# Patient Record
Sex: FEMALE | Race: WHITE | Employment: UNEMPLOYED | URBAN - METROPOLITAN AREA
[De-identification: names, ages, dates, MRNs, and addresses within clinical notes are randomized per-mention and may not be internally consistent; named-entity substitution may affect disease eponyms.]

---

## 2017-01-05 DIAGNOSIS — R07.0 BURNING SENSATION OF THROAT: ICD-10-CM

## 2017-01-05 RX ORDER — PHENOL/SODIUM PHENOLATE
20 AEROSOL, SPRAY (ML) MUCOUS MEMBRANE DAILY
Qty: 30 TAB | Refills: 1 | Status: SHIPPED | OUTPATIENT
Start: 2017-01-05 | End: 2019-08-13 | Stop reason: ALTCHOICE

## 2017-01-05 NOTE — TELEPHONE ENCOUNTER
Requested Prescriptions     Pending Prescriptions Disp Refills    Omeprazole delayed release (PRILOSEC D/R) 20 mg tablet 30 Tab 1     Sig: Take 1 Tab by mouth daily.

## 2017-01-10 ENCOUNTER — TELEPHONE (OUTPATIENT)
Dept: INTERNAL MEDICINE CLINIC | Age: 57
End: 2017-01-10

## 2017-01-13 NOTE — TELEPHONE ENCOUNTER
Denial received from insurance for Omeprazole. Patient contacted and pharmacy faxed insurance decision. Dr Tobias Martínez recommends using OTC omeprazole. Patient expressed understanding.

## 2017-05-09 DIAGNOSIS — E11.9 CONTROLLED TYPE 2 DIABETES MELLITUS WITHOUT COMPLICATION, WITHOUT LONG-TERM CURRENT USE OF INSULIN (HCC): ICD-10-CM

## 2017-05-09 RX ORDER — GLYBURIDE-METFORMIN HYDROCHLORIDE 2.5; 5 MG/1; MG/1
1 TABLET ORAL 2 TIMES DAILY WITH MEALS
Qty: 60 TAB | Refills: 5 | Status: SHIPPED | OUTPATIENT
Start: 2017-05-09 | End: 2017-11-20 | Stop reason: CLARIF

## 2017-05-09 NOTE — TELEPHONE ENCOUNTER
Requested Prescriptions     Pending Prescriptions Disp Refills    glyBURIDE-metFORMIN (GLUCOVANCE) 2.5-500 mg per tablet 60 Tab 5     Sig: Take 1 Tab by mouth two (2) times daily (with meals).

## 2017-05-24 ENCOUNTER — OFFICE VISIT (OUTPATIENT)
Dept: INTERNAL MEDICINE CLINIC | Age: 57
End: 2017-05-24

## 2017-05-24 VITALS
RESPIRATION RATE: 16 BRPM | OXYGEN SATURATION: 99 % | BODY MASS INDEX: 32.14 KG/M2 | WEIGHT: 200 LBS | TEMPERATURE: 97.3 F | DIASTOLIC BLOOD PRESSURE: 76 MMHG | HEART RATE: 93 BPM | SYSTOLIC BLOOD PRESSURE: 128 MMHG | HEIGHT: 66 IN

## 2017-05-24 DIAGNOSIS — I10 ESSENTIAL HYPERTENSION, BENIGN: Primary | ICD-10-CM

## 2017-05-24 DIAGNOSIS — J06.9 UPPER RESPIRATORY TRACT INFECTION, UNSPECIFIED TYPE: ICD-10-CM

## 2017-05-24 RX ORDER — AZITHROMYCIN 250 MG/1
TABLET, FILM COATED ORAL
Qty: 6 TAB | Refills: 0 | Status: SHIPPED | OUTPATIENT
Start: 2017-05-24 | End: 2017-12-04

## 2017-05-24 RX ORDER — CODEINE PHOSPHATE AND GUAIFENESIN 10; 100 MG/5ML; MG/5ML
5 SOLUTION ORAL
Qty: 120 ML | Refills: 0 | Status: SHIPPED | OUTPATIENT
Start: 2017-05-24 | End: 2018-01-16 | Stop reason: ALTCHOICE

## 2017-05-24 RX ORDER — BENAZEPRIL HYDROCHLORIDE AND HYDROCHLOROTHIAZIDE 20; 12.5 MG/1; MG/1
1 TABLET ORAL DAILY
Qty: 30 TAB | Refills: 5 | Status: SHIPPED | OUTPATIENT
Start: 2017-05-24 | End: 2017-12-11 | Stop reason: SDUPTHER

## 2017-05-24 NOTE — PROGRESS NOTES
Derek Montana is a 60 y.o. female presenting today for Nasal Congestion (x1 week)  . HPI:  Derek Montana presents to the office today for fever, rhinorrhea, cough, and sore throat x 1 week. Review of Systems   Constitutional: Positive for fever. HENT: Positive for congestion and sore throat. Respiratory: Positive for cough. Negative for sputum production and shortness of breath. Cardiovascular: Negative for chest pain and palpitations. Gastrointestinal: Negative for nausea and vomiting. Allergies   Allergen Reactions    Clindamycin Hives    Sulfa (Sulfonamide Antibiotics) Unable to Obtain       Current Outpatient Prescriptions   Medication Sig Dispense Refill    benazepril-hydroCHLOROthiazide (LOTENSIN HCT) 20-12.5 mg per tablet Take 1 Tab by mouth daily. 30 Tab 5    azithromycin (ZITHROMAX) 250 mg tablet Take 2 tablets today, then take 1 tablet daily 6 Tab 0    guaiFENesin-codeine (ROBITUSSIN AC) 100-10 mg/5 mL solution Take 5 mL by mouth three (3) times daily as needed for Cough. Max Daily Amount: 15 mL. Do not drive if taking this medication 120 mL 0    glyBURIDE-metFORMIN (GLUCOVANCE) 2.5-500 mg per tablet Take 1 Tab by mouth two (2) times daily (with meals). 60 Tab 5    Omeprazole delayed release (PRILOSEC D/R) 20 mg tablet Take 1 Tab by mouth daily. 30 Tab 1    atorvastatin (LIPITOR) 40 mg tablet 1 tablet daily 30 Tab 5    traMADol (ULTRAM) 50 mg tablet 1 tablet three times per day as needed for ain 90 Tab 3    clotrimazole-betamethasone (LOTRISONE) topical cream Apply to the affected area twice a day for 2 weeks 15 g 0    conjugated estrogens (PREMARIN) 0.625 mg/gram vaginal cream Apply 2 grams at bedtime 45 g 6    nystatin (MYCOSTATIN) topical cream Apply  to affected area two (2) times a day.  45 g 3       Past Medical History:   Diagnosis Date    Essential hypertension, benign     Mixed hyperlipidemia     Type II or unspecified type diabetes mellitus without mention of complication, not stated as uncontrolled        Past Surgical History:   Procedure Laterality Date    HX HYSTERECTOMY      X2       Social History     Social History    Marital status: SINGLE     Spouse name: N/A    Number of children: N/A    Years of education: N/A     Occupational History    Not on file. Social History Main Topics    Smoking status: Never Smoker    Smokeless tobacco: Never Used    Alcohol use 0.0 oz/week     0 Standard drinks or equivalent per week    Drug use: No    Sexual activity: Not Currently     Partners: Male     Other Topics Concern    Not on file     Social History Narrative       Patient does not have an advanced directive on file    Vitals:    05/24/17 0813   BP: 128/76   Pulse: 93   Resp: 16   Temp: 97.3 °F (36.3 °C)   TempSrc: Tympanic   SpO2: 99%   Weight: 200 lb (90.7 kg)   Height: 5' 6\" (1.676 m)   PainSc:   0 - No pain       Physical Exam   Constitutional: No distress. HENT:   Mouth/Throat: No oropharyngeal exudate. Cardiovascular: Normal rate, regular rhythm and normal heart sounds. Pulmonary/Chest: Effort normal and breath sounds normal.   Lymphadenopathy:     She has no cervical adenopathy. Nursing note and vitals reviewed. No visits with results within 3 Month(s) from this visit.   Latest known visit with results is:    Telephone on 08/28/2015   Component Date Value Ref Range Status    Glucose 08/28/2015 323* 65 - 99 mg/dL Final    BUN 08/28/2015 16  6 - 24 mg/dL Final    Creatinine 08/28/2015 0.81  0.57 - 1.00 mg/dL Final    GFR est non-AA 08/28/2015 81  >59 mL/min/1.73 Final    GFR est AA 08/28/2015 94  >59 mL/min/1.73 Final    BUN/Creatinine ratio 08/28/2015 20  9 - 23 Final    Sodium 08/28/2015 137  134 - 144 mmol/L Final    Potassium 08/28/2015 4.5  3.5 - 5.2 mmol/L Final    Chloride 08/28/2015 95* 97 - 108 mmol/L Final    CO2 08/28/2015 25  18 - 29 mmol/L Final    Calcium 08/28/2015 9.0  8.7 - 10.2 mg/dL Final    Protein, total 08/28/2015 7.0  6.0 - 8.5 g/dL Final    Albumin 08/28/2015 4.5  3.5 - 5.5 g/dL Final    GLOBULIN, TOTAL 08/28/2015 2.5  1.5 - 4.5 g/dL Final    A-G Ratio 08/28/2015 1.8  1.1 - 2.5 Final    Bilirubin, total 08/28/2015 0.6  0.0 - 1.2 mg/dL Final    Alk. phosphatase 08/28/2015 135* 39 - 117 IU/L Final    AST (SGOT) 08/28/2015 17  0 - 40 IU/L Final    ALT (SGPT) 08/28/2015 25  0 - 32 IU/L Final    WBC 08/28/2015 9.5  3.4 - 10.8 x10E3/uL Final    RBC 08/28/2015 4.57  3.77 - 5.28 x10E6/uL Final    HGB 08/28/2015 12.8  11.1 - 15.9 g/dL Final    HCT 08/28/2015 39.0  34.0 - 46.6 % Final    MCV 08/28/2015 85  79 - 97 fL Final    MCH 08/28/2015 28.0  26.6 - 33.0 pg Final    MCHC 08/28/2015 32.8  31.5 - 35.7 g/dL Final    RDW 08/28/2015 15.1  12.3 - 15.4 % Final    PLATELET 72/23/6497 617  150 - 379 x10E3/uL Final       .No results found for any visits on 05/24/17. Assessment / Plan:      ICD-10-CM ICD-9-CM    1. Essential hypertension, benign I10 401.1 benazepril-hydroCHLOROthiazide (LOTENSIN HCT) 20-12.5 mg per tablet   2. Upper respiratory tract infection, unspecified type J06.9 465.9 azithromycin (ZITHROMAX) 250 mg tablet      guaiFENesin-codeine (ROBITUSSIN AC) 100-10 mg/5 mL solution     Claritin OTC  Zpak rx  Cheratussin rx   HTN- controlled    Follow-up Disposition:  Return if symptoms worsen or fail to improve. I asked the patient if she  had any questions and answered her  questions.   The patient stated that she understands the treatment plan and agrees with the treatment plan

## 2017-06-05 NOTE — TELEPHONE ENCOUNTER
Requested Prescriptions     Pending Prescriptions Disp Refills    atorvastatin (LIPITOR) 40 mg tablet 30 Tab 5     Si tablet daily

## 2017-06-06 RX ORDER — ATORVASTATIN CALCIUM 40 MG/1
TABLET, FILM COATED ORAL
Qty: 30 TAB | Refills: 5 | Status: SHIPPED | OUTPATIENT
Start: 2017-06-06 | End: 2018-01-16 | Stop reason: SDUPTHER

## 2017-08-16 ENCOUNTER — CLINICAL SUPPORT (OUTPATIENT)
Dept: INTERNAL MEDICINE CLINIC | Age: 57
End: 2017-08-16

## 2017-08-16 DIAGNOSIS — Z23 ENCOUNTER FOR IMMUNIZATION: Primary | ICD-10-CM

## 2017-08-16 NOTE — PROGRESS NOTES
Ariadna Berrios denies any symptoms , reactions or allergies that would exclude them from being immunized today. Influenza vaccine administered per order. Risks and adverse reactions were discussed and the VIS was given to them. All questions were addressed. She was observed for 15 min post injection. There were no reactions observed. Patient left office ambulatory.

## 2017-08-16 NOTE — PATIENT INSTRUCTIONS
Vaccine Information Statement    Influenza (Flu) Vaccine (Inactivated or Recombinant): What you need to know    Many Vaccine Information Statements are available in [place]ic and other languages. See www.immunize.org/vis  Hojas de Información Sobre Vacunas están disponibles en Español y en muchos otros idiomas. Visite www.immunize.org/vis    1. Why get vaccinated? Influenza (flu) is a contagious disease that spreads around the [place] every year, usually between October and May. Flu is caused by influenza viruses, and is spread mainly by coughing, sneezing, and close contact. Anyone can get flu. Flu strikes suddenly and can last several days. Symptoms vary by age, but can include:   fever/chills   sore throat   muscle aches   fatigue   cough   headache    runny or stuffy nose    Flu can also lead to pneumonia and blood infections, and cause diarrhea and seizures in children. If you have a medical condition, such as heart or lung disease, flu can make it worse. Flu is more dangerous for some people. Infants and young children, people 72years of age and older, pregnant women, and people with certain health conditions or a weakened immune system are at greatest risk. Each year thousands of people in the [facility] die from flu, and many more are hospitalized. Flu vaccine can:   keep you from getting flu,   make flu less severe if you do get it, and   keep you from spreading flu to your family and other people. 2. Inactivated and recombinant flu vaccines    A dose of flu vaccine is recommended every flu season. Children 6 months through 6years of age may need two doses during the same flu season. Everyone else needs only one dose each flu season.        Some inactivated flu vaccines contain a very small amount of a mercury-based preservative called thimerosal. Studies have not shown thimerosal in vaccines to be harmful, but flu vaccines that do not contain thimerosal are available. There is no live flu virus in flu shots. They cannot cause the flu. There are many flu viruses, and they are always changing. Each year a new flu vaccine is made to protect against three or four viruses that are likely to cause disease in the upcoming flu season. But even when the vaccine doesnt exactly match these viruses, it may still provide some protection    Flu vaccine cannot prevent:   flu that is caused by a virus not covered by the vaccine, or   illnesses that look like flu but are not. It takes about 2 weeks for protection to develop after vaccination, and protection lasts through the flu season. 3. Some people should not get this vaccine    Tell the person who is giving you the vaccine:     If you have any severe, life-threatening allergies. If you ever had a life-threatening allergic reaction after a dose of flu vaccine, or have a severe allergy to any part of this vaccine, you may be advised not to get vaccinated. Most, but not all, types of flu vaccine contain a small amount of egg protein.  If you ever had Guillain-Barré Syndrome (also called GBS). Some people with a history of GBS should not get this vaccine. This should be discussed with your doctor.  If you are not feeling well. It is usually okay to get flu vaccine when you have a mild illness, but you might be asked to come back when you feel better. 4. Risks of a vaccine reaction    With any medicine, including vaccines, there is a chance of reactions. These are usually mild and go away on their own, but serious reactions are also possible. Most people who get a flu shot do not have any problems with it.      Minor problems following a flu shot include:    soreness, redness, or swelling where the shot was given     hoarseness   sore, red or itchy eyes   cough   fever   aches   headache   itching   fatigue  If these problems occur, they usually begin soon after the shot and last 1 or 2 days. More serious problems following a flu shot can include the following:     There may be a small increased risk of Guillain-Barré Syndrome (GBS) after inactivated flu vaccine. This risk has been estimated at 1 or 2 additional cases per million people vaccinated. This is much lower than the risk of severe complications from flu, which can be prevented by flu vaccine.  Young children who get the flu shot along with pneumococcal vaccine (PCV13) and/or DTaP vaccine at the same time might be slightly more likely to have a seizure caused by fever. Ask your doctor for more information. Tell your doctor if a child who is getting flu vaccine has ever had a seizure. Problems that could happen after any injected vaccine:      People sometimes faint after a medical procedure, including vaccination. Sitting or lying down for about 15 minutes can help prevent fainting, and injuries caused by a fall. Tell your doctor if you feel dizzy, or have vision changes or ringing in the ears.  Some people get severe pain in the shoulder and have difficulty moving the arm where a shot was given. This happens very rarely.  Any medication can cause a severe allergic reaction. Such reactions from a vaccine are very rare, estimated at about 1 in a million doses, and would happen within a few minutes to a few hours after the vaccination. As with any medicine, there is a very remote chance of a vaccine causing a serious injury or death. The safety of vaccines is always being monitored. For more information, visit: www.cdc.gov/vaccinesafety/    5. What if there is a serious reaction? What should I look for?  Look for anything that concerns you, such as signs of a severe allergic reaction, very high fever, or unusual behavior.     Signs of a severe allergic reaction can include hives, swelling of the face and throat, difficulty breathing, a fast heartbeat, dizziness, and weakness - usually within a few minutes to a few hours after the vaccination. What should I do?  If you think it is a severe allergic reaction or other emergency that cant wait, call 9-1-1 and get the person to the nearest hospital. Otherwise, call your doctor.  Reactions should be reported to the Vaccine Adverse Event Reporting System (VAERS). Your doctor should file this report, or you can do it yourself through  the VAERS web site at www.vaers. [facility].gov, or by calling 4-290.378.7450. VAERS does not give medical advice. 6. The National Vaccine Injury Compensation Program    The [facility] Vaccine Injury Compensation Program (VICP) is a federal program that was created to compensate people who may have been injured by certain vaccines. Persons who believe they may have been injured by a vaccine can learn about the program and about filing a claim by calling 1-288.969.3038 or visiting the [employer] website at www.[facility].gov/vaccinecompensation. There is a time limit to file a claim for compensation. 7. How can I learn more?  Ask your healthcare provider. He or she can give you the vaccine package insert or suggest other sources of information.  Call your local or state health department.  Contact the Centers for Disease Control and Prevention (CDC):  - Call 0-974.440.3728 (1-800-CDC-INFO) or  - Visit CDCs website at www.cdc.gov/flu    Vaccine Information Statement   Inactivated Influenza Vaccine   8/7/2015  42 UTino Watson Avers [id number]    Department of Health and Human Services  Centers for Disease Control and Prevention    Office Use Only

## 2017-11-20 ENCOUNTER — OFFICE VISIT (OUTPATIENT)
Dept: INTERNAL MEDICINE CLINIC | Age: 57
End: 2017-11-20

## 2017-11-20 VITALS
RESPIRATION RATE: 18 BRPM | BODY MASS INDEX: 30.86 KG/M2 | DIASTOLIC BLOOD PRESSURE: 84 MMHG | WEIGHT: 192 LBS | OXYGEN SATURATION: 99 % | HEART RATE: 93 BPM | SYSTOLIC BLOOD PRESSURE: 120 MMHG | TEMPERATURE: 98.3 F | HEIGHT: 66 IN

## 2017-11-20 DIAGNOSIS — Z11.59 ENCOUNTER FOR HEPATITIS C SCREENING TEST FOR LOW RISK PATIENT: ICD-10-CM

## 2017-11-20 DIAGNOSIS — I10 ESSENTIAL HYPERTENSION, BENIGN: ICD-10-CM

## 2017-11-20 DIAGNOSIS — N32.81 OAB (OVERACTIVE BLADDER): ICD-10-CM

## 2017-11-20 DIAGNOSIS — E78.2 MIXED HYPERLIPIDEMIA: ICD-10-CM

## 2017-11-20 DIAGNOSIS — Z12.11 COLON CANCER SCREENING: ICD-10-CM

## 2017-11-20 DIAGNOSIS — B37.31 VULVAL CANDIDIASIS: ICD-10-CM

## 2017-11-20 DIAGNOSIS — L30.4 INTERTRIGO: Primary | ICD-10-CM

## 2017-11-20 DIAGNOSIS — E11.9 TYPE 2 DIABETES MELLITUS WITHOUT COMPLICATION, WITHOUT LONG-TERM CURRENT USE OF INSULIN (HCC): ICD-10-CM

## 2017-11-20 DIAGNOSIS — Z12.39 BREAST CANCER SCREENING: ICD-10-CM

## 2017-11-20 LAB — HBA1C MFR BLD HPLC: 13.1 %

## 2017-11-20 RX ORDER — METFORMIN HYDROCHLORIDE 1000 MG/1
1000 TABLET ORAL 2 TIMES DAILY
Qty: 60 TAB | Refills: 2 | Status: SHIPPED | OUTPATIENT
Start: 2017-11-20 | End: 2018-02-20 | Stop reason: ALTCHOICE

## 2017-11-20 RX ORDER — NYSTATIN 100000 U/G
CREAM TOPICAL 2 TIMES DAILY
Qty: 15 G | Refills: 0 | Status: SHIPPED | OUTPATIENT
Start: 2017-11-20 | End: 2018-01-02 | Stop reason: SDUPTHER

## 2017-11-20 RX ORDER — PIOGLITAZONEHYDROCHLORIDE 15 MG/1
15 TABLET ORAL DAILY
Qty: 30 TAB | Refills: 1 | Status: SHIPPED | OUTPATIENT
Start: 2017-11-20 | End: 2018-01-16 | Stop reason: ALTCHOICE

## 2017-11-20 RX ORDER — GLIMEPIRIDE 2 MG/1
2 TABLET ORAL 2 TIMES DAILY
Qty: 60 TAB | Refills: 1 | Status: SHIPPED | OUTPATIENT
Start: 2017-11-20 | End: 2018-01-16 | Stop reason: SDUPTHER

## 2017-11-20 RX ORDER — OXYBUTYNIN CHLORIDE 5 MG/1
5 TABLET ORAL 3 TIMES DAILY
Qty: 90 TAB | Refills: 2 | Status: SHIPPED | OUTPATIENT
Start: 2017-11-20 | End: 2018-01-16 | Stop reason: ALTCHOICE

## 2017-11-20 RX ORDER — DOXYLAMINE SUCCINATE 25 MG
TABLET ORAL 2 TIMES DAILY
Qty: 15 G | Refills: 0 | Status: SHIPPED | OUTPATIENT
Start: 2017-11-20 | End: 2018-01-02

## 2017-11-20 NOTE — PROGRESS NOTES
Patient presents for   Chief Complaint   Patient presents with    Rash     groin and breast     Fall risk assessment was not indicated. Depression screening was not indicated Follow up questions were not indicated. 1. Have you been to the ER, urgent care clinic since your last visit? Hospitalized since your last visit? No    2. Have you seen or consulted any other health care providers outside of the [address] [place] [address] since your last visit? Include any pap smears or colon screening. No     Poc Hemoglobin A1C performed per provider order, provider made aware of results.

## 2017-11-20 NOTE — PROGRESS NOTES
Harshal Tavarez is a 60 y.o. female presenting today for Rash (groin and breast)  . HPI:  Harshal Tavarez presents to the office today for rash in the groin and breast area x 2 weeks. Patient noted she has \"urinary drainage\" a lot. She notes she wears a pad and has to change it several times a day. She is also complaining of a rash between the folds of her breast.     Review of Systems   Respiratory: Negative for cough. Cardiovascular: Negative for chest pain and palpitations. Skin: Positive for rash. Allergies   Allergen Reactions    Clindamycin Hives    Sulfa (Sulfonamide Antibiotics) Unable to Obtain       Current Outpatient Prescriptions   Medication Sig Dispense Refill    nystatin (MYCOSTATIN) topical cream Apply  to affected area two (2) times a day. Underneath Breast 15 g 0    oxybutynin (DITROPAN) 5 mg tablet Take 1 Tab by mouth three (3) times daily. 90 Tab 2    miconazole (MICOTIN) 2 % topical cream Apply  to affected area two (2) times a day. Vaginal area 15 g 0    metFORMIN (GLUCOPHAGE) 1,000 mg tablet Take 1 Tab by mouth two (2) times a day. 60 Tab 2    glimepiride (AMARYL) 2 mg tablet Take 1 Tab by mouth two (2) times a day. 60 Tab 1    pioglitazone (ACTOS) 15 mg tablet Take 1 Tab by mouth daily. 30 Tab 1    atorvastatin (LIPITOR) 40 mg tablet 1 tablet daily 30 Tab 5    benazepril-hydroCHLOROthiazide (LOTENSIN HCT) 20-12.5 mg per tablet Take 1 Tab by mouth daily. 30 Tab 5    Omeprazole delayed release (PRILOSEC D/R) 20 mg tablet Take 1 Tab by mouth daily. 30 Tab 1    traMADol (ULTRAM) 50 mg tablet 1 tablet three times per day as needed for ain 90 Tab 3    conjugated estrogens (PREMARIN) 0.625 mg/gram vaginal cream Apply 2 grams at bedtime 45 g 6    azithromycin (ZITHROMAX) 250 mg tablet Take 2 tablets today, then take 1 tablet daily 6 Tab 0    guaiFENesin-codeine (ROBITUSSIN AC) 100-10 mg/5 mL solution Take 5 mL by mouth three (3) times daily as needed for Cough.  Max Daily Amount: 15 mL. Do not drive if taking this medication 120 mL 0    nystatin (MYCOSTATIN) topical cream Apply  to affected area two (2) times a day. 45 g 3       Past Medical History:   Diagnosis Date    Essential hypertension, benign     Mixed hyperlipidemia     Type II or unspecified type diabetes mellitus without mention of complication, not stated as uncontrolled        Past Surgical History:   Procedure Laterality Date    HX HYSTERECTOMY      X2       Social History     Social History    Marital status: SINGLE     Spouse name: N/A    Number of children: N/A    Years of education: N/A     Occupational History    Not on file. Social History Main Topics    Smoking status: Never Smoker    Smokeless tobacco: Never Used    Alcohol use 0.0 oz/week     0 Standard drinks or equivalent per week    Drug use: No    Sexual activity: Not Currently     Partners: Male     Other Topics Concern    Not on file     Social History Narrative       Patient does not have an advanced directive on file    Vitals:    11/20/17 0939 11/20/17 1023   BP: 150/76 120/84   Pulse: 93    Resp: 18    Temp: 98.3 °F (36.8 °C)    TempSrc: Tympanic    SpO2: 99%    Weight: 192 lb (87.1 kg)    Height: 5' 6\" (1.676 m)    PainSc:   0 - No pain        Physical Exam   Cardiovascular: Normal rate and regular rhythm. Pulmonary/Chest: Effort normal and breath sounds normal.       Abdominal: Soft. Genitourinary: Vulva exhibits rash (erythematous). Nursing note and vitals reviewed. Office Visit on 11/20/2017   Component Date Value Ref Range Status    Hemoglobin A1c (POC) 11/20/2017 13.1  % Final       .  Results for orders placed or performed in visit on 11/20/17   AMB POC HEMOGLOBIN A1C   Result Value Ref Range    Hemoglobin A1c (POC) 13.1 %       Assessment / Plan:      ICD-10-CM ICD-9-CM    1. Intertrigo L30.4 695.89 nystatin (MYCOSTATIN) topical cream      AMB POC HEMOGLOBIN A1C   2.  Essential hypertension, benign I10 443.7 METABOLIC PANEL, COMPREHENSIVE      CBC WITH AUTOMATED DIFF   3. OAB (overactive bladder) N32.81 596.51 oxybutynin (DITROPAN) 5 mg tablet   4. Vulval candidiasis B37.3 112.1 miconazole (MICOTIN) 2 % topical cream      AMB POC HEMOGLOBIN A1C      REFERRAL TO OBSTETRICS AND GYNECOLOGY   5. Breast cancer screening Z12.31 V76.10 BINTA MAMMO BI SCREENING INCL CAD   6. Colon cancer screening Z12.11 V76.51 REFERRAL TO GASTROENTEROLOGY   7. Mixed hyperlipidemia E78.2 272.2 LIPID PANEL   8. Type 2 diabetes mellitus without complication, without long-term current use of insulin (HCC) E11.9 250.00 MICROALBUMIN, UR, RAND W/ MICROALBUMIN/CREA RATIO      metFORMIN (GLUCOPHAGE) 1,000 mg tablet      glimepiride (AMARYL) 2 mg tablet      pioglitazone (ACTOS) 15 mg tablet   9. Encounter for hepatitis C screening test for low risk patient Z11.59 V73.89 HEPATITIS C AB     Mycostatin for the breast  Miconazole for the vaginal region  Ditropan rx given  Hgb A1C done  Referral to GYN  Fasting labs ordered  Mammo ordered  Colonoscopy ordered  Patient needs fasting labs next week  Hgb A1c- 13.1     Medication changes      Repeat in 3 months    Follow-up Disposition:  Return in about 1 week (around 11/27/2017), or if symptoms worsen or fail to improve. I asked the patient if she  had any questions and answered her  questions. The patient stated that she understands the treatment plan and agrees with the treatment plan    Discussed the patient's BMI with her. The BMI follow up plan is as follows:     dietary management education, guidance, and counseling  encourage exercise  monitor weight    An After Visit Summary was printed and given to the patient.

## 2017-11-20 NOTE — MR AVS SNAPSHOT
Visit Information Date & Time Provider Department Dept. Phone Encounter #  
 11/20/2017  9:45 AM Elle Kelly NP [facility] INTERNAL MEDICINE [facility] [phone] [id number] Follow-up Instructions Return in about 1 week (around 11/27/2017), or if symptoms worsen or fail to improve. Follow-up and Disposition History Upcoming Health Maintenance Date Due Hepatitis C Screening 1960 HEMOGLOBIN A1C Q6M 1958 LIPID PANEL Q1 1958 FOOT EXAM Q1 12/16/1968 MICROALBUMIN Q1 12/16/1968 EYE EXAM RETINAL OR DILATED Q1 12/16/1968 Pneumococcal 19-64 Medium Risk (1 of 1 - PPSV23) 12/16/1977 DTaP/Tdap/Td series (1 - Tdap) 12/16/1979 PAP AKA CERVICAL CYTOLOGY 12/16/1979 BREAST CANCER SCRN MAMMOGRAM 12/16/2008 FOBT Q 1 YEAR AGE 50-75 12/16/2008 Allergies as of 11/20/2017  Review Complete On: 11/20/2017 By: Elle Kelly NP Severity Noted Reaction Type Reactions Clindamycin High 05/24/2016    Hives Sulfa (Sulfonamide Antibiotics) Medium   Unable to Obtain Current Immunizations  Reviewed on 8/16/2017 Name Date Influenza Vaccine (Quad) PF 8/16/2017 Not reviewed this visit You Were Diagnosed With   
  
 Codes Comments Intertrigo    -  Primary ICD-10-CM: L30.4 ICD-9-CM: 695.89 Essential hypertension, benign     ICD-10-CM: I10 
ICD-9-CM: 401.1   
 OAB (overactive bladder)     ICD-10-CM: N32.81 ICD-9-CM: 596.51 Vulval candidiasis     ICD-10-CM: B37.3 ICD-9-CM: 112.1 Breast cancer screening     ICD-10-CM: Z12.31 
ICD-9-CM: V76.10 Colon cancer screening     ICD-10-CM: Z12.11 ICD-9-CM: V76.51 Mixed hyperlipidemia     ICD-10-CM: E78.2 ICD-9-CM: 272.2 Type 2 diabetes mellitus without complication, without long-term current use of insulin (HCC)     ICD-10-CM: E11.9 ICD-9-CM: 250.00  Encounter for hepatitis C screening test for low risk patient     ICD-10-CM: Z11.59 
 ICD-9-CM: V73.89 Vitals BP Pulse Temp Resp Height(growth percentile) Weight(growth percentile) 120/84 93 98.3 °F (36.8 °C) (Tympanic) 18 5' 6\" (1.676 m) 192 lb (87.1 kg) SpO2 BMI OB Status Smoking Status 99% 30.99 kg/m2 Hysterectomy Never Smoker Vitals History BMI and BSA Data Body Mass Index Body Surface Area 30.99 kg/m 2 2.01 m 2 Preferred Pharmacy Pharmacy Name [facility] [address] [place] [address] [phone] Your Updated Medication List  
  
   
This list is accurate as of: 11/20/17 11:32 AM.  Always use your most recent med list.  
  
  
  
  
 atorvastatin 40 mg tablet Commonly known as:  LIPITOR  
1 tablet daily  
  
 azithromycin 250 mg tablet Commonly known as:  Marline Albert Take 2 tablets today, then take 1 tablet daily  
  
 benazepril-hydroCHLOROthiazide 20-12.5 mg per tablet Commonly known as:  LOTENSIN HCT Take 1 Tab by mouth daily. conjugated estrogens 0.625 mg/gram vaginal cream  
Commonly known as:  PREMARIN Apply 2 grams at bedtime  
  
 glimepiride 2 mg tablet Commonly known as:  AMARYL Take 1 Tab by mouth two (2) times a day. guaiFENesin-codeine 100-10 mg/5 mL solution Commonly known as:  ROBITUSSIN AC Take 5 mL by mouth three (3) times daily as needed for Cough. Max Daily Amount: 15 mL. Do not drive if taking this medication  
  
 metFORMIN 1,000 mg tablet Commonly known as:  GLUCOPHAGE Take 1 Tab by mouth two (2) times a day. miconazole 2 % topical cream  
Commonly known as:  [place]  Apply  to affected area two (2) times a day. Vaginal area * nystatin topical cream  
Commonly known as:  MYCOSTATIN Apply  to affected area two (2) times a day. * nystatin topical cream  
Commonly known as:  MYCOSTATIN Apply  to affected area two (2) times a day. Underneath Breast  
  
 Omeprazole delayed release 20 mg tablet Commonly known as:  PRILOSEC D/R  
 Take 1 Tab by mouth daily. oxybutynin 5 mg tablet Commonly known as:  GJVDKABR Take 1 Tab by mouth three (3) times daily. pioglitazone 15 mg tablet Commonly known as:  ACTOS Take 1 Tab by mouth daily. traMADol 50 mg tablet Commonly known as:  ULTRAM  
1 tablet three times per day as needed for ain * Notice: This list has 2 medication(s) that are the same as other medications prescribed for you. Read the directions carefully, and ask your doctor or other care provider to review them with you. Prescriptions Sent to Pharmacy Refills  
 nystatin (MYCOSTATIN) topical cream 0 Sig: Apply  to affected area two (2) times a day. Underneath Breast  
 Class: Normal  
 Pharmacy: [facility] [place] [address] Ph #: [phone] Route: Topical  
 oxybutynin (DITROPAN) 5 mg tablet 2 Sig: Take 1 Tab by mouth three (3) times daily. Class: Normal  
 Pharmacy: [facility] [place] [address] Ph #: [phone] Route: Oral  
 miconazole (MICOTIN) 2 % topical cream 0 Sig: Apply  to affected area two (2) times a day. Vaginal area Class: Normal  
 Pharmacy: [facility] [place] [address] Ph #: [phone] Route: Topical  
 metFORMIN (GLUCOPHAGE) 1,000 mg tablet 2 Sig: Take 1 Tab by mouth two (2) times a day. Class: Normal  
 Pharmacy: [facility] [place] [address] Ph #: [phone] Route: Oral  
 glimepiride (AMARYL) 2 mg tablet 1 Sig: Take 1 Tab by mouth two (2) times a day. Class: Normal  
 Pharmacy: [facility] [place] [address] Ph #: [phone] Route: Oral  
 pioglitazone (ACTOS) 15 mg tablet 1 Sig: Take 1 Tab by mouth daily. Class: Normal  
 Pharmacy: [facility] [place] [address] Ph #: [phone] Route: Oral  
  
We Performed the Following AMB POC HEMOGLOBIN A1C [71446 CPT(R)] REFERRAL TO GASTROENTEROLOGY [KPK41 Custom] Comments:  
 Colon cancer screening REFERRAL TO OBSTETRICS AND GYNECOLOGY [REF51 Custom] Comments:  
 Vulva rash and routine exam  
  
Follow-up Instructions Return in about 1 week (around 11/27/2017), or if symptoms worsen or fail to improve. To-Do List   
 11/20/2017 Lab:  CBC WITH AUTOMATED DIFF   
  
 11/20/2017 Lab:  HEPATITIS C AB   
  
 11/20/2017 Lab:  LIPID PANEL   
  
 11/20/2017 Imaging:  BINTA MAMMO BI SCREENING INCL CAD   
  
 11/20/2017 Lab:  METABOLIC PANEL, COMPREHENSIVE   
  
 11/20/2017 Lab:  MICROALBUMIN, UR, RAND W/ MICROALBUMIN/CREA RATIO Referral Information Referral ID Referred By Referred To  
  
 [id number] Bharat Soni MD   
   [address] [place] [address] [address] [place] [address], [address] [place] [address]. Phone: [phone] Fax: [phone] Visits Status Start Date End Date 1 New Request 11/20/17 11/20/18 If your referral has a status of pending review or denied, additional information will be sent to support the outcome of this decision. Referral ID Referred By Referred To  
 [id number] Hayden [address] [place], [place] [address]MD [address] [facility][1], [address] Phone: [phone] Fax: [phone] Visits Status Start Date End Date 1 New Request 11/20/17 11/20/18 If your referral has a status of pending review or denied, additional information will be sent to support the outcome of this decision. Patient Instructions Body Mass Index: Care Instructions Your Care Instructions Body mass index (BMI) can help you see if your weight is raising your risk for health problems. It uses a formula to compare how much you weigh with how tall you are. · A BMI lower than 18.5 is considered underweight. · A BMI between 18.5 and 24.9 is considered healthy. · A BMI between 25 and 29.9 is considered overweight. A BMI of 30 or higher is considered obese. If your BMI is in the normal range, it means that you have a lower risk for weight-related health problems. If your BMI is in the overweight or obese range, you may be at increased risk for weight-related health problems, such as high blood pressure, heart disease, stroke, arthritis or joint pain, and diabetes. If your BMI is in the underweight range, you may be at increased risk for health problems such as fatigue, lower protection (immunity) against illness, muscle loss, bone loss, hair loss, and hormone problems. BMI is just one measure of your risk for weight-related health problems. You may be at higher risk for health problems if you are not active, you eat an unhealthy diet, or you drink too much alcohol or use tobacco products. Follow-up care is a key part of your treatment and safety. Be sure to make and go to all appointments, and call your doctor if you are having problems. It's also a good idea to know your test results and keep a list of the medicines you take. How can you care for yourself at home? · Practice healthy eating habits. This includes eating plenty of fruits, vegetables, whole grains, lean protein, and low-fat dairy. · If your doctor recommends it, get more exercise. Walking is a good choice. Bit by bit, increase the amount you walk every day. Try for at least 30 minutes on most days of the week. · Do not smoke. Smoking can increase your risk for health problems. If you need help quitting, talk to your doctor about stop-smoking programs and medicines. These can increase your chances of quitting for good. · Limit alcohol to 2 drinks a day for men and 1 drink a day for women. Too much alcohol can cause health problems. If you have a BMI higher than 25 · Your doctor may do other tests to check your risk for weight-related health problems.  This may include measuring the distance around your waist. A waist measurement of more than 40 inches in men or 35 inches in women can increase the risk of weight-related health problems. · Talk with your doctor about steps you can take to stay healthy or improve your health. You may need to make lifestyle changes to lose weight and stay healthy, such as changing your diet and getting regular exercise. If you have a BMI lower than 18.5 · Your doctor may do other tests to check your risk for health problems. · Talk with your doctor about steps you can take to stay healthy or improve your health. You may need to make lifestyle changes to gain or maintain weight and stay healthy, such as getting more healthy foods in your diet and doing exercises to build muscle. Where can you learn more? Go to http://lyn-misty.info/. Enter S176 in the search box to learn more about \"Body Mass Index: Care Instructions. \" Current as of: October 13, 2016 Content Version: 11.4 © 6530-3905 [employer]. Care instructions adapted under license by [employer] (which disclaims liability or warranty for this information). If you have questions about a medical condition or this instruction, always ask your healthcare professional. [place] [address] any warranty or liability for your use of this information. Introducing [facility]! Miroslava Stahl introduces [facility] patient portal. Now you can access parts of your medical record, email your doctor's office, and request medication refills online. 1. In your internet browser, go to [email or web address] [employer]. [employer] / [employer] 2. Click on the First Time User? Click Here link in the Sign In box. You will see the New Member Sign Up page. 3. Enter your [facility] Access Code exactly as it appears below. You will not need to use this code after youve completed the sign-up process. If you do not sign up before the expiration date, you must request a new code. · [employer] Access Code: [email or web address] Expires: 2/18/2018 11:32 AM 
 
4. Enter the last four digits of your Social Security Number (xxxx) and Date of Birth (mm/dd/yyyy) as indicated and click Submit. You will be taken to the next sign-up page. 5. Create a [employer] ID. This will be your [employer] login ID and cannot be changed, so think of one that is secure and easy to remember. 6. Create a [employer] password. You can change your password at any time. 7. Enter your Password Reset Question and Answer. This can be used at a later time if you forget your password. 8. Enter your e-mail address. You will receive e-mail notification when new information is available in [address] 9. Click Sign Up. You can now view and download portions of your medical record. 10. Click the Download Summary menu link to download a portable copy of your medical information. If you have questions, please visit the Frequently Asked Questions section of the [employer] website. Remember, [employer] is NOT to be used for urgent needs. For medical emergencies, dial 911. Now available from your iPhone and Android! Please provide this summary of care documentation to your next provider. Your primary care clinician is listed as Marie Drake. If you have any questions after today's visit, please call [phone].

## 2017-11-20 NOTE — PATIENT INSTRUCTIONS
Body Mass Index: Care Instructions  Your Care Instructions    Body mass index (BMI) can help you see if your weight is raising your risk for health problems. It uses a formula to compare how much you weigh with how tall you are. · A BMI lower than 18.5 is considered underweight. · A BMI between 18.5 and 24.9 is considered healthy. · A BMI between 25 and 29.9 is considered overweight. A BMI of 30 or higher is considered obese. If your BMI is in the normal range, it means that you have a lower risk for weight-related health problems. If your BMI is in the overweight or obese range, you may be at increased risk for weight-related health problems, such as high blood pressure, heart disease, stroke, arthritis or joint pain, and diabetes. If your BMI is in the underweight range, you may be at increased risk for health problems such as fatigue, lower protection (immunity) against illness, muscle loss, bone loss, hair loss, and hormone problems. BMI is just one measure of your risk for weight-related health problems. You may be at higher risk for health problems if you are not active, you eat an unhealthy diet, or you drink too much alcohol or use tobacco products. Follow-up care is a key part of your treatment and safety. Be sure to make and go to all appointments, and call your doctor if you are having problems. It's also a good idea to know your test results and keep a list of the medicines you take. How can you care for yourself at home? · Practice healthy eating habits. This includes eating plenty of fruits, vegetables, whole grains, lean protein, and low-fat dairy. · If your doctor recommends it, get more exercise. Walking is a good choice. Bit by bit, increase the amount you walk every day. Try for at least 30 minutes on most days of the week. · Do not smoke. Smoking can increase your risk for health problems. If you need help quitting, talk to your doctor about stop-smoking programs and medicines. These can increase your chances of quitting for good. · Limit alcohol to 2 drinks a day for men and 1 drink a day for women. Too much alcohol can cause health problems. If you have a BMI higher than 25  · Your doctor may do other tests to check your risk for weight-related health problems. This may include measuring the distance around your waist. A waist measurement of more than 40 inches in men or 35 inches in women can increase the risk of weight-related health problems. · Talk with your doctor about steps you can take to stay healthy or improve your health. You may need to make lifestyle changes to lose weight and stay healthy, such as changing your diet and getting regular exercise. If you have a BMI lower than 18.5  · Your doctor may do other tests to check your risk for health problems. · Talk with your doctor about steps you can take to stay healthy or improve your health. You may need to make lifestyle changes to gain or maintain weight and stay healthy, such as getting more healthy foods in your diet and doing exercises to build muscle. Where can you learn more? Go to http://lyn-misty.info/. Enter S176 in the search box to learn more about \"Body Mass Index: Care Instructions. \"  Current as of: October 13, 2016  Content Version: 11.4  © 5569-1267 [employer]. Care instructions adapted under license by [employer] (which disclaims liability or warranty for this information). If you have questions about a medical condition or this instruction, always ask your healthcare professional. [address] any warranty or liability for your use of this information.

## 2017-11-27 ENCOUNTER — LAB ONLY (OUTPATIENT)
Dept: INTERNAL MEDICINE CLINIC | Age: 57
End: 2017-11-27

## 2017-11-27 DIAGNOSIS — I10 ESSENTIAL HYPERTENSION, BENIGN: Primary | ICD-10-CM

## 2017-11-27 DIAGNOSIS — E78.2 MIXED HYPERLIPIDEMIA: ICD-10-CM

## 2017-11-27 DIAGNOSIS — Z11.59 ENCOUNTER FOR HEPATITIS C SCREENING TEST FOR LOW RISK PATIENT: ICD-10-CM

## 2017-11-27 DIAGNOSIS — E11.9 TYPE 2 DIABETES MELLITUS WITHOUT COMPLICATION, WITHOUT LONG-TERM CURRENT USE OF INSULIN (HCC): ICD-10-CM

## 2017-11-28 LAB
ALBUMIN SERPL-MCNC: 4.4 G/DL (ref 3.5–5.5)
ALBUMIN/CREAT UR: 14.4 MG/G CREAT (ref 0–30)
ALBUMIN/GLOB SERPL: 1.8 {RATIO} (ref 1.2–2.2)
ALP SERPL-CCNC: 159 IU/L (ref 39–117)
ALT SERPL-CCNC: 19 IU/L (ref 0–32)
AST SERPL-CCNC: 9 IU/L (ref 0–40)
BASOPHILS # BLD AUTO: 0 X10E3/UL (ref 0–0.2)
BASOPHILS NFR BLD AUTO: 0 %
BILIRUB SERPL-MCNC: 0.6 MG/DL (ref 0–1.2)
BUN SERPL-MCNC: 17 MG/DL (ref 6–24)
BUN/CREAT SERPL: 24 (ref 9–23)
CALCIUM SERPL-MCNC: 9 MG/DL (ref 8.7–10.2)
CHLORIDE SERPL-SCNC: 93 MMOL/L (ref 96–106)
CHOLEST SERPL-MCNC: 136 MG/DL (ref 100–199)
CO2 SERPL-SCNC: 27 MMOL/L (ref 18–29)
CREAT SERPL-MCNC: 0.72 MG/DL (ref 0.57–1)
CREAT UR-MCNC: 45.8 MG/DL
EOSINOPHIL # BLD AUTO: 0.1 X10E3/UL (ref 0–0.4)
EOSINOPHIL NFR BLD AUTO: 1 %
ERYTHROCYTE [DISTWIDTH] IN BLOOD BY AUTOMATED COUNT: 14.2 % (ref 12.3–15.4)
GFR SERPLBLD CREATININE-BSD FMLA CKD-EPI: 107 ML/MIN/1.73
GFR SERPLBLD CREATININE-BSD FMLA CKD-EPI: 93 ML/MIN/1.73
GLOBULIN SER CALC-MCNC: 2.5 G/DL (ref 1.5–4.5)
GLUCOSE SERPL-MCNC: 297 MG/DL (ref 65–99)
HCT VFR BLD AUTO: 38.8 % (ref 34–46.6)
HCV AB S/CO SERPL IA: 0.1 S/CO RATIO (ref 0–0.9)
HDLC SERPL-MCNC: 39 MG/DL
HGB BLD-MCNC: 12.5 G/DL (ref 11.1–15.9)
IMM GRANULOCYTES # BLD: 0 X10E3/UL (ref 0–0.1)
IMM GRANULOCYTES NFR BLD: 0 %
LDLC SERPL CALC-MCNC: 56 MG/DL (ref 0–99)
LYMPHOCYTES # BLD AUTO: 2.3 X10E3/UL (ref 0.7–3.1)
LYMPHOCYTES NFR BLD AUTO: 23 %
MCH RBC QN AUTO: 28 PG (ref 26.6–33)
MCHC RBC AUTO-ENTMCNC: 32.2 G/DL (ref 31.5–35.7)
MCV RBC AUTO: 87 FL (ref 79–97)
MICROALBUMIN UR-MCNC: 6.6 UG/ML
MONOCYTES # BLD AUTO: 0.5 X10E3/UL (ref 0.1–0.9)
MONOCYTES NFR BLD AUTO: 5 %
NEUTROPHILS # BLD AUTO: 7 X10E3/UL (ref 1.4–7)
NEUTROPHILS NFR BLD AUTO: 71 %
PLATELET # BLD AUTO: 240 X10E3/UL (ref 150–379)
POTASSIUM SERPL-SCNC: 4.3 MMOL/L (ref 3.5–5.2)
PROT SERPL-MCNC: 6.9 G/DL (ref 6–8.5)
RBC # BLD AUTO: 4.47 X10E6/UL (ref 3.77–5.28)
SODIUM SERPL-SCNC: 140 MMOL/L (ref 134–144)
TRIGL SERPL-MCNC: 203 MG/DL (ref 0–149)
VLDLC SERPL CALC-MCNC: 41 MG/DL (ref 5–40)
WBC # BLD AUTO: 10 X10E3/UL (ref 3.4–10.8)

## 2017-11-30 NOTE — PROGRESS NOTES
Please let the patient know her labs are ok. Please confirm she is taking her cholesterol med. She should limit fried, fatty food and increase activity.

## 2017-12-04 ENCOUNTER — OFFICE VISIT (OUTPATIENT)
Dept: INTERNAL MEDICINE CLINIC | Age: 57
End: 2017-12-04

## 2017-12-04 VITALS
RESPIRATION RATE: 18 BRPM | OXYGEN SATURATION: 97 % | WEIGHT: 196 LBS | HEIGHT: 66 IN | DIASTOLIC BLOOD PRESSURE: 70 MMHG | TEMPERATURE: 97.8 F | BODY MASS INDEX: 31.5 KG/M2 | SYSTOLIC BLOOD PRESSURE: 118 MMHG | HEART RATE: 92 BPM

## 2017-12-04 DIAGNOSIS — I10 ESSENTIAL HYPERTENSION, BENIGN: ICD-10-CM

## 2017-12-04 DIAGNOSIS — J06.9 ACUTE URI: Primary | ICD-10-CM

## 2017-12-04 RX ORDER — AZITHROMYCIN 250 MG/1
TABLET, FILM COATED ORAL
Qty: 6 TAB | Refills: 0 | Status: SHIPPED | OUTPATIENT
Start: 2017-12-04 | End: 2017-12-29

## 2017-12-04 RX ORDER — CODEINE PHOSPHATE AND GUAIFENESIN 10; 100 MG/5ML; MG/5ML
5 SOLUTION ORAL
Qty: 120 ML | Refills: 0 | Status: SHIPPED | OUTPATIENT
Start: 2017-12-04 | End: 2018-01-02 | Stop reason: SDUPTHER

## 2017-12-04 NOTE — PROGRESS NOTES
Mary Alfonso is a 60 y.o. female presenting today for Nasal Congestion  . HPI:  Mary Alfonso presents to the office today for sore throat, nasal congestion, non-productive cough x 1 week. Review of Systems   HENT: Positive for congestion and sore throat. Respiratory: Positive for cough. Negative for sputum production and shortness of breath. Cardiovascular: Negative for chest pain and palpitations. Allergies   Allergen Reactions    Clindamycin Hives    Sulfa (Sulfonamide Antibiotics) Unable to Obtain       Current Outpatient Prescriptions   Medication Sig Dispense Refill    azithromycin (ZITHROMAX) 250 mg tablet Take 2 tablets today, then take 1 tablet daily 6 Tab 0    guaiFENesin-codeine (ROBITUSSIN AC) 100-10 mg/5 mL solution Take 5 mL by mouth three (3) times daily as needed for Cough. Max Daily Amount: 15 mL. Do not drive if taking this medication 120 mL 0    nystatin (MYCOSTATIN) topical cream Apply  to affected area two (2) times a day. Underneath Breast 15 g 0    oxybutynin (DITROPAN) 5 mg tablet Take 1 Tab by mouth three (3) times daily. 90 Tab 2    miconazole (MICOTIN) 2 % topical cream Apply  to affected area two (2) times a day. Vaginal area 15 g 0    metFORMIN (GLUCOPHAGE) 1,000 mg tablet Take 1 Tab by mouth two (2) times a day. 60 Tab 2    glimepiride (AMARYL) 2 mg tablet Take 1 Tab by mouth two (2) times a day. 60 Tab 1    pioglitazone (ACTOS) 15 mg tablet Take 1 Tab by mouth daily. 30 Tab 1    atorvastatin (LIPITOR) 40 mg tablet 1 tablet daily 30 Tab 5    benazepril-hydroCHLOROthiazide (LOTENSIN HCT) 20-12.5 mg per tablet Take 1 Tab by mouth daily. 30 Tab 5    Omeprazole delayed release (PRILOSEC D/R) 20 mg tablet Take 1 Tab by mouth daily.  30 Tab 1    traMADol (ULTRAM) 50 mg tablet 1 tablet three times per day as needed for ain 90 Tab 3    conjugated estrogens (PREMARIN) 0.625 mg/gram vaginal cream Apply 2 grams at bedtime 45 g 6    guaiFENesin-codeine (ROBITUSSIN AC) 100-10 mg/5 mL solution Take 5 mL by mouth three (3) times daily as needed for Cough. Max Daily Amount: 15 mL. Do not drive if taking this medication 120 mL 0    nystatin (MYCOSTATIN) topical cream Apply  to affected area two (2) times a day. 45 g 3       Past Medical History:   Diagnosis Date    Essential hypertension, benign     Mixed hyperlipidemia     Type II or unspecified type diabetes mellitus without mention of complication, not stated as uncontrolled        Past Surgical History:   Procedure Laterality Date    HX HYSTERECTOMY      X2       Social History     Social History    Marital status: SINGLE     Spouse name: N/A    Number of children: N/A    Years of education: N/A     Occupational History    Not on file. Social History Main Topics    Smoking status: Never Smoker    Smokeless tobacco: Never Used    Alcohol use 0.0 oz/week     0 Standard drinks or equivalent per week    Drug use: No    Sexual activity: Not Currently     Partners: Male     Other Topics Concern    Not on file     Social History Narrative       Patient does not have an advanced directive on file    Vitals:    12/04/17 1226   BP: 118/70   Pulse: 92   Resp: 18   Temp: 97.8 °F (36.6 °C)   TempSrc: Tympanic   SpO2: 97%   Weight: 196 lb (88.9 kg)   Height: 5' 6\" (1.676 m)   PainSc:   8   PainLoc: Throat       Physical Exam   HENT:   Right Ear: External ear normal.   Left Ear: External ear normal.   Mouth/Throat: No oropharyngeal exudate. Cardiovascular: Normal rate, regular rhythm and normal heart sounds. Pulmonary/Chest: Effort normal and breath sounds normal.   Lymphadenopathy:     She has no cervical adenopathy. Nursing note and vitals reviewed.       Office Visit on 11/20/2017   Component Date Value Ref Range Status    Hemoglobin A1c (POC) 11/20/2017 13.1  % Final    WBC 11/27/2017 10.0  3.4 - 10.8 x10E3/uL Final    RBC 11/27/2017 4.47  3.77 - 5.28 x10E6/uL Final    HGB 11/27/2017 12.5  11.1 - 15.9 g/dL Final    Comment: **Effective December 4, 2017 the reference interval**    for Hemoglobin MALES only will be changing to: Males 13-15 years: 12.6 - 17.7                          Males   >15 years: 13.0 - 17.7      HCT 11/27/2017 38.8  34.0 - 46.6 % Final    MCV 11/27/2017 87  79 - 97 fL Final    MCH 11/27/2017 28.0  26.6 - 33.0 pg Final    MCHC 11/27/2017 32.2  31.5 - 35.7 g/dL Final    RDW 11/27/2017 14.2  12.3 - 15.4 % Final    PLATELET 40/23/7248 149  150 - 379 x10E3/uL Final    NEUTROPHILS 11/27/2017 71  Not Estab. % Final    Lymphocytes 11/27/2017 23  Not Estab. % Final    MONOCYTES 11/27/2017 5  Not Estab. % Final    EOSINOPHILS 11/27/2017 1  Not Estab. % Final    BASOPHILS 11/27/2017 0  Not Estab. % Final    ABS. NEUTROPHILS 11/27/2017 7.0  1.4 - 7.0 x10E3/uL Final    Abs Lymphocytes 11/27/2017 2.3  0.7 - 3.1 x10E3/uL Final    ABS. MONOCYTES 11/27/2017 0.5  0.1 - 0.9 x10E3/uL Final    ABS. EOSINOPHILS 11/27/2017 0.1  0.0 - 0.4 x10E3/uL Final    ABS. BASOPHILS 11/27/2017 0.0  0.0 - 0.2 x10E3/uL Final    IMMATURE GRANULOCYTES 11/27/2017 0  Not Estab. % Final    ABS. IMM.  GRANS. 11/27/2017 0.0  0.0 - 0.1 x10E3/uL Final    Glucose 11/27/2017 297* 65 - 99 mg/dL Final    BUN 11/27/2017 17  6 - 24 mg/dL Final    Creatinine 11/27/2017 0.72  0.57 - 1.00 mg/dL Final    GFR est non-AA 11/27/2017 93  >59 mL/min/1.73 Final    GFR est AA 11/27/2017 107  >59 mL/min/1.73 Final    BUN/Creatinine ratio 11/27/2017 24* 9 - 23 Final    Sodium 11/27/2017 140  134 - 144 mmol/L Final    Potassium 11/27/2017 4.3  3.5 - 5.2 mmol/L Final    Chloride 11/27/2017 93* 96 - 106 mmol/L Final    CO2 11/27/2017 27  18 - 29 mmol/L Final    Calcium 11/27/2017 9.0  8.7 - 10.2 mg/dL Final    Protein, total 11/27/2017 6.9  6.0 - 8.5 g/dL Final    Albumin 11/27/2017 4.4  3.5 - 5.5 g/dL Final    GLOBULIN, TOTAL 11/27/2017 2.5  1.5 - 4.5 g/dL Final    A-G Ratio 11/27/2017 1.8  1.2 - 2.2 Final    Bilirubin, total 11/27/2017 0.6  0.0 - 1.2 mg/dL Final    Alk. phosphatase 11/27/2017 159* 39 - 117 IU/L Final    AST (SGOT) 11/27/2017 9  0 - 40 IU/L Final    ALT (SGPT) 11/27/2017 19  0 - 32 IU/L Final    Cholesterol, total 11/27/2017 136  100 - 199 mg/dL Final    Triglyceride 11/27/2017 203* 0 - 149 mg/dL Final    HDL Cholesterol 11/27/2017 39* >39 mg/dL Final    VLDL, calculated 11/27/2017 41* 5 - 40 mg/dL Final    LDL, calculated 11/27/2017 56  0 - 99 mg/dL Final    Creatinine, urine 11/27/2017 45.8  Not Estab. mg/dL Final    Microalbumin, urine 11/27/2017 6.6  Not Estab. ug/mL Final    Microalb/Creat ratio (ug/mg creat.) 11/27/2017 14.4  0.0 - 30.0 mg/g creat Final    Hep C Virus Ab 11/27/2017 0.1  0.0 - 0.9 s/co ratio Final    Comment:                                   Negative:     < 0.8                               Indeterminate: 0.8 - 0.9                                    Positive:     > 0.9   The CDC recommends that a positive HCV antibody result   be followed up with a HCV Nucleic Acid Amplification   test (214001). .No results found for any visits on 12/04/17. Assessment / Plan:      ICD-10-CM ICD-9-CM    1. Acute URI J06.9 465.9 azithromycin (ZITHROMAX) 250 mg tablet      guaiFENesin-codeine (ROBITUSSIN AC) 100-10 mg/5 mL solution   2. Essential hypertension, benign I10 401.1      zpak rx  Cheratussin rx  HTN- controlled  Lab work reviewed  BMI discussed    Follow-up Disposition:  Return if symptoms worsen or fail to improve. I asked the patient if she  had any questions and answered her  questions. The patient stated that she understands the treatment plan and agrees with the treatment plan    Discussed the patient's BMI with her. The BMI follow up plan is as follows:     dietary management education, guidance, and counseling  encourage exercise  monitor weight    An After Visit Summary was printed and given to the patient.

## 2017-12-04 NOTE — PROGRESS NOTES
Patient presents for   Chief Complaint   Patient presents with    Nasal Congestion     Fall risk assessment was not indicated. Depression screening was not indicated Follow up questions were not indicated. 1. Have you been to the ER, urgent care clinic since your last visit? Hospitalized since your last visit? No    2. Have you seen or consulted any other health care providers outside of the [address] [place] [address] since your last visit? Include any pap smears or colon screening.  No

## 2017-12-04 NOTE — PATIENT INSTRUCTIONS
Body Mass Index: Care Instructions  Your Care Instructions    Body mass index (BMI) can help you see if your weight is raising your risk for health problems. It uses a formula to compare how much you weigh with how tall you are. · A BMI lower than 18.5 is considered underweight. · A BMI between 18.5 and 24.9 is considered healthy. · A BMI between 25 and 29.9 is considered overweight. A BMI of 30 or higher is considered obese. If your BMI is in the normal range, it means that you have a lower risk for weight-related health problems. If your BMI is in the overweight or obese range, you may be at increased risk for weight-related health problems, such as high blood pressure, heart disease, stroke, arthritis or joint pain, and diabetes. If your BMI is in the underweight range, you may be at increased risk for health problems such as fatigue, lower protection (immunity) against illness, muscle loss, bone loss, hair loss, and hormone problems. BMI is just one measure of your risk for weight-related health problems. You may be at higher risk for health problems if you are not active, you eat an unhealthy diet, or you drink too much alcohol or use tobacco products. Follow-up care is a key part of your treatment and safety. Be sure to make and go to all appointments, and call your doctor if you are having problems. It's also a good idea to know your test results and keep a list of the medicines you take. How can you care for yourself at home? · Practice healthy eating habits. This includes eating plenty of fruits, vegetables, whole grains, lean protein, and low-fat dairy. · If your doctor recommends it, get more exercise. Walking is a good choice. Bit by bit, increase the amount you walk every day. Try for at least 30 minutes on most days of the week. · Do not smoke. Smoking can increase your risk for health problems. If you need help quitting, talk to your doctor about stop-smoking programs and medicines. These can increase your chances of quitting for good. · Limit alcohol to 2 drinks a day for men and 1 drink a day for women. Too much alcohol can cause health problems. If you have a BMI higher than 25  · Your doctor may do other tests to check your risk for weight-related health problems. This may include measuring the distance around your waist. A waist measurement of more than 40 inches in men or 35 inches in women can increase the risk of weight-related health problems. · Talk with your doctor about steps you can take to stay healthy or improve your health. You may need to make lifestyle changes to lose weight and stay healthy, such as changing your diet and getting regular exercise. If you have a BMI lower than 18.5  · Your doctor may do other tests to check your risk for health problems. · Talk with your doctor about steps you can take to stay healthy or improve your health. You may need to make lifestyle changes to gain or maintain weight and stay healthy, such as getting more healthy foods in your diet and doing exercises to build muscle. Where can you learn more? Go to http://lyn-misty.info/. Enter S176 in the search box to learn more about \"Body Mass Index: Care Instructions. \"  Current as of: October 13, 2016  Content Version: 11.4  © 4641-8104 [employer]. Care instructions adapted under license by [employer] (which disclaims liability or warranty for this information). If you have questions about a medical condition or this instruction, always ask your healthcare professional. [address] any warranty or liability for your use of this information.

## 2017-12-11 DIAGNOSIS — I10 ESSENTIAL HYPERTENSION, BENIGN: ICD-10-CM

## 2017-12-11 RX ORDER — BENAZEPRIL HYDROCHLORIDE AND HYDROCHLOROTHIAZIDE 20; 12.5 MG/1; MG/1
1 TABLET ORAL DAILY
Qty: 30 TAB | Refills: 5 | Status: SHIPPED | OUTPATIENT
Start: 2017-12-11 | End: 2017-12-18 | Stop reason: SDUPTHER

## 2017-12-11 NOTE — TELEPHONE ENCOUNTER
Requested Prescriptions     Pending Prescriptions Disp Refills    benazepril-hydroCHLOROthiazide (LOTENSIN HCT) 20-12.5 mg per tablet 30 Tab 5     Sig: Take 1 Tab by mouth daily.

## 2017-12-14 ENCOUNTER — TELEPHONE (OUTPATIENT)
Dept: INTERNAL MEDICINE CLINIC | Age: 57
End: 2017-12-14

## 2017-12-14 NOTE — TELEPHONE ENCOUNTER
Contacted Toni Damon and informed Her of lab results for labs drawn on 11/27/2017 per Alanna Willis NP's request. Toni Damon expressed understanding.

## 2017-12-14 NOTE — TELEPHONE ENCOUNTER
----- Message from Sylwia Shanks NP sent at 11/30/2017  2:30 PM EST -----  Please let the patient know her labs are ok. Please confirm she is taking her cholesterol med. She should limit fried, fatty food and increase activity.

## 2017-12-18 DIAGNOSIS — I10 ESSENTIAL HYPERTENSION, BENIGN: ICD-10-CM

## 2017-12-19 RX ORDER — BENAZEPRIL HYDROCHLORIDE AND HYDROCHLOROTHIAZIDE 20; 12.5 MG/1; MG/1
1 TABLET ORAL DAILY
Qty: 30 TAB | Refills: 5 | Status: SHIPPED | OUTPATIENT
Start: 2017-12-19 | End: 2018-01-16 | Stop reason: ALTCHOICE

## 2017-12-29 ENCOUNTER — OFFICE VISIT (OUTPATIENT)
Dept: INTERNAL MEDICINE CLINIC | Age: 57
End: 2017-12-29

## 2017-12-29 ENCOUNTER — HOSPITAL ENCOUNTER (OUTPATIENT)
Dept: GENERAL RADIOLOGY | Age: 57
Discharge: HOME | End: 2017-12-29
Payer: MEDICAID

## 2017-12-29 ENCOUNTER — TELEPHONE (OUTPATIENT)
Dept: INTERNAL MEDICINE CLINIC | Age: 57
End: 2017-12-29

## 2017-12-29 VITALS
HEART RATE: 95 BPM | WEIGHT: 189 LBS | TEMPERATURE: 97.6 F | OXYGEN SATURATION: 99 % | DIASTOLIC BLOOD PRESSURE: 80 MMHG | SYSTOLIC BLOOD PRESSURE: 138 MMHG | RESPIRATION RATE: 16 BRPM | BODY MASS INDEX: 30.37 KG/M2 | HEIGHT: 66 IN

## 2017-12-29 DIAGNOSIS — E88.81 METABOLIC SYNDROME: ICD-10-CM

## 2017-12-29 DIAGNOSIS — R05.9 COUGH: Primary | ICD-10-CM

## 2017-12-29 DIAGNOSIS — R05.9 COUGH: ICD-10-CM

## 2017-12-29 PROCEDURE — 71020 XR CHEST PA LAT: CPT

## 2017-12-29 RX ORDER — AZITHROMYCIN 250 MG/1
TABLET, FILM COATED ORAL
Qty: 6 TAB | Refills: 0 | Status: SHIPPED | OUTPATIENT
Start: 2017-12-29 | End: 2018-01-02

## 2017-12-29 RX ORDER — LOSARTAN POTASSIUM AND HYDROCHLOROTHIAZIDE 12.5; 1 MG/1; MG/1
1 TABLET ORAL DAILY
Qty: 30 TAB | Refills: 1 | Status: SHIPPED | OUTPATIENT
Start: 2017-12-29 | End: 2018-02-20 | Stop reason: SDUPTHER

## 2017-12-29 NOTE — PROGRESS NOTES
HPI:   Recurrent NP cough x 7 days with sinusitis; no fever, SOB  Given zpak earlier this mo and requests it again    ROS is otherwise negative. Past Medical History:   Diagnosis Date    Essential hypertension, benign     Mixed hyperlipidemia     Type II or unspecified type diabetes mellitus without mention of complication, not stated as uncontrolled        Past Surgical History:   Procedure Laterality Date    HX HYSTERECTOMY      X2       Social History     Social History    Marital status: SINGLE     Spouse name: N/A    Number of children: N/A    Years of education: N/A     Occupational History    Not on file. Social History Main Topics    Smoking status: Never Smoker    Smokeless tobacco: Never Used    Alcohol use 0.0 oz/week     0 Standard drinks or equivalent per week    Drug use: No    Sexual activity: Not Currently     Partners: Male     Other Topics Concern    Not on file     Social History Narrative       Allergies   Allergen Reactions    Clindamycin Hives    Sulfa (Sulfonamide Antibiotics) Unable to Obtain       Family History   Problem Relation Age of Onset    Diabetes Mother     Stroke Maternal Grandmother     Stroke Maternal Grandfather        Current Outpatient Prescriptions   Medication Sig Dispense Refill    benazepril-hydroCHLOROthiazide (LOTENSIN HCT) 20-12.5 mg per tablet Take 1 Tab by mouth daily. 30 Tab 5    guaiFENesin-codeine (ROBITUSSIN AC) 100-10 mg/5 mL solution Take 5 mL by mouth three (3) times daily as needed for Cough. Max Daily Amount: 15 mL. Do not drive if taking this medication 120 mL 0    nystatin (MYCOSTATIN) topical cream Apply  to affected area two (2) times a day. Underneath Breast 15 g 0    oxybutynin (DITROPAN) 5 mg tablet Take 1 Tab by mouth three (3) times daily. 90 Tab 2    miconazole (MICOTIN) 2 % topical cream Apply  to affected area two (2) times a day.  Vaginal area 15 g 0    metFORMIN (GLUCOPHAGE) 1,000 mg tablet Take 1 Tab by mouth two (2) times a day. 60 Tab 2    glimepiride (AMARYL) 2 mg tablet Take 1 Tab by mouth two (2) times a day. 60 Tab 1    pioglitazone (ACTOS) 15 mg tablet Take 1 Tab by mouth daily. 30 Tab 1    atorvastatin (LIPITOR) 40 mg tablet 1 tablet daily 30 Tab 5    guaiFENesin-codeine (ROBITUSSIN AC) 100-10 mg/5 mL solution Take 5 mL by mouth three (3) times daily as needed for Cough. Max Daily Amount: 15 mL. Do not drive if taking this medication 120 mL 0    Omeprazole delayed release (PRILOSEC D/R) 20 mg tablet Take 1 Tab by mouth daily. 30 Tab 1    traMADol (ULTRAM) 50 mg tablet 1 tablet three times per day as needed for ain 90 Tab 3    conjugated estrogens (PREMARIN) 0.625 mg/gram vaginal cream Apply 2 grams at bedtime 45 g 6    nystatin (MYCOSTATIN) topical cream Apply  to affected area two (2) times a day. 45 g 3           Visit Vitals    /80 (BP 1 Location: Left arm, BP Patient Position: Sitting)    Pulse 95    Temp 97.6 °F (36.4 °C) (Tympanic)    Resp 16    Ht 5' 6\" (1.676 m)    Wt 189 lb (85.7 kg)    SpO2 99%    BMI 30.51 kg/m2       PE  Well nourished in NAD  HEENT: OP: clear. Neck: supple w/o mass   Chest: clear. CV: RRR w/o m,r,g  Abd: soft, NT, w/o HSM or mass. Ext: w/o edema. Neuro: NF. Assessment and Plan    Encounter Diagnoses   Name Primary?  Cough Yes    Metabolic syndrome      Cough - possible recurrent sinustis; possibly d/t ACE I  Zpak; [facility]; change lotensin to losartan hct 100/12.5 every day  F/U worsening or unimproved 7 days  Metabolic syndrome - keep routine f/u in next mo for assessment  I have explained plan to patient and the patient verbalizes understanding      Discussed the patient's BMI with her. The BMI follow up plan is as follows:     dietary management education, guidance, and counseling  encourage exercise  monitor weight  prescribed dietary intake    An After Visit Summary was printed and given to the patient.

## 2017-12-29 NOTE — PATIENT INSTRUCTIONS
Diet and Exercise for Metabolic Syndrome: Care Instructions  Your Care Instructions    Metabolic syndrome is the name for a group of health problems. It includes having too much fat around your waist and high blood pressure. It also includes high triglycerides, high blood sugar, and low levels of healthy (HDL) cholesterol. These problems make it more likely you will have a heart attack or stroke or get diabetes. Your family history (your genes) can cause metabolic syndrome. So can unhealthy eating habits and not getting enough exercise. You can help lower your risk of heart attack, stroke, and diabetes if you eat healthy foods and get more exercise. It may be hard to make these lifestyle changes. But even small changes can help. Follow-up care is a key part of your treatment and safety. Be sure to make and go to all appointments, and call your doctor if you are having problems. It's also a good idea to know your test results and keep a list of the medicines you take. How can you care for yourself at home? Eat more fruits and vegetables  · Fruits and vegetables have nutrients to help protect you from heart disease and high blood pressure. They are low in fat and high in fiber. Dark green, orange, and yellow ones are the healthiest.  · Keep lots of vegetables ready for snacks. · Buy fruit that is in season. Then put it where you can see it so you will want to eat it. · Cook dishes that have a lot of vegetables. Soups and stir-fries are good choices. Limit saturated and trans fats  · Read food labels, and try to avoid saturated and trans fats. They increase your risk of heart disease. · Use olive or canola oil when you cook. · Bake, broil, grill, or steam foods. Avoid fried foods. · Limit how much high-fat meat you eat. This includes hot dogs and sausages. When you prepare meat, cut off all the fat. · You can replace high-fat meat with fish and skinless poultry.  You can also try products made from soybeans, like tofu. Soybeans may be very good for your heart. · Eat at least 2 servings of fish a week. Fish with omega-3 fatty acids may help reduce your risk of heart attack. [place] and sardines are good examples. · Choose low-fat or fat-free milk and dairy products. Eat foods high in fiber  · Foods high in fiber may reduce your cholesterol. And they may give you important vitamins and minerals. Good examples are oatmeal, cooked dried beans, brown rice, citrus fruits, and apples. · Eat whole-grain breads and cereals. They have more fiber than white bread or pastries. Limit high-sugar foods  · Limit foods and drinks that are high in sugar. Some examples are soda pop, sugar-sweetened fruit drinks, candy, and many desserts. · Limit the amount of sugar, honey, and other sweeteners that you add to food and drinks. · Choose water instead of soda pop or other sugar-sweetened drinks. · Limit fruit juice. Limit salt and sodium  · You can help lower your blood pressure if you limit salt and sodium. · If you take the salt shaker off the table, it may be easier to use less salt. You can also try using half the salt in a recipe. And you can avoid adding salt to cooking water for pasta, rice, and potatoes. · Try to eat fewer snacks, fast foods, and other high-salt, processed foods. Check labels so you know how much sodium a food has. · Choose canned goods (soups, vegetables, and beans) that are low in sodium. Get regular exercise  · Get more exercise. Make sure your doctor knows when you start a new exercise program. Even small amounts of exercise will help you get stronger and have more energy. It can also help you manage your weight and your stress. · Walking is a good choice. Little by little, increase the amount you walk every day. Try for at least 30 minutes on most days of the week. Where can you learn more? Go to http://andrew.info/.   Enter 046 0633 in the search box to learn more about \"Diet and Exercise for Metabolic Syndrome: Care Instructions. \"  Current as of: May 12, 2017  Content Version: 11.4  © 6184-1539 [employer]. Care instructions adapted under license by [employer] (which disclaims liability or warranty for this information). If you have questions about a medical condition or this instruction, always ask your healthcare professional. [address] any warranty or liability for your use of this information. Body Mass Index: Care Instructions  Your Care Instructions    Body mass index (BMI) can help you see if your weight is raising your risk for health problems. It uses a formula to compare how much you weigh with how tall you are. · A BMI lower than 18.5 is considered underweight. · A BMI between 18.5 and 24.9 is considered healthy. · A BMI between 25 and 29.9 is considered overweight. A BMI of 30 or higher is considered obese. If your BMI is in the normal range, it means that you have a lower risk for weight-related health problems. If your BMI is in the overweight or obese range, you may be at increased risk for weight-related health problems, such as high blood pressure, heart disease, stroke, arthritis or joint pain, and diabetes. If your BMI is in the underweight range, you may be at increased risk for health problems such as fatigue, lower protection (immunity) against illness, muscle loss, bone loss, hair loss, and hormone problems. BMI is just one measure of your risk for weight-related health problems. You may be at higher risk for health problems if you are not active, you eat an unhealthy diet, or you drink too much alcohol or use tobacco products. Follow-up care is a key part of your treatment and safety. Be sure to make and go to all appointments, and call your doctor if you are having problems. It's also a good idea to know your test results and keep a list of the medicines you take.   How can you care for yourself at home? · Practice healthy eating habits. This includes eating plenty of fruits, vegetables, whole grains, lean protein, and low-fat dairy. · If your doctor recommends it, get more exercise. Walking is a good choice. Bit by bit, increase the amount you walk every day. Try for at least 30 minutes on most days of the week. · Do not smoke. Smoking can increase your risk for health problems. If you need help quitting, talk to your doctor about stop-smoking programs and medicines. These can increase your chances of quitting for good. · Limit alcohol to 2 drinks a day for men and 1 drink a day for women. Too much alcohol can cause health problems. If you have a BMI higher than 25  · Your doctor may do other tests to check your risk for weight-related health problems. This may include measuring the distance around your waist. A waist measurement of more than 40 inches in men or 35 inches in women can increase the risk of weight-related health problems. · Talk with your doctor about steps you can take to stay healthy or improve your health. You may need to make lifestyle changes to lose weight and stay healthy, such as changing your diet and getting regular exercise. If you have a BMI lower than 18.5  · Your doctor may do other tests to check your risk for health problems. · Talk with your doctor about steps you can take to stay healthy or improve your health. You may need to make lifestyle changes to gain or maintain weight and stay healthy, such as getting more healthy foods in your diet and doing exercises to build muscle. Where can you learn more? Go to http://lyn-misty.info/. Enter S176 in the search box to learn more about \"Body Mass Index: Care Instructions. \"  Current as of: October 13, 2016  Content Version: 11.4  © 2758-8935 [employer]wise[employer].  Care instructions adapted under license by [employer] (which disclaims liability or warranty for this information). If you have questions about a medical condition or this instruction, always ask your healthcare professional. [place] [address] any warranty or liability for your use of this information.

## 2017-12-29 NOTE — PROGRESS NOTES
1. Have you been to the ER, urgent care clinic since your last visit? Hospitalized since your last visit? No    2. Have you seen or consulted any other health care providers outside of the [address] [place] [address] since your last visit? Include any pap smears or colon screening.  No

## 2018-01-02 ENCOUNTER — OFFICE VISIT (OUTPATIENT)
Dept: OBGYN CLINIC | Age: 58
End: 2018-01-02

## 2018-01-02 VITALS
DIASTOLIC BLOOD PRESSURE: 69 MMHG | RESPIRATION RATE: 18 BRPM | WEIGHT: 185 LBS | BODY MASS INDEX: 29.73 KG/M2 | HEIGHT: 66 IN | HEART RATE: 89 BPM | SYSTOLIC BLOOD PRESSURE: 126 MMHG

## 2018-01-02 DIAGNOSIS — R21 VULVAR RASH: ICD-10-CM

## 2018-01-02 DIAGNOSIS — R21 VULVAR RASH: Primary | ICD-10-CM

## 2018-01-02 RX ORDER — NYSTATIN AND TRIAMCINOLONE ACETONIDE 100000; 1 [USP'U]/G; MG/G
OINTMENT TOPICAL 2 TIMES DAILY
Qty: 30 G | Refills: 0 | Status: SHIPPED | OUTPATIENT
Start: 2018-01-02 | End: 2018-03-10 | Stop reason: ALTCHOICE

## 2018-01-02 RX ORDER — FLUCONAZOLE 150 MG/1
150 TABLET ORAL
Qty: 3 TAB | Refills: 4 | Status: SHIPPED | OUTPATIENT
Start: 2018-01-02 | End: 2018-01-09

## 2018-01-02 NOTE — PROGRESS NOTES
Name: Vincent Blanca MRN: [id number]    YOB: 1960  Age: 59 y.o. Sex: female        Chief Complaint   Patient presents with    Rash     pt c/o having a red rash in vaginal area for 2 months started after starting Metformin       HPI 59 P2 with HTN, DM2, and HLD, s/p abdominal hysterectomy in , referred from PCP for persistent vulvar rash. Pt states she has had red,itchy,burning vulva rash x2 mos. She was treated for a yeast infection with topical nystatin x2 by her pcp with no relief of symptoms. She denies vaginal discharge or bleeding. She wears pads for urinary incontinence. She states her DM2 is well controlled. Last HgAlc 13.1  OB History      Para Term  AB Living    4 2 2  2 2    SAB TAB Ectopic Molar Multiple Live Births    2             Obstetric Comments    No LMP recorded. Patient has had a hysterectomy. History of sexually transmitted infections none             PGyn  History   Sexual Activity    Sexual activity: Not Currently    Partners: Male         Past Medical History:   Diagnosis Date    Essential hypertension, benign     Mixed hyperlipidemia     Type II or unspecified type diabetes mellitus without mention of complication, not stated as uncontrolled        Past Surgical History:   Procedure Laterality Date    HX HYSTERECTOMY      X2       Allergies   Allergen Reactions    Clindamycin Hives    Sulfa (Sulfonamide Antibiotics) Unable to Obtain    Lisinopril Cough       Current Outpatient Prescriptions on File Prior to Visit   Medication Sig Dispense Refill    losartan-hydroCHLOROthiazide (HYZAAR) 100-12.5 mg per tablet Take 1 Tab by mouth daily. 30 Tab 1    miconazole (MICOTIN) 2 % topical cream Apply  to affected area two (2) times a day. Vaginal area 15 g 0    metFORMIN (GLUCOPHAGE) 1,000 mg tablet Take 1 Tab by mouth two (2) times a day. 60 Tab 2    glimepiride (AMARYL) 2 mg tablet Take 1 Tab by mouth two (2) times a day.  60 Tab 1    atorvastatin (LIPITOR) 40 mg tablet 1 tablet daily 30 Tab 5    guaiFENesin-codeine (ROBITUSSIN AC) 100-10 mg/5 mL solution Take 5 mL by mouth three (3) times daily as needed for Cough. Max Daily Amount: 15 mL. Do not drive if taking this medication 120 mL 0    Omeprazole delayed release (PRILOSEC D/R) 20 mg tablet Take 1 Tab by mouth daily. 30 Tab 1    nystatin (MYCOSTATIN) topical cream Apply  to affected area two (2) times a day. 45 g 3    azithromycin (ZITHROMAX) 250 mg tablet Take 2 tablets today, then take 1 tablet daily 6 Tab 0    benazepril-hydroCHLOROthiazide (LOTENSIN HCT) 20-12.5 mg per tablet Take 1 Tab by mouth daily. 30 Tab 5    oxybutynin (DITROPAN) 5 mg tablet Take 1 Tab by mouth three (3) times daily. 90 Tab 2    pioglitazone (ACTOS) 15 mg tablet Take 1 Tab by mouth daily. 30 Tab 1    traMADol (ULTRAM) 50 mg tablet 1 tablet three times per day as needed for ain 90 Tab 3    conjugated estrogens (PREMARIN) 0.625 mg/gram vaginal cream Apply 2 grams at bedtime 45 g 6     No current facility-administered medications on file prior to visit. Social History     Social History    Marital status: SINGLE     Spouse name: N/A    Number of children: N/A    Years of education: N/A     Occupational History    Not on file.      Social History Main Topics    Smoking status: Never Smoker    Smokeless tobacco: Never Used    Alcohol use No    Drug use: No    Sexual activity: Not Currently     Partners: Male     Other Topics Concern    Not on file     Social History Narrative       Family History   Problem Relation Age of Onset    Diabetes Mother     Stroke Maternal Grandmother     Stroke Maternal Grandfather     Hypertension Sister     Diabetes Sister        ROS        Visit Vitals    /69 (BP 1 Location: Right arm, BP Patient Position: Sitting)    Pulse 89    Resp 18    Ht 5' 6\" (1.676 m)    Wt 185 lb (83.9 kg)    BMI 29.86 kg/m2       GENERAL:  Well developed, well nourished, in no distress  Abdomen: obese, soft, NTND, well healed midline vertical scar seen, superior to umbilicus down to the pubis   PELVIC EXAM:  LABIA MAJORA: no masses, tenderness or lesions  LABIA MINORA: no masses, tenderness or lesions  CLITORIS: no masses, tenderness or lesions  SPeculum: no abnormal discharge, or bleeding, no lesions, pink vaginal mucosa, no atrophy  SVE: no adnexal tendernes or massess, no lesions palpated on vagina  Erythema present along the labia major and minor extended the inner crease of the thigh bilateral and down to the bilateral buttocks. Also with vulvar edema. No results found for this or any previous visit (from the past 24 hour(s)).     1. Vulvar Rash: likely persistent vulvovaginal candidiasis  Will treat with fluconazole 150mg q 72hours x 3 doses  And mycolog topical ointment  Once infection is treated, recommend applying vaseline, to the area prior to putting on pad.     2 F/U weeks

## 2018-01-03 NOTE — PATIENT INSTRUCTIONS
Vaginal Yeast Infection: Care Instructions  Your Care Instructions    A vaginal yeast infection is caused by too many yeast cells in the vagina. This is common in women of all ages. Itching, vaginal discharge and irritation, and other symptoms can bother you. But yeast infections don't often cause other health problems. Some medicines can increase your risk of getting a yeast infection. These include antibiotics, birth control pills, hormones, and steroids. You may also be more likely to get a yeast infection if you are pregnant, have diabetes, douche, or wear tight clothes. With treatment, most yeast infections get better in 2 to 3 days. Follow-up care is a key part of your treatment and safety. Be sure to make and go to all appointments, and call your doctor if you are having problems. It's also a good idea to know your test results and keep a list of the medicines you take. How can you care for yourself at home? · Take your medicines exactly as prescribed. Call your doctor if you think you are having a problem with your medicine. · Ask your doctor about over-the-counter (OTC) medicines for yeast infections. They may cost less than prescription medicines. If you use an OTC treatment, read and follow all instructions on the label. · Do not use tampons while using a vaginal cream or suppository. The tampons can absorb the medicine. Use pads instead. · Wear loose cotton clothing. Do not wear nylon or other fabric that holds body heat and moisture close to the skin. · Try sleeping without underwear. · Do not scratch. Relieve itching with a cold pack or a cool bath. · Do not wash your vaginal area more than once a day. Use plain water or a mild, unscented soap. Air-dry the vaginal area. · Change out of wet swimsuits after swimming. · Do not have sex until you have finished your treatment. · Do not douche. When should you call for help?   Call your doctor now or seek immediate medical care if:  ? · You have unexpected vaginal bleeding. ? · You have new or increased pain in your vagina or pelvis. ? Watch closely for changes in your health, and be sure to contact your doctor if:  ? · You have a fever. ? · You are not getting better after 2 days. ? · Your symptoms come back after you finish your medicines. Where can you learn more? Go to http://lyn-misty.info/. Enter S749 in the search box to learn more about \"Vaginal Yeast Infection: Care Instructions. \"  Current as of: October 13, 2016  Content Version: 11.4  © 1256-7193 [employer]. Care instructions adapted under license by [employer] (which disclaims liability or warranty for this information). If you have questions about a medical condition or this instruction, always ask your healthcare professional. [address] any warranty or liability for your use of this information.

## 2018-01-04 LAB
A VAGINAE DNA VAG QL NAA+PROBE: ABNORMAL SCORE
BVAB2 DNA VAG QL NAA+PROBE: ABNORMAL SCORE
C ALBICANS DNA VAG QL NAA+PROBE: POSITIVE
C GLABRATA DNA VAG QL NAA+PROBE: POSITIVE
C TRACH RRNA SPEC QL NAA+PROBE: NEGATIVE
MEGA1 DNA VAG QL NAA+PROBE: ABNORMAL SCORE
N GONORRHOEA RRNA SPEC QL NAA+PROBE: NEGATIVE
T VAGINALIS RRNA SPEC QL NAA+PROBE: NEGATIVE

## 2018-01-16 ENCOUNTER — OFFICE VISIT (OUTPATIENT)
Dept: INTERNAL MEDICINE CLINIC | Age: 58
End: 2018-01-16

## 2018-01-16 VITALS
DIASTOLIC BLOOD PRESSURE: 76 MMHG | BODY MASS INDEX: 29.73 KG/M2 | HEIGHT: 66 IN | OXYGEN SATURATION: 97 % | RESPIRATION RATE: 18 BRPM | SYSTOLIC BLOOD PRESSURE: 120 MMHG | WEIGHT: 185 LBS | HEART RATE: 95 BPM | TEMPERATURE: 98.5 F

## 2018-01-16 DIAGNOSIS — E11.9 TYPE 2 DIABETES MELLITUS WITHOUT COMPLICATION, WITHOUT LONG-TERM CURRENT USE OF INSULIN (HCC): ICD-10-CM

## 2018-01-16 DIAGNOSIS — L30.9 DERMATITIS: Primary | ICD-10-CM

## 2018-01-16 RX ORDER — ATORVASTATIN CALCIUM 40 MG/1
TABLET, FILM COATED ORAL
Qty: 30 TAB | Refills: 3 | Status: SHIPPED | OUTPATIENT
Start: 2018-01-16 | End: 2018-02-20 | Stop reason: SDUPTHER

## 2018-01-16 RX ORDER — GLIMEPIRIDE 2 MG/1
2 TABLET ORAL 2 TIMES DAILY
Qty: 60 TAB | Refills: 1 | Status: SHIPPED | OUTPATIENT
Start: 2018-01-16 | End: 2018-02-20 | Stop reason: SDUPTHER

## 2018-01-16 RX ORDER — FLUCONAZOLE 150 MG/1
150 TABLET ORAL
COMMUNITY
Start: 2018-01-16 | End: 2018-01-25

## 2018-01-16 NOTE — MR AVS SNAPSHOT
[facility] 
 
 
 [address], [address] [facility] [address] 
[phone] Patient: Yanira Sabillon MRN: [id number] SPH:[id number] Visit Information Date & Time Provider Department Dept. Phone Encounter #  
 1/16/2018  2:30 PM Josse Hammond MD [facility] OF [place] [phone] [id number] Your Appointments 2/2/2018 10:30 AM  
Office Visit with Madonna Rajan MD  
[facility] ([facility] Appt Note: 1 month f/u  
 [address], [place] [address] [facility] [address]  
[phone]  
  
   
 [address], [address] [facility] 2/19/2018 10:00 AM  
Office Visit with Anders Herrera NP  
[facility]ICINE OF [place] ([facility]) Appt Note: 3 mo f/u  
 [address], [address]  
  
   
 [address], [address] [facility] [address] Upcoming Health Maintenance Date Due  
 FOOT EXAM Q1 12/16/1968 EYE EXAM RETINAL OR DILATED Q1 12/16/1968 Pneumococcal 19-64 Medium Risk (1 of 1 - PPSV23) 12/16/1977 DTaP/Tdap/Td series (1 - Tdap) 12/16/1979 PAP AKA CERVICAL CYTOLOGY 12/16/1979 BREAST CANCER SCRN MAMMOGRAM 12/16/2008 FOBT Q 1 YEAR AGE 50-75 12/16/2008 HEMOGLOBIN A1C Q6M 5/20/2018 MICROALBUMIN Q1 11/27/2018 LIPID PANEL Q1 11/27/2018 Allergies as of 1/16/2018  Review Complete On: 1/16/2018 By: Semaj Horse, LPN Severity Noted Reaction Type Reactions Clindamycin High 05/24/2016    Hives Sulfa (Sulfonamide Antibiotics) Medium   Unable to Obtain Lisinopril  01/02/2018    Cough Current Immunizations  Reviewed on 8/16/2017 Name Date Influenza Vaccine (Quad) PF 8/16/2017 Not reviewed this visit You Were Diagnosed With   
  
 Codes Comments Type 2 diabetes mellitus without complication, without long-term current use of insulin (HCC)     ICD-10-CM: E11.9 ICD-9-CM: 250.00 Vitals BP Pulse Temp Resp Height(growth percentile) 120/76 (BP 1 Location: Left arm, BP Patient Position: Sitting) 95 98.5 °F (36.9 °C) (Tympanic) 18 5' 6\" (1.676 m) Weight(growth percentile) SpO2 BMI OB Status Smoking Status 185 lb (83.9 kg) 97% 29.86 kg/m2 Hysterectomy Never Smoker BMI and BSA Data Body Mass Index Body Surface Area  
 29.86 kg/m 2 1.98 m 2 Preferred Pharmacy Pharmacy Name [facility]  [place] [address], [facility] [address] [place] [address] [phone] Your Updated Medication List  
  
   
This list is accurate as of: 1/16/18  3:41 PM.  Always use your most recent med list.  
  
  
  
  
 atorvastatin 40 mg tablet Commonly known as:  LIPITOR  
1 tablet daily  
  
 benazepril-hydroCHLOROthiazide 20-12.5 mg per tablet Commonly known as:  LOTENSIN HCT Take 1 Tab by mouth daily. conjugated estrogens 0.625 mg/gram vaginal cream  
Commonly known as:  PREMARIN Apply 2 grams at bedtime  
  
 fluconazole 150 mg tablet Commonly known as:  DIFLUCAN Take 150 mg by mouth every three (3) days. FDA advises cautious prescribing of oral fluconazole in pregnancy. glimepiride 2 mg tablet Commonly known as:  AMARYL Take 1 Tab by mouth two (2) times a day. guaiFENesin-codeine 100-10 mg/5 mL solution Commonly known as:  ROBITUSSIN AC Take 5 mL by mouth three (3) times daily as needed for Cough. Max Daily Amount: 15 mL. Do not drive if taking this medication  
  
 losartan-hydroCHLOROthiazide 100-12.5 mg per tablet Commonly known as:  HYZAAR Take 1 Tab by mouth daily. metFORMIN 1,000 mg tablet Commonly known as:  GLUCOPHAGE Take 1 Tab by mouth two (2) times a day. nystatin-triamcinolone 100,000-0.1 unit/gram-% ointment Commonly known as:  Sherine Sabina Apply  to affected area two (2) times a day. Omeprazole delayed release 20 mg tablet Commonly known as:  PRILOSEC D/R  
 Take 1 Tab by mouth daily. oxybutynin 5 mg tablet Commonly known as:  XTYQYGJK Take 1 Tab by mouth three (3) times daily. pioglitazone 15 mg tablet Commonly known as:  ACTOS Take 1 Tab by mouth daily. traMADol 50 mg tablet Commonly known as:  ULTRAM  
1 tablet three times per day as needed for ain Prescriptions Sent to Pharmacy Refills  
 glimepiride (AMARYL) 2 mg tablet 1 Sig: Take 1 Tab by mouth two (2) times a day. Class: Normal  
 Pharmacy: [facility] [address] Ph #: [phone] Route: Oral  
 atorvastatin (LIPITOR) 40 mg tablet 3 Si tablet daily Class: Normal  
 Pharmacy: [facility] [address] Ph #: [phone] Please provide this summary of care documentation to your next provider. Your primary care clinician is listed as Kailey Matthews. If you have any questions after today's visit, please call [phone].

## 2018-01-17 NOTE — PROGRESS NOTES
The patient presents to the office today with the chief complaint of rash    HPI    The has been placed on Metformin for diabetes mellitus. Since then the patient states that she has rash - on her face, arms and back. The rash is non pruritic. ROS    Allergies   Allergen Reactions    Clindamycin Hives    Sulfa (Sulfonamide Antibiotics) Unable to Obtain    Lisinopril Cough       Current Outpatient Prescriptions   Medication Sig Dispense Refill    fluconazole (DIFLUCAN) 150 mg tablet Take 150 mg by mouth every three (3) days. FDA advises cautious prescribing of oral fluconazole in pregnancy.  glimepiride (AMARYL) 2 mg tablet Take 1 Tab by mouth two (2) times a day. 60 Tab 1    atorvastatin (LIPITOR) 40 mg tablet 1 tablet daily 30 Tab 3    nystatin-triamcinolone (MYCOLOG) 100,000-0.1 unit/gram-% ointment Apply  to affected area two (2) times a day. 30 g 0    losartan-hydroCHLOROthiazide (HYZAAR) 100-12.5 mg per tablet Take 1 Tab by mouth daily. 30 Tab 1    metFORMIN (GLUCOPHAGE) 1,000 mg tablet Take 1 Tab by mouth two (2) times a day. 60 Tab 2    Omeprazole delayed release (PRILOSEC D/R) 20 mg tablet Take 1 Tab by mouth daily. 30 Tab 1    benazepril-hydroCHLOROthiazide (LOTENSIN HCT) 20-12.5 mg per tablet Take 1 Tab by mouth daily. 30 Tab 5    oxybutynin (DITROPAN) 5 mg tablet Take 1 Tab by mouth three (3) times daily. 90 Tab 2    pioglitazone (ACTOS) 15 mg tablet Take 1 Tab by mouth daily. 30 Tab 1    guaiFENesin-codeine (ROBITUSSIN AC) 100-10 mg/5 mL solution Take 5 mL by mouth three (3) times daily as needed for Cough. Max Daily Amount: 15 mL.  Do not drive if taking this medication 120 mL 0    traMADol (ULTRAM) 50 mg tablet 1 tablet three times per day as needed for ain 90 Tab 3    conjugated estrogens (PREMARIN) 0.625 mg/gram vaginal cream Apply 2 grams at bedtime 45 g 6       Past Medical History:   Diagnosis Date    Essential hypertension, benign     Mixed hyperlipidemia     Type II or unspecified type diabetes mellitus without mention of complication, not stated as uncontrolled        Past Surgical History:   Procedure Laterality Date    HX HYSTERECTOMY  80s    X1       Social History     Social History    Marital status: SINGLE     Spouse name: N/A    Number of children: N/A    Years of education: N/A     Occupational History    Not on file. Social History Main Topics    Smoking status: Never Smoker    Smokeless tobacco: Never Used    Alcohol use No    Drug use: No    Sexual activity: Not Currently     Partners: Male     Other Topics Concern    Not on file     Social History Narrative       Patient does not have an advanced directive on file    Visit Vitals    /76 (BP 1 Location: Left arm, BP Patient Position: Sitting)    Pulse 95    Temp 98.5 °F (36.9 °C) (Tympanic)    Resp 18    Ht 5' 6\" (1.676 m)    Wt 185 lb (83.9 kg)    SpO2 97%    BMI 29.86 kg/m2       Physical Exam   Skin:  Rash on face, upper chest and upper back - pimple type apperence  No Cervical Lymphadenopathy  No Supraclavicular Lymphadenopathy  Thyroid is Normal  Lungs are normal to percussion. Clear to auscultation   Heart:  S1 S2 are normal, No gallops, No mummers  No Carotid Bruits  Abdomen:  Normal Bowel Sounds. No tenderness. No masses. No Hepatomegaly or Splenomegly  LE:  Strong Pedal Pulses. No Edema      BMI:  The patient was advised to limit calories to 1800 cecelia and carbohydrates to 100 grams daily      Office Visit on 01/02/2018   Component Date Value Ref Range Status    Atopobium vaginae 01/02/2018 Low - 0  Score Final    BVAB 2 01/02/2018 Low - 0  Score Final    Megasphaera 1 01/02/2018 Low - 0  Score Final    Comment: Calculate total score by adding the 3 individual bacterial  vaginosis (BV) marker scores together. Total score is  interpreted as follows: Total score 0-1: Indicates the absence of BV. Total score   2: Indeterminate for BV.  Additional clinical data should be evaluated to establish a                   diagnosis. Total score 3-6: Indicates the presence of BV. This test was developed and its performance characteristics  determined by [facility].  It has not been cleared or approved  by the Food and Drug Administration. The FDA has determined  that such clearance or approval is not necessary.  C. albicans, NEGAR 01/02/2018 Positive* Negative Final    C. glabrata, NEGAR 01/02/2018 Positive* Negative Final    Comment: This test was developed and its performance characteristics determined  by [facility].  It has not been cleared or approved by the Food and Drug  Administration. The FDA has determined that such clearance or  approval is not necessary. Published data demonstrate that up to 65% of Candida glabrata  identified in cases of vaginal candidiasis have decreased  susceptibility to fluconazole.  T. vaginalis, NEGAR 01/02/2018 Negative  Negative Final    C. trachomatis, NEGAR 01/02/2018 Negative  Negative Final    N. gonorrhoeae, NEGAR 01/02/2018 Negative  Negative Final   Office Visit on 11/20/2017   Component Date Value Ref Range Status    Hemoglobin A1c (POC) 11/20/2017 13.1  % Final    WBC 11/27/2017 10.0  3.4 - 10.8 x10E3/uL Final    RBC 11/27/2017 4.47  3.77 - 5.28 x10E6/uL Final    HGB 11/27/2017 12.5  11.1 - 15.9 g/dL Final    Comment: **Effective December 4, 2017 the reference interval**    for Hemoglobin MALES only will be changing to:                           Males 13-15 years: 12.6 - 17.7                          Males   >15 years: 13.0 - 17.7      HCT 11/27/2017 38.8  34.0 - 46.6 % Final    MCV 11/27/2017 87  79 - 97 fL Final    MCH 11/27/2017 28.0  26.6 - 33.0 pg Final    MCHC 11/27/2017 32.2  31.5 - 35.7 g/dL Final    RDW 11/27/2017 14.2  12.3 - 15.4 % Final    PLATELET 27/85/5569 783  150 - 379 x10E3/uL Final    NEUTROPHILS 11/27/2017 71  Not Estab. % Final    Lymphocytes 11/27/2017 23  Not Estab. % Final    MONOCYTES 11/27/2017 5  Not Estab. % Final    EOSINOPHILS 11/27/2017 1  Not Estab. % Final    BASOPHILS 11/27/2017 0  Not Estab. % Final    ABS. NEUTROPHILS 11/27/2017 7.0  1.4 - 7.0 x10E3/uL Final    Abs Lymphocytes 11/27/2017 2.3  0.7 - 3.1 x10E3/uL Final    ABS. MONOCYTES 11/27/2017 0.5  0.1 - 0.9 x10E3/uL Final    ABS. EOSINOPHILS 11/27/2017 0.1  0.0 - 0.4 x10E3/uL Final    ABS. BASOPHILS 11/27/2017 0.0  0.0 - 0.2 x10E3/uL Final    IMMATURE GRANULOCYTES 11/27/2017 0  Not Estab. % Final    ABS. IMM. GRANS. 11/27/2017 0.0  0.0 - 0.1 x10E3/uL Final    Glucose 11/27/2017 297* 65 - 99 mg/dL Final    BUN 11/27/2017 17  6 - 24 mg/dL Final    Creatinine 11/27/2017 0.72  0.57 - 1.00 mg/dL Final    GFR est non-AA 11/27/2017 93  >59 mL/min/1.73 Final    GFR est AA 11/27/2017 107  >59 mL/min/1.73 Final    BUN/Creatinine ratio 11/27/2017 24* 9 - 23 Final    Sodium 11/27/2017 140  134 - 144 mmol/L Final    Potassium 11/27/2017 4.3  3.5 - 5.2 mmol/L Final    Chloride 11/27/2017 93* 96 - 106 mmol/L Final    CO2 11/27/2017 27  18 - 29 mmol/L Final    Calcium 11/27/2017 9.0  8.7 - 10.2 mg/dL Final    Protein, total 11/27/2017 6.9  6.0 - 8.5 g/dL Final    Albumin 11/27/2017 4.4  3.5 - 5.5 g/dL Final    GLOBULIN, TOTAL 11/27/2017 2.5  1.5 - 4.5 g/dL Final    A-G Ratio 11/27/2017 1.8  1.2 - 2.2 Final    Bilirubin, total 11/27/2017 0.6  0.0 - 1.2 mg/dL Final    Alk.  phosphatase 11/27/2017 159* 39 - 117 IU/L Final    AST (SGOT) 11/27/2017 9  0 - 40 IU/L Final    ALT (SGPT) 11/27/2017 19  0 - 32 IU/L Final    Cholesterol, total 11/27/2017 136  100 - 199 mg/dL Final    Triglyceride 11/27/2017 203* 0 - 149 mg/dL Final    HDL Cholesterol 11/27/2017 39* >39 mg/dL Final    VLDL, calculated 11/27/2017 41* 5 - 40 mg/dL Final    LDL, calculated 11/27/2017 56  0 - 99 mg/dL Final    Creatinine, urine 11/27/2017 45.8  Not Estab. mg/dL Final    Microalbumin, urine 11/27/2017 6.6  Not Estab. ug/mL Final    Microalb/Creat ratio (ug/mg creat.) 11/27/2017 14.4  0.0 - 30.0 mg/g creat Final    Hep C Virus Ab 11/27/2017 0.1  0.0 - 0.9 s/co ratio Final    Comment:                                   Negative:     < 0.8                               Indeterminate: 0.8 - 0.9                                    Positive:     > 0.9   The CDC recommends that a positive HCV antibody result   be followed up with a HCV Nucleic Acid Amplification   test ([id number]). .No results found for any visits on 01/16/18. Assessment / Plan      ICD-10-CM ICD-9-CM    1. Dermatitis L30.9 692.9    2. Type 2 diabetes mellitus without complication, without long-term current use of insulin (HCC) E11.9 250.00 glimepiride (AMARYL) 2 mg tablet       Question of Metformin is the cause of the rash but the timing suggests that it is. Will hold Met formin and follow the symptoms and the sugars. she was advised to continue her other maintenance medications      Follow-up Disposition:  Return in about 5 months (around 6/16/2018). I asked Levell Necessary if she has any questions and I answered the questions.   Levell Necessary states that she understands the treatment plan and agrees with the treatment plan

## 2018-01-29 ENCOUNTER — TELEPHONE (OUTPATIENT)
Dept: INTERNAL MEDICINE CLINIC | Age: 58
End: 2018-01-29

## 2018-01-29 NOTE — TELEPHONE ENCOUNTER
Patient saw Dr Dorinda Stratton 2 weeks ago and he took her off of her diabetes medication. She has since gotten 2 boils. One on her backside and the other on her stomach. They are painful. Please call her to advise at [phone].

## 2018-02-20 ENCOUNTER — OFFICE VISIT (OUTPATIENT)
Dept: INTERNAL MEDICINE CLINIC | Age: 58
End: 2018-02-20

## 2018-02-20 VITALS
TEMPERATURE: 98.6 F | HEIGHT: 66 IN | RESPIRATION RATE: 16 BRPM | BODY MASS INDEX: 30.37 KG/M2 | DIASTOLIC BLOOD PRESSURE: 88 MMHG | OXYGEN SATURATION: 97 % | HEART RATE: 80 BPM | SYSTOLIC BLOOD PRESSURE: 140 MMHG | WEIGHT: 189 LBS

## 2018-02-20 DIAGNOSIS — E78.2 MIXED HYPERLIPIDEMIA: Primary | ICD-10-CM

## 2018-02-20 DIAGNOSIS — E78.2 MIXED HYPERLIPIDEMIA: ICD-10-CM

## 2018-02-20 DIAGNOSIS — I10 ESSENTIAL HYPERTENSION, BENIGN: ICD-10-CM

## 2018-02-20 DIAGNOSIS — E11.9 TYPE 2 DIABETES MELLITUS WITHOUT COMPLICATION, WITHOUT LONG-TERM CURRENT USE OF INSULIN (HCC): ICD-10-CM

## 2018-02-20 DIAGNOSIS — L02.211 ABSCESS OF SKIN OF ABDOMEN: ICD-10-CM

## 2018-02-20 RX ORDER — LOSARTAN POTASSIUM AND HYDROCHLOROTHIAZIDE 12.5; 1 MG/1; MG/1
TABLET ORAL
Qty: 30 TAB | Refills: 3 | Status: SHIPPED | OUTPATIENT
Start: 2018-02-20 | End: 2018-08-02 | Stop reason: SDUPTHER

## 2018-02-20 RX ORDER — ATORVASTATIN CALCIUM 40 MG/1
TABLET, FILM COATED ORAL
Qty: 30 TAB | Refills: 3 | Status: SHIPPED | OUTPATIENT
Start: 2018-02-20 | End: 2018-07-05 | Stop reason: SDUPTHER

## 2018-02-20 RX ORDER — METFORMIN HYDROCHLORIDE 500 MG/1
TABLET, EXTENDED RELEASE ORAL
Qty: 30 TAB | Refills: 3 | Status: SHIPPED | OUTPATIENT
Start: 2018-02-20 | End: 2018-03-06 | Stop reason: SDUPTHER

## 2018-02-20 RX ORDER — GLIMEPIRIDE 2 MG/1
2 TABLET ORAL 2 TIMES DAILY
Qty: 60 TAB | Refills: 3 | Status: SHIPPED | OUTPATIENT
Start: 2018-02-20 | End: 2018-07-05 | Stop reason: SDUPTHER

## 2018-02-20 NOTE — PATIENT INSTRUCTIONS
Health Maintenance Due   Topic    FOOT EXAM Q1     EYE EXAM RETINAL OR DILATED Q1     Pneumococcal 19-64 Medium Risk (1 of 1 - PPSV23)    DTaP/Tdap/Td series (1 - Tdap)    PAP AKA CERVICAL CYTOLOGY     BREAST CANCER SCRN MAMMOGRAM     FOBT Q 1 YEAR AGE 50-75

## 2018-02-20 NOTE — MR AVS SNAPSHOT
[facility] 
 
 
 [address], [address] [place] [address] 
[phone] Patient: Justin Stokes MRN: [id number] Visit Information Date & Time Provider Department Dept. Phone Encounter #  
 2/20/2018 12:30 PM Balaji Allen MD [facility] [phone] [id number] Your Appointments 6/18/2018 10:30 AM  
Follow Up with Balaji Allen MD  
[facility]: 4 mo f/u  
 [address], [address] [place] [address]  
  
   
 [address], [address] [place] [address] Upcoming Health Maintenance Date Due  
 FOOT EXAM Q1 12/16/1968 EYE EXAM RETINAL OR DILATED Q1 12/16/1968 Pneumococcal 19-64 Medium Risk (1 of 1 - PPSV23) 12/16/1977 DTaP/Tdap/Td series (1 - Tdap) 12/16/1979 PAP AKA CERVICAL CYTOLOGY 12/16/1979 BREAST CANCER SCRN MAMMOGRAM 12/16/2008 FOBT Q 1 YEAR AGE 50-75 12/16/2008 HEMOGLOBIN A1C Q6M 5/20/2018 MICROALBUMIN Q1 11/27/2018 LIPID PANEL Q1 11/27/2018 Allergies as of 2/20/2018  Review Complete On: 2/20/2018 By: Jack Cerna LPN Severity Noted Reaction Type Reactions Clindamycin High 05/24/2016    Hives Sulfa (Sulfonamide Antibiotics) Medium   Unable to Obtain Lisinopril  01/02/2018    Cough Current Immunizations  Reviewed on 2/9/2018 Name Date Influenza Vaccine (Quad) PF 8/16/2017 Zoster Vaccine, Live 9/20/2015 Not reviewed this visit You Were Diagnosed With   
  
 Codes Comments Mixed hyperlipidemia    -  Primary ICD-10-CM: P12.3 ICD-9-CM: 272.2 Type 2 diabetes mellitus without complication, without long-term current use of insulin (HCC)     ICD-10-CM: E11.9 ICD-9-CM: 250.00 Essential hypertension, benign     ICD-10-CM: I10 
ICD-9-CM: 401.1 Vitals BP Pulse Temp Resp Height(growth percentile) Weight(growth percentile) 140/88 (BP 1 Location: Left arm, BP Patient Position: Sitting) 80 98.6 °F (37 °C) (Tympanic) 16 5' 6\" (1.676 m) 189 lb (85.7 kg) SpO2 BMI OB Status Smoking Status 97% 30.51 kg/m2 Hysterectomy Never Smoker Vitals History BMI and BSA Data Body Mass Index Body Surface Area 30.51 kg/m 2 2 m 2 Preferred Pharmacy Pharmacy Name Phone [facility] [place] [address], [address] [place] [address] [phone] Your Updated Medication List  
  
   
This list is accurate as of: 18  1:29 PM.  Always use your most recent med list.  
  
  
  
  
 atorvastatin 40 mg tablet Commonly known as:  LIPITOR  
1 tablet daily  
  
 glimepiride 2 mg tablet Commonly known as:  AMARYL Take 1 Tab by mouth two (2) times a day. losartan-hydroCHLOROthiazide 100-12.5 mg per tablet Commonly known as:  HYZAAR  
1 tablet daily  
  
 metFORMIN  mg tablet Commonly known as:  GLUCOPHAGE XR  
1 tablet daily at supper  
  
 nystatin-triamcinolone 100,000-0.1 unit/gram-% ointment Commonly known as:  Teola Flavio Apply  to affected area two (2) times a day. Omeprazole delayed release 20 mg tablet Commonly known as:  PRILOSEC D/R Take 1 Tab by mouth daily. Prescriptions Sent to Pharmacy Refills  
 metFORMIN ER (GLUCOPHAGE XR) 500 mg tablet 3 Si tablet daily at supper Class: Normal  
 Pharmacy: [facility] [address] [place] [address], [address] [place] [address] Ph #: [phone]  
 glimepiride (AMARYL) 2 mg tablet 3 Sig: Take 1 Tab by mouth two (2) times a day. Class: Normal  
 Pharmacy: [facility] Ph #: [phone] Route: Oral  
 atorvastatin (LIPITOR) 40 mg tablet 3 Si tablet daily Class: Normal  
 Pharmacy: [facility] [address] [place] [address], [address] [place] [address],[address] [place] [address] Ph #: [phone]  
 losartan-hydroCHLOROthiazide (HYZAAR) 100-12.5 mg per tablet 3 Si tablet daily Class: Normal  
 Pharmacy: [facility], [address] [place] [address] #: [phone] To-Do List   
 02/20/2018 Lab:  CHOLESTEROL, TOTAL   
  
 02/20/2018 Lab:  HEMOGLOBIN A1C WITH EAG   
  
 02/20/2018 Lab:  METABOLIC PANEL, COMPREHENSIVE   
  
 06/22/2018 Lab:  CBC WITH AUTOMATED DIFF Patient Instructions Health Maintenance Due Topic  FOOT EXAM Q1   
 EYE EXAM RETINAL OR DILATED Q1   
 Pneumococcal 19-64 Medium Risk (1 of 1 - PPSV23)  DTaP/Tdap/Td series (1 - Tdap)  PAP AKA CERVICAL CYTOLOGY  BREAST CANCER SCRN MAMMOGRAM   
 FOBT Q 1 YEAR AGE 50-75 Introducing [facility]! Nicholas Palmer introduces [employer] patient portal. Now you can access parts of your medical record, email your doctor's office, and request medication refills online. 1. In your internet browser, go to [email or web address] 2. Click on the First Time User? Click Here link in the Sign In box. You will see the New Member Sign Up page. 3. Enter your [employer] Access Code exactly as it appears below. You will not need to use this code after youve completed the sign-up process. If you do not sign up before the expiration date, you must request a new code. · [employer] Access Code: [email or web address] Expires: 4/16/2018 10:27 PM 
 
4. Enter the last four digits of your Social Security Number (xxxx) and Date of Birth (mm/dd/yyyy) as indicated and click Submit. You will be taken to the next sign-up page. 5. Create a Stor[employer] ID. This will be your [employer] login ID and cannot be changed, so think of one that is secure and easy to remember. 6. Create a [employer] password. You can change your password at any time. 7. Enter your Password Reset Question and Answer. This can be used at a later time if you forget your password. 8. Enter your e-mail address. You will receive e-mail notification when new information is available in [address] 9. Click Sign Up. You can now view and download portions of your medical record. 10. Click the Download Summary menu link to download a portable copy of your medical information. If you have questions, please visit the Frequently Asked Questions section of the [employer] website. Remember, [employer] is NOT to be used for urgent needs. For medical emergencies, dial 911. Now available from your iPhone and Android! Please provide this summary of care documentation to your next provider. Your primary care clinician is listed as Juliann Aguiar. If you have any questions after today's visit, please call [phone].

## 2018-02-20 NOTE — PROGRESS NOTES
1. Have you been to the ER, urgent care clinic since your last visit? Hospitalized since your last visit? No    2. Have you seen or consulted any other health care providers outside of the [address] [place] [address] since your last visit? Include any pap smears or colon screening.  No

## 2018-02-21 ENCOUNTER — TELEPHONE (OUTPATIENT)
Dept: INTERNAL MEDICINE CLINIC | Age: 58
End: 2018-02-21

## 2018-02-21 LAB
ALBUMIN SERPL-MCNC: 4.6 G/DL (ref 3.5–5.5)
ALBUMIN/GLOB SERPL: 1.8 {RATIO} (ref 1.2–2.2)
ALP SERPL-CCNC: 218 IU/L (ref 39–117)
ALT SERPL-CCNC: 32 IU/L (ref 0–32)
AST SERPL-CCNC: 20 IU/L (ref 0–40)
BASOPHILS # BLD AUTO: 0 X10E3/UL (ref 0–0.2)
BASOPHILS NFR BLD AUTO: 0 %
BILIRUB SERPL-MCNC: 0.5 MG/DL (ref 0–1.2)
BUN SERPL-MCNC: 18 MG/DL (ref 6–24)
BUN/CREAT SERPL: 24 (ref 9–23)
CALCIUM SERPL-MCNC: 9.1 MG/DL (ref 8.7–10.2)
CHLORIDE SERPL-SCNC: 90 MMOL/L (ref 96–106)
CHOLEST SERPL-MCNC: 175 MG/DL (ref 100–199)
CO2 SERPL-SCNC: 25 MMOL/L (ref 18–29)
CREAT SERPL-MCNC: 0.76 MG/DL (ref 0.57–1)
EOSINOPHIL # BLD AUTO: 0.1 X10E3/UL (ref 0–0.4)
EOSINOPHIL NFR BLD AUTO: 1 %
ERYTHROCYTE [DISTWIDTH] IN BLOOD BY AUTOMATED COUNT: 14.2 % (ref 12.3–15.4)
EST. AVERAGE GLUCOSE BLD GHB EST-MCNC: 344 MG/DL
GFR SERPLBLD CREATININE-BSD FMLA CKD-EPI: 86 ML/MIN/{1.73_M2}
GFR SERPLBLD CREATININE-BSD FMLA CKD-EPI: 99 ML/MIN/{1.73_M2}
GLOBULIN SER CALC-MCNC: 2.5 G/L (ref 1.5–4.5)
GLUCOSE SERPL-MCNC: 540 MG/DL (ref 65–99)
HBA1C MFR BLD: 13.6 % (ref 4.8–5.6)
HCT VFR BLD AUTO: 41.5 % (ref 34–46.6)
HGB BLD-MCNC: 13.2 G/DL (ref 11.1–15.9)
IMM GRANULOCYTES # BLD: 0 X10E3/UL (ref 0–0.1)
IMM GRANULOCYTES NFR BLD: 0 %
LYMPHOCYTES # BLD AUTO: 2 X10E3/UL (ref 0.7–3.1)
LYMPHOCYTES NFR BLD AUTO: 25 %
MCH RBC QN AUTO: 28 PG (ref 26.6–33)
MCHC RBC AUTO-ENTMCNC: 31.8 G/DL (ref 31.5–35.7)
MCV RBC AUTO: 88 FL (ref 79–97)
MONOCYTES # BLD AUTO: 0.4 X10E3/UL (ref 0.1–0.9)
MONOCYTES NFR BLD AUTO: 5 %
NEUTROPHILS # BLD AUTO: 5.6 X10E3/UL (ref 1.4–7)
NEUTROPHILS NFR BLD AUTO: 69 %
PLATELET # BLD AUTO: 217 X10E3/UL (ref 150–379)
POTASSIUM SERPL-SCNC: 3.8 MMOL/L (ref 3.5–5.2)
PROT SERPL-MCNC: 7.1 G/DL (ref 6–8.5)
RBC # BLD AUTO: 4.72 X10E6/UL (ref 3.77–5.28)
SODIUM SERPL-SCNC: 134 MMOL/L (ref 134–144)
WBC # BLD AUTO: 8.1 X10E3/UL (ref 3.4–10.8)

## 2018-02-22 ENCOUNTER — OFFICE VISIT (OUTPATIENT)
Dept: INTERNAL MEDICINE CLINIC | Age: 58
End: 2018-02-22

## 2018-02-22 VITALS
HEART RATE: 80 BPM | OXYGEN SATURATION: 97 % | HEIGHT: 66 IN | SYSTOLIC BLOOD PRESSURE: 144 MMHG | DIASTOLIC BLOOD PRESSURE: 100 MMHG | BODY MASS INDEX: 30.37 KG/M2 | WEIGHT: 189 LBS | RESPIRATION RATE: 16 BRPM | TEMPERATURE: 98.3 F

## 2018-02-22 RX ORDER — PEN NEEDLE, DIABETIC 30 GX3/16"
NEEDLE, DISPOSABLE MISCELLANEOUS
Qty: 1 PACKAGE | Refills: 11 | Status: SHIPPED | OUTPATIENT
Start: 2018-02-22 | End: 2018-03-08 | Stop reason: ALTCHOICE

## 2018-02-22 RX ORDER — INSULIN GLARGINE 100 [IU]/ML
INJECTION, SOLUTION SUBCUTANEOUS
Qty: 3 PEN | Refills: 1 | Status: SHIPPED | OUTPATIENT
Start: 2018-02-22 | End: 2018-03-08 | Stop reason: SDUPTHER

## 2018-02-22 NOTE — MR AVS SNAPSHOT
[address] 
 
 
 [address], [address] [place] [address] 
[phone] Patient: Sonja Webb MRN: [id number] Visit Information Date & Time Provider Department Dept. Phone Encounter #  
 2/22/2018 12:15 PM Kate Uriostegui MD [facility] OF [place] [phone] [id number] Follow-up Instructions Return in about 1 week (around 3/1/2018). Your Appointments 6/18/2018 10:30 AM  
Follow Up with Kate Uriostegui MD  
[address] [facility]) Appt Note: 4 mo f/u  
 [address], [address] [place] [address]  
  
   
 [address], [address] [place] [address] Upcoming Health Maintenance Date Due  
 FOOT EXAM Q1 12/16/1968 EYE EXAM RETINAL OR DILATED Q1 12/16/1968 Pneumococcal 19-64 Medium Risk (1 of 1 - PPSV23) 12/16/1977 DTaP/Tdap/Td series (1 - Tdap) 12/16/1979 PAP AKA CERVICAL CYTOLOGY 12/16/1979 BREAST CANCER SCRN MAMMOGRAM 12/16/2008 FOBT Q 1 YEAR AGE 50-75 12/16/2008 HEMOGLOBIN A1C Q6M 8/20/2018 MICROALBUMIN Q1 11/27/2018 LIPID PANEL Q1 2/20/2019 Allergies as of 2/22/2018  Review Complete On: 2/22/2018 By: Erna Gonzáles LPN Severity Noted Reaction Type Reactions Clindamycin High 05/24/2016    Hives Sulfa (Sulfonamide Antibiotics) Medium   Unable to Obtain Lisinopril  01/02/2018    Cough Current Immunizations  Reviewed on 2/9/2018 Name Date Influenza Vaccine (Quad) PF 8/16/2017 Zoster Vaccine, Live 9/20/2015 Not reviewed this visit Vitals BP Pulse Temp Resp Height(growth percentile) (!) 144/100 (BP 1 Location: Left arm, BP Patient Position: Sitting) 80 98.3 °F (36.8 °C) (Tympanic) 16 5' 6\" (1.676 m) Weight(growth percentile) SpO2 BMI OB Status Smoking Status 189 lb (85.7 kg) 97% 30.51 kg/m2 Hysterectomy Never Smoker BMI and BSA Data Body Mass Index Body Surface Area 30.51 kg/m 2 2 m 2 Preferred Pharmacy Pharmacy Name Phone [facility] [place], [address] [place] [address],[address] [place] [address] [phone] Your Updated Medication List  
  
   
This list is accurate as of 18  1:36 PM.  Always use your most recent med list.  
  
  
  
  
 atorvastatin 40 mg tablet Commonly known as:  LIPITOR  
1 tablet daily  
  
 glimepiride 2 mg tablet Commonly known as:  AMARYL Take 1 Tab by mouth two (2) times a day. insulin glargine 100 unit/mL (3 mL) Inpn Commonly known as:  LANTUS,BASAGLAR  
20 units daily Insulin Needles (Disposable) 31 gauge x 516\" Ndle Use insulin pen  
  
 losartan-hydroCHLOROthiazide 100-12.5 mg per tablet Commonly known as:  HYZAAR  
1 tablet daily  
  
 metFORMIN  mg tablet Commonly known as:  GLUCOPHAGE XR  
1 tablet daily at supper  
  
 nystatin-triamcinolone 100,000-0.1 unit/gram-% ointment Commonly known as:  Leni  Apply  to affected area two (2) times a day. Omeprazole delayed release 20 mg tablet Commonly known as:  PRILOSEC D/R Take 1 Tab by mouth daily. Prescriptions Sent to Pharmacy Refills  
 insulin glargine (LANTUS,BASAGLAR) 100 unit/mL (3 mL) inpn 1 Si units daily Class: Normal  
 Pharmacy: [facility] [address] Ph #: [phone] Insulin Needles, Disposable, 31 gauge x 516\" ndle 11 Sig: Use insulin pen Class: Normal  
 Pharmacy: [facility] Ph #: [phone] Follow-up Instructions Return in about 1 week (around 3/1/2018). Patient Instructions Health Maintenance Due Topic Date Due  
 FOOT EXAM Q1  1968  
 EYE EXAM RETINAL OR DILATED Q1  1968  Pneumococcal 19-64 Medium Risk (1 of 1 - PPSV23) 1977  
 DTaP/Tdap/Td series (1 - Tdap) 1979  PAP AKA CERVICAL CYTOLOGY  12/16/1979  
 BREAST CANCER SCRN MAMMOGRAM  12/16/2008  FOBT Q 1 YEAR AGE 50-75  12/16/2008 Introducing [facility]! Danny Lugo introduces [employer] patient portal. Now you can access parts of your medical record, email your doctor's office, and request medication refills online. 1. In your internet browser, go to [email or web address] 2. Click on the First Time User? Click Here link in the Sign In box. You will see the New Member Sign Up page. 3. Enter your [employer] Access Code exactly as it appears below. You will not need to use this code after youve completed the sign-up process. If you do not sign up before the expiration date, you must request a new code. · [employer] Access Code: [email or web address] Expires: 4/16/2018 10:27 PM 
 
4. Enter the last four digits of your Social Security Number (xxxx) and Date of Birth (mm/dd/yyyy) as indicated and click Submit. You will be taken to the next sign-up page. 5. Create a [employer] ID. This will be your [employer] login ID and cannot be changed, so think of one that is secure and easy to remember. 6. Create a [employer] password. You can change your password at any time. 7. Enter your Password Reset Question and Answer. This can be used at a later time if you forget your password. 8. Enter your e-mail address. You will receive e-mail notification when new information is available in [address] 9. Click Sign Up. You can now view and download portions of your medical record. 10. Click the Download Summary menu link to download a portable copy of your medical information. If you have questions, please visit the Frequently Asked Questions section of the [employer] website. Remember, [employer] is NOT to be used for urgent needs. For medical emergencies, dial 911. Now available from your iPhone and Android! Please provide this summary of care documentation to your next provider. Your primary care clinician is listed as Lauren Hinton. If you have any questions after today's visit, please call [phone].

## 2018-02-22 NOTE — PROGRESS NOTES
1. Have you been to the ER, urgent care clinic since your last visit? Hospitalized since your last visit? No    2. Have you seen or consulted any other health care providers outside of the [address] [place] [address] since your last visit? Include any pap smears or colon screening.  No

## 2018-02-22 NOTE — PROGRESS NOTES
CC:  Diabetes management/Education    S/O: Meghana Calderon is a 59 y.o. female referred by Dr. Ruby Dover MD for diabetes management/education. Patient had a critical BG value on recent labs - 540 mg/dL and was asked to come to the office to discuss results and consider change in therapy. HPI: Patient has long-standing uncontrolled type 2 DM. Current diabetes regimen consists of the following: metformin  mg once daily, glimepiride 2 mg BID.      ROS:   Patient denies chest pain, shortness of breath, neuropathy, vision changes, and any foot changes. Patient reports the following signs/symptoms of diabetes: polyuria, polydipsia, polyphagia, blurred vision, numbness/tingling in feet, dizziness and fatigue. Patient denies recent hypoglycemic episodes. Self-monitoring Blood Glucose: Patient does not check blood sugars at home. Most recent A1C was 13.6 % this week. Diet was reviewed extensively and the following were noted:  A typical days food intake is as follows:  - breakfast: breakfast bowl with eggs, steak, potatoes, cheese or scrapple/sausage with sweet tea  - lunch: \"can't recall\"  - dinner: fried chicken with skin removed, large baked potato with cheese  - beverages: sweet tea, Pepsi, water  - snacks: cheese nips, applesauce    Exercise consists of \"staying active around the house\". Patient reports adherence with her medications. Patient denies adverse effects from her medications at the current doses (patient believes she got a rash at higher doses of metformin IR - 1000 mg BID, no rash on the XR product).     Past Medical History:   Diagnosis Date    Essential hypertension, benign     Mixed hyperlipidemia     Type II or unspecified type diabetes mellitus without mention of complication, not stated as uncontrolled      Past Surgical History:   Procedure Laterality Date    [facility]X [facility]ST[facility]CTOMY  [facility]    X2     Family History   Problem Relation Age of Onset    Diabetes Mother  Stroke Maternal Grandmother     Stroke Maternal Grandfather     Hypertension Sister     Diabetes Sister      Social History     Social History    Marital status: SINGLE     Spouse name: N/A    Number of children: N/A    Years of education: N/A     Social History Main Topics    Smoking status: Never Smoker    Smokeless tobacco: Never Used    Alcohol use No    Drug use: No    Sexual activity: Not Currently     Partners: Male     Other Topics Concern    None     Social History Narrative     Allergies   Allergen Reactions    Clindamycin Hives    Sulfa (Sulfonamide Antibiotics) Unable to Obtain    Lisinopril Cough     Current Outpatient Prescriptions   Medication Sig    metFORMIN ER (GLUCOPHAGE XR) 500 mg tablet 1 tablet daily at supper    glimepiride (AMARYL) 2 mg tablet Take 1 Tab by mouth two (2) times a day.  atorvastatin (LIPITOR) 40 mg tablet 1 tablet daily    losartan-hydroCHLOROthiazide (HYZAAR) 100-12.5 mg per tablet 1 tablet daily    nystatin-triamcinolone (MYCOLOG) 100,000-0.1 unit/gram-% ointment Apply  to affected area two (2) times a day.  Omeprazole delayed release (PRILOSEC D/R) 20 mg tablet Take 1 Tab by mouth daily. No current facility-administered medications for this visit.       Visit Vitals    BP (!) 144/100 (BP 1 Location: Left arm, BP Patient Position: Sitting)    Pulse 80    Temp 98.3 °F (36.8 °C) (Tympanic)    Resp 16    Ht 5' 6\" (1.676 m)    Wt 189 lb (85.7 kg)    SpO2 97%    BMI 30.51 kg/m2     *No BP meds yet today*    Data reviewed:  Lab Results   Component Value Date/Time    Hemoglobin A1c 13.6 (H) 02/20/2018 01:30 AM    Hemoglobin A1c (POC) 13.1 11/20/2017 10:52 AM     Lab Results   Component Value Date/Time    Cholesterol, total 175 02/20/2018 01:30 AM    HDL Cholesterol 39 (L) 11/27/2017 09:15 AM    LDL, calculated 56 11/27/2017 09:15 AM    VLDL, calculated 41 (H) 11/27/2017 09:15 AM    Triglyceride 203 (H) 11/27/2017 09:15 AM     Lab Results Component Value Date/Time    ALT (SGPT) 32 02/20/2018 01:30 AM    AST (SGOT) 20 02/20/2018 01:30 AM    Alk. phosphatase 218 (H) 02/20/2018 01:30 AM    Bilirubin, total 0.5 02/20/2018 01:30 AM     Lab Results   Component Value Date/Time    Creatinine 0.76 02/20/2018 01:30 AM     Lab Results   Component Value Date/Time    Microalb/Creat ratio (ug/mg creat.) 14.4 11/27/2017 09:15 AM       Diabetes Health Maintenance/Screenings:  Last eye exam: unsure - will address next visit  Last foot exam: unsure - will address next visit  Last microalbumin: 11/27/2017 14.4 mcg/mg Cr  Last influenza vaccine: 8/16/2017  Last Pneumovax 23 vaccine: none  Hepatitis B Series: none  ASA Therapy: not taking  ACE/ARB Therapy: losartan/HCTZ (not yet taken today)  Statin Therapy: atorvastatin 40 mg (high intensity)    Assessment/Plan:     1. Diabetes:  Uncontrolled (goal A1C <7%) and no home blood glucose readings. A. Medications:  Stressed importance of medication adherence on overall diabetes control as well as with preventing complications of diabetes. MD would like to start insulin glargine - demonstrated injection technique, administration sites, rotation of sites, use of an insulin pen. Reviewed symptoms of hypoglycemia and how to treat. Patient's daughter is on Lantus Solostar and agreed to help patient with insulin administration per patient. Discussed need to have own insulin pen and needles, testing supplies (if patient starts checking glucose at home) and importance of not sharing any devices/equipment that may be contaminated with blood. Patient verbalized understanding. At follow-up visits, plan is to maximize insulin/metformin and consider stopping glimepiride as likely no added benefit once on adequate insulin dose.     B. Diet/lifestyle:  Reinforced importance of regular activity/exercise to help improve insulin resistance and reviewed general food label/carbohydrate concepts and plate method, difference between starchy and nonstarchy vegetables. Patient education materials were provided (Type 2 DM and insulin, Healthy Portions, Plate Method, Hypo/Hyperglycemia, Planning Healthy Meals) and reviewed with the patient. Discussed decreasing Pepsi/sweet tea and moderating starch intake, increasing non-starchy vegetable intake and limiting fried foods. May need to consider referral to the dietician in the future. C. Screenings/Prevention:  Vaccinations: Patient is eligible for the following vaccinations: Pneumococcal (PPSV23), Hepatitis B, Tdap    Dilated eye exam (recommended at least every 2 years or annually if retinopathy present): next due - need to schedule    Albumin-to creatinine ratio (recommended annually): next due 11/2018    Foot Exam (recommended annually): next due - need to perform at next PCP visit     2. Hypertension:  Blood pressureis not at goal of < 140/90 mm Hg per the ADA guidelines but patient did not take her BP meds prior to visit today. Emphasized the importance of regular physical activity, restricting salt in diet and weight maintenance/loss on overall blood pressure control. Will reassess BP at visit next week. 3.  Hyperlipidemia:  ASCVD 10 year risk is 8.9%. Current lipid treatment guidelines recommend at least moderate-intensity statin doses to decrease ASCVD risk. Patient is on high intensity atorvastatin and LDL was 56 in November 2017. 4.  Medication reconciliation was completed during the visit. There are no discontinued medications. Patient verbalized understanding of the information presented and all of the patients questions were answered. Patient advised to call the officeor Dr. Kevyn Desai MD with any additional questions or concerns. Follow-up: 1 week with MD/pharmacist Monday/Thursday. Notification of recommendations will be sent to Dr. Kevyn Desai MD for review.     Thank you for the consult,  Rocky Patricia, PharmD, BCACP

## 2018-02-22 NOTE — PATIENT INSTRUCTIONS
Health Maintenance Due   Topic Date Due    FOOT EXAM Q1  12/16/1968    EYE EXAM RETINAL OR DILATED Q1  12/16/1968    Pneumococcal 19-64 Medium Risk (1 of 1 - PPSV23) 12/16/1977    DTaP/Tdap/Td series (1 - Tdap) 12/16/1979    PAP AKA CERVICAL CYTOLOGY  12/16/1979    BREAST CANCER SCRN MAMMOGRAM  12/16/2008    FOBT Q 1 YEAR AGE 50-75  12/16/2008

## 2018-02-22 NOTE — TELEPHONE ENCOUNTER
A message was left on patient's voice mail to call the office to set appointment to see Dr. Mary Kate Mathew today. My direct line was left on message.

## 2018-02-24 NOTE — PROGRESS NOTES
Patient on oral medications for type II diabetes mellitus. Recent random blood sugar 540 with HbA1c 13.6. I discussed the high sugar with the patient and the long term implications of a high sugar. I recommended using Metformin 500 mg BID and Lantus 20 units daily. Recheck the sugar in 1 week    A total of 10 minutes was spent with the patient.  Greater than 50% of this time was spent in discussion of the problem, counseling the patient, and coordinating the care regarding the problem of type II diabetes mellitus with high blood sugars    Dr. Angelina Burdick also met with the patient and educated the patient on aspects of Type II diabetes mellitus - proper diet - proper use of an insulin pen

## 2018-02-28 PROBLEM — L02.211 ABSCESS OF SKIN OF ABDOMEN: Status: ACTIVE | Noted: 2018-02-28

## 2018-02-28 NOTE — PROGRESS NOTES
The patient presents to the office today with the chief complaint of Diabetes Mellitus    HPI    The patient remains on oral for type II diabetes mellitus. she does not check blood sugars at home. The patient has not had any symptoms of  low sugars. The patient notes polyuria and recurrent skin abscesses. The patient has one on her abdomen currently The patient remains on medications for hypertension and hyperlipidemia. she is tolerating the medications well. Review of Systems   Respiratory: Negative for shortness of breath. Cardiovascular: Negative for chest pain and leg swelling. Skin:        Skin infection        Allergies   Allergen Reactions    Clindamycin Hives    Sulfa (Sulfonamide Antibiotics) Unable to Obtain    Lisinopril Cough       Current Outpatient Prescriptions   Medication Sig Dispense Refill    metFORMIN ER (GLUCOPHAGE XR) 500 mg tablet 1 tablet daily at supper 30 Tab 3    glimepiride (AMARYL) 2 mg tablet Take 1 Tab by mouth two (2) times a day. 60 Tab 3    atorvastatin (LIPITOR) 40 mg tablet 1 tablet daily 30 Tab 3    losartan-hydroCHLOROthiazide (HYZAAR) 100-12.5 mg per tablet 1 tablet daily 30 Tab 3    nystatin-triamcinolone (MYCOLOG) 100,000-0.1 unit/gram-% ointment Apply  to affected area two (2) times a day. 30 g 0    Omeprazole delayed release (PRILOSEC D/R) 20 mg tablet Take 1 Tab by mouth daily.  30 Tab 1    insulin glargine (LANTUS,BASAGLAR) 100 unit/mL (3 mL) inpn 20 units daily 3 Pen 1    Insulin Needles, Disposable, 31 gauge x 5/16\" ndle Use insulin pen 1 Package 11       Past Medical History:   Diagnosis Date    Essential hypertension, benign     Mixed hyperlipidemia     Type II or unspecified type diabetes mellitus without mention of complication, not stated as uncontrolled        Past Surgical History:   Procedure Laterality Date    HX HYSTERECTOMY  80s    X1       Social History     Social History    Marital status: SINGLE     Spouse name: N/A    Number of children: N/A    Years of education: N/A     Occupational History    Not on file. Social History Main Topics    Smoking status: Never Smoker    Smokeless tobacco: Never Used    Alcohol use No    Drug use: No    Sexual activity: Not Currently     Partners: Male     Other Topics Concern    Not on file     Social History Narrative       Patient does not have an advanced directive on file    Visit Vitals    /88 (BP 1 Location: Left arm, BP Patient Position: Sitting)    Pulse 80    Temp 98.6 °F (37 °C) (Tympanic)    Resp 16    Ht 5' 6\" (1.676 m)    Wt 189 lb (85.7 kg)    SpO2 97%    BMI 30.51 kg/m2       Physical Exam   Neck: Carotid bruit is not present. Cardiovascular: Normal rate and regular rhythm. Exam reveals no gallop. No murmur heard. Pulmonary/Chest: She has no wheezes. She has no rales. Abdominal: Soft. Bowel sounds are normal. She exhibits no distension. There is no tenderness. Musculoskeletal: She exhibits no edema.    Skin:   1 1/2 cm superficial abscess upper abdomen       DM Foot:  Diabetic foot exam:     Left: Reflexes 2+     Vibratory sensation normal    Proprioception normal   Sharp/dull discrimination normal    Filament test 4/6   Pulse DP: 2+ (normal)   Pulse PT: 2+ (normal)   Deformities: None   Right: Reflexes 2+   Vibratory sensation normal   Proprioception normal   Sharp/dull discrimination normal   Filament test 4/6   Pulse DP: 2+ (normal)   Pulse PT: 2+ (normal)   Deformities: None      BMI:  The patient was advised to limit calories to 1800 cecelia and carbohydrates to 100 grams daily      Office Visit on 02/20/2018   Component Date Value Ref Range Status    WBC 02/20/2018 8.1  3.4 - 10.8 x10E3/uL Final    RBC 02/20/2018 4.72  3.77 - 5.28 x10E6/uL Final    HGB 02/20/2018 13.2  11.1 - 15.9 g/dL Final    HCT 02/20/2018 41.5  34.0 - 46.6 % Final    MCV 02/20/2018 88  79 - 97 fL Final    MCH 02/20/2018 28.0  26.6 - 33.0 pg Final    MCHC 02/20/2018 31.8  31.5 - 35.7 g/dL Final    RDW 02/20/2018 14.2  12.3 - 15.4 % Final    PLATELET 91/64/3150 724  150 - 379 x10E3/uL Final    NEUTROPHILS 02/20/2018 69  Not Estab. % Final    Lymphocytes 02/20/2018 25  Not Estab. % Final    MONOCYTES 02/20/2018 5  Not Estab. % Final    EOSINOPHILS 02/20/2018 1  Not Estab. % Final    BASOPHILS 02/20/2018 0  Not Estab. % Final    ABS. NEUTROPHILS 02/20/2018 5.6  1.4 - 7.0 x10E3/uL Final    Abs Lymphocytes 02/20/2018 2.0  0.7 - 3.1 x10E3/uL Final    ABS. MONOCYTES 02/20/2018 0.4  0.1 - 0.9 x10E3/uL Final    ABS. EOSINOPHILS 02/20/2018 0.1  0.0 - 0.4 x10E3/uL Final    ABS. BASOPHILS 02/20/2018 0.0  0.0 - 0.2 x10E3/uL Final    IMMATURE GRANULOCYTES 02/20/2018 0  Not Estab. % Final    ABS. IMM. GRANS. 02/20/2018 0.0  0.0 - 0.1 x10E3/uL Final    Glucose 02/20/2018 540* 65 - 99 mg/dL Final    **Verified by repeat analysis**    BUN 02/20/2018 18  6 - 24 mg/dL Final    Creatinine 02/20/2018 0.76  0.57 - 1.00 mg/dL Final    GFR est non-AA 02/20/2018 86  >59 Final    GFR est AA 02/20/2018 99  >59 Final    BUN/Creatinine ratio 02/20/2018 24* 9 - 23 Final    Sodium 02/20/2018 134  134 - 144 mmol/L Final    Potassium 02/20/2018 3.8  3.5 - 5.2 mmol/L Final    Chloride 02/20/2018 90* 96 - 106 mmol/L Final    CO2 02/20/2018 25  18 - 29 mmol/L Final    Calcium 02/20/2018 9.1  8.7 - 10.2 mg/dL Final    Protein, total 02/20/2018 7.1  6.0 - 8.5 g/dL Final    Albumin 02/20/2018 4.6  3.5 - 5.5 g/dL Final    GLOBULIN, TOTAL 02/20/2018 2.5  1.5 - 4.5 Final    A-G Ratio 02/20/2018 1.8  1.2 - 2.2 Final    Bilirubin, total 02/20/2018 0.5  0.0 - 1.2 mg/dL Final    Alk.  phosphatase 02/20/2018 218* 39 - 117 IU/L Final    AST (SGOT) 02/20/2018 20  0 - 40 IU/L Final    ALT (SGPT) 02/20/2018 32  0 - 32 IU/L Final    Hemoglobin A1c 02/20/2018 13.6* 4.8 - 5.6 % Final    Comment:          Pre-diabetes: 5.7 - 6.4           Diabetes: >6.4           Glycemic control for adults with diabetes: <7.0      Estimated average glucose 02/20/2018 344   Final    Cholesterol, total 02/20/2018 175  100 - 199 mg/dL Final   Office Visit on 01/02/2018   Component Date Value Ref Range Status    Atopobium vaginae 01/02/2018 Low - 0  Score Final    BVAB 2 01/02/2018 Low - 0  Score Final    Megasphaera 1 01/02/2018 Low - 0  Score Final    Comment: Calculate total score by adding the 3 individual bacterial  vaginosis (BV) marker scores together. Total score is  interpreted as follows: Total score 0-1: Indicates the absence of BV. Total score   2: Indeterminate for BV. Additional clinical                   data should be evaluated to establish a                   diagnosis. Total score 3-6: Indicates the presence of BV. This test was developed and its performance characteristics  determined by [facility].  It has not been cleared or approved  by the Food and Drug Administration. The FDA has determined  that such clearance or approval is not necessary.  C. albicans, NEGAR 01/02/2018 Positive* Negative Final    C. glabrata, NEGAR 01/02/2018 Positive* Negative Final    Comment: This test was developed and its performance characteristics determined  by [facility].  It has not been cleared or approved by the Food and Drug  Administration. The FDA has determined that such clearance or  approval is not necessary. Published data demonstrate that up to 65% of Candida glabrata  identified in cases of vaginal candidiasis have decreased  susceptibility to fluconazole.       T. vaginalis, NEGAR 01/02/2018 Negative  Negative Final    C. trachomatis, NEGAR 01/02/2018 Negative  Negative Final    N. gonorrhoeae, NEGAR 01/02/2018 Negative  Negative Final       .  Results for orders placed or performed in visit on 02/20/18   CBC WITH AUTOMATED DIFF   Result Value Ref Range    WBC 8.1 3.4 - 10.8 x10E3/uL    RBC 4.72 3.77 - 5.28 x10E6/uL    HGB 13.2 11.1 - 15.9 g/dL    HCT 41.5 34.0 - 46.6 %    MCV 88 79 - 97 fL    MCH 28.0 26.6 - 33.0 pg    MCHC 31.8 31.5 - 35.7 g/dL    RDW 14.2 12.3 - 15.4 %    PLATELET 855 752 - 571 x10E3/uL    NEUTROPHILS 69 Not Estab. %    Lymphocytes 25 Not Estab. %    MONOCYTES 5 Not Estab. %    EOSINOPHILS 1 Not Estab. %    BASOPHILS 0 Not Estab. %    ABS. NEUTROPHILS 5.6 1.4 - 7.0 x10E3/uL    Abs Lymphocytes 2.0 0.7 - 3.1 x10E3/uL    ABS. MONOCYTES 0.4 0.1 - 0.9 x10E3/uL    ABS. EOSINOPHILS 0.1 0.0 - 0.4 x10E3/uL    ABS. BASOPHILS 0.0 0.0 - 0.2 x10E3/uL    IMMATURE GRANULOCYTES 0 Not Estab. %    ABS. IMM. GRANS. 0.0 0.0 - 0.1 x10E3/uL    Narrative    Specimen Comment: Test(s) Glucose, Serum called to [facility] [address] on 02/21/2018 at   11:04 EST  Performed at:  [address]  [id number]  : Maine Rivas MD, Phone:  [phone]   METABOLIC PANEL, COMPREHENSIVE   Result Value Ref Range    Glucose 540 (>) 65 - 99 mg/dL    BUN 18 6 - 24 mg/dL    Creatinine 0.76 0.57 - 1.00 mg/dL    GFR est non-AA 86 >59    GFR est AA 99 >59    BUN/Creatinine ratio 24 (H) 9 - 23    Sodium 134 134 - 144 mmol/L    Potassium 3.8 3.5 - 5.2 mmol/L    Chloride 90 (L) 96 - 106 mmol/L    CO2 25 18 - 29 mmol/L    Calcium 9.1 8.7 - 10.2 mg/dL    Protein, total 7.1 6.0 - 8.5 g/dL    Albumin 4.6 3.5 - 5.5 g/dL    GLOBULIN, TOTAL 2.5 1.5 - 4.5    A-G Ratio 1.8 1.2 - 2.2    Bilirubin, total 0.5 0.0 - 1.2 mg/dL    Alk.  phosphatase 218 (H) 39 - 117 IU/L    AST (SGOT) 20 0 - 40 IU/L    ALT (SGPT) 32 0 - 32 IU/L    Narrative    Specimen Comment: Test(s) Glucose, Serum called to [facility] [address] on 02/21/2018 at   11:04 EST  Performed at:  [address]  [id number]  : Maine Rivas MD, Phone:  [phone]   HEMOGLOBIN A1C WITH EAG   Result Value Ref Range    Hemoglobin A1c 13.6 (H) 4.8 - 5.6 %    Estimated average glucose 344     Narrative    Specimen Comment: Test(s) Glucose, Serum called to [facility] [address] on 02/21/2018 at   11:04 EST  Performed at:  [facility]  [address], [address] [place] [address]  [id number]  : Sunny Moon MD, Phone:  [phone]   CHOLESTEROL, TOTAL   Result Value Ref Range    Cholesterol, total 175 100 - 199 mg/dL    Narrative    Specimen Comment: Test(s) Glucose, Serum called to [address] [place] [address] [address] on 02/21/2018 at   11:04 EST  Performed at:  [address], [address] [place] [address]  [id number]  : Sunny Moon MD, Phone:  [phone]       Assessment / Plan      ICD-10-CM ICD-9-CM    1. Mixed hyperlipidemia E78.2 272.2 metFORMIN ER (GLUCOPHAGE XR) 500 mg tablet      glimepiride (AMARYL) 2 mg tablet      atorvastatin (LIPITOR) 40 mg tablet      losartan-hydroCHLOROthiazide (HYZAAR) 100-12.5 mg per tablet      COLLECTION VENOUS BLOOD,VENIPUNCTURE      CANCELED: CBC WITH AUTOMATED DIFF      CANCELED: METABOLIC PANEL, COMPREHENSIVE      CANCELED: HEMOGLOBIN A1C WITH EAG      CANCELED: CHOLESTEROL, TOTAL   2. Type 2 diabetes mellitus without complication, without long-term current use of insulin (HCC) E11.9 250.00 metFORMIN ER (GLUCOPHAGE XR) 500 mg tablet      glimepiride (AMARYL) 2 mg tablet      atorvastatin (LIPITOR) 40 mg tablet      losartan-hydroCHLOROthiazide (HYZAAR) 100-12.5 mg per tablet      COLLECTION VENOUS BLOOD,VENIPUNCTURE      CANCELED: CBC WITH AUTOMATED DIFF      CANCELED: METABOLIC PANEL, COMPREHENSIVE      CANCELED: HEMOGLOBIN A1C WITH EAG      CANCELED: CHOLESTEROL, TOTAL   3. Essential hypertension, benign I10 401.1 metFORMIN ER (GLUCOPHAGE XR) 500 mg tablet      glimepiride (AMARYL) 2 mg tablet      atorvastatin (LIPITOR) 40 mg tablet      losartan-hydroCHLOROthiazide (HYZAAR) 100-12.5 mg per tablet      COLLECTION VENOUS BLOOD,VENIPUNCTURE      CANCELED: CBC WITH AUTOMATED DIFF      CANCELED: METABOLIC PANEL, COMPREHENSIVE      CANCELED: HEMOGLOBIN A1C WITH EAG      CANCELED: CHOLESTEROL, TOTAL   4. Abscess of skin of abdomen L02.211 682. 2        she was advised to continue her maintenance medications for now  Labs  The patient does not seem to have a good understanding of diabetes. I suspect that her sugars will be high. I will have the patient return in 2 days to see Dr. Kenneth Holly or review of medications and education. We may need to start insulin then    Follow-up Disposition:  Return in about 2 days (around 2/22/2018). I asked Katherine Wilson if she has any questions and I answered the questions.   Katherine Wilson states that she understands the treatment plan and agrees with the treatment plan

## 2018-03-06 DIAGNOSIS — I10 ESSENTIAL HYPERTENSION, BENIGN: ICD-10-CM

## 2018-03-06 DIAGNOSIS — E11.9 TYPE 2 DIABETES MELLITUS WITHOUT COMPLICATION, WITHOUT LONG-TERM CURRENT USE OF INSULIN (HCC): ICD-10-CM

## 2018-03-06 DIAGNOSIS — E78.2 MIXED HYPERLIPIDEMIA: ICD-10-CM

## 2018-03-06 RX ORDER — METFORMIN HYDROCHLORIDE 500 MG/1
500 TABLET, EXTENDED RELEASE ORAL 2 TIMES DAILY WITH MEALS
Qty: 120 TAB | Refills: 3 | Status: SHIPPED | OUTPATIENT
Start: 2018-03-06 | End: 2018-10-02 | Stop reason: DRUGHIGH

## 2018-03-06 NOTE — TELEPHONE ENCOUNTER
Requested Prescriptions     Pending Prescriptions Disp Refills    metFORMIN ER (GLUCOPHAGE XR) 500 mg tablet 30 Tab 3     Si tablet daily at supper        DR Jacey Sequeira CHANGED IT TO TWO A DAY PATIENT NEEDS A NEW SCRIPT SENT TO THE PHARMACY.     Patient will be out tomorrow

## 2018-03-08 ENCOUNTER — OFFICE VISIT (OUTPATIENT)
Dept: INTERNAL MEDICINE CLINIC | Age: 58
End: 2018-03-08

## 2018-03-08 VITALS
SYSTOLIC BLOOD PRESSURE: 140 MMHG | OXYGEN SATURATION: 97 % | WEIGHT: 193 LBS | RESPIRATION RATE: 18 BRPM | DIASTOLIC BLOOD PRESSURE: 86 MMHG | BODY MASS INDEX: 31.02 KG/M2 | TEMPERATURE: 97.9 F | HEART RATE: 95 BPM | HEIGHT: 66 IN

## 2018-03-08 DIAGNOSIS — R73.9 ELEVATED BLOOD SUGAR: Primary | ICD-10-CM

## 2018-03-08 LAB — GLUCOSE POC: NORMAL MG/DL

## 2018-03-08 RX ORDER — PEN NEEDLE, DIABETIC 31 GX3/16"
NEEDLE, DISPOSABLE MISCELLANEOUS
Qty: 100 PEN NEEDLE | Refills: 1 | Status: SHIPPED | OUTPATIENT
Start: 2018-03-08 | End: 2021-03-24 | Stop reason: SDUPTHER

## 2018-03-08 RX ORDER — INSULIN GLARGINE 100 [IU]/ML
INJECTION, SOLUTION SUBCUTANEOUS
Qty: 3 PEN | Refills: 1
Start: 2018-03-08 | End: 2018-05-04 | Stop reason: SDUPTHER

## 2018-03-08 NOTE — MR AVS SNAPSHOT
[facility] 
 
 
 [address], [address] [place] [address] 
[phone] Patient: Mike Allison MRN: [id number] BJO: Visit Information Date & Time Provider Department Dept. Phone Encounter #  
 3/8/2018 11:30 AM Milady Ornelas MD [facility] INTERNAL MEDICINE OF [place] Riggs [phone] Your Appointments 3/23/2018 12:30 PM  
Follow Up with Milady Ornelas MD  
[facility]) Appt Note: 2wk  
 [address], [address]  
  
   
 [address], [address] [facility] [address] 2018 10:30 AM  
Follow Up with Milady Ornelas MD  
[facility]) Appt Note: 4 mo f/u  
 [address], [address] [facility] [address]  
[phone] Upcoming Health Maintenance Date Due  
 FOOT EXAM Q1 1968 EYE EXAM RETINAL OR DILATED Q1 1968 Pneumococcal 19-64 Medium Risk (1 of 1 - PPSV23) 1977 DTaP/Tdap/Td series (1 - Tdap) 1979 PAP AKA CERVICAL CYTOLOGY 1979 BREAST CANCER SCRN MAMMOGRAM 2008 FOBT Q 1 YEAR AGE 50-75 2008 HEMOGLOBIN A1C Q6M 2018 MICROALBUMIN Q1 2018 LIPID PANEL Q1 2019 Allergies as of 3/8/2018  Review Complete On: 3/8/2018 By: Abby Christianson, ROSALINAD  
  
 Severity Noted Reaction Type Reactions Clindamycin High 2016    Hives Sulfa (Sulfonamide Antibiotics) Medium   Unable to Obtain Lisinopril  2018    Cough Current Immunizations  Reviewed on 2018 Name Date Influenza Vaccine (Quad) PF 2017 Zoster Vaccine, Live 2015 Not reviewed this visit You Were Diagnosed With   
  
 Codes Comments Elevated blood sugar    -  Primary ICD-10-CM: R73.9 ICD-9-CM: 790.29  Uncontrolled type 2 diabetes mellitus without complication, with long-term current use of insulin (HCC)     ICD-10-CM: E11.65, Z79.4 ICD-9-CM: 250.02, V58.67 Vitals BP Pulse Temp Resp Height(growth percentile) 140/86 (BP 1 Location: Left arm, BP Patient Position: Sitting) 95 97.9 °F (36.6 °C) (Tympanic) 18 5' 6\" (1.676 m) Weight(growth percentile) SpO2 BMI OB Status Smoking Status 193 lb (87.5 kg) 97% 31.15 kg/m2 Hysterectomy Never Smoker BMI and BSA Data Body Mass Index Body Surface Area  
 31.15 kg/m 2 2.02 m 2 Preferred Pharmacy Pharmacy Name Phone [facility], [address] [place] [address],[address] [place] [address] [phone] Your Updated Medication List  
  
   
This list is accurate as of 3/8/18 12:51 PM.  Always use your most recent med list.  
  
  
  
  
 atorvastatin 40 mg tablet Commonly known as:  LIPITOR  
1 tablet daily  
  
 glimepiride 2 mg tablet Commonly known as:  AMARYL Take 1 Tab by mouth two (2) times a day. glucose blood VI test strips strip Commonly known as:  ASCENSIA AUTODISC VI, ONE TOUCH ULTRA TEST VI To check sugar daily  
  
 insulin glargine 100 unit/mL (3 mL) Inpn Commonly known as:  LANTUS,BASAGLAR  
20 units daily Insulin Needles (Disposable) 32 gauge x 5/32\" Ndle Commonly known as:  PEN NEEDLE For insulin shots  
  
 losartan-hydroCHLOROthiazide 100-12.5 mg per tablet Commonly known as:  HYZAAR  
1 tablet daily  
  
 metFORMIN  mg tablet Commonly known as:  GLUCOPHAGE XR Take 1 Tab by mouth two (2) times daily (with meals). nystatin-triamcinolone 100,000-0.1 unit/gram-% ointment Commonly known as:  An Home Apply  to affected area two (2) times a day. Omeprazole delayed release 20 mg tablet Commonly known as:  PRILOSEC D/R Take 1 Tab by mouth daily. Prescriptions Sent to Pharmacy Refills  
 glucose blood VI test strips (ASCENSIA AUTODISC VI, ONE TOUCH ULTRA TEST VI) strip 2 Sig: To check sugar daily  Class: Normal  
 Pharmacy: [facility] Daren Arellano, [address] [place] [address] Ph #: [phone] Insulin Needles, Disposable, (PEN NEEDLE) 32 gauge x 5/32\" ndle 1 Sig: For insulin shots Class: Normal  
 Pharmacy: [facility] Daren Arellano, [address] [place] [address] Ph #: [phone] We Performed the Following AMB POC GLUCOSE BLOOD, BY GLUCOSE MONITORING DEVICE [96477 CPT(R)] Patient Instructions Make sure to store your unopened Lantus Solostar pens in the fridge. Once you use a pen, you may store it at room temperature for 28 days after opening. Introducing [facility]! [facility] introduces [employer] patient portal. Now you can access parts of your medical record, email your doctor's office, and request medication refills online. 1. In your internet browser, go to [email or web address] 2. Click on the First Time User? Click Here link in the Sign In box. You will see the New Member Sign Up page. 3. Enter your [employer] Access Code exactly as it appears below. You will not need to use this code after youve completed the sign-up process. If you do not sign up before the expiration date, you must request a new code. · [employer] Access Code: [email or web address] Expires: 4/16/2018 10:27 PM 
 
4. Enter the last four digits of your Social Security Number (xxxx) and Date of Birth (mm/dd/yyyy) as indicated and click Submit. You will be taken to the next sign-up page. 5. Create a [employer] ID. This will be your [employer] login ID and cannot be changed, so think of one that is secure and easy to remember. 6. Create a [employer] password. You can change your password at any time. 7. Enter your Password Reset Question and Answer. This can be used at a later time if you forget your password. 8. Enter your e-mail address. You will receive e-mail notification when new information is available in [address] 9. Click Sign Up. You can now view and download portions of your medical record. 10. Click the Download Summary menu link to download a portable copy of your medical information. If you have questions, please visit the Frequently Asked Questions section of the [employer] website. Remember, [employer] is NOT to be used for urgent needs. For medical emergencies, dial 911. Now available from your iPhone and Android! Please provide this summary of care documentation to your next provider. Your primary care clinician is listed as Derrick Lawton. If you have any questions after today's visit, please call [phone].

## 2018-03-08 NOTE — PROGRESS NOTES
CC:  Diabetes management/Education    S/O: Maegan Crisostomo is a 59 y.o. female referred by Dr. Omar Everett MD for follow-up diabetes management/education. Patient had a critical BG value on recent labs - 540 mg/dL and was started on 20 units of insulin glargine and metformin XR was increased to 500 mg BID approximately 2 weeks ago. Patient is accompanied by her daughter, Renu Duran, who is also diabetic and has been administering patient's Lantus. Patient will also be seen by PCP today. HPI: Patient has long-standing uncontrolled type 2 DM. Current diabetes regimen consists of the following: metformin  mg BID, glimepiride 2 mg BID (plan is to discontinue soon) and Lantus Solostar 20 units sub-Q every morning. ROS:   Patient denies chest pain, shortness of breath, neuropathy, vision changes, and any foot changes. Patient reports the following signs/symptoms of diabetes: polyuria (but she has noticed some improvement since starting insulin), polydipsia, polyphagia, blurred vision (also notes some improvement after starting insulin), numbness/tingling in feet, dizziness and fatigue (dizziness and fatigue \"come and go\" but seem to be less frequent since starting insulin). Patient denies any  hypoglycemic episodes since starting insulin. Self-monitoring Blood Glucose: Patient does not check blood sugars at home but is contacted insurance to see which glucometer is covered. Most recent A1C was 13.6 %.       Diet was reviewed extensively again and the following were noted:  A typical days food intake is as follows:  - breakfast: breakfast bowl with eggs, steak, potatoes, cheese or scrapple/sausage with sweet tea (this morning she ate scrapple)  - lunch: \"can't recall\"  - dinner: fried chicken with skin removed, medium baked potato with cheese  - beverages: sweet tea, Pepsi (trying to cut down to 1 small soda/day, tried diet Coke yesterday), water  - snacks: cheese nips, applesauce    Exercise consists of \"staying active around the house\" but no activity for the sake of exercise. Daughter states she will encourage her mom to start walking with her daily. Patient reports adherence with her medications. Patient denies adverse effects from her medications at the current doses (no rash with increased metformin XR dose - patient suspected higher dose IR product caused rash). Past Medical History:   Diagnosis Date    Essential hypertension, benign     Mixed hyperlipidemia     Type II or unspecified type diabetes mellitus without mention of complication, not stated as uncontrolled      Past Surgical History:   Procedure Laterality Date    HX HYSTERECTOMY  36s    X2     Family History   Problem Relation Age of Onset    Diabetes Mother     Stroke Maternal Grandmother     Stroke Maternal Grandfather     Hypertension Sister     Diabetes Sister      Social History     Social History    Marital status: SINGLE     Spouse name: N/A    Number of children: N/A    Years of education: N/A     Social History Main Topics    Smoking status: Never Smoker    Smokeless tobacco: Never Used    Alcohol use No    Drug use: No    Sexual activity: Not Currently     Partners: Male     Other Topics Concern    None     Social History Narrative     Allergies   Allergen Reactions    Clindamycin Hives    Sulfa (Sulfonamide Antibiotics) Unable to Obtain    Lisinopril Cough     Current Outpatient Prescriptions   Medication Sig    metFORMIN ER (GLUCOPHAGE XR) 500 mg tablet Take 1 Tab by mouth two (2) times daily (with meals).  insulin glargine (LANTUS,BASAGLAR) 100 unit/mL (3 mL) inpn 20 units daily    Insulin Needles, Disposable, 31 gauge x 5/16\" ndle Use insulin pen    glimepiride (AMARYL) 2 mg tablet Take 1 Tab by mouth two (2) times a day.     atorvastatin (LIPITOR) 40 mg tablet 1 tablet daily    losartan-hydroCHLOROthiazide (HYZAAR) 100-12.5 mg per tablet 1 tablet daily    Omeprazole delayed release (PRILOSEC D/R) 20 mg tablet Take 1 Tab by mouth daily.  nystatin-triamcinolone (MYCOLOG) 100,000-0.1 unit/gram-% ointment Apply  to affected area two (2) times a day. No current facility-administered medications for this visit. Visit Vitals    /86 (BP 1 Location: Left arm, BP Patient Position: Sitting)    Pulse 95    Temp 97.9 °F (36.6 °C) (Tympanic)    Resp 18    Ht 5' 6\" (1.676 m)    Wt 193 lb (87.5 kg)    SpO2 97%    BMI 31.15 kg/m2     *Took BP meds today*    Data reviewed:  Lab Results   Component Value Date/Time    Hemoglobin A1c 13.6 (H) 02/20/2018 01:30 AM    Hemoglobin A1c (POC) 13.1 11/20/2017 10:52 AM     Lab Results   Component Value Date/Time    Cholesterol, total 175 02/20/2018 01:30 AM    HDL Cholesterol 39 (L) 11/27/2017 09:15 AM    LDL, calculated 56 11/27/2017 09:15 AM    VLDL, calculated 41 (H) 11/27/2017 09:15 AM    Triglyceride 203 (H) 11/27/2017 09:15 AM     Lab Results   Component Value Date/Time    ALT (SGPT) 32 02/20/2018 01:30 AM    AST (SGOT) 20 02/20/2018 01:30 AM    Alk. phosphatase 218 (H) 02/20/2018 01:30 AM    Bilirubin, total 0.5 02/20/2018 01:30 AM     Lab Results   Component Value Date/Time    Creatinine 0.76 02/20/2018 01:30 AM     Lab Results   Component Value Date/Time    Microalb/Creat ratio (ug/mg creat.) 14.4 11/27/2017 09:15 AM       Diabetes Health Maintenance/Screenings:  Last eye exam: unsure - will address next PCP visit  Last foot exam: unsure - will address next PCP visit  Last microalbumin: 11/27/2017 14.4 mcg/mg Cr  Last influenza vaccine: 8/16/2017  Last Pneumovax 23 vaccine: none  Hepatitis B Series: none  ASA Therapy: not taking  ACE/ARB Therapy: losartan/HCTZ  Statin Therapy: atorvastatin 40 mg (high intensity)    Assessment/Plan:     1.   Diabetes:  Uncontrolled (goal A1C <7%) and no home blood glucose readings although patient is looking into insurance coverage for specific glucometers (daughter is willing to assist with testing). A. Medications:  Patient tolerating Lantus insulin and random blood sugar today still elevated well above goal (296 mg/dL POC). No hypogycemic episodes. Per MD, will increase Lantus to 26 units sub-q daily. Prescriptions for testing supplies sent to pharmacy today. Reviewed injection technique, administration sites, rotation of sites, use of Solostar pen and proper storage of unopened pens in fridge vs. opened pens which can be stored at room temp for 28 days after opening (patient and daughter were keeping all pens at room temp). Reviewed symptoms of hypoglycemia and how to treat. Patient's daughter agreed to continue to help patient with both insulin administration and blood sugar testing. Reinforced need to have own insulin pen and needles, testing supplies and importance of not sharing any devices/equipment that may be contaminated with blood. Patient and daughter verbalized understanding. At follow-up visits, plan is to maximize insulin/metformin and consider stopping glimepiride as likely no added benefit once on adequate insulin dose. B. Diet/lifestyle:  Reinforced importance of regular activity/exercise (in particular, walking with daughter) to help improve insulin resistance. Reinforced general food label/carbohydrate concepts and plate method, difference between starchy and nonstarchy vegetables. Reinforced patient education materials provided at last visit (Type 2 DM and insulin, Healthy Portions, Plate Method, Hypo/Hyperglycemia, Planning Healthy Meals) . Discussed decreasing Pepsi/sweet tea and moderating starch intake, increasing non-starchy vegetable intake and limiting fried foods/salt. May need to consider referral to the dietician in the future.      C. Screenings/Prevention:  Vaccinations: Patient is eligible for the following vaccinations: Pneumococcal (PPSV23), Hepatitis B, Tdap    Dilated eye exam (recommended at least every 2 years or annually if retinopathy present): next due - need to schedule    Albumin-to creatinine ratio (recommended annually): next due 11/2018    Foot Exam (recommended annually): next due - need to perform at next PCP visit     2. Hypertension:  Blood pressure is right at goal of < 140/90 mm Hg per the ADA guidelines (patient took her BP meds prior to visit today). Emphasized the importance of regular physical activity, restricting salt in diet and weight maintenance/loss on overall blood pressure control. Will continue to follow. 3.  Hyperlipidemia:  ASCVD 10 year risk is 8.9%. Current lipid treatment guidelines recommend at least moderate-intensity statin doses to decrease ASCVD risk. Patient is on high intensity atorvastatin and LDL was 56 in November 2017. 4.  Medication reconciliation was completed during the visit. There are no discontinued medications. Patient/daughter verbalized understanding of the information presented and all of the patients/daughter's questions were answered. Patient advised to call the officeor Dr. Zuleyka Evans MD with any additional questions or concerns. Follow-up: 2 weeks with MD/pharmacist Monday/Thursday. Notification of recommendations will be sent to Dr. Zuleyka Evans MD for review.     Thank you for the consult,  Rocky Patricia, PharmD, BCACP

## 2018-03-08 NOTE — PATIENT INSTRUCTIONS
Make sure to store your unopened Lantus Solostar pens in the fridge. Once you use a pen, you may store it at room temperature for 28 days after opening.

## 2018-03-09 NOTE — TELEPHONE ENCOUNTER
Patient would like Dr Marichuy Menon to put a order in for a Glucouse  Meter it needs to be True Metrix for her insurance to cover. They are requesting #150 Strips

## 2018-03-10 NOTE — PROGRESS NOTES
The patient remains on Lantus Insulin at 20 units daily. The previously noted abdominal skin abscess's is resolving. The patient has polyuria but this is decreasing. The sugar is 294. I discussed the situation with the patient . I recommended that the patient increase the Insulin to 26 units daily. The patient also received in depth instructions on there medications and diet by Dr. Jackee Lesch. FANI   I recommended follow up here in 2 weeks. A total of 10 minutes was spent with the patient.  Greater than 50% of this time was spent in discussion of the problem, counseling the patient, and coordinating the care regarding the problem of  Type II diabetes mellitus and insulin use

## 2018-03-12 RX ORDER — INSULIN PUMP SYRINGE, 3 ML
EACH MISCELLANEOUS
Qty: 1 KIT | Refills: 0 | Status: SHIPPED | OUTPATIENT
Start: 2018-03-12

## 2018-03-23 ENCOUNTER — OFFICE VISIT (OUTPATIENT)
Dept: INTERNAL MEDICINE CLINIC | Age: 58
End: 2018-03-23

## 2018-03-23 VITALS
DIASTOLIC BLOOD PRESSURE: 90 MMHG | HEIGHT: 66 IN | HEART RATE: 107 BPM | SYSTOLIC BLOOD PRESSURE: 150 MMHG | RESPIRATION RATE: 18 BRPM | WEIGHT: 192 LBS | OXYGEN SATURATION: 98 % | TEMPERATURE: 98 F | BODY MASS INDEX: 30.86 KG/M2

## 2018-03-23 NOTE — MR AVS SNAPSHOT
[facility] 
 
 
 [address], [address] [place] [address] 
[phone] Patient: Marian Res MRN: [id number] QUD:[id number] Visit Information Date & Time Provider Department Dept. Phone Encounter #  
 3/23/2018 12:30 PM Junior Torres MD [facility] INTERNAL MEDICINE OF [place] [phone] [id number] Your Appointments 4/13/2018 10:45 AM  
Follow Up with Junior Torres MD  
[facility]) Appt Note: 3wk  
 [address], [address] [place] [address]  
  
   
 [address], [address] [facility] [address] 6/18/2018 10:30 AM  
Follow Up with Junior Torres MD  
[facility]) Appt Note: 4 mo f/u  
 [address], [address] [facility] [address]  
[phone] Upcoming Health Maintenance Date Due  
 FOOT EXAM Q1 12/16/1968 EYE EXAM RETINAL OR DILATED Q1 12/16/1968 Pneumococcal 19-64 Medium Risk (1 of 1 - PPSV23) 12/16/1977 DTaP/Tdap/Td series (1 - Tdap) 12/16/1979 PAP AKA CERVICAL CYTOLOGY 12/16/1979 BREAST CANCER SCRN MAMMOGRAM 12/16/2008 FOBT Q 1 YEAR AGE 50-75 12/16/2008 HEMOGLOBIN A1C Q6M 8/20/2018 MICROALBUMIN Q1 11/27/2018 LIPID PANEL Q1 2/20/2019 Allergies as of 3/23/2018  Review Complete On: 3/10/2018 By: Junior Torres MD  
  
 Severity Noted Reaction Type Reactions Clindamycin High 05/24/2016    Hives Sulfa (Sulfonamide Antibiotics) Medium   Unable to Obtain Lisinopril  01/02/2018    Cough Current Immunizations  Reviewed on 2/9/2018 Name Date Influenza Vaccine (Quad) PF 8/16/2017 Zoster Vaccine, Live 9/20/2015 Not reviewed this visit Vitals BP Pulse Temp Resp Height(growth percentile) Weight(growth percentile)  150/90 (BP 1 Location: Left arm, BP Patient Position: Sitting) (!) 107 98 °F (36.7 °C) (Tympanic) 18 5' 6\" (1.676 m) 192 lb (87.1 kg) SpO2 BMI OB Status Smoking Status 98% 30.99 kg/m2 Hysterectomy Never Smoker BMI and BSA Data Body Mass Index Body Surface Area 30.99 kg/m 2 2.01 m 2 Preferred Pharmacy Pharmacy Name Phone [facility] - [place], [facility] [address] [place] [address] [phone] Your Updated Medication List  
  
   
This list is accurate as of 3/23/18 12:39 PM.  Always use your most recent med list.  
  
  
  
  
 atorvastatin 40 mg tablet Commonly known as:  LIPITOR  
1 tablet daily Blood-Glucose Meter monitoring kit Commonly known as:  TRUE METRIX AIR GLUCOSE METER Test Blood Glucose once daily; ICD 10 E11.9  
  
 glimepiride 2 mg tablet Commonly known as:  AMARYL Take 1 Tab by mouth two (2) times a day. * glucose blood VI test strips strip Commonly known as:  ASCENSIA AUTODISC VI, ONE TOUCH ULTRA TEST VI To check sugar daily * glucose blood VI test strips strip Commonly known as:  TRUE METRIX GLUCOSE TEST STRIP Test Blood Glucose once daily; ICD 10 E11.9, Quantity 100  
  
 insulin glargine 100 unit/mL (3 mL) Inpn Commonly known as:  Divina Feng Inject 26 units subcutaneously (under skin) once daily. Insulin Needles (Disposable) 32 gauge x 5/32\" Ndle Commonly known as:  PEN NEEDLE For insulin shots  
  
 losartan-hydroCHLOROthiazide 100-12.5 mg per tablet Commonly known as:  HYZAAR  
1 tablet daily  
  
 metFORMIN  mg tablet Commonly known as:  GLUCOPHAGE XR Take 1 Tab by mouth two (2) times daily (with meals). Omeprazole delayed release 20 mg tablet Commonly known as:  PRILOSEC D/R Take 1 Tab by mouth daily. * Notice: This list has 2 medication(s) that are the same as other medications prescribed for you. Read the directions carefully, and ask your doctor or other care provider to review them with you. Introducing [facility]! Jaylen Chamberlain introduces [employer] patient portal. Now you can access parts of your medical record, email your doctor's office, and request medication refills online. 1. In your internet browser, go to [email or web address] 2. Click on the First Time User? Click Here link in the Sign In box. You will see the New Member Sign Up page. 3. Enter your [employer] Access Code exactly as it appears below. You will not need to use this code after youve completed the sign-up process. If you do not sign up before the expiration date, you must request a new code. · [employer] Access Code: [email or web address] Expires: 4/16/2018 11:27 PM 
 
4. Enter the last four digits of your Social Security Number (xxxx) and Date of Birth (mm/dd/yyyy) as indicated and click Submit. You will be taken to the next sign-up page. 5. Create a [employer] ID. This will be your [employer] login ID and cannot be changed, so think of one that is secure and easy to remember. 6. Create a [employer] password. You can change your password at any time. 7. Enter your Password Reset Question and Answer. This can be used at a later time if you forget your password. 8. Enter your e-mail address. You will receive e-mail notification when new information is available in [address] 9. Click Sign Up. You can now view and download portions of your medical record. 10. Click the Download Summary menu link to download a portable copy of your medical information. If you have questions, please visit the Frequently Asked Questions section of the [employer] website. Remember, [employer] is NOT to be used for urgent needs. For medical emergencies, dial 911. Now available from your iPhone and Android! Please provide this summary of care documentation to your next provider. Your primary care clinician is listed as [place]. If you have any questions after today's visit, please call [phone].

## 2018-03-25 NOTE — PROGRESS NOTES
The patient finds that her blood sugars are running in the 250 to 320 range at home. The patient notes low sugars. She is using the insulin without fail  The patient had a previous skin abscess has healed. I advised the patient to increase the insulin 34 units daily. The patient was advised to check her blood sugars several times during the day. Recheck the patient in 3 weeks. A total of 10 minutes was spent with the patient.  Greater than 50% of this time was spent in discussion of the problem, counseling the patient, and coordinating the care regarding the problem of diabetes mellitus and an elevated blood sugar

## 2018-05-02 ENCOUNTER — OFFICE VISIT (OUTPATIENT)
Dept: INTERNAL MEDICINE CLINIC | Age: 58
End: 2018-05-02

## 2018-05-02 VITALS
SYSTOLIC BLOOD PRESSURE: 140 MMHG | RESPIRATION RATE: 18 BRPM | TEMPERATURE: 97.5 F | HEART RATE: 73 BPM | BODY MASS INDEX: 30.53 KG/M2 | HEIGHT: 66 IN | WEIGHT: 190 LBS | DIASTOLIC BLOOD PRESSURE: 98 MMHG | OXYGEN SATURATION: 99 %

## 2018-05-02 DIAGNOSIS — Z12.11 COLON CANCER SCREENING: ICD-10-CM

## 2018-05-02 DIAGNOSIS — Z12.39 BREAST CANCER SCREENING: ICD-10-CM

## 2018-05-02 DIAGNOSIS — J01.90 ACUTE SINUSITIS, RECURRENCE NOT SPECIFIED, UNSPECIFIED LOCATION: Primary | ICD-10-CM

## 2018-05-02 DIAGNOSIS — I10 ESSENTIAL HYPERTENSION, BENIGN: ICD-10-CM

## 2018-05-02 RX ORDER — AMLODIPINE BESYLATE 5 MG/1
5 TABLET ORAL DAILY
Qty: 30 TAB | Refills: 1 | Status: SHIPPED | OUTPATIENT
Start: 2018-05-02 | End: 2018-10-03 | Stop reason: SDUPTHER

## 2018-05-02 RX ORDER — AMOXICILLIN AND CLAVULANATE POTASSIUM 875; 125 MG/1; MG/1
1 TABLET, FILM COATED ORAL 2 TIMES DAILY
Qty: 14 TAB | Refills: 0 | Status: SHIPPED | OUTPATIENT
Start: 2018-05-02 | End: 2018-05-09

## 2018-05-02 RX ORDER — CODEINE PHOSPHATE AND GUAIFENESIN 10; 100 MG/5ML; MG/5ML
5 SOLUTION ORAL
Qty: 118 ML | Refills: 0 | Status: SHIPPED | OUTPATIENT
Start: 2018-05-02 | End: 2018-10-17 | Stop reason: ALTCHOICE

## 2018-05-02 NOTE — PROGRESS NOTES
Alexy Brown is a 59 y.o. female presenting today for Dizziness and Excessive Sweating  . HPI:  Alexy Brown presents to the office today for headache, sinus pain and sore throat with dizziness x 2 weeks. Patient reports she has been taking allergy medication without relief. Associating symptoms are cough and rhinorrhea. Ms. Allyn Carver is also complaining of excessive sweating after 8 p.m. She has a Pmhx for diabetes and notes she eats dinner at 5 p.m and she is inconsistent in the times she takes her insulin. Patient last Hgb A1C was 13.6 on 2/20/18. She is scheduled for upcoming labs this month. Review of Systems   HENT: Positive for sore throat. Respiratory: Positive for cough and sputum production. Negative for shortness of breath. Cardiovascular: Negative for chest pain and palpitations. Neurological: Positive for dizziness and headaches. Allergies   Allergen Reactions    Clindamycin Hives    Sulfa (Sulfonamide Antibiotics) Unable to Obtain    Lisinopril Cough       Current Outpatient Prescriptions   Medication Sig Dispense Refill    amLODIPine (NORVASC) 5 mg tablet Take 1 Tab by mouth daily. 30 Tab 1    amoxicillin-clavulanate (AUGMENTIN) 875-125 mg per tablet Take 1 Tab by mouth two (2) times a day for 7 days. 14 Tab 0    guaiFENesin-codeine (ROBITUSSIN AC) 100-10 mg/5 mL solution Take 5 mL by mouth three (3) times daily as needed for Cough. Max Daily Amount: 15 mL. Do not drive if taking this medication 118 mL 0    Blood-Glucose Meter (TRUE METRIX AIR GLUCOSE METER) monitoring kit Test Blood Glucose once daily; ICD 10 E11.9 1 Kit 0    glucose blood VI test strips (TRUE METRIX GLUCOSE TEST STRIP) strip Test Blood Glucose once daily; ICD 10 E11.9, Quantity 100 100 Strip 3    Insulin Needles, Disposable, (PEN NEEDLE) 32 gauge x 5/32\" ndle For insulin shots 100 Pen Needle 1    metFORMIN ER (GLUCOPHAGE XR) 500 mg tablet Take 1 Tab by mouth two (2) times daily (with meals). 120 Tab 3    glimepiride (AMARYL) 2 mg tablet Take 1 Tab by mouth two (2) times a day. 60 Tab 3    atorvastatin (LIPITOR) 40 mg tablet 1 tablet daily 30 Tab 3    losartan-hydroCHLOROthiazide (HYZAAR) 100-12.5 mg per tablet 1 tablet daily 30 Tab 3    Omeprazole delayed release (PRILOSEC D/R) 20 mg tablet Take 1 Tab by mouth daily. 30 Tab 1    insulin glargine (LANTUS,BASAGLAR) 100 unit/mL (3 mL) inpn Inject 26 units subcutaneously (under skin) once daily. 3 Pen 1    glucose blood VI test strips (ASCENSIA AUTODISC VI, ONE TOUCH ULTRA TEST VI) strip To check sugar daily 50 Strip 2       Past Medical History:   Diagnosis Date    Essential hypertension, benign     Mixed hyperlipidemia     Type II or unspecified type diabetes mellitus without mention of complication, not stated as uncontrolled        Past Surgical History:   Procedure Laterality Date    HX HYSTERECTOMY  80s    X1       Social History     Social History    Marital status: SINGLE     Spouse name: N/A    Number of children: N/A    Years of education: N/A     Occupational History    Not on file. Social History Main Topics    Smoking status: Never Smoker    Smokeless tobacco: Never Used    Alcohol use No    Drug use: No    Sexual activity: Not Currently     Partners: Male     Other Topics Concern    Not on file     Social History Narrative       Patient does not have an advanced directive on file    Vitals:    05/02/18 0938   BP: (!) 140/98   Pulse: 73   Resp: 18   Temp: 97.5 °F (36.4 °C)   TempSrc: Tympanic   SpO2: 99%   Weight: 190 lb (86.2 kg)   Height: 5' 6\" (1.676 m)   PainSc:   0 - No pain       Physical Exam   Constitutional: No distress. HENT:   Right Ear: External ear normal.   Mouth/Throat: Oropharynx is clear and moist.   Cardiovascular: Normal rate, regular rhythm and normal heart sounds. Pulmonary/Chest: Effort normal and breath sounds normal.   Musculoskeletal: She exhibits no tenderness.    Lymphadenopathy:     She has no cervical adenopathy. Neurological: She is alert. Nursing note and vitals reviewed. Office Visit on 02/20/2018   Component Date Value Ref Range Status    WBC 02/20/2018 8.1  3.4 - 10.8 x10E3/uL Final    RBC 02/20/2018 4.72  3.77 - 5.28 x10E6/uL Final    HGB 02/20/2018 13.2  11.1 - 15.9 g/dL Final    HCT 02/20/2018 41.5  34.0 - 46.6 % Final    MCV 02/20/2018 88  79 - 97 fL Final    MCH 02/20/2018 28.0  26.6 - 33.0 pg Final    MCHC 02/20/2018 31.8  31.5 - 35.7 g/dL Final    RDW 02/20/2018 14.2  12.3 - 15.4 % Final    PLATELET 38/22/9182 817  150 - 379 x10E3/uL Final    NEUTROPHILS 02/20/2018 69  Not Estab. % Final    Lymphocytes 02/20/2018 25  Not Estab. % Final    MONOCYTES 02/20/2018 5  Not Estab. % Final    EOSINOPHILS 02/20/2018 1  Not Estab. % Final    BASOPHILS 02/20/2018 0  Not Estab. % Final    ABS. NEUTROPHILS 02/20/2018 5.6  1.4 - 7.0 x10E3/uL Final    Abs Lymphocytes 02/20/2018 2.0  0.7 - 3.1 x10E3/uL Final    ABS. MONOCYTES 02/20/2018 0.4  0.1 - 0.9 x10E3/uL Final    ABS. EOSINOPHILS 02/20/2018 0.1  0.0 - 0.4 x10E3/uL Final    ABS. BASOPHILS 02/20/2018 0.0  0.0 - 0.2 x10E3/uL Final    IMMATURE GRANULOCYTES 02/20/2018 0  Not Estab. % Final    ABS. IMM.  GRANS. 02/20/2018 0.0  0.0 - 0.1 x10E3/uL Final    Glucose 02/20/2018 540* 65 - 99 mg/dL Final    **Verified by repeat analysis**    BUN 02/20/2018 18  6 - 24 mg/dL Final    Creatinine 02/20/2018 0.76  0.57 - 1.00 mg/dL Final    GFR est non-AA 02/20/2018 86  >59 Final    GFR est AA 02/20/2018 99  >59 Final    BUN/Creatinine ratio 02/20/2018 24* 9 - 23 Final    Sodium 02/20/2018 134  134 - 144 mmol/L Final    Potassium 02/20/2018 3.8  3.5 - 5.2 mmol/L Final    Chloride 02/20/2018 90* 96 - 106 mmol/L Final    CO2 02/20/2018 25  18 - 29 mmol/L Final    Calcium 02/20/2018 9.1  8.7 - 10.2 mg/dL Final    Protein, total 02/20/2018 7.1  6.0 - 8.5 g/dL Final    Albumin 02/20/2018 4.6  3.5 - 5.5 g/dL Final    GLOBULIN, TOTAL 02/20/2018 2.5  1.5 - 4.5 Final    A-G Ratio 02/20/2018 1.8  1.2 - 2.2 Final    Bilirubin, total 02/20/2018 0.5  0.0 - 1.2 mg/dL Final    Alk. phosphatase 02/20/2018 218* 39 - 117 IU/L Final    AST (SGOT) 02/20/2018 20  0 - 40 IU/L Final    ALT (SGPT) 02/20/2018 32  0 - 32 IU/L Final    Hemoglobin A1c 02/20/2018 13.6* 4.8 - 5.6 % Final    Comment:          Pre-diabetes: 5.7 - 6.4           Diabetes: >6.4           Glycemic control for adults with diabetes: <7.0      Estimated average glucose 02/20/2018 344   Final    Cholesterol, total 02/20/2018 175  100 - 199 mg/dL Final       .No results found for any visits on 05/02/18. Assessment / Plan:      ICD-10-CM ICD-9-CM    1. Acute sinusitis, recurrence not specified, unspecified location J01.90 461.9 amoxicillin-clavulanate (AUGMENTIN) 875-125 mg per tablet      guaiFENesin-codeine (ROBITUSSIN AC) 100-10 mg/5 mL solution   2. Essential hypertension, benign I10 401.1 amLODIPine (NORVASC) 5 mg tablet   3. Breast cancer screening Z12.31 V76.10 [facility] BI SCREENING INCL CAD   4. Colon cancer screening Z12.11 V76.51 REFERRAL TO GASTROENTEROLOGY     Acute Sinusitis     Augmentin rx     Cheratussin rx  Refill Amlodipine  Mammo ordered  Referral to GI for Colonoscopy    Follow-up Disposition:  Return in about 3 weeks (around 5/23/2018), or if symptoms worsen or fail to improve, for Blood pressure. I asked the patient if she  had any questions and answered her  questions.   The patient stated that she understands the treatment plan and agrees with the treatment plan

## 2018-05-02 NOTE — MR AVS SNAPSHOT
Rohan Harris 
 
 
 [address] [facility] [place] 2[facility] [address] [facility] [address] 
[phone] Patient: Elbetr Hollins MRN: [id number] Visit Information Date & Time Provider Department Dept. Phone Encounter #  
 5/2/2018  9:15 AM Rashi Olivas NP [facility] [phone] [id number] Follow-up Instructions Return in about 3 weeks (around 5/23/2018), or if symptoms worsen or fail to improve, for Blood pressure. Your Appointments 6/18/2018 10:30 AM  
Follow Up with Hillary De Dios MD  
[address] [place] [address] Appt Note: 4 mo f/u  
 [address] [facility] [place]note [facility] [address] [facility] [address]  
  
   
 [address] [facility] [place]note [facility] [address] [facility] [address] Upcoming Health Maintenance Date Due  
 FOOT EXAM Q1 12/16/1968 EYE EXAM RETINAL OR DILATED Q1 12/16/1968 Pneumococcal 19-64 Medium Risk (1 of 1 - PPSV23) 12/16/1977 DTaP/Tdap/Td series (1 - Tdap) 12/16/1979 PAP AKA CERVICAL CYTOLOGY 12/16/1979 BREAST CANCER SCRN MAMMOGRAM 12/16/2008 FOBT Q 1 YEAR AGE 50-75 12/16/2008 Influenza Age 5 to Adult 8/1/2018 HEMOGLOBIN A1C Q6M 8/20/2018 MICROALBUMIN Q1 11/27/2018 LIPID PANEL Q1 2/20/2019 Allergies as of 5/2/2018  Review Complete On: 5/2/2018 By: Felice Ramires LPN Severity Noted Reaction Type Reactions Clindamycin High 05/24/2016    Hives Sulfa (Sulfonamide Antibiotics) Medium   Unable to Obtain Lisinopril  01/02/2018    Cough Current Immunizations  Reviewed on 2/9/2018 Name Date Influenza Vaccine (Quad) PF 8/16/2017 Zoster Vaccine, Live 9/20/2015 Not reviewed this visit You Were Diagnosed With   
  
 Codes Comments Acute sinusitis, recurrence not specified, unspecified location    -  Primary ICD-10-CM: J01.90 ICD-9-CM: 461.9 Essential hypertension, benign     ICD-10-CM: I10 
ICD-9-CM: 401.1 Breast cancer screening     ICD-10-CM: Z12.31 
ICD-9-CM: V76.10 Colon cancer screening     ICD-10-CM: Z12.11 ICD-9-CM: V76.51 Vitals BP Pulse Temp Resp Height(growth percentile) (!) 140/98 (BP 1 Location: Left arm, BP Patient Position: Sitting) 73 97.5 °F (36.4 °C) (Tympanic) 18 5' 6\" (1.676 m) Weight(growth percentile) SpO2 BMI OB Status Smoking Status 190 lb (86.2 kg) 99% 30.67 kg/m2 Hysterectomy Never Smoker BMI and BSA Data Body Mass Index Body Surface Area  
 30.67 kg/m 2 2 m 2 Preferred Pharmacy Pharmacy Name [facility]  Sharan Alfred, [address] [place] [address],[address] [place] [address] [phone] Your Updated Medication List  
  
   
This list is accurate as of 5/2/18 10:38 AM.  Always use your most recent med list. amLODIPine 5 mg tablet Commonly known as:  Candace Chimes Take 1 Tab by mouth daily. amoxicillin-clavulanate 875-125 mg per tablet Commonly known as:  AUGMENTIN Take 1 Tab by mouth two (2) times a day for 7 days. atorvastatin 40 mg tablet Commonly known as:  LIPITOR  
1 tablet daily Blood-Glucose Meter monitoring kit Commonly known as:  TRUE METRIX AIR GLUCOSE METER Test Blood Glucose once daily; ICD 10 E11.9  
  
 glimepiride 2 mg tablet Commonly known as:  AMARYL Take 1 Tab by mouth two (2) times a day. * glucose blood VI test strips strip Commonly known as:  ASCENSIA AUTODISC VI, ONE TOUCH ULTRA TEST VI To check sugar daily * glucose blood VI test strips strip Commonly known as:  TRUE METRIX GLUCOSE TEST STRIP Test Blood Glucose once daily; ICD 10 E11.9, Quantity 100  
  
 guaiFENesin-codeine 100-10 mg/5 mL solution Commonly known as:  ROBITUSSIN AC Take 5 mL by mouth three (3) times daily as needed for Cough. Max Daily Amount: 15 mL. Do not drive if taking this medication  
  
 insulin glargine 100 unit/mL (3 mL) Inpn Commonly known as:  Arnie Greer  
 Inject 26 units subcutaneously (under skin) once daily. Insulin Needles (Disposable) 32 gauge x 5/32\" Ndle Commonly known as:  PEN NEEDLE For insulin shots  
  
 losartan-hydroCHLOROthiazide 100-12.5 mg per tablet Commonly known as:  HYZAAR  
1 tablet daily  
  
 metFORMIN  mg tablet Commonly known as:  GLUCOPHAGE XR Take 1 Tab by mouth two (2) times daily (with meals). Omeprazole delayed release 20 mg tablet Commonly known as:  PRILOSEC D/R Take 1 Tab by mouth daily. * Notice: This list has 2 medication(s) that are the same as other medications prescribed for you. Read the directions carefully, and ask your doctor or other care provider to review them with you. Prescriptions Printed Refills  
 guaiFENesin-codeine (ROBITUSSIN AC) 100-10 mg/5 mL solution 0 Sig: Take 5 mL by mouth three (3) times daily as needed for Cough. Max Daily Amount: 15 mL. Do not drive if taking this medication Class: Print Route: Oral  
  
Prescriptions Sent to Pharmacy Refills  
 amLODIPine (NORVASC) 5 mg tablet 1 Sig: Take 1 Tab by mouth daily. Class: Normal  
 Pharmacy: [facility] [address] Ph #: [phone] Route: Oral  
 amoxicillin-clavulanate (AUGMENTIN) 875-125 mg per tablet 0 Sig: Take 1 Tab by mouth two (2) times a day for 7 days. Class: Normal  
 Pharmacy: [facility] [address] Ph #: [phone] Route: Oral  
  
We Performed the Following REFERRAL TO GASTROENTEROLOGY [TFP Custom] Comments:  
 Colon cancer screening Follow-up Instructions Return in about 3 weeks (around 5/23/2018), or if symptoms worsen or fail to improve, for Blood pressure. To-Do List   
 05/02/2018 Imaging:  BINTA MAMMO BI SCREENING INCL CAD Referral Information  Referral ID Referred By Referred To  
  
 [id number] Joe Lombardi MD   
 [address] [place] [address] [address] Yousif spann, [address] Phone: [phone] Fax: [phone] Visits Status Start Date End Date 1 New Request 5/2/18 5/2/19 If your referral has a status of pending review or denied, additional information will be sent to support the outcome of this decision. Introducing [facility]! [employer] introduces [employer] patient portal. Now you can access parts of your medical record, email your doctor's office, and request medication refills online. 1. In your internet browser, go to [email or web address] 2. Click on the First Time User? Click Here link in the Sign In box. You will see the New Member Sign Up page. 3. Enter your [employer] Access Code exactly as it appears below. You will not need to use this code after youve completed the sign-up process. If you do not sign up before the expiration date, you must request a new code. · [employer] Access Code: [email or web address] Expires: 7/31/2018 10:38 AM 
 
4. Enter the last four digits of your Social Security Number (xxxx) and Date of Birth (mm/dd/yyyy) as indicated and click Submit. You will be taken to the next sign-up page. 5. Create a [employer] ID. This will be your [employer] login ID and cannot be changed, so think of one that is secure and easy to remember. 6. Create a [employer] password. You can change your password at any time. 7. Enter your Password Reset Question and Answer. This can be used at a later time if you forget your password. 8. Enter your e-mail address. You will receive e-mail notification when new information is available in [address] 9. Click Sign Up. You can now view and download portions of your medical record. 10. Click the Download Summary menu link to download a portable copy of your medical information.  
 
If you have questions, please visit the Frequently Asked Questions section of the [employer]. Remember, [employer] is NOT to be used for urgent needs. For medical emergencies, dial 911. Now available from your iPhone and Android! Please provide this summary of care documentation to your next provider. Your primary care clinician is listed as Altagracia Ayers. If you have any questions after today's visit, please call [phone].

## 2018-05-04 DIAGNOSIS — E78.2 MIXED HYPERLIPIDEMIA: ICD-10-CM

## 2018-05-04 DIAGNOSIS — I10 ESSENTIAL HYPERTENSION, BENIGN: ICD-10-CM

## 2018-05-04 DIAGNOSIS — E11.9 TYPE 2 DIABETES MELLITUS WITHOUT COMPLICATION, WITHOUT LONG-TERM CURRENT USE OF INSULIN (HCC): ICD-10-CM

## 2018-05-04 RX ORDER — INSULIN GLARGINE 100 [IU]/ML
INJECTION, SOLUTION SUBCUTANEOUS
Qty: 3 PEN | Refills: 1 | Status: SHIPPED | OUTPATIENT
Start: 2018-05-04 | End: 2018-09-21 | Stop reason: SDUPTHER

## 2018-05-04 NOTE — TELEPHONE ENCOUNTER
Requested Prescriptions     Pending Prescriptions Disp Refills    insulin glargine (LANTUS,BASAGLAR) 100 unit/mL (3 mL) inpn 3 Pen 1     Sig: Inject 26 units subcutaneously (under skin) once daily.

## 2018-05-08 RX ORDER — INSULIN GLARGINE 100 [IU]/ML
INJECTION, SOLUTION SUBCUTANEOUS
Qty: 15 ADJUSTABLE DOSE PRE-FILLED PEN SYRINGE | Refills: 1 | Status: SHIPPED | OUTPATIENT
Start: 2018-05-08 | End: 2019-01-22 | Stop reason: SDUPTHER

## 2018-07-05 DIAGNOSIS — I10 ESSENTIAL HYPERTENSION, BENIGN: ICD-10-CM

## 2018-07-05 DIAGNOSIS — E78.2 MIXED HYPERLIPIDEMIA: ICD-10-CM

## 2018-07-05 DIAGNOSIS — E11.9 TYPE 2 DIABETES MELLITUS WITHOUT COMPLICATION, WITHOUT LONG-TERM CURRENT USE OF INSULIN (HCC): ICD-10-CM

## 2018-07-05 NOTE — TELEPHONE ENCOUNTER
Requested Prescriptions     Pending Prescriptions Disp Refills    atorvastatin (LIPITOR) 40 mg tablet 30 Tab 3     Si tablet daily    glimepiride (AMARYL) 2 mg tablet 60 Tab 3     Sig: Take 1 Tab by mouth two (2) times a day.

## 2018-07-06 RX ORDER — GLIMEPIRIDE 2 MG/1
2 TABLET ORAL 2 TIMES DAILY
Qty: 60 TAB | Refills: 3 | Status: SHIPPED | OUTPATIENT
Start: 2018-07-06 | End: 2018-07-16 | Stop reason: SDUPTHER

## 2018-07-06 RX ORDER — ATORVASTATIN CALCIUM 40 MG/1
TABLET, FILM COATED ORAL
Qty: 90 TAB | Refills: 3 | Status: SHIPPED | OUTPATIENT
Start: 2018-07-06 | End: 2018-07-16 | Stop reason: SDUPTHER

## 2018-07-16 DIAGNOSIS — E11.9 TYPE 2 DIABETES MELLITUS WITHOUT COMPLICATION, WITHOUT LONG-TERM CURRENT USE OF INSULIN (HCC): ICD-10-CM

## 2018-07-16 DIAGNOSIS — I10 ESSENTIAL HYPERTENSION, BENIGN: ICD-10-CM

## 2018-07-16 DIAGNOSIS — E78.2 MIXED HYPERLIPIDEMIA: ICD-10-CM

## 2018-07-16 RX ORDER — GLIMEPIRIDE 2 MG/1
2 TABLET ORAL 2 TIMES DAILY
Qty: 120 TAB | Refills: 3 | Status: SHIPPED | OUTPATIENT
Start: 2018-07-16 | End: 2018-11-26 | Stop reason: SDUPTHER

## 2018-07-16 RX ORDER — ATORVASTATIN CALCIUM 40 MG/1
TABLET, FILM COATED ORAL
Qty: 90 TAB | Refills: 3 | Status: SHIPPED | OUTPATIENT
Start: 2018-07-16 | End: 2018-08-10 | Stop reason: SDUPTHER

## 2018-08-02 DIAGNOSIS — I10 ESSENTIAL HYPERTENSION, BENIGN: ICD-10-CM

## 2018-08-02 DIAGNOSIS — E11.9 TYPE 2 DIABETES MELLITUS WITHOUT COMPLICATION, WITHOUT LONG-TERM CURRENT USE OF INSULIN (HCC): ICD-10-CM

## 2018-08-02 DIAGNOSIS — E78.2 MIXED HYPERLIPIDEMIA: ICD-10-CM

## 2018-08-02 RX ORDER — LOSARTAN POTASSIUM AND HYDROCHLOROTHIAZIDE 12.5; 1 MG/1; MG/1
TABLET ORAL
Qty: 30 TAB | Refills: 2 | Status: SHIPPED | OUTPATIENT
Start: 2018-08-02 | End: 2018-08-10 | Stop reason: SDUPTHER

## 2018-08-10 DIAGNOSIS — E78.2 MIXED HYPERLIPIDEMIA: ICD-10-CM

## 2018-08-10 DIAGNOSIS — I10 ESSENTIAL HYPERTENSION, BENIGN: ICD-10-CM

## 2018-08-10 DIAGNOSIS — E11.9 TYPE 2 DIABETES MELLITUS WITHOUT COMPLICATION, WITHOUT LONG-TERM CURRENT USE OF INSULIN (HCC): ICD-10-CM

## 2018-08-10 NOTE — TELEPHONE ENCOUNTER
Requested Prescriptions     Pending Prescriptions Disp Refills    losartan-hydroCHLOROthiazide (HYZAAR) 100-12.5 mg per tablet 30 Tab 2    atorvastatin (LIPITOR) 40 mg tablet 90 Tab 3     Si tablet daily

## 2018-08-12 RX ORDER — LOSARTAN POTASSIUM AND HYDROCHLOROTHIAZIDE 12.5; 1 MG/1; MG/1
TABLET ORAL
Qty: 90 TAB | Refills: 2 | Status: SHIPPED | OUTPATIENT
Start: 2018-08-12 | End: 2019-05-21 | Stop reason: SDUPTHER

## 2018-08-12 RX ORDER — ATORVASTATIN CALCIUM 40 MG/1
TABLET, FILM COATED ORAL
Qty: 90 TAB | Refills: 3 | Status: SHIPPED | OUTPATIENT
Start: 2018-08-12 | End: 2019-08-01 | Stop reason: SDUPTHER

## 2018-09-21 RX ORDER — INSULIN GLARGINE 100 [IU]/ML
INJECTION, SOLUTION SUBCUTANEOUS
Qty: 5 PEN | Refills: 1 | Status: SHIPPED | OUTPATIENT
Start: 2018-09-21 | End: 2018-10-02

## 2018-09-23 DIAGNOSIS — R73.9 ELEVATED BLOOD SUGAR: ICD-10-CM

## 2018-10-01 RX ORDER — CALCIUM CITRATE/VITAMIN D3 200MG-6.25
TABLET ORAL
Qty: 50 STRIP | Refills: 1 | Status: SHIPPED | OUTPATIENT
Start: 2018-10-01 | End: 2019-08-01 | Stop reason: SDUPTHER

## 2018-10-02 ENCOUNTER — OFFICE VISIT (OUTPATIENT)
Dept: INTERNAL MEDICINE CLINIC | Age: 58
End: 2018-10-02

## 2018-10-02 ENCOUNTER — HOSPITAL ENCOUNTER (OUTPATIENT)
Dept: LAB | Age: 58
Discharge: HOME | End: 2018-10-02
Payer: MEDICAID

## 2018-10-02 VITALS
HEART RATE: 95 BPM | SYSTOLIC BLOOD PRESSURE: 140 MMHG | DIASTOLIC BLOOD PRESSURE: 82 MMHG | TEMPERATURE: 98.1 F | RESPIRATION RATE: 18 BRPM | HEIGHT: 66 IN | BODY MASS INDEX: 31.66 KG/M2 | WEIGHT: 197 LBS | OXYGEN SATURATION: 96 %

## 2018-10-02 DIAGNOSIS — I83.11 VARICOSE VEINS OF BOTH LOWER EXTREMITIES WITH INFLAMMATION: ICD-10-CM

## 2018-10-02 DIAGNOSIS — S81.801A OPEN LEG WOUND, RIGHT, INITIAL ENCOUNTER: ICD-10-CM

## 2018-10-02 DIAGNOSIS — I10 ESSENTIAL HYPERTENSION, BENIGN: ICD-10-CM

## 2018-10-02 DIAGNOSIS — I83.12 VARICOSE VEINS OF BOTH LOWER EXTREMITIES WITH INFLAMMATION: ICD-10-CM

## 2018-10-02 DIAGNOSIS — E11.65 UNCONTROLLED TYPE 2 DIABETES MELLITUS WITH HYPERGLYCEMIA (HCC): ICD-10-CM

## 2018-10-02 DIAGNOSIS — S81.802A OPEN LEG WOUND, LEFT, INITIAL ENCOUNTER: Primary | ICD-10-CM

## 2018-10-02 LAB — HBA1C MFR BLD HPLC: 10.4 %

## 2018-10-02 PROCEDURE — 99001 SPECIMEN HANDLING PT-LAB: CPT | Performed by: NURSE PRACTITIONER

## 2018-10-02 RX ORDER — METFORMIN HYDROCHLORIDE 750 MG/1
TABLET, EXTENDED RELEASE ORAL
Qty: 60 TAB | Refills: 2 | Status: SHIPPED | OUTPATIENT
Start: 2018-10-02 | End: 2018-12-26 | Stop reason: SDUPTHER

## 2018-10-02 RX ORDER — CEPHALEXIN 500 MG/1
500 CAPSULE ORAL 4 TIMES DAILY
Qty: 40 CAP | Refills: 0 | Status: SHIPPED | OUTPATIENT
Start: 2018-10-02 | End: 2018-10-12

## 2018-10-02 NOTE — MR AVS SNAPSHOT
[address] [facility] 
 
 
 [address], [address] [facility] [address] 
[phone] Patient: Alec Herrera MRN: [id number] Visit Information Date & Time Provider Department Dept. Phone Encounter #  
 10/2/2018  8:45 AM Prabha Hernandez NP [facility] OF [place] [phone] [id number] Your Appointments 10/9/2018 10:30 AM  
Follow Up with Prabha Hernandez NP  
[facility] OF [place] ([address]) Appt Note: 1mo  
 [address], [address] [facility] [address]  
[phone]  
  
   
 [address] [facility] [facility] [address]  
  
    
 10/15/2018 10:30 AM  
Follow Up with Sammy Sandra MD  
[facility] [place] [address]) Appt Note: boils on legs - requested only Dr Maxwell Gabriel [address], [address] [facility] [address]  
  
   
 [address], [address] [facility] [address] Upcoming Health Maintenance Date Due  
 FOOT EXAM Q1 12/16/1968 EYE EXAM RETINAL OR DILATED Q1 12/16/1968 Pneumococcal 19-64 Medium Risk (1 of 1 - PPSV23) 12/16/1977 DTaP/Tdap/Td series (1 - Tdap) 12/16/1979 Shingrix Vaccine Age 50> (1 of 2) 12/16/2008 BREAST CANCER SCRN MAMMOGRAM 12/16/2008 FOBT Q 1 YEAR AGE 50-75 12/16/2008 Influenza Age 5 to Adult 8/1/2018 HEMOGLOBIN A1C Q6M 8/20/2018 MICROALBUMIN Q1 11/27/2018 LIPID PANEL Q1 2/20/2019 Allergies as of 10/2/2018  Review Complete On: 10/2/2018 By: Prabha Hernandez NP Severity Noted Reaction Type Reactions Clindamycin High 05/24/2016    Hives Sulfa (Sulfonamide Antibiotics) Medium   Unable to Obtain Lisinopril  01/02/2018    Cough Current Immunizations  Reviewed on 6/14/2018 Name Date Influenza Vaccine (Quad) PF 8/16/2017 Zoster Vaccine, Live 9/20/2015 Not reviewed this visit You Were Diagnosed With   
  
 Codes Comments Open leg wound, left, initial encounter    -  Primary ICD-10-CM: U01.832G ICD-9-CM: 891.0 Open leg wound, right, initial encounter     ICD-10-CM: D72.490P ICD-9-CM: 891.0 Uncontrolled type 2 diabetes mellitus with hyperglycemia (HCC)     ICD-10-CM: E11.65 ICD-9-CM: 250.02 Essential hypertension, benign     ICD-10-CM: I10 
ICD-9-CM: 401.1 Varicose veins of both lower extremities with inflammation     ICD-10-CM: I83.11, I83.12 
ICD-9-CM: 454.1 Vitals BP Pulse Temp Resp Height(growth percentile) 140/82 (BP 1 Location: Left arm, BP Patient Position: Sitting) 95 98.1 °F (36.7 °C) (Tympanic) 18 5' 6\" (1.676 m) Weight(growth percentile) SpO2 BMI OB Status Smoking Status 197 lb (89.4 kg) 96% 31.8 kg/m2 Hysterectomy Never Smoker BMI and BSA Data Body Mass Index Body Surface Area  
 31.8 kg/m 2 2.04 m 2 Preferred Pharmacy Pharmacy Name [facility] [address] [place] [address],[address] [place] [address] [phone] Your Updated Medication List  
  
   
This list is accurate as of 10/2/18 10:36 AM.  Always use your most recent med list. amLODIPine 5 mg tablet Commonly known as:  Erna Mate Take 1 Tab by mouth daily. atorvastatin 40 mg tablet Commonly known as:  LIPITOR  
1 tablet daily Blood-Glucose Meter monitoring kit Commonly known as:  TRUE METRIX AIR GLUCOSE METER Test Blood Glucose once daily; ICD 10 E11.9 cephALEXin 500 mg capsule Commonly known as:  Izella Locket Take 1 Cap by mouth four (4) times daily for 10 days. glimepiride 2 mg tablet Commonly known as:  AMARYL Take 1 Tab by mouth two (2) times a day. * glucose blood VI test strips strip Commonly known as:  TRUE METRIX GLUCOSE TEST STRIP Test Blood Glucose once daily; ICD 10 E11.9, Quantity 100 * TRUE METRIX GLUCOSE TEST STRIP strip Generic drug:  glucose blood VI test strips CHECK BLOOD SUGAR DAILY guaiFENesin-codeine 100-10 mg/5 mL solution Commonly known as:  ROBITUSSIN AC Take 5 mL by mouth three (3) times daily as needed for Cough. Max Daily Amount: 15 mL. Do not drive if taking this medication Insulin Needles (Disposable) 32 gauge x 5/32\" Ndle Commonly known as:  PEN NEEDLE For insulin shots LANTUS SOLOSTAR U-100 INSULIN 100 unit/mL (3 mL) Inpn Generic drug:  insulin glargine INJECT 20 UNITS UNDER THE SKIN ONCE DAILY losartan-hydroCHLOROthiazide 100-12.5 mg per tablet Commonly known as:  HYZAAR  
TAKE 1 TABLET BY MOUTH ONCE DAILY  
  
 metFORMIN  mg tablet Commonly known as:  GLUCOPHAGE XR Take 1 tablet by mouth twice daily Omeprazole delayed release 20 mg tablet Commonly known as:  PRILOSEC D/R Take 1 Tab by mouth daily. * Notice: This list has 2 medication(s) that are the same as other medications prescribed for you. Read the directions carefully, and ask your doctor or other care provider to review them with you. Prescriptions Sent to Pharmacy Refills  
 cephALEXin (KEFLEX) 500 mg capsule 0 Sig: Take 1 Cap by mouth four (4) times daily for 10 days. Class: Normal  
 Pharmacy: [facility] [address] [place] [address] Ph #: [phone] Route: Oral  
 metFORMIN ER (GLUCOPHAGE XR) 750 mg tablet 2 Sig: Take 1 tablet by mouth twice daily Class: Normal  
 Pharmacy: [facility] [address] Ph #: [phone] We Performed the Following REFERRAL TO NUTRITION [REF50 Custom] REFERRAL TO VASCULAR CENTER [PCE381 Custom] To-Do List   
 10/02/2018 Microbiology:  CULTURE, WOUND Cleta Brooking STAIN   
  
 10/02/2018 Microbiology:  CULTURE, WOUND W GRAM STAIN Referral Information Referral ID Referred By Referred To  
  
 [id number] Halina HUI Not Available Visits Status Start Date End Date 1 New Request 10/2/18 10/2/19 If your referral has a status of pending review or denied, additional information will be sent to support the outcome of this decision. Referral ID Referred By Referred To  
 [id number] NIRMALA Homero Manning [address] [facility] [address] Phone: [phone] Fax: [phone] Visits Status Start Date End Date 1 New Request 10/2/18 10/2/19 If your referral has a status of pending review or denied, additional information will be sent to support the outcome of this decision. Patient Instructions Body Mass Index: Care Instructions Your Care Instructions Body mass index (BMI) can help you see if your weight is raising your risk for health problems. It uses a formula to compare how much you weigh with how tall you are. · A BMI lower than 18.5 is considered underweight. · A BMI between 18.5 and 24.9 is considered healthy. · A BMI between 25 and 29.9 is considered overweight. A BMI of 30 or higher is considered obese. If your BMI is in the normal range, it means that you have a lower risk for weight-related health problems. If your BMI is in the overweight or obese range, you may be at increased risk for weight-related health problems, such as high blood pressure, heart disease, stroke, arthritis or joint pain, and diabetes. If your BMI is in the underweight range, you may be at increased risk for health problems such as fatigue, lower protection (immunity) against illness, muscle loss, bone loss, hair loss, and hormone problems. BMI is just one measure of your risk for weight-related health problems. You may be at higher risk for health problems if you are not active, you eat an unhealthy diet, or you drink too much alcohol or use tobacco products. Follow-up care is a key part of your treatment and safety.  Be sure to make and go to all appointments, and call your doctor if you are having problems. It's also a good idea to know your test results and keep a list of the medicines you take. How can you care for yourself at home? · Practice healthy eating habits. This includes eating plenty of fruits, vegetables, whole grains, lean protein, and low-fat dairy. · If your doctor recommends it, get more exercise. Walking is a good choice. Bit by bit, increase the amount you walk every day. Try for at least 30 minutes on most days of the week. · Do not smoke. Smoking can increase your risk for health problems. If you need help quitting, talk to your doctor about stop-smoking programs and medicines. These can increase your chances of quitting for good. · Limit alcohol to 2 drinks a day for men and 1 drink a day for women. Too much alcohol can cause health problems. If you have a BMI higher than 25 · Your doctor may do other tests to check your risk for weight-related health problems. This may include measuring the distance around your waist. A waist measurement of more than 40 inches in men or 35 inches in women can increase the risk of weight-related health problems. · Talk with your doctor about steps you can take to stay healthy or improve your health. You may need to make lifestyle changes to lose weight and stay healthy, such as changing your diet and getting regular exercise. If you have a BMI lower than 18.5 · Your doctor may do other tests to check your risk for health problems. · Talk with your doctor about steps you can take to stay healthy or improve your health. You may need to make lifestyle changes to gain or maintain weight and stay healthy, such as getting more healthy foods in your diet and doing exercises to build muscle. Where can you learn more? Go to http://lyn-misty.info/. Enter S176 in the search box to learn more about \"Body Mass Index: Care Instructions. \" Current as of: October 13, 2016 Content Version: 11.4 © 6049-3958 Healthwise, Incorporated. Care instructions adapted under license by [address] (which disclaims liability or warranty for this information). If you have questions about a medical condition or this instruction, always ask your healthcare professional. [address] any warranty or liability for your use of this information. Introducing [facility]! [employer] introduces [employer] patient portal. Now you can access parts of your medical record, email your doctor's office, and request medication refills online. 1. In your internet browser, go to [email or web address] 2. Click on the First Time User? Click Here link in the Sign In box. You will see the New Member Sign Up page. 3. Enter your [employer] Access Code exactly as it appears below. You will not need to use this code after youve completed the sign-up process. If you do not sign up before the expiration date, you must request a new code. · [employer] Access Code: [email or web address] Expires: 12/31/2018  9:49 AM 
 
4. Enter the last four digits of your Social Security Number (xxxx) and Date of Birth (mm/dd/yyyy) as indicated and click Submit. You will be taken to the next sign-up page. 5. Create a [employer] ID. This will be your [employer] login ID and cannot be changed, so think of one that is secure and easy to remember. 6. Create a [employer] password. You can change your password at any time. 7. Enter your Password Reset Question and Answer. This can be used at a later time if you forget your password. 8. Enter your e-mail address. You will receive e-mail notification when new information is available in [address] 9. Click Sign Up. You can now view and download portions of your medical record. 10. Click the Download Summary menu link to download a portable copy of your medical information.  
 
If you have questions, please visit the Frequently Asked Questions section of the [employer]. Remember, [employer] is NOT to be used for urgent needs. For medical emergencies, dial 911. Now available from your iPhone and Android! Please provide this summary of care documentation to your next provider. Your primary care clinician is listed as Shani Hagan. If you have any questions after today's visit, please call [phone].

## 2018-10-02 NOTE — PATIENT INSTRUCTIONS
Body Mass Index: Care Instructions  Your Care Instructions    Body mass index (BMI) can help you see if your weight is raising your risk for health problems. It uses a formula to compare how much you weigh with how tall you are. · A BMI lower than 18.5 is considered underweight. · A BMI between 18.5 and 24.9 is considered healthy. · A BMI between 25 and 29.9 is considered overweight. A BMI of 30 or higher is considered obese. If your BMI is in the normal range, it means that you have a lower risk for weight-related health problems. If your BMI is in the overweight or obese range, you may be at increased risk for weight-related health problems, such as high blood pressure, heart disease, stroke, arthritis or joint pain, and diabetes. If your BMI is in the underweight range, you may be at increased risk for health problems such as fatigue, lower protection (immunity) against illness, muscle loss, bone loss, hair loss, and hormone problems. BMI is just one measure of your risk for weight-related health problems. You may be at higher risk for health problems if you are not active, you eat an unhealthy diet, or you drink too much alcohol or use tobacco products. Follow-up care is a key part of your treatment and safety. Be sure to make and go to all appointments, and call your doctor if you are having problems. It's also a good idea to know your test results and keep a list of the medicines you take. How can you care for yourself at home? · Practice healthy eating habits. This includes eating plenty of fruits, vegetables, whole grains, lean protein, and low-fat dairy. · If your doctor recommends it, get more exercise. Walking is a good choice. Bit by bit, increase the amount you walk every day. Try for at least 30 minutes on most days of the week. · Do not smoke. Smoking can increase your risk for health problems. If you need help quitting, talk to your doctor about stop-smoking programs and medicines. These can increase your chances of quitting for good. · Limit alcohol to 2 drinks a day for men and 1 drink a day for women. Too much alcohol can cause health problems. If you have a BMI higher than 25  · Your doctor may do other tests to check your risk for weight-related health problems. This may include measuring the distance around your waist. A waist measurement of more than 40 inches in men or 35 inches in women can increase the risk of weight-related health problems. · Talk with your doctor about steps you can take to stay healthy or improve your health. You may need to make lifestyle changes to lose weight and stay healthy, such as changing your diet and getting regular exercise. If you have a BMI lower than 18.5  · Your doctor may do other tests to check your risk for health problems. · Talk with your doctor about steps you can take to stay healthy or improve your health. You may need to make lifestyle changes to gain or maintain weight and stay healthy, such as getting more healthy foods in your diet and doing exercises to build muscle. Where can you learn more? Go to http://lyn-misty.info/. Enter S176 in the search box to learn more about \"Body Mass Index: Care Instructions. \"  Current as of: October 13, 2016  Content Version: 11.4  © 3403-9272 Healthwise, Incorporated. Care instructions adapted under license by [employer] (which disclaims liability or warranty for this information). If you have questions about a medical condition or this instruction, always ask your healthcare professional. [address] any warranty or liability for your use of this information.

## 2018-10-02 NOTE — PROGRESS NOTES
Chief Complaint   Patient presents with    Skin Problem         Is someone accompanying this pt? yes    Is the patient using any DME equipment during OV? no    Depression Screening:  PHQ over the last two weeks 10/2/2018 3/23/2018 3/8/2018 11/20/2017 10/5/2016 9/4/2015   Little interest or pleasure in doing things Not at all Not at all Not at all Not at all Not at all Not at all   Feeling down, depressed, irritable, or hopeless Not at all Not at all Not at all Not at all Not at all Not at all   Total Score PHQ 2 0 0 0 0 0 0       Learning Assessment:  Learning Assessment 10/2/2018 5/24/2017 11/22/2016 9/4/2015   PRIMARY LEARNER Patient Patient Patient Patient   HIGHEST LEVEL OF EDUCATION - PRIMARY LEARNER  DID NOT GRADUATE HIGH SCHOOL DID NOT GRADUATE HIGH SCHOOL DID NOT GRADUATE HIGH SCHOOL DID NOT GRADUATE [address] LEARNER NONE NONE NONE NONE   CO-LEARNER CAREGIVER No No No -   PRIMARY LANGUAGE ENGLISH ENGLISH ENGLISH ENGLISH   LEARNER PREFERENCE PRIMARY DEMONSTRATION DEMONSTRATION DEMONSTRATION DEMONSTRATION   ANSWERED BY patient patient patient Patient   RELATIONSHIP SELF SELF SELF SELF       Abuse Screening:  Abuse Screening Questionnaire 10/2/2018 9/4/2015   Do you ever feel afraid of your partner? N N   Are you in a relationship with someone who physically or mentally threatens you? N N   Is it safe for you to go home? Y Y       Fall Risk  Fall Risk Assessment, last 12 mths 5/24/2016   Able to walk? Yes   Fall in past 12 months? Yes   Fall with injury? Yes   Number of falls in past 12 months 5   Fall Risk Score 6         Health Maintenance reviewed and discussed per provider.     Pt is due for   Health Maintenance Due   Topic    FOOT EXAM Q1     EYE EXAM RETINAL OR DILATED Q1     Pneumococcal 19-64 Medium Risk (1 of 1 - PPSV23)    DTaP/Tdap/Td series (1 - Tdap)    Shingrix Vaccine Age 50> (1 of 2)    BREAST CANCER SCRN MAMMOGRAM     FOBT Q 1 YEAR AGE 54-65     Influenza Age 5 to Adult     HEMOGLOBIN A1C Q6M     Please order/place referral if appropriate. Pt currently taking Antiplatelet therapy? no      Coordination of Care:  1. Have you been to the ER, urgent care clinic since your last visit? Hospitalized since your last visit? no    2. Have you seen or consulted any other health care providers outside of the [address] [place] [address] since your last visit? Include any pap smears or colon screening. no    Please see Red banners under Allergies, Med rec, Immunizations to remove outside inquires. All correct information has been verified with patient and added to chart.

## 2018-10-02 NOTE — PROGRESS NOTES
Emelina Chong is a 59 y.o. female presenting today for Skin Problem  . HPI:  Emelina Chong presents to the office today for bilateral medial calf wounds over 1 week ago. Patient reports she has been treating the wounds with peroxide and bandages. She denies any fever or any known injury. She is diabetic and notes she has been taking her medications as prescribed. She does not follow a diabetic diet and her last HBA1C is     Review of Systems   Respiratory: Negative for sputum production. Cardiovascular: Negative for chest pain and palpitations. Allergies   Allergen Reactions    Clindamycin Hives    Sulfa (Sulfonamide Antibiotics) Unable to Obtain    Lisinopril Cough       Current Outpatient Prescriptions   Medication Sig Dispense Refill    cephALEXin (KEFLEX) 500 mg capsule Take 1 Cap by mouth four (4) times daily for 10 days. 40 Cap 0    metFORMIN ER (GLUCOPHAGE XR) 750 mg tablet Take 1 tablet by mouth twice daily 60 Tab 2    TRUE METRIX GLUCOSE TEST STRIP strip CHECK BLOOD SUGAR DAILY 50 Strip 1    glucose blood VI test strips (TRUE METRIX GLUCOSE TEST STRIP) strip Test Blood Glucose once daily; ICD 10 E11.9, Quantity 100 100 Strip 3    losartan-hydroCHLOROthiazide (HYZAAR) 100-12.5 mg per tablet TAKE 1 TABLET BY MOUTH ONCE DAILY 90 Tab 2    atorvastatin (LIPITOR) 40 mg tablet 1 tablet daily 90 Tab 3    glimepiride (AMARYL) 2 mg tablet Take 1 Tab by mouth two (2) times a day. 120 Tab 3    LANTUS SOLOSTAR U-100 INSULIN 100 unit/mL (3 mL) inpn INJECT 20 UNITS UNDER THE SKIN ONCE DAILY (Patient taking differently: INJECT 34 UNITS UNDER THE SKIN ONCE DAILY) 15 Adjustable Dose Pre-filled Pen Syringe 1    amLODIPine (NORVASC) 5 mg tablet Take 1 Tab by mouth daily.  30 Tab 1    Blood-Glucose Meter (TRUE METRIX AIR GLUCOSE METER) monitoring kit Test Blood Glucose once daily; ICD 10 E11.9 1 Kit 0    Insulin Needles, Disposable, (PEN NEEDLE) 32 gauge x 5/32\" ndle For insulin shots 100 Pen Needle 1    Omeprazole delayed release (PRILOSEC D/R) 20 mg tablet Take 1 Tab by mouth daily. 30 Tab 1    guaiFENesin-codeine (ROBITUSSIN AC) 100-10 mg/5 mL solution Take 5 mL by mouth three (3) times daily as needed for Cough. Max Daily Amount: 15 mL. Do not drive if taking this medication 118 mL 0       Past Medical History:   Diagnosis Date    Essential hypertension, benign     Mixed hyperlipidemia     Type II or unspecified type diabetes mellitus without mention of complication, not stated as uncontrolled        Past Surgical History:   Procedure Laterality Date    HX HYSTERECTOMY  80s    X1       Social History     Social History    Marital status: SINGLE     Spouse name: N/A    Number of children: N/A    Years of education: N/A     Occupational History    Not on file. Social History Main Topics    Smoking status: Never Smoker    Smokeless tobacco: Never Used    Alcohol use No    Drug use: No    Sexual activity: Not Currently     Partners: Male     Other Topics Concern    Not on file     Social History Narrative       Patient does not have an advanced directive on file    Vitals:    10/02/18 0902   BP: 140/82   Pulse: 95   Resp: 18   Temp: 98.1 °F (36.7 °C)   TempSrc: Tympanic   SpO2: 96%   Weight: 197 lb (89.4 kg)   Height: 5' 6\" (1.676 m)   PainSc:   7   PainLoc: Leg       Physical Exam   Cardiovascular: Normal rate and regular rhythm. Pulmonary/Chest: Effort normal and breath sounds normal. No respiratory distress. Skin:        Nursing note and vitals reviewed. No visits with results within 3 Month(s) from this visit.   Latest known visit with results is:    Office Visit on 02/20/2018   Component Date Value Ref Range Status    WBC 02/20/2018 8.1  3.4 - 10.8 x10E3/uL Final    RBC 02/20/2018 4.72  3.77 - 5.28 x10E6/uL Final    HGB 02/20/2018 13.2  11.1 - 15.9 g/dL Final    HCT 02/20/2018 41.5  34.0 - 46.6 % Final    MCV 02/20/2018 88  79 - 97 fL Final    MCH 02/20/2018 28.0 26.6 - 33.0 pg Final    MCHC 02/20/2018 31.8  31.5 - 35.7 g/dL Final    RDW 02/20/2018 14.2  12.3 - 15.4 % Final    PLATELET 69/11/3705 482  150 - 379 x10E3/uL Final    NEUTROPHILS 02/20/2018 69  Not Estab. % Final    Lymphocytes 02/20/2018 25  Not Estab. % Final    MONOCYTES 02/20/2018 5  Not Estab. % Final    EOSINOPHILS 02/20/2018 1  Not Estab. % Final    BASOPHILS 02/20/2018 0  Not Estab. % Final    ABS. NEUTROPHILS 02/20/2018 5.6  1.4 - 7.0 x10E3/uL Final    Abs Lymphocytes 02/20/2018 2.0  0.7 - 3.1 x10E3/uL Final    ABS. MONOCYTES 02/20/2018 0.4  0.1 - 0.9 x10E3/uL Final    ABS. EOSINOPHILS 02/20/2018 0.1  0.0 - 0.4 x10E3/uL Final    ABS. BASOPHILS 02/20/2018 0.0  0.0 - 0.2 x10E3/uL Final    IMMATURE GRANULOCYTES 02/20/2018 0  Not Estab. % Final    ABS. IMM. GRANS. 02/20/2018 0.0  0.0 - 0.1 x10E3/uL Final    Glucose 02/20/2018 540* 65 - 99 mg/dL Final    **Verified by repeat analysis**    BUN 02/20/2018 18  6 - 24 mg/dL Final    Creatinine 02/20/2018 0.76  0.57 - 1.00 mg/dL Final    GFR est non-AA 02/20/2018 86  >59 Final    GFR est AA 02/20/2018 99  >59 Final    BUN/Creatinine ratio 02/20/2018 24* 9 - 23 Final    Sodium 02/20/2018 134  134 - 144 mmol/L Final    Potassium 02/20/2018 3.8  3.5 - 5.2 mmol/L Final    Chloride 02/20/2018 90* 96 - 106 mmol/L Final    CO2 02/20/2018 25  18 - 29 mmol/L Final    Calcium 02/20/2018 9.1  8.7 - 10.2 mg/dL Final    Protein, total 02/20/2018 7.1  6.0 - 8.5 g/dL Final    Albumin 02/20/2018 4.6  3.5 - 5.5 g/dL Final    GLOBULIN, TOTAL 02/20/2018 2.5  1.5 - 4.5 Final    A-G Ratio 02/20/2018 1.8  1.2 - 2.2 Final    Bilirubin, total 02/20/2018 0.5  0.0 - 1.2 mg/dL Final    Alk.  phosphatase 02/20/2018 218* 39 - 117 IU/L Final    AST (SGOT) 02/20/2018 20  0 - 40 IU/L Final    ALT (SGPT) 02/20/2018 32  0 - 32 IU/L Final    Hemoglobin A1c 02/20/2018 13.6* 4.8 - 5.6 % Final    Comment:          Pre-diabetes: 5.7 - 6.4           Diabetes: >6.4 Glycemic control for adults with diabetes: <7.0      Estimated average glucose 02/20/2018 344   Final    Cholesterol, total 02/20/2018 175  100 - 199 mg/dL Final       .No results found for any visits on 10/02/18. Assessment / Plan:      ICD-10-CM ICD-9-CM    1. Open leg wound, left, initial encounter S81.802A 891.0 CULTURE, WOUND W GRAM STAIN      CULTURE, WOUND W GRAM STAIN      cephALEXin (KEFLEX) 500 mg capsule      CULTURE, WOUND W GRAM STAIN      CULTURE, WOUND W GRAM STAIN      REFERRAL TO WOUND CARE   2. Open leg wound, right, initial encounter S81.801A 891.0 CULTURE, WOUND W GRAM STAIN      CULTURE, WOUND W GRAM STAIN      cephALEXin (KEFLEX) 500 mg capsule      CULTURE, WOUND W GRAM STAIN      CULTURE, WOUND W GRAM STAIN      REFERRAL TO WOUND CARE   3. Uncontrolled type 2 diabetes mellitus with hyperglycemia (HCC) E11.65 250.02 REFERRAL TO NUTRITION      AMB POC HEMOGLOBIN A1C   4. Essential hypertension, benign I10 401.1    5. Varicose veins of both lower extremities with inflammation I83.11 454.1 REFERRAL TO VASCULAR CENTER    I83.12       Increase Metformin to 750 mg BID  Increase Lantus to 40 units   Nutritionist  HTN- controlled  Engorged varicose veins- will refer to Vascular  Wounds bilateral medial calf region  Culture bilateral medial calf region wounds  Referral to wound care    Follow-up Disposition:  Return if symptoms worsen or fail to improve. I asked the patient if she  had any questions and answered her  questions. The patient stated that she understands the treatment plan and agrees with the treatment plan    Discussed the patient's BMI with her. The BMI follow up plan is as follows:     dietary management education, guidance, and counseling  encourage exercise  monitor weight  prescribed dietary intake    An After Visit Summary was printed and give to the patient.

## 2018-10-02 NOTE — ACP (ADVANCE CARE PLANNING)
Non-Provider Advance Care Planning (ACP) Note    Date of ACP Conversation: 10/2/2018  Persons included in Conversation: patient and family  Length of ACP Conversation in minutes: <16 minutes (Non-Billable)    Conversation requested by:   Provider      Authorized Decision Maker (if patient is incapable of making informed decisions): This person is:  Healthcare Agent/Medical Power of  under Advance Directive    General ACP for ALL Patients with Decision Making Capacity:    Advance Directive Conversation with Patients who have not yet planned:  n/a    Review of Existing Advance Directive: (Select questions covered)  Does this advance directive still reflect your preferences?   Yes    Interventions Provided:  Reviewed patient's existing Advance Directive and prior conversations/process related to its completion  Recommended communicating the plan and providing copies for the healthcare agent, personal physician, and others as appropriate

## 2018-10-03 DIAGNOSIS — I10 ESSENTIAL HYPERTENSION, BENIGN: ICD-10-CM

## 2018-10-04 RX ORDER — AMLODIPINE BESYLATE 5 MG/1
TABLET ORAL
Qty: 30 TAB | Refills: 1 | Status: SHIPPED | OUTPATIENT
Start: 2018-10-04 | End: 2018-10-15 | Stop reason: SDUPTHER

## 2018-10-07 LAB
BACTERIA SPEC AEROBE CULT: ABNORMAL
BACTERIA SPEC AEROBE CULT: ABNORMAL
GRAM STN SPEC: NORMAL

## 2018-10-09 ENCOUNTER — OFFICE VISIT (OUTPATIENT)
Dept: INTERNAL MEDICINE CLINIC | Age: 58
End: 2018-10-09

## 2018-10-09 VITALS
BODY MASS INDEX: 31.66 KG/M2 | TEMPERATURE: 98.2 F | HEART RATE: 85 BPM | DIASTOLIC BLOOD PRESSURE: 68 MMHG | RESPIRATION RATE: 16 BRPM | SYSTOLIC BLOOD PRESSURE: 102 MMHG | HEIGHT: 66 IN | OXYGEN SATURATION: 96 % | WEIGHT: 197 LBS

## 2018-10-09 DIAGNOSIS — E11.65 UNCONTROLLED TYPE 2 DIABETES MELLITUS WITH HYPERGLYCEMIA (HCC): ICD-10-CM

## 2018-10-09 DIAGNOSIS — S81.801A OPEN WOUND OF RIGHT LOWER EXTREMITY, INITIAL ENCOUNTER: ICD-10-CM

## 2018-10-09 DIAGNOSIS — S81.802A OPEN LEG WOUND, LEFT, INITIAL ENCOUNTER: Primary | ICD-10-CM

## 2018-10-09 DIAGNOSIS — I10 ESSENTIAL HYPERTENSION, BENIGN: ICD-10-CM

## 2018-10-09 NOTE — MR AVS SNAPSHOT
[address] [facility] [address] 
 
 
 [address] [facility] [facility] [address] 
[phone] Patient: Osvaldo Cui MRN: [id number] MTH:[id number] Visit Information Date & Time Provider Department Dept. Phone Encounter #  
 10/9/2018 10:30 AM Donzell Babinski, NP [facility] INTERNAL MEDICINE OF Destiny Javier [phone] [id number] Your Appointments 10/15/2018 10:30 AM  
Follow Up with Onelia Martinez MD  
[address] [place] [address] [address] [facility]) Appt Note: boils on legs - requested only Dr Dane Kevin [address] [facility], [facility] [address]  
  
   
 [address] [facility] [address]  
  
    
 10/23/2018  9:00 AM  
Follow Up with Onelia Martinez MD  
[facility] INTERNAL MEDICINE OF [place] [address] [facility]) Appt Note: 2wk  
 [address] [facility], [facility] [address] [facility] [address]  
[phone] Upcoming Health Maintenance Date Due  
 FOOT EXAM Q1 12/16/1968 EYE EXAM RETINAL OR DILATED Q1 12/16/1968 Pneumococcal 19-64 Medium Risk (1 of 1 - PPSV23) 12/16/1977 DTaP/Tdap/Td series (1 - Tdap) 12/16/1979 Shingrix Vaccine Age 50> (1 of 2) 12/16/2008 BREAST CANCER SCRN MAMMOGRAM 12/16/2008 FOBT Q 1 YEAR AGE 50-75 12/16/2008 Influenza Age 5 to Adult 8/1/2018 MICROALBUMIN Q1 11/27/2018 LIPID PANEL Q1 2/20/2019 HEMOGLOBIN A1C Q6M 4/2/2019 Allergies as of 10/9/2018  Review Complete On: 10/4/2018 By: Donzell Babinski, NP Severity Noted Reaction Type Reactions Clindamycin High 05/24/2016    Hives Sulfa (Sulfonamide Antibiotics) Medium   Unable to Obtain Lisinopril  01/02/2018    Cough Current Immunizations  Reviewed on 6/14/2018 Name Date Influenza Vaccine (Quad) PF 8/16/2017 Zoster Vaccine, Live 9/20/2015 Not reviewed this visit Vitals BP Pulse Temp Resp Height(growth percentile) 102/68 (BP 1 Location: Left arm, BP Patient Position: Sitting) 85 98.2 °F (36.8 °C) (Tympanic) 16 5' 6\" (1.676 m) Weight(growth percentile) SpO2 BMI OB Status Smoking Status 197 lb (89.4 kg) 96% 31.8 kg/m2 Hysterectomy Never Smoker BMI and BSA Data Body Mass Index Body Surface Area  
 31.8 kg/m 2 2.04 m 2 Preferred Pharmacy Pharmacy Name [facility] [address] [place] [address] [phone] Your Updated Medication List  
  
   
This list is accurate as of 10/9/18 11:07 AM.  Always use your most recent med list. amLODIPine 5 mg tablet Commonly known as:  Jilda Sierras TAKE 1 TABLET BY MOUTH ONCE DAILY  
  
 atorvastatin 40 mg tablet Commonly known as:  LIPITOR  
1 tablet daily Blood-Glucose Meter monitoring kit Commonly known as:  TRUE METRIX AIR GLUCOSE METER Test Blood Glucose once daily; ICD 10 E11.9 cephALEXin 500 mg capsule Commonly known as:  Joretta Arabella Take 1 Cap by mouth four (4) times daily for 10 days. glimepiride 2 mg tablet Commonly known as:  AMARYL Take 1 Tab by mouth two (2) times a day. * glucose blood VI test strips strip Commonly known as:  TRUE METRIX GLUCOSE TEST STRIP Test Blood Glucose once daily; ICD 10 E11.9, Quantity 100 * TRUE METRIX GLUCOSE TEST STRIP strip Generic drug:  glucose blood VI test strips CHECK BLOOD SUGAR DAILY  
  
 guaiFENesin-codeine 100-10 mg/5 mL solution Commonly known as:  ROBITUSSIN AC Take 5 mL by mouth three (3) times daily as needed for Cough. Max Daily Amount: 15 mL. Do not drive if taking this medication Insulin Needles (Disposable) 32 gauge x 5/32\" Ndle Commonly known as:  PEN NEEDLE For insulin shots LANTUS SOLOSTAR U-100 INSULIN 100 unit/mL (3 mL) Inpn Generic drug:  insulin glargine INJECT 20 UNITS UNDER THE SKIN ONCE DAILY losartan-hydroCHLOROthiazide 100-12.5 mg per tablet Commonly known as:  HYZAAR  
 TAKE 1 TABLET BY MOUTH ONCE DAILY  
  
 metFORMIN  mg tablet Commonly known as:  GLUCOPHAGE XR Take 1 tablet by mouth twice daily Omeprazole delayed release 20 mg tablet Commonly known as:  PRILOSEC D/R Take 1 Tab by mouth daily. * Notice: This list has 2 medication(s) that are the same as other medications prescribed for you. Read the directions carefully, and ask your doctor or other care provider to review them with you. Introducing hospitals & HEALTH SERVICES! [facility] introduces Embarke patient portal. Now you can access parts of your medical record, email your doctor's office, and request medication refills online. 1. In your internet browser, go to [email or web address] 2. Click on the First Time User? Click Here link in the Sign In box. You will see the New Member Sign Up page. 3. Enter your Embarke Access Code exactly as it appears below. You will not need to use this code after youve completed the sign-up process. If you do not sign up before the expiration date, you must request a new code. · Embarke Access Code: [email or web address] Expires: 12/31/2018  9:49 AM 
 
4. Enter the last four digits of your Social Security Number (xxxx) and Date of Birth (mm/dd/yyyy) as indicated and click Submit. You will be taken to the next sign-up page. 5. Create a Embarke ID. This will be your Embarke login ID and cannot be changed, so think of one that is secure and easy to remember. 6. Create a Embarke password. You can change your password at any time. 7. Enter your Password Reset Question and Answer. This can be used at a later time if you forget your password. 8. Enter your e-mail address. You will receive e-mail notification when new information is available in [address] 9. Click Sign Up. You can now view and download portions of your medical record. 10. Click the Download Summary menu link to download a portable copy of your medical information. If you have questions, please visit the Frequently Asked Questions section of the [employer] website. Remember, via680 is NOT to be used for urgent needs. For medical emergencies, dial 911. Now available from your iPhone and Android! Please provide this summary of care documentation to your next provider. Your primary care clinician is listed as Luana Mota. If you have any questions after today's visit, please call [phone].

## 2018-10-15 ENCOUNTER — HOSPITAL ENCOUNTER (OUTPATIENT)
Dept: LAB | Age: 58
Discharge: HOME | End: 2018-10-15
Payer: MEDICAID

## 2018-10-15 ENCOUNTER — OFFICE VISIT (OUTPATIENT)
Dept: INTERNAL MEDICINE CLINIC | Age: 58
End: 2018-10-15

## 2018-10-15 VITALS
DIASTOLIC BLOOD PRESSURE: 76 MMHG | OXYGEN SATURATION: 98 % | HEIGHT: 66 IN | WEIGHT: 197 LBS | HEART RATE: 95 BPM | SYSTOLIC BLOOD PRESSURE: 118 MMHG | TEMPERATURE: 98.4 F | BODY MASS INDEX: 31.66 KG/M2

## 2018-10-15 DIAGNOSIS — E11.65 UNCONTROLLED TYPE 2 DIABETES MELLITUS WITH HYPERGLYCEMIA (HCC): Primary | ICD-10-CM

## 2018-10-15 DIAGNOSIS — E78.2 MIXED HYPERLIPIDEMIA: ICD-10-CM

## 2018-10-15 DIAGNOSIS — I10 ESSENTIAL HYPERTENSION, BENIGN: ICD-10-CM

## 2018-10-15 DIAGNOSIS — Z23 ENCOUNTER FOR IMMUNIZATION: ICD-10-CM

## 2018-10-15 PROCEDURE — 99001 SPECIMEN HANDLING PT-LAB: CPT | Performed by: INTERNAL MEDICINE

## 2018-10-15 RX ORDER — METFORMIN HYDROCHLORIDE 500 MG/1
TABLET, EXTENDED RELEASE ORAL
COMMUNITY
Start: 2018-09-04 | End: 2018-10-15 | Stop reason: SDUPTHER

## 2018-10-15 RX ORDER — AMLODIPINE BESYLATE 5 MG/1
TABLET ORAL
Qty: 30 TAB | Refills: 1 | Status: SHIPPED | OUTPATIENT
Start: 2018-10-15 | End: 2019-01-02 | Stop reason: SDUPTHER

## 2018-10-15 NOTE — TELEPHONE ENCOUNTER
Requested Prescriptions     Pending Prescriptions Disp Refills    amLODIPine (NORVASC) 5 mg tablet 30 Tab 1

## 2018-10-15 NOTE — PROGRESS NOTES
Patient presented to office for Influenza 0.5 ml injection. Allergies noted. Patient well and consenting to injection. Injection given intramuscular in left deltoid. Patient tolerated injection well and left office ambulatory.

## 2018-10-15 NOTE — MR AVS SNAPSHOT
[address] 
 
 
 [address], [address] [place] [address] 
[phone] Patient: Rita Marrero MRN: [id number] [id number] Visit Information Date & Time Provider Department Dept. Phone Encounter #  
 10/15/2018 10:30 AM Bryanna Parker MD [facility] INTERNAL MEDICINE OF Ananth Choi [phone] [id number] Your Appointments 1/16/2019 10:30 AM  
Follow Up with Bryanna Parker MD  
[address]) Appt Note: 3mo  
 [address], [address] [place] [address]  
  
   
 [address], [address] [place] [address] Upcoming Health Maintenance Date Due  
 FOOT EXAM Q1 12/16/1968 EYE EXAM RETINAL OR DILATED Q1 12/16/1968 Pneumococcal 19-64 Medium Risk (1 of 1 - PPSV23) 12/16/1977 DTaP/Tdap/Td series (1 - Tdap) 12/16/1979 Shingrix Vaccine Age 50> (1 of 2) 12/16/2008 BREAST CANCER SCRN MAMMOGRAM 12/16/2008 FOBT Q 1 YEAR AGE 50-75 12/16/2008 Influenza Age 5 to Adult 8/1/2018 MICROALBUMIN Q1 11/27/2018 LIPID PANEL Q1 2/20/2019 HEMOGLOBIN A1C Q6M 4/2/2019 Allergies as of 10/15/2018  Review Complete On: 10/15/2018 By: Peggy Weaver LPN Severity Noted Reaction Type Reactions Clindamycin High 05/24/2016    Hives Sulfa (Sulfonamide Antibiotics) Medium   Unable to Obtain Lisinopril  01/02/2018    Cough Current Immunizations  Reviewed on 10/12/2018 Name Date Influenza Vaccine (Quad) PF 8/16/2017 Zoster Vaccine, Live 9/20/2015 Not reviewed this visit You Were Diagnosed With   
  
 Codes Comments Uncontrolled type 2 diabetes mellitus with hyperglycemia (HCC)    -  Primary ICD-10-CM: E11.65 ICD-9-CM: 250.02 Essential hypertension, benign     ICD-10-CM: I10 
ICD-9-CM: 401.1 Vitals BP Pulse Temp Height(growth percentile) Weight(growth percentile) 118/76 (BP 1 Location: Left arm, BP Patient Position: Sitting) 95 98.4 °F (36.9 °C) (Tympanic) 5' 6\" (1.676 m) 197 lb (89.4 kg) SpO2 BMI OB Status Smoking Status 98% 31.8 kg/m2 Hysterectomy Never Smoker Vitals History BMI and BSA Data Body Mass Index Body Surface Area  
 31.8 kg/m 2 2.04 m 2 Preferred Pharmacy Pharmacy Name [facility] [address] [place] [address],[address] [phone] Your Updated Medication List  
  
   
This list is accurate as of 10/15/18 12:35 PM.  Always use your most recent med list. amLODIPine 5 mg tablet Commonly known as:  Erna Mate TAKE 1 TABLET BY MOUTH ONCE DAILY  
  
 atorvastatin 40 mg tablet Commonly known as:  LIPITOR  
1 tablet daily Blood-Glucose Meter monitoring kit Commonly known as:  TRUE METRIX AIR GLUCOSE METER Test Blood Glucose once daily; ICD 10 E11.9  
  
 glimepiride 2 mg tablet Commonly known as:  AMARYL Take 1 Tab by mouth two (2) times a day. * glucose blood VI test strips strip Commonly known as:  TRUE METRIX GLUCOSE TEST STRIP Test Blood Glucose once daily; ICD 10 E11.9, Quantity 100 * TRUE METRIX GLUCOSE TEST STRIP strip Generic drug:  glucose blood VI test strips CHECK BLOOD SUGAR DAILY  
  
 guaiFENesin-codeine 100-10 mg/5 mL solution Commonly known as:  ROBITUSSIN AC Take 5 mL by mouth three (3) times daily as needed for Cough. Max Daily Amount: 15 mL. Do not drive if taking this medication Insulin Needles (Disposable) 32 gauge x 5/32\" Ndle Commonly known as:  PEN NEEDLE For insulin shots LANTUS SOLOSTAR U-100 INSULIN 100 unit/mL (3 mL) Inpn Generic drug:  insulin glargine INJECT 20 UNITS UNDER THE SKIN ONCE DAILY losartan-hydroCHLOROthiazide 100-12.5 mg per tablet Commonly known as:  HYZAAR  
TAKE 1 TABLET BY MOUTH ONCE DAILY  
  
 metFORMIN  mg tablet Commonly known as:  GLUCOPHAGE XR  
 Take 1 tablet by mouth twice daily Omeprazole delayed release 20 mg tablet Commonly known as:  PRILOSEC D/R Take 1 Tab by mouth daily. * Notice: This list has 2 medication(s) that are the same as other medications prescribed for you. Read the directions carefully, and ask your doctor or other care provider to review them with you. We Performed the Following CBC WITH AUTOMATED DIFF [63807 CPT(R)] HEMOGLOBIN A1C WITH EAG [12388 CPT(R)] METABOLIC PANEL, COMPREHENSIVE [18196 CPT(R)] To-Do List   
 10/15/2018 Lab:  HEMOGLOBIN A1C WITH EAG   
  
 10/15/2018 Lab:  METABOLIC PANEL, COMPREHENSIVE   
  
 02/14/2019 Lab:  CBC WITH AUTOMATED DIFF Introducing [facility]! [employer] introduces AbilTo patient portal. Now you can access parts of your medical record, email your doctor's office, and request medication refills online. 1. In your internet browser, go to [email or web address] 2. Click on the First Time User? Click Here link in the Sign In box. You will see the New Member Sign Up page. 3. Enter your AbilTo Access Code exactly as it appears below. You will not need to use this code after youve completed the sign-up process. If you do not sign up before the expiration date, you must request a new code. · AbilTo Access Code: [email or web address] Expires: 12/31/2018  9:49 AM 
 
4. Enter the last four digits of your Social Security Number (xxxx) and Date of Birth (mm/dd/yyyy) as indicated and click Submit. You will be taken to the next sign-up page. 5. Create a AbilTo ID. This will be your AbilTo login ID and cannot be changed, so think of one that is secure and easy to remember. 6. Create a AbilTo password. You can change your password at any time. 7. Enter your Password Reset Question and Answer. This can be used at a later time if you forget your password. 8. Enter your e-mail address. You will receive e-mail notification when new information is available in [address] 9. Click Sign Up. You can now view and download portions of your medical record. 10. Click the Download Summary menu link to download a portable copy of your medical information. If you have questions, please visit the Frequently Asked Questions section of the [employer] website. Remember, Nasty Gal is NOT to be used for urgent needs. For medical emergencies, dial 911. Now available from your iPhone and Android! Please provide this summary of care documentation to your next provider. Your primary care clinician is listed as Brandt Chandler. If you have any questions after today's visit, please call [phone].

## 2018-10-15 NOTE — PROGRESS NOTES
Chief Complaint   Patient presents with    Wound Check       Depression Screening:  PHQ over the last two weeks 10/2/2018   Little interest or pleasure in doing things Not at all   Feeling down, depressed, irritable, or hopeless Not at all   Total Score PHQ 2 0       Learning Assessment:  Learning Assessment 10/2/2018   PRIMARY LEARNER Patient   HIGHEST LEVEL OF EDUCATION - PRIMARY LEARNER  DID NOT GRADUATE [facility] PRIMARY LEARNER NONE   CO-LEARNER CAREGIVER No   PRIMARY LANGUAGE ENGLISH   LEARNER PREFERENCE PRIMARY DEMONSTRATION   ANSWERED BY patient   RELATIONSHIP SELF       Abuse Screening:  Abuse Screening Questionnaire 10/2/2018   Do you ever feel afraid of your partner? N   Are you in a relationship with someone who physically or mentally threatens you? N   Is it safe for you to go home? Y       Fall Risk  Fall Risk Assessment, last 12 mths 5/24/2016   Able to walk? Yes   Fall in past 12 months? Yes   Fall with injury? Yes   Number of falls in past 12 months 5   Fall Risk Score 6         Advance Directive:  1. Do you have an advance directive in place? Patient Reply:on file         1. Have you been to the ER, urgent care clinic since your last visit? Hospitalized since your last visit? No    2. Have you seen or consulted any other health care providers outside of the [address] [place] [address] since your last visit? Include any pap smears or colon screening.  No

## 2018-10-16 LAB
ALBUMIN SERPL-MCNC: 4.8 G/DL (ref 3.5–5.5)
ALBUMIN/GLOB SERPL: 1.8 {RATIO} (ref 1.2–2.2)
ALP SERPL-CCNC: 172 IU/L (ref 39–117)
ALT SERPL-CCNC: 21 IU/L (ref 0–32)
AST SERPL-CCNC: 13 IU/L (ref 0–40)
BASOPHILS # BLD AUTO: 0 X10E3/UL (ref 0–0.2)
BASOPHILS NFR BLD AUTO: 0 %
BILIRUB SERPL-MCNC: 0.6 MG/DL (ref 0–1.2)
BUN SERPL-MCNC: 22 MG/DL (ref 6–24)
BUN/CREAT SERPL: 24 (ref 9–23)
CALCIUM SERPL-MCNC: 9.2 MG/DL (ref 8.7–10.2)
CHLORIDE SERPL-SCNC: 100 MMOL/L (ref 96–106)
CO2 SERPL-SCNC: 22 MMOL/L (ref 20–29)
CREAT SERPL-MCNC: 0.91 MG/DL (ref 0.57–1)
EOSINOPHIL # BLD AUTO: 0.1 X10E3/UL (ref 0–0.4)
EOSINOPHIL NFR BLD AUTO: 1 %
ERYTHROCYTE [DISTWIDTH] IN BLOOD BY AUTOMATED COUNT: 14.5 % (ref 12.3–15.4)
EST. AVERAGE GLUCOSE BLD GHB EST-MCNC: 243 MG/DL
GLOBULIN SER CALC-MCNC: 2.7 G/DL (ref 1.5–4.5)
GLUCOSE SERPL-MCNC: 273 MG/DL (ref 65–99)
HBA1C MFR BLD: 10.1 % (ref 4.8–5.6)
HCT VFR BLD AUTO: 36.8 % (ref 34–46.6)
HGB BLD-MCNC: 12.6 G/DL (ref 11.1–15.9)
IMM GRANULOCYTES # BLD: 0 X10E3/UL (ref 0–0.1)
IMM GRANULOCYTES NFR BLD: 0 %
LYMPHOCYTES # BLD AUTO: 2.7 X10E3/UL (ref 0.7–3.1)
LYMPHOCYTES NFR BLD AUTO: 29 %
MCH RBC QN AUTO: 28.4 PG (ref 26.6–33)
MCHC RBC AUTO-ENTMCNC: 34.2 G/DL (ref 31.5–35.7)
MCV RBC AUTO: 83 FL (ref 79–97)
MONOCYTES # BLD AUTO: 0.5 X10E3/UL (ref 0.1–0.9)
MONOCYTES NFR BLD AUTO: 6 %
NEUTROPHILS # BLD AUTO: 5.9 X10E3/UL (ref 1.4–7)
NEUTROPHILS NFR BLD AUTO: 64 %
PLATELET # BLD AUTO: 278 X10E3/UL (ref 150–379)
POTASSIUM SERPL-SCNC: 4 MMOL/L (ref 3.5–5.2)
PROT SERPL-MCNC: 7.5 G/DL (ref 6–8.5)
RBC # BLD AUTO: 4.44 X10E6/UL (ref 3.77–5.28)
SODIUM SERPL-SCNC: 144 MMOL/L (ref 134–144)
WBC # BLD AUTO: 9.3 X10E3/UL (ref 3.4–10.8)

## 2018-10-18 NOTE — PROGRESS NOTES
The patient presents to the office today with the chief complaint of Diabetes Mellitus    HPI    The patient remains on Lantus for type II diabetes mellitus. she  checks blood sugars at home daily. The sugars have been high. The patient has not had any  low sugars. The patient remains on medications for hyperlipidemia. she is tolerating the medications well. ROS     Allergies   Allergen Reactions    Clindamycin Hives    Sulfa (Sulfonamide Antibiotics) Unable to Obtain    Lisinopril Cough       Current Outpatient Medications   Medication Sig Dispense Refill    amLODIPine (NORVASC) 5 mg tablet TAKE 1 TABLET BY MOUTH ONCE DAILY 30 Tab 1    metFORMIN ER (GLUCOPHAGE XR) 750 mg tablet Take 1 tablet by mouth twice daily 60 Tab 2    TRUE METRIX GLUCOSE TEST STRIP strip CHECK BLOOD SUGAR DAILY 50 Strip 1    glucose blood VI test strips (TRUE METRIX GLUCOSE TEST STRIP) strip Test Blood Glucose once daily; ICD 10 E11.9, Quantity 100 100 Strip 3    losartan-hydroCHLOROthiazide (HYZAAR) 100-12.5 mg per tablet TAKE 1 TABLET BY MOUTH ONCE DAILY 90 Tab 2    atorvastatin (LIPITOR) 40 mg tablet 1 tablet daily 90 Tab 3    glimepiride (AMARYL) 2 mg tablet Take 1 Tab by mouth two (2) times a day. 120 Tab 3    LANTUS SOLOSTAR U-100 INSULIN 100 unit/mL (3 mL) inpn INJECT 20 UNITS UNDER THE SKIN ONCE DAILY (Patient taking differently: INJECT 40 UNITS UNDER THE SKIN ONCE DAILY) 15 Adjustable Dose Pre-filled Pen Syringe 1    Blood-Glucose Meter (TRUE METRIX AIR GLUCOSE METER) monitoring kit Test Blood Glucose once daily; ICD 10 E11.9 1 Kit 0    Insulin Needles, Disposable, (PEN NEEDLE) 32 gauge x 5/32\" ndle For insulin shots 100 Pen Needle 1    Omeprazole delayed release (PRILOSEC D/R) 20 mg tablet Take 1 Tab by mouth daily.  27 Tab 1       Past Medical History:   Diagnosis Date    Essential hypertension, benign     Mixed hyperlipidemia     Type II or unspecified type diabetes mellitus without mention of complication, not stated as uncontrolled        Past Surgical History:   Procedure Laterality Date    HX HYSTERECTOMY  36s    X2       Social History     Socioeconomic History    Marital status: SINGLE     Spouse name: Not on file    Number of children: Not on file    Years of education: Not on file    Highest education level: Not on file   Social Needs    Financial resource strain: Not on file    Food insecurity - worry: Not on file    Food insecurity - inability: Not on file    Transportation needs - medical: Not on file   [employer] needs - non-medical: Not on file   Occupational History    Not on file   Tobacco Use    Smoking status: Never Smoker    Smokeless tobacco: Never Used   Substance and Sexual Activity    Alcohol use: No     Alcohol/week: 0.0 oz    Drug use: No    Sexual activity: Not Currently     Partners: Male   Other Topics Concern    Not on file   Social History Narrative    Not on file       Patient does have an advanced directive on file    Visit Vitals  /76 (BP 1 Location: Left arm, BP Patient Position: Sitting)   Pulse 95   Temp 98.4 °F (36.9 °C) (Tympanic)   Ht 5' 6\" (1.676 m)   Wt 197 lb (89.4 kg)   SpO2 98%   BMI 31.80 kg/m²       Physical Exam    DM Foot:    Diabetic foot exam:     Left Foot:   Visual Exam: normal    Pulse DP: 2+ (normal)   Filament test: normal sensation    Vibratory sensation: normal      Right Foot:   Visual Exam: normal    Pulse DP: 2+ (normal)   Filament test: normal sensation    Vibratory sensation: normal        BMI:  The patient was advised to limit calories to 1800 cecelia and carbohydrates to 100 grams daily      Office Visit on 10/15/2018   Component Date Value Ref Range Status    Glucose 10/15/2018 273* 65 - 99 mg/dL Final    BUN 10/15/2018 22  6 - 24 mg/dL Final    Creatinine 10/15/2018 0.91  0.57 - 1.00 mg/dL Final    GFR est non-AA 10/15/2018 69  >59 mL/min/1.73 Final    GFR est AA 10/15/2018 80  >59 mL/min/1.73 Final    BUN/Creatinine ratio 10/15/2018 24* 9 - 23 Final    Sodium 10/15/2018 144  134 - 144 mmol/L Final    Potassium 10/15/2018 4.0  3.5 - 5.2 mmol/L Final    Chloride 10/15/2018 100  96 - 106 mmol/L Final    CO2 10/15/2018 22  20 - 29 mmol/L Final    Calcium 10/15/2018 9.2  8.7 - 10.2 mg/dL Final    Protein, total 10/15/2018 7.5  6.0 - 8.5 g/dL Final    Albumin 10/15/2018 4.8  3.5 - 5.5 g/dL Final    GLOBULIN, TOTAL 10/15/2018 2.7  1.5 - 4.5 g/dL Final    A-G Ratio 10/15/2018 1.8  1.2 - 2.2 Final    Bilirubin, total 10/15/2018 0.6  0.0 - 1.2 mg/dL Final    Alk. phosphatase 10/15/2018 172* 39 - 117 IU/L Final    AST (SGOT) 10/15/2018 13  0 - 40 IU/L Final    ALT (SGPT) 10/15/2018 21  0 - 32 IU/L Final    WBC 10/15/2018 9.3  3.4 - 10.8 x10E3/uL Final    RBC 10/15/2018 4.44  3.77 - 5.28 x10E6/uL Final    HGB 10/15/2018 12.6  11.1 - 15.9 g/dL Final    HCT 10/15/2018 36.8  34.0 - 46.6 % Final    MCV 10/15/2018 83  79 - 97 fL Final    MCH 10/15/2018 28.4  26.6 - 33.0 pg Final    MCHC 10/15/2018 34.2  31.5 - 35.7 g/dL Final    RDW 10/15/2018 14.5  12.3 - 15.4 % Final    PLATELET 02/49/6121 870  150 - 379 x10E3/uL Final    NEUTROPHILS 10/15/2018 64  Not Estab. % Final    Lymphocytes 10/15/2018 29  Not Estab. % Final    MONOCYTES 10/15/2018 6  Not Estab. % Final    EOSINOPHILS 10/15/2018 1  Not Estab. % Final    BASOPHILS 10/15/2018 0  Not Estab. % Final    ABS. NEUTROPHILS 10/15/2018 5.9  1.4 - 7.0 x10E3/uL Final    Abs Lymphocytes 10/15/2018 2.7  0.7 - 3.1 x10E3/uL Final    ABS. MONOCYTES 10/15/2018 0.5  0.1 - 0.9 x10E3/uL Final    ABS. EOSINOPHILS 10/15/2018 0.1  0.0 - 0.4 x10E3/uL Final    ABS. BASOPHILS 10/15/2018 0.0  0.0 - 0.2 x10E3/uL Final    IMMATURE GRANULOCYTES 10/15/2018 0  Not Estab. % Final    ABS. IMM. GRANS.  10/15/2018 0.0  0.0 - 0.1 x10E3/uL Final    Hemoglobin A1c 10/15/2018 10.1* 4.8 - 5.6 % Final    Comment:          Prediabetes: 5.7 - 6.4           Diabetes: >6.4 Glycemic control for adults with diabetes: <7.0      Estimated average glucose 10/15/2018 243  mg/dL Final   Office Visit on 10/02/2018   Component Date Value Ref Range Status    Hemoglobin A1c (POC) 10/02/2018 10.4  % Final    Gram Stain 10/02/2018 No white blood cells seen. Final    Gram Stain 10/02/2018 Many gram negative diplococci. Final    Gram Stain 10/02/2018 Many gram positive cocci. Final    Gram Stain 10/02/2018 Moderate number of gram positive rods. Final    Aerobic culture 10/02/2018 *  Final                    Value:Staphylococcus aureus  Heavy growth      Comment: Methicillin resistant (MRSA)  Based on resistance to oxacillin this isolate would be resistant to  all currently available beta-lactam antimicrobial agents, with the  exception of the newer cephalosporins with anti-MRSA activity, such as  Ceftaroline      Aerobic culture 10/02/2018 *  Final                    Value:Beta hemolytic Streptococcus, group B  Moderate growth      Comment: Penicillin and ampicillin are drugs of choice for treatment of  beta-hemolytic streptococcal infections. Susceptibility testing of  penicillins and other beta-lactam agents approved by the FDA for  treatment of beta-hemolytic streptococcal infections need not be  performed routinely because nonsusceptible isolates are extremely  rare in any beta-hemolytic streptococcus and have not been reported  for Streptococcus pyogenes (group A). (CLSI 2011)         .   Results for orders placed or performed in visit on 37/02/10   METABOLIC PANEL, COMPREHENSIVE   Result Value Ref Range    Glucose 273 (H) 65 - 99 mg/dL    BUN 22 6 - 24 mg/dL    Creatinine 0.91 0.57 - 1.00 mg/dL    GFR est non-AA 69 >59 mL/min/1.73    GFR est AA 80 >59 mL/min/1.73    BUN/Creatinine ratio 24 (H) 9 - 23    Sodium 144 134 - 144 mmol/L    Potassium 4.0 3.5 - 5.2 mmol/L    Chloride 100 96 - 106 mmol/L    CO2 22 20 - 29 mmol/L    Calcium 9.2 8.7 - 10.2 mg/dL    Protein, total 7.5 6.0 - 8.5 g/dL    Albumin 4.8 3.5 - 5.5 g/dL    GLOBULIN, TOTAL 2.7 1.5 - 4.5 g/dL    A-G Ratio 1.8 1.2 - 2.2    Bilirubin, total 0.6 0.0 - 1.2 mg/dL    Alk. phosphatase 172 (H) 39 - 117 IU/L    AST (SGOT) 13 0 - 40 IU/L    ALT (SGPT) 21 0 - 32 IU/L    Narrative    Performed at:  [address]  [id number]  : Kellie Best MD, Phone:  [phone]   CBC WITH AUTOMATED DIFF   Result Value Ref Range    WBC 9.3 3.4 - 10.8 x10E3/uL    RBC 4.44 3.77 - 5.28 x10E6/uL    HGB 12.6 11.1 - 15.9 g/dL    HCT 36.8 34.0 - 46.6 %    MCV 83 79 - 97 fL    MCH 28.4 26.6 - 33.0 pg    MCHC 34.2 31.5 - 35.7 g/dL    RDW 14.5 12.3 - 15.4 %    PLATELET 569 339 - 578 x10E3/uL    NEUTROPHILS 64 Not Estab. %    Lymphocytes 29 Not Estab. %    MONOCYTES 6 Not Estab. %    EOSINOPHILS 1 Not Estab. %    BASOPHILS 0 Not Estab. %    ABS. NEUTROPHILS 5.9 1.4 - 7.0 x10E3/uL    Abs Lymphocytes 2.7 0.7 - 3.1 x10E3/uL    ABS. MONOCYTES 0.5 0.1 - 0.9 x10E3/uL    ABS. EOSINOPHILS 0.1 0.0 - 0.4 x10E3/uL    ABS. BASOPHILS 0.0 0.0 - 0.2 x10E3/uL    IMMATURE GRANULOCYTES 0 Not Estab. %    ABS. IMM. GRANS. 0.0 0.0 - 0.1 x10E3/uL    Narrative    Performed at:  [address]  [id number]  : Kellie Best MD, Phone:  [phone]   HEMOGLOBIN A1C WITH EAG   Result Value Ref Range    Hemoglobin A1c 10.1 (H) 4.8 - 5.6 %    Estimated average glucose 243 mg/dL    Narrative    Performed at:  [address]  [id number]  : Kellie Best MD, Phone:  [phone]       Assessment / Plan      ICD-10-CM ICD-9-CM    1. Uncontrolled type 2 diabetes mellitus with hyperglycemia (HCC) E11.65 250.02 HEMOGLOBIN A1C WITH EAG      HEMOGLOBIN A1C WITH EAG      HM DIABETES FOOT EXAM   2.  Essential hypertension, benign U12 424.7 METABOLIC PANEL, COMPREHENSIVE      CBC WITH AUTOMATED DIFF      METABOLIC PANEL, COMPREHENSIVE      CBC WITH AUTOMATED DIFF   3. Encounter for immunization Z23 V03.89 INFLUENZA VIRUS VAC QUAD,SPLIT,PRESV FREE SYRINGE IM   4. Mixed hyperlipidemia E78.2 272.2      Increase Lantus to 48 units daily  she was advised to continue her maintenance medications  Labs ordered  Flu shot given    Follow-up Disposition:  Return in about 3 months (around 1/15/2019). I asked Laura Barrera if she has any questions and I answered the questions.   Laura Barrera states that she understands the treatment plan and agrees with the treatment plan

## 2018-10-30 DIAGNOSIS — I10 ESSENTIAL HYPERTENSION, BENIGN: ICD-10-CM

## 2018-11-01 RX ORDER — AMLODIPINE BESYLATE 5 MG/1
TABLET ORAL
Qty: 30 TAB | Refills: 1 | Status: SHIPPED | OUTPATIENT
Start: 2018-11-01 | End: 2019-04-12 | Stop reason: SDUPTHER

## 2018-11-26 ENCOUNTER — OFFICE VISIT (OUTPATIENT)
Dept: INTERNAL MEDICINE CLINIC | Age: 58
End: 2018-11-26

## 2018-11-26 VITALS
SYSTOLIC BLOOD PRESSURE: 130 MMHG | HEIGHT: 66 IN | RESPIRATION RATE: 18 BRPM | DIASTOLIC BLOOD PRESSURE: 78 MMHG | TEMPERATURE: 98.5 F | WEIGHT: 199 LBS | BODY MASS INDEX: 31.98 KG/M2 | HEART RATE: 96 BPM | OXYGEN SATURATION: 96 %

## 2018-11-26 DIAGNOSIS — E11.9 TYPE 2 DIABETES MELLITUS WITHOUT COMPLICATION, WITHOUT LONG-TERM CURRENT USE OF INSULIN (HCC): ICD-10-CM

## 2018-11-26 DIAGNOSIS — I10 ESSENTIAL HYPERTENSION, BENIGN: ICD-10-CM

## 2018-11-26 DIAGNOSIS — J06.9 URI WITH COUGH AND CONGESTION: Primary | ICD-10-CM

## 2018-11-26 DIAGNOSIS — E78.2 MIXED HYPERLIPIDEMIA: ICD-10-CM

## 2018-11-26 RX ORDER — GLIMEPIRIDE 2 MG/1
2 TABLET ORAL 2 TIMES DAILY
Qty: 120 TAB | Refills: 3 | Status: SHIPPED | OUTPATIENT
Start: 2018-11-26 | End: 2018-12-26 | Stop reason: SDUPTHER

## 2018-11-26 RX ORDER — BENZONATATE 100 MG/1
100 CAPSULE ORAL
Qty: 21 CAP | Refills: 0 | Status: SHIPPED | OUTPATIENT
Start: 2018-11-26 | End: 2018-12-03

## 2018-11-26 RX ORDER — AMOXICILLIN AND CLAVULANATE POTASSIUM 875; 125 MG/1; MG/1
1 TABLET, FILM COATED ORAL EVERY 12 HOURS
Qty: 14 TAB | Refills: 0 | Status: SHIPPED | OUTPATIENT
Start: 2018-11-26 | End: 2018-12-03

## 2018-11-26 NOTE — TELEPHONE ENCOUNTER
Requested Prescriptions     Pending Prescriptions Disp Refills    glimepiride (AMARYL) 2 mg tablet 120 Tab 3     Sig: Take 1 Tab by mouth two (2) times a day.

## 2018-11-26 NOTE — PROGRESS NOTES
Isis Diego is a 59 y.o. female presenting today for Nasal Congestion (x1 week)  . HPI:  Isis Diego presents to the office today for sore throat, productive cough, and chest congestion x 7 days. Patient also reports rhinorrhea and chest tightness when breathing or swallowing. Patient reports recent sick contacts within the home. Denies shortness of breath, fever, chest pain. Blood pressure stable 130/78. Review of Systems   Constitutional: Negative for chills and fever. HENT: Positive for congestion. Eyes: Negative for blurred vision, discharge and redness. Respiratory: Positive for cough and sputum production. Negative for shortness of breath and wheezing. Cardiovascular: Negative for chest pain and palpitations. Allergies   Allergen Reactions    Clindamycin Hives    Sulfa (Sulfonamide Antibiotics) Unable to Obtain    Lisinopril Cough       Current Outpatient Medications   Medication Sig Dispense Refill    amoxicillin-clavulanate (AUGMENTIN) 875-125 mg per tablet Take 1 Tab by mouth every twelve (12) hours for 7 days. 14 Tab 0    benzonatate (TESSALON) 100 mg capsule Take 1 Cap by mouth three (3) times daily as needed for Cough for up to 7 days.  21 Cap 0    amLODIPine (NORVASC) 5 mg tablet TAKE 1 TABLET BY MOUTH ONCE DAILY 30 Tab 1    metFORMIN ER (GLUCOPHAGE XR) 750 mg tablet Take 1 tablet by mouth twice daily 60 Tab 2    TRUE METRIX GLUCOSE TEST STRIP strip CHECK BLOOD SUGAR DAILY 50 Strip 1    glucose blood VI test strips (TRUE METRIX GLUCOSE TEST STRIP) strip Test Blood Glucose once daily; ICD 10 E11.9, Quantity 100 100 Strip 3    losartan-hydroCHLOROthiazide (HYZAAR) 100-12.5 mg per tablet TAKE 1 TABLET BY MOUTH ONCE DAILY 90 Tab 2    atorvastatin (LIPITOR) 40 mg tablet 1 tablet daily 90 Tab 3    LANTUS SOLOSTAR U-100 INSULIN 100 unit/mL (3 mL) inpn INJECT 20 UNITS UNDER THE SKIN ONCE DAILY (Patient taking differently: INJECT 40 UNITS UNDER THE SKIN ONCE DAILY) 15 Adjustable Dose Pre-filled Pen Syringe 1    Blood-Glucose Meter (TRUE METRIX AIR GLUCOSE METER) monitoring kit Test Blood Glucose once daily; ICD 10 E11.9 1 Kit 0    Insulin Needles, Disposable, (PEN NEEDLE) 32 gauge x 5/32\" ndle For insulin shots 100 Pen Needle 1    glimepiride (AMARYL) 2 mg tablet Take 1 Tab by mouth two (2) times a day. 120 Tab 3    amLODIPine (NORVASC) 5 mg tablet TAKE 1 TABLET BY MOUTH ONCE DAILY 30 Tab 1    Omeprazole delayed release (PRILOSEC D/R) 20 mg tablet Take 1 Tab by mouth daily.  27 Tab 1       Past Medical History:   Diagnosis Date    Essential hypertension, benign     Mixed hyperlipidemia     Type II or unspecified type diabetes mellitus without mention of complication, not stated as uncontrolled        Past Surgical History:   Procedure Laterality Date    HX HYSTERECTOMY  36s    X2       Social History     Socioeconomic History    Marital status: SINGLE     Spouse name: Not on file    Number of children: Not on file    Years of education: Not on file    Highest education level: Not on file   Social Needs    Financial resource strain: Not on file    Food insecurity - worry: Not on file    Food insecurity - inability: Not on file    Transportation needs - medical: Not on file   [employer] needs - non-medical: Not on file   Occupational History    Not on file   Tobacco Use    Smoking status: Never Smoker    Smokeless tobacco: Never Used   Substance and Sexual Activity    Alcohol use: No     Alcohol/week: 0.0 oz    Drug use: No    Sexual activity: Not Currently     Partners: Male   Other Topics Concern    Not on file   Social History Narrative    Not on file       Patient does not have an advanced directive on file    Vitals:    11/26/18 1223   BP: 130/78   Pulse: 96   Resp: 18   Temp: 98.5 °F (36.9 °C)   TempSrc: Tympanic   SpO2: 96%   Weight: 199 lb (90.3 kg)   Height: 5' 6\" (1.676 m)   PainSc:   8   PainLoc: Chest       Physical Exam Constitutional: She is well-developed, well-nourished, and in no distress. HENT:   Right Ear: External ear normal.   Left Ear: External ear normal.   Mouth/Throat: No oropharyngeal exudate. Cardiovascular: Normal rate, regular rhythm and normal heart sounds. Pulmonary/Chest: Effort normal and breath sounds normal.   Abdominal: Soft. Bowel sounds are normal.       Office Visit on 10/15/2018   Component Date Value Ref Range Status    Glucose 10/15/2018 273* 65 - 99 mg/dL Final    BUN 10/15/2018 22  6 - 24 mg/dL Final    Creatinine 10/15/2018 0.91  0.57 - 1.00 mg/dL Final    GFR est non-AA 10/15/2018 69  >59 mL/min/1.73 Final    GFR est AA 10/15/2018 80  >59 mL/min/1.73 Final    BUN/Creatinine ratio 10/15/2018 24* 9 - 23 Final    Sodium 10/15/2018 144  134 - 144 mmol/L Final    Potassium 10/15/2018 4.0  3.5 - 5.2 mmol/L Final    Chloride 10/15/2018 100  96 - 106 mmol/L Final    CO2 10/15/2018 22  20 - 29 mmol/L Final    Calcium 10/15/2018 9.2  8.7 - 10.2 mg/dL Final    Protein, total 10/15/2018 7.5  6.0 - 8.5 g/dL Final    Albumin 10/15/2018 4.8  3.5 - 5.5 g/dL Final    GLOBULIN, TOTAL 10/15/2018 2.7  1.5 - 4.5 g/dL Final    A-G Ratio 10/15/2018 1.8  1.2 - 2.2 Final    Bilirubin, total 10/15/2018 0.6  0.0 - 1.2 mg/dL Final    Alk.  phosphatase 10/15/2018 172* 39 - 117 IU/L Final    AST (SGOT) 10/15/2018 13  0 - 40 IU/L Final    ALT (SGPT) 10/15/2018 21  0 - 32 IU/L Final    WBC 10/15/2018 9.3  3.4 - 10.8 x10E3/uL Final    RBC 10/15/2018 4.44  3.77 - 5.28 x10E6/uL Final    HGB 10/15/2018 12.6  11.1 - 15.9 g/dL Final    HCT 10/15/2018 36.8  34.0 - 46.6 % Final    MCV 10/15/2018 83  79 - 97 fL Final    MCH 10/15/2018 28.4  26.6 - 33.0 pg Final    MCHC 10/15/2018 34.2  31.5 - 35.7 g/dL Final    RDW 10/15/2018 14.5  12.3 - 15.4 % Final    PLATELET 97/69/7777 124  150 - 379 x10E3/uL Final    NEUTROPHILS 10/15/2018 64  Not Estab. % Final    Lymphocytes 10/15/2018 29  Not Estab. % Final    MONOCYTES 10/15/2018 6  Not Estab. % Final    EOSINOPHILS 10/15/2018 1  Not Estab. % Final    BASOPHILS 10/15/2018 0  Not Estab. % Final    ABS. NEUTROPHILS 10/15/2018 5.9  1.4 - 7.0 x10E3/uL Final    Abs Lymphocytes 10/15/2018 2.7  0.7 - 3.1 x10E3/uL Final    ABS. MONOCYTES 10/15/2018 0.5  0.1 - 0.9 x10E3/uL Final    ABS. EOSINOPHILS 10/15/2018 0.1  0.0 - 0.4 x10E3/uL Final    ABS. BASOPHILS 10/15/2018 0.0  0.0 - 0.2 x10E3/uL Final    IMMATURE GRANULOCYTES 10/15/2018 0  Not Estab. % Final    ABS. IMM. GRANS. 10/15/2018 0.0  0.0 - 0.1 x10E3/uL Final    Hemoglobin A1c 10/15/2018 10.1* 4.8 - 5.6 % Final    Comment:          Prediabetes: 5.7 - 6.4           Diabetes: >6.4           Glycemic control for adults with diabetes: <7.0      Estimated average glucose 10/15/2018 243  mg/dL Final   Office Visit on 10/02/2018   Component Date Value Ref Range Status    Hemoglobin A1c (POC) 10/02/2018 10.4  % Final    Gram Stain 10/02/2018 No white blood cells seen. Final    Gram Stain 10/02/2018 Many gram negative diplococci. Final    Gram Stain 10/02/2018 Many gram positive cocci. Final    Gram Stain 10/02/2018 Moderate number of gram positive rods. Final    Aerobic culture 10/02/2018 *  Final                    Value:Staphylococcus aureus  Heavy growth      Comment: Methicillin resistant (MRSA)  Based on resistance to oxacillin this isolate would be resistant to  all currently available beta-lactam antimicrobial agents, with the  exception of the newer cephalosporins with anti-MRSA activity, such as  Ceftaroline      Aerobic culture 10/02/2018 *  Final                    Value:Beta hemolytic Streptococcus, group B  Moderate growth      Comment: Penicillin and ampicillin are drugs of choice for treatment of  beta-hemolytic streptococcal infections.  Susceptibility testing of  penicillins and other beta-lactam agents approved by the FDA for  treatment of beta-hemolytic streptococcal infections need not be  performed routinely because nonsusceptible isolates are extremely  rare in any beta-hemolytic streptococcus and have not been reported  for Streptococcus pyogenes (group A). (CLSI 2011)         . No results found for any visits on 11/26/18. Assessment / Plan:      ICD-10-CM ICD-9-CM    1. URI with cough and congestion J06.9 465.9 amoxicillin-clavulanate (AUGMENTIN) 875-125 mg per tablet      benzonatate (TESSALON) 100 mg capsule         Tessalon Perles prescribed. Augmentin prescribed. Increase fluid intake encouraged. Follow-up if no improvement      Follow-up Disposition:  Return if symptoms worsen or fail to improve. I asked the patient if she  had any questions and answered her  questions.   The patient stated that she understands the treatment plan and agrees with the treatment plan

## 2018-12-03 ENCOUNTER — OFFICE VISIT (OUTPATIENT)
Dept: INTERNAL MEDICINE CLINIC | Age: 58
End: 2018-12-03

## 2018-12-03 VITALS
HEART RATE: 63 BPM | OXYGEN SATURATION: 96 % | DIASTOLIC BLOOD PRESSURE: 78 MMHG | WEIGHT: 198 LBS | TEMPERATURE: 98.4 F | SYSTOLIC BLOOD PRESSURE: 132 MMHG | BODY MASS INDEX: 31.82 KG/M2 | RESPIRATION RATE: 18 BRPM | HEIGHT: 66 IN

## 2018-12-03 DIAGNOSIS — J06.9 VIRAL URI WITH COUGH: Primary | ICD-10-CM

## 2018-12-03 RX ORDER — FLUTICASONE PROPIONATE 50 MCG
SPRAY, SUSPENSION (ML) NASAL
Qty: 1 BOTTLE | Refills: 1 | Status: SHIPPED | OUTPATIENT
Start: 2018-12-03 | End: 2019-01-30 | Stop reason: SDUPTHER

## 2018-12-03 RX ORDER — LORATADINE 10 MG/1
10 TABLET ORAL
Qty: 30 TAB | Refills: 0 | Status: SHIPPED | OUTPATIENT
Start: 2018-12-03 | End: 2019-08-13 | Stop reason: ALTCHOICE

## 2018-12-03 NOTE — PROGRESS NOTES
Isis Diego is a 59 y.o. female presenting today for Nasal Congestion  . HPI:  Isis Diego presents to the office today for follow-up for continued nasal congestion. Patient continues to complain of congestion rhinorrhea cough and hoarseness times 2 weeks. Patient reports some improvement in cough since last visit. Patient denies dyspnea, chest pain, palpitations, sinus pressure, sinus pain. Review of Systems   Constitutional: Negative for chills and fever. HENT: Positive for congestion. Negative for sinus pain and sore throat. Respiratory: Positive for cough. Negative for sputum production and shortness of breath. Cardiovascular: Negative for chest pain and palpitations. Allergies   Allergen Reactions    Clindamycin Hives    Sulfa (Sulfonamide Antibiotics) Unable to Obtain    Lisinopril Cough       Current Outpatient Medications   Medication Sig Dispense Refill    fluticasone (FLONASE) 50 mcg/actuation nasal spray 2 spray each nostril twice daily 1 Bottle 1    loratadine (CLARITIN) 10 mg tablet Take 1 Tab by mouth daily as needed for Allergies. 30 Tab 0    glimepiride (AMARYL) 2 mg tablet Take 1 Tab by mouth two (2) times a day.  120 Tab 3    amLODIPine (NORVASC) 5 mg tablet TAKE 1 TABLET BY MOUTH ONCE DAILY 30 Tab 1    metFORMIN ER (GLUCOPHAGE XR) 750 mg tablet Take 1 tablet by mouth twice daily 60 Tab 2    TRUE METRIX GLUCOSE TEST STRIP strip CHECK BLOOD SUGAR DAILY 50 Strip 1    glucose blood VI test strips (TRUE METRIX GLUCOSE TEST STRIP) strip Test Blood Glucose once daily; ICD 10 E11.9, Quantity 100 100 Strip 3    losartan-hydroCHLOROthiazide (HYZAAR) 100-12.5 mg per tablet TAKE 1 TABLET BY MOUTH ONCE DAILY 90 Tab 2    atorvastatin (LIPITOR) 40 mg tablet 1 tablet daily 90 Tab 3    LANTUS SOLOSTAR U-100 INSULIN 100 unit/mL (3 mL) inpn INJECT 20 UNITS UNDER THE SKIN ONCE DAILY (Patient taking differently: INJECT 40 UNITS UNDER THE SKIN ONCE DAILY) 15 Adjustable Dose Pre-filled Pen Syringe 1    Blood-Glucose Meter (TRUE METRIX AIR GLUCOSE METER) monitoring kit Test Blood Glucose once daily; ICD 10 E11.9 1 Kit 0    Insulin Needles, Disposable, (PEN NEEDLE) 32 gauge x 5/32\" ndle For insulin shots 100 Pen Needle 1    Omeprazole delayed release (PRILOSEC D/R) 20 mg tablet Take 1 Tab by mouth daily. 30 Tab 1    amoxicillin-clavulanate (AUGMENTIN) 875-125 mg per tablet Take 1 Tab by mouth every twelve (12) hours for 7 days. 14 Tab 0    benzonatate (TESSALON) 100 mg capsule Take 1 Cap by mouth three (3) times daily as needed for Cough for up to 7 days.  21 Cap 0    amLODIPine (NORVASC) 5 mg tablet TAKE 1 TABLET BY MOUTH ONCE DAILY 30 Tab 1       Past Medical History:   Diagnosis Date    Essential hypertension, benign     Mixed hyperlipidemia     Type II or unspecified type diabetes mellitus without mention of complication, not stated as uncontrolled        Past Surgical History:   Procedure Laterality Date    HX HYSTERECTOMY  36s    X2       Social History     Socioeconomic History    Marital status: SINGLE     Spouse name: Not on file    Number of children: Not on file    Years of education: Not on file    Highest education level: Not on file   Social Needs    Financial resource strain: Not on file    Food insecurity - worry: Not on file    Food insecurity - inability: Not on file   [employer] needs - medical: Not on file   [employer] needs - non-medical: Not on file   Occupational History    Not on file   Tobacco Use    Smoking status: Never Smoker    Smokeless tobacco: Never Used   Substance and Sexual Activity    Alcohol use: No     Alcohol/week: 0.0 oz    Drug use: No    Sexual activity: Not Currently     Partners: Male   Other Topics Concern    Not on file   Social History Narrative    Not on file       Patient does have an advanced directive on file    Vitals:    12/03/18 0914   BP: 132/78   Pulse: 63   Resp: 18   Temp: 98.4 °F (36.9 °C) TempSrc: Tympanic   SpO2: 96%   Weight: 198 lb (89.8 kg)   Height: 5' 6\" (1.676 m)   PainSc:   8   PainLoc: Chest       Physical Exam   Constitutional: She is well-developed, well-nourished, and in no distress. HENT:   Head: Normocephalic. Right Ear: External ear normal.   Left Ear: External ear normal.   Cardiovascular: Normal rate, regular rhythm and normal heart sounds. Pulmonary/Chest: Effort normal and breath sounds normal.       Office Visit on 10/15/2018   Component Date Value Ref Range Status    Glucose 10/15/2018 273* 65 - 99 mg/dL Final    BUN 10/15/2018 22  6 - 24 mg/dL Final    Creatinine 10/15/2018 0.91  0.57 - 1.00 mg/dL Final    GFR est non-AA 10/15/2018 69  >59 mL/min/1.73 Final    GFR est AA 10/15/2018 80  >59 mL/min/1.73 Final    BUN/Creatinine ratio 10/15/2018 24* 9 - 23 Final    Sodium 10/15/2018 144  134 - 144 mmol/L Final    Potassium 10/15/2018 4.0  3.5 - 5.2 mmol/L Final    Chloride 10/15/2018 100  96 - 106 mmol/L Final    CO2 10/15/2018 22  20 - 29 mmol/L Final    Calcium 10/15/2018 9.2  8.7 - 10.2 mg/dL Final    Protein, total 10/15/2018 7.5  6.0 - 8.5 g/dL Final    Albumin 10/15/2018 4.8  3.5 - 5.5 g/dL Final    GLOBULIN, TOTAL 10/15/2018 2.7  1.5 - 4.5 g/dL Final    A-G Ratio 10/15/2018 1.8  1.2 - 2.2 Final    Bilirubin, total 10/15/2018 0.6  0.0 - 1.2 mg/dL Final    Alk.  phosphatase 10/15/2018 172* 39 - 117 IU/L Final    AST (SGOT) 10/15/2018 13  0 - 40 IU/L Final    ALT (SGPT) 10/15/2018 21  0 - 32 IU/L Final    WBC 10/15/2018 9.3  3.4 - 10.8 x10E3/uL Final    RBC 10/15/2018 4.44  3.77 - 5.28 x10E6/uL Final    HGB 10/15/2018 12.6  11.1 - 15.9 g/dL Final    HCT 10/15/2018 36.8  34.0 - 46.6 % Final    MCV 10/15/2018 83  79 - 97 fL Final    MCH 10/15/2018 28.4  26.6 - 33.0 pg Final    MCHC 10/15/2018 34.2  31.5 - 35.7 g/dL Final    RDW 10/15/2018 14.5  12.3 - 15.4 % Final    PLATELET 56/62/4305 443  150 - 379 x10E3/uL Final    NEUTROPHILS 10/15/2018 64 Not Estab. % Final    Lymphocytes 10/15/2018 29  Not Estab. % Final    MONOCYTES 10/15/2018 6  Not Estab. % Final    EOSINOPHILS 10/15/2018 1  Not Estab. % Final    BASOPHILS 10/15/2018 0  Not Estab. % Final    ABS. NEUTROPHILS 10/15/2018 5.9  1.4 - 7.0 x10E3/uL Final    Abs Lymphocytes 10/15/2018 2.7  0.7 - 3.1 x10E3/uL Final    ABS. MONOCYTES 10/15/2018 0.5  0.1 - 0.9 x10E3/uL Final    ABS. EOSINOPHILS 10/15/2018 0.1  0.0 - 0.4 x10E3/uL Final    ABS. BASOPHILS 10/15/2018 0.0  0.0 - 0.2 x10E3/uL Final    IMMATURE GRANULOCYTES 10/15/2018 0  Not Estab. % Final    ABS. IMM. GRANS. 10/15/2018 0.0  0.0 - 0.1 x10E3/uL Final    Hemoglobin A1c 10/15/2018 10.1* 4.8 - 5.6 % Final    Comment:          Prediabetes: 5.7 - 6.4           Diabetes: >6.4           Glycemic control for adults with diabetes: <7.0      Estimated average glucose 10/15/2018 243  mg/dL Final   Office Visit on 10/02/2018   Component Date Value Ref Range Status    Hemoglobin A1c (POC) 10/02/2018 10.4  % Final    Gram Stain 10/02/2018 No white blood cells seen. Final    Gram Stain 10/02/2018 Many gram negative diplococci. Final    Gram Stain 10/02/2018 Many gram positive cocci. Final    Gram Stain 10/02/2018 Moderate number of gram positive rods. Final    Aerobic culture 10/02/2018 *  Final                    Value:Staphylococcus aureus  Heavy growth      Comment: Methicillin resistant (MRSA)  Based on resistance to oxacillin this isolate would be resistant to  all currently available beta-lactam antimicrobial agents, with the  exception of the newer cephalosporins with anti-MRSA activity, such as  Ceftaroline      Aerobic culture 10/02/2018 *  Final                    Value:Beta hemolytic Streptococcus, group B  Moderate growth      Comment: Penicillin and ampicillin are drugs of choice for treatment of  beta-hemolytic streptococcal infections.  Susceptibility testing of  penicillins and other beta-lactam agents approved by the FDA for  treatment of beta-hemolytic streptococcal infections need not be  performed routinely because nonsusceptible isolates are extremely  rare in any beta-hemolytic streptococcus and have not been reported  for Streptococcus pyogenes (group A). (CLSI 2011)         . No results found for any visits on 12/03/18. Assessment / Plan:      ICD-10-CM ICD-9-CM    1. Viral URI with cough J06.9 465.9 fluticasone (FLONASE) 50 mcg/actuation nasal spray    B97.89  loratadine (CLARITIN) 10 mg tablet         Patient prescribed Claritin. Patient prescribed Flonase. Patient instructed to follow-up within 1 week as needed. Follow-up Disposition:  Return if symptoms worsen or fail to improve. I asked the patient if she  had any questions and answered her  questions.   The patient stated that she understands the treatment plan and agrees with the treatment plan

## 2018-12-26 DIAGNOSIS — I10 ESSENTIAL HYPERTENSION, BENIGN: ICD-10-CM

## 2018-12-26 DIAGNOSIS — E78.2 MIXED HYPERLIPIDEMIA: ICD-10-CM

## 2018-12-26 DIAGNOSIS — E11.9 TYPE 2 DIABETES MELLITUS WITHOUT COMPLICATION, WITHOUT LONG-TERM CURRENT USE OF INSULIN (HCC): ICD-10-CM

## 2018-12-26 NOTE — TELEPHONE ENCOUNTER
Requested Prescriptions     Pending Prescriptions Disp Refills    glimepiride (AMARYL) 2 mg tablet 120 Tab 3     Sig: Take 1 Tab by mouth two (2) times a day.     metFORMIN ER (GLUCOPHAGE XR) 750 mg tablet 60 Tab 2     Sig: Take 1 tablet by mouth twice daily

## 2018-12-27 RX ORDER — GLIMEPIRIDE 2 MG/1
2 TABLET ORAL 2 TIMES DAILY
Qty: 120 TAB | Refills: 3 | Status: SHIPPED | OUTPATIENT
Start: 2018-12-27 | End: 2019-08-01 | Stop reason: SDUPTHER

## 2018-12-27 RX ORDER — METFORMIN HYDROCHLORIDE 750 MG/1
TABLET, EXTENDED RELEASE ORAL
Qty: 60 TAB | Refills: 2 | Status: SHIPPED | OUTPATIENT
Start: 2018-12-27 | End: 2019-03-25 | Stop reason: SDUPTHER

## 2019-01-02 DIAGNOSIS — I10 ESSENTIAL HYPERTENSION, BENIGN: ICD-10-CM

## 2019-01-02 RX ORDER — AMLODIPINE BESYLATE 5 MG/1
TABLET ORAL
Qty: 30 TAB | Refills: 1 | Status: SHIPPED | OUTPATIENT
Start: 2019-01-02 | End: 2019-03-01 | Stop reason: SDUPTHER

## 2019-01-02 NOTE — TELEPHONE ENCOUNTER
Requested Prescriptions     Pending Prescriptions Disp Refills    amLODIPine (NORVASC) 5 mg tablet 30 Tab 1     Sig: TAKE 1 TABLET BY MOUTH ONCE DAILY

## 2019-01-22 NOTE — TELEPHONE ENCOUNTER
Requested Prescriptions     Pending Prescriptions Disp Refills    insulin glargine (LANTUS SOLOSTAR U-100 INSULIN) 100 unit/mL (3 mL) inpn 15 Adjustable Dose Pre-filled Pen Syringe 1

## 2019-01-23 ENCOUNTER — TELEPHONE (OUTPATIENT)
Dept: INTERNAL MEDICINE CLINIC | Age: 59
End: 2019-01-23

## 2019-01-23 RX ORDER — INSULIN GLARGINE 100 [IU]/ML
INJECTION, SOLUTION SUBCUTANEOUS
Qty: 15 ADJUSTABLE DOSE PRE-FILLED PEN SYRINGE | Refills: 1 | Status: SHIPPED | OUTPATIENT
Start: 2019-01-23 | End: 2019-03-12 | Stop reason: SDUPTHER

## 2019-01-23 NOTE — TELEPHONE ENCOUNTER
I called the patient  Per Dr. Naeem Issa and left a message for her to return my call regarding information on the units of her insulin. Waiting for a return call.

## 2019-01-30 DIAGNOSIS — J06.9 VIRAL URI WITH COUGH: ICD-10-CM

## 2019-01-31 RX ORDER — FLUTICASONE PROPIONATE 50 MCG
SPRAY, SUSPENSION (ML) NASAL
Qty: 1 BOTTLE | Refills: 3 | Status: SHIPPED | OUTPATIENT
Start: 2019-01-31 | End: 2019-08-13 | Stop reason: ALTCHOICE

## 2019-03-01 DIAGNOSIS — I10 ESSENTIAL HYPERTENSION, BENIGN: ICD-10-CM

## 2019-03-01 RX ORDER — AMLODIPINE BESYLATE 5 MG/1
TABLET ORAL
Qty: 30 TAB | Refills: 0 | Status: SHIPPED | OUTPATIENT
Start: 2019-03-01 | End: 2019-08-01 | Stop reason: SDUPTHER

## 2019-03-12 RX ORDER — INSULIN GLARGINE 100 [IU]/ML
INJECTION, SOLUTION SUBCUTANEOUS
Qty: 15 ADJUSTABLE DOSE PRE-FILLED PEN SYRINGE | Refills: 1 | Status: SHIPPED | OUTPATIENT
Start: 2019-03-12 | End: 2019-04-12 | Stop reason: SDUPTHER

## 2019-03-12 NOTE — TELEPHONE ENCOUNTER
Requested Prescriptions     Pending Prescriptions Disp Refills    insulin glargine (LANTUS SOLOSTAR U-100 INSULIN) 100 unit/mL (3 mL) inpn 15 Adjustable Dose Pre-filled Pen Syringe 1     Sig: INJECT 40 UNITS UNDER THE SKIN ONCE DAILY

## 2019-03-25 NOTE — TELEPHONE ENCOUNTER
Requested Prescriptions     Pending Prescriptions Disp Refills    metFORMIN ER (GLUCOPHAGE XR) 750 mg tablet 60 Tab 2     Sig: Take 1 tablet by mouth twice daily

## 2019-03-26 RX ORDER — METFORMIN HYDROCHLORIDE 750 MG/1
TABLET, EXTENDED RELEASE ORAL
Qty: 60 TAB | Refills: 2 | Status: SHIPPED | OUTPATIENT
Start: 2019-03-26 | End: 2019-07-29 | Stop reason: SDUPTHER

## 2019-03-29 RX ORDER — METFORMIN HYDROCHLORIDE 750 MG/1
TABLET, EXTENDED RELEASE ORAL
Qty: 60 TAB | Refills: 1 | Status: SHIPPED | OUTPATIENT
Start: 2019-03-29 | End: 2019-07-29 | Stop reason: SDUPTHER

## 2019-04-12 DIAGNOSIS — I10 ESSENTIAL HYPERTENSION, BENIGN: ICD-10-CM

## 2019-04-12 RX ORDER — INSULIN GLARGINE 100 [IU]/ML
INJECTION, SOLUTION SUBCUTANEOUS
Qty: 15 ADJUSTABLE DOSE PRE-FILLED PEN SYRINGE | Refills: 1 | Status: SHIPPED | OUTPATIENT
Start: 2019-04-12 | End: 2019-08-12 | Stop reason: SDUPTHER

## 2019-04-12 RX ORDER — AMLODIPINE BESYLATE 5 MG/1
TABLET ORAL
Qty: 30 TAB | Refills: 1 | Status: SHIPPED | OUTPATIENT
Start: 2019-04-12 | End: 2019-08-01 | Stop reason: SDUPTHER

## 2019-05-21 DIAGNOSIS — E11.9 TYPE 2 DIABETES MELLITUS WITHOUT COMPLICATION, WITHOUT LONG-TERM CURRENT USE OF INSULIN (HCC): ICD-10-CM

## 2019-05-21 DIAGNOSIS — I10 ESSENTIAL HYPERTENSION, BENIGN: ICD-10-CM

## 2019-05-21 DIAGNOSIS — E78.2 MIXED HYPERLIPIDEMIA: ICD-10-CM

## 2019-05-21 RX ORDER — LOSARTAN POTASSIUM AND HYDROCHLOROTHIAZIDE 12.5; 1 MG/1; MG/1
TABLET ORAL
Qty: 90 TAB | Refills: 2 | Status: SHIPPED | OUTPATIENT
Start: 2019-05-21 | End: 2019-08-01 | Stop reason: SDUPTHER

## 2019-05-21 NOTE — TELEPHONE ENCOUNTER
Requested Prescriptions     Pending Prescriptions Disp Refills    losartan-hydroCHLOROthiazide (HYZAAR) 100-12.5 mg per tablet 90 Tab 2     Sig: TAKE 1 TABLET BY MOUTH ONCE DAILY

## 2019-06-17 DIAGNOSIS — E78.2 MIXED HYPERLIPIDEMIA: ICD-10-CM

## 2019-06-17 DIAGNOSIS — E11.9 TYPE 2 DIABETES MELLITUS WITHOUT COMPLICATION, WITHOUT LONG-TERM CURRENT USE OF INSULIN (HCC): ICD-10-CM

## 2019-06-17 DIAGNOSIS — I10 ESSENTIAL HYPERTENSION, BENIGN: ICD-10-CM

## 2019-06-17 RX ORDER — LOSARTAN POTASSIUM AND HYDROCHLOROTHIAZIDE 12.5; 1 MG/1; MG/1
TABLET ORAL
Qty: 30 TAB | Refills: 1 | Status: SHIPPED | OUTPATIENT
Start: 2019-06-17 | End: 2019-09-16 | Stop reason: SDUPTHER

## 2019-07-19 DIAGNOSIS — I10 ESSENTIAL HYPERTENSION, BENIGN: ICD-10-CM

## 2019-07-19 DIAGNOSIS — E11.9 TYPE 2 DIABETES MELLITUS WITHOUT COMPLICATION, WITHOUT LONG-TERM CURRENT USE OF INSULIN (HCC): ICD-10-CM

## 2019-07-19 DIAGNOSIS — E78.2 MIXED HYPERLIPIDEMIA: ICD-10-CM

## 2019-07-19 RX ORDER — AMLODIPINE BESYLATE 5 MG/1
TABLET ORAL
Qty: 30 TAB | Refills: 0 | Status: SHIPPED | OUTPATIENT
Start: 2019-07-19 | End: 2019-09-16 | Stop reason: SDUPTHER

## 2019-07-19 RX ORDER — GLIMEPIRIDE 2 MG/1
TABLET ORAL
Qty: 60 TAB | Refills: 2 | Status: SHIPPED | OUTPATIENT
Start: 2019-07-19 | End: 2020-02-19 | Stop reason: SDUPTHER

## 2019-07-29 RX ORDER — METFORMIN HYDROCHLORIDE 750 MG/1
TABLET, EXTENDED RELEASE ORAL
Qty: 60 TAB | Refills: 0 | Status: SHIPPED | OUTPATIENT
Start: 2019-07-29 | End: 2019-08-01 | Stop reason: SDUPTHER

## 2019-07-29 RX ORDER — METFORMIN HYDROCHLORIDE 750 MG/1
TABLET, EXTENDED RELEASE ORAL
Qty: 60 TAB | Refills: 1 | Status: SHIPPED | OUTPATIENT
Start: 2019-07-29 | End: 2019-09-16 | Stop reason: SDUPTHER

## 2019-08-01 ENCOUNTER — OFFICE VISIT (OUTPATIENT)
Dept: FAMILY MEDICINE CLINIC | Facility: CLINIC | Age: 59
End: 2019-08-01

## 2019-08-01 VITALS
BODY MASS INDEX: 31.66 KG/M2 | HEIGHT: 66 IN | HEART RATE: 100 BPM | TEMPERATURE: 98.3 F | OXYGEN SATURATION: 97 % | WEIGHT: 197 LBS | DIASTOLIC BLOOD PRESSURE: 90 MMHG | SYSTOLIC BLOOD PRESSURE: 128 MMHG | RESPIRATION RATE: 18 BRPM

## 2019-08-01 DIAGNOSIS — E78.2 MIXED HYPERLIPIDEMIA: ICD-10-CM

## 2019-08-01 DIAGNOSIS — I10 ESSENTIAL HYPERTENSION, BENIGN: ICD-10-CM

## 2019-08-01 DIAGNOSIS — E11.9 TYPE 2 DIABETES MELLITUS WITHOUT COMPLICATION, WITHOUT LONG-TERM CURRENT USE OF INSULIN (HCC): ICD-10-CM

## 2019-08-01 DIAGNOSIS — S90.411A INFECTED ABRASION OF GREAT TOE OF RIGHT FOOT, INITIAL ENCOUNTER: Primary | ICD-10-CM

## 2019-08-01 DIAGNOSIS — L08.9 INFECTED ABRASION OF GREAT TOE OF RIGHT FOOT, INITIAL ENCOUNTER: Primary | ICD-10-CM

## 2019-08-01 RX ORDER — CEPHALEXIN 500 MG/1
CAPSULE ORAL
Qty: 30 CAP | Refills: 0 | Status: SHIPPED | OUTPATIENT
Start: 2019-08-01 | End: 2019-08-12 | Stop reason: SDUPTHER

## 2019-08-01 RX ORDER — ATORVASTATIN CALCIUM 40 MG/1
TABLET, FILM COATED ORAL
Qty: 90 TAB | Refills: 3 | Status: SHIPPED | OUTPATIENT
Start: 2019-08-01 | End: 2020-09-09 | Stop reason: SDUPTHER

## 2019-08-01 NOTE — PROGRESS NOTES
1. Have you been to the ER, urgent care clinic since your last visit? Hospitalized since your last visit? No    2. Have you seen or consulted any other health care providers outside of the [address] [place] [address] since your last visit? Include any pap smears or colon screening.  No        Chief Complaint   Patient presents with    Toe Injury     Right big toe

## 2019-08-01 NOTE — PROGRESS NOTES
Medication reconciliation completed by pharmacist at MD request - multiple duplicate medications. Sonja Russell was notified of these changes. Allergies   Allergen Reactions    Clindamycin Hives    Sulfa (Sulfonamide Antibiotics) Unable to Obtain    Lisinopril Cough       Medication List Prior to Visit:   Outpatient Medications Prior to Visit   Medication Sig Dispense Refill    metFORMIN ER (GLUCOPHAGE XR) 750 mg tablet TAKE 1 TABLET BY MOUTH TWO TIMES A DAY 60 Tab 1    glimepiride (AMARYL) 2 mg tablet TAKE 1 TABLET BY MOUTH TWO TIMES A DAY 60 Tab 2    amLODIPine (NORVASC) 5 mg tablet TAKE 1 TABLET BY MOUTH ONCE DAILY 30 Tab 0    losartan-hydroCHLOROthiazide (HYZAAR) 100-12.5 mg per tablet TAKE 1 TABLET BY MOUTH ONCE DAILY 30 Tab 1    insulin glargine (LANTUS SOLOSTAR U-100 INSULIN) 100 unit/mL (3 mL) inpn INJECT 40 UNITS UNDER THE SKIN ONCE DAILY 15 Adjustable Dose Pre-filled Pen Syringe 1    fluticasone (FLONASE) 50 mcg/actuation nasal spray USE 2 SPRAYS EACH NOSTRIL TWO TIMES A DAY 1 Bottle 3    loratadine (CLARITIN) 10 mg tablet Take 1 Tab by mouth daily as needed for Allergies. 30 Tab 0    glucose blood VI test strips (TRUE METRIX GLUCOSE TEST STRIP) strip Test Blood Glucose once daily; ICD 10 E11.9, Quantity 100 100 Strip 3    Blood-Glucose Meter (TRUE METRIX AIR GLUCOSE METER) monitoring kit Test Blood Glucose once daily; ICD 10 E11.9 1 Kit 0    Insulin Needles, Disposable, (PEN NEEDLE) 32 gauge x 5/32\" ndle For insulin shots 100 Pen Needle 1    Omeprazole delayed release (PRILOSEC D/R) 20 mg tablet Take 1 Tab by mouth daily.  30 Tab 1    metFORMIN ER (GLUCOPHAGE XR) 750 mg tablet TAKE 1 TABLET BY MOUTH TWO TIMES A DAY 60 Tab 0    losartan-hydroCHLOROthiazide (HYZAAR) 100-12.5 mg per tablet TAKE 1 TABLET BY MOUTH ONCE DAILY 90 Tab 2    amLODIPine (NORVASC) 5 mg tablet TAKE 1 TABLET BY MOUTH ONCE DAILY 30 Tab 1    amLODIPine (NORVASC) 5 mg tablet TAKE 1 TABLET BY MOUTH ONCE DAILY 30 Tab 0    glimepiride (AMARYL) 2 mg tablet Take 1 Tab by mouth two (2) times a day. 120 Tab 3    TRUE METRIX GLUCOSE TEST STRIP strip CHECK BLOOD SUGAR DAILY 50 Strip 1    atorvastatin (LIPITOR) 40 mg tablet 1 tablet daily 90 Tab 3     No facility-administered medications prior to visit. Medications Discontinued / Updated During Visit:   Medications Discontinued During This Encounter   Medication Reason    atorvastatin (LIPITOR) 40 mg tablet Reorder    amLODIPine (NORVASC) 5 mg tablet Duplicate Order    amLODIPine (NORVASC) 5 mg tablet Duplicate Order    glimepiride (AMARYL) 2 mg tablet Duplicate Order    losartan-hydroCHLOROthiazide (HYZAAR) 100-12.5 mg per tablet Duplicate Order    metFORMIN ER (GLUCOPHAGE XR) 664 mg tablet Duplicate Order    TRUE METRIX GLUCOSE TEST STRIP strip Duplicate Order       Updated Medication List   Current Outpatient Medications   Medication Sig    atorvastatin (LIPITOR) 40 mg tablet 1 tablet daily    metFORMIN ER (GLUCOPHAGE XR) 750 mg tablet TAKE 1 TABLET BY MOUTH TWO TIMES A DAY    glimepiride (AMARYL) 2 mg tablet TAKE 1 TABLET BY MOUTH TWO TIMES A DAY    amLODIPine (NORVASC) 5 mg tablet TAKE 1 TABLET BY MOUTH ONCE DAILY    losartan-hydroCHLOROthiazide (HYZAAR) 100-12.5 mg per tablet TAKE 1 TABLET BY MOUTH ONCE DAILY    insulin glargine (LANTUS SOLOSTAR U-100 INSULIN) 100 unit/mL (3 mL) inpn INJECT 40 UNITS UNDER THE SKIN ONCE DAILY    fluticasone (FLONASE) 50 mcg/actuation nasal spray USE 2 SPRAYS EACH NOSTRIL TWO TIMES A DAY    loratadine (CLARITIN) 10 mg tablet Take 1 Tab by mouth daily as needed for Allergies.     glucose blood VI test strips (TRUE METRIX GLUCOSE TEST STRIP) strip Test Blood Glucose once daily; ICD 10 E11.9, Quantity 100    Blood-Glucose Meter (TRUE METRIX AIR GLUCOSE METER) monitoring kit Test Blood Glucose once daily; ICD 10 E11.9    Insulin Needles, Disposable, (PEN NEEDLE) 32 gauge x 5/32\" ndle For insulin shots    Omeprazole delayed release (PRILOSEC D/R) 20 mg tablet Take 1 Tab by mouth daily. No current facility-administered medications for this visit.       Dipti Wolfe, PharmD, BCACP

## 2019-08-02 LAB
ALBUMIN SERPL-MCNC: 4.7 G/DL (ref 3.6–4.8)
ALBUMIN/GLOB SERPL: 1.9 {RATIO} (ref 1.2–2.2)
ALP SERPL-CCNC: 172 IU/L (ref 39–117)
ALT SERPL-CCNC: 23 IU/L (ref 0–32)
AST SERPL-CCNC: 19 IU/L (ref 0–40)
BASOPHILS # BLD AUTO: 0.1 X10E3/UL (ref 0–0.2)
BASOPHILS NFR BLD AUTO: 1 %
BILIRUB SERPL-MCNC: 0.5 MG/DL (ref 0–1.2)
BUN SERPL-MCNC: 19 MG/DL (ref 8–27)
BUN/CREAT SERPL: 21 (ref 12–28)
CALCIUM SERPL-MCNC: 8.9 MG/DL (ref 8.7–10.3)
CHLORIDE SERPL-SCNC: 94 MMOL/L (ref 96–106)
CO2 SERPL-SCNC: 23 MMOL/L (ref 20–29)
CREAT SERPL-MCNC: 0.92 MG/DL (ref 0.57–1)
CRP SERPL-MCNC: 21 MG/L (ref 0–10)
EOSINOPHIL # BLD AUTO: 0.1 X10E3/UL (ref 0–0.4)
EOSINOPHIL NFR BLD AUTO: 1 %
ERYTHROCYTE [DISTWIDTH] IN BLOOD BY AUTOMATED COUNT: 14.4 % (ref 12.3–15.4)
ERYTHROCYTE [SEDIMENTATION RATE] IN BLOOD BY WESTERGREN METHOD: 13 MM/HR (ref 0–40)
EST. AVERAGE GLUCOSE BLD GHB EST-MCNC: 301 MG/DL
GLOBULIN SER CALC-MCNC: 2.5 G/DL (ref 1.5–4.5)
GLUCOSE SERPL-MCNC: 299 MG/DL (ref 65–99)
HBA1C MFR BLD: 12.1 % (ref 4.8–5.6)
HCT VFR BLD AUTO: 39.2 % (ref 34–46.6)
HGB BLD-MCNC: 13.2 G/DL (ref 11.1–15.9)
IMM GRANULOCYTES # BLD AUTO: 0 X10E3/UL (ref 0–0.1)
IMM GRANULOCYTES NFR BLD AUTO: 0 %
LYMPHOCYTES # BLD AUTO: 3 X10E3/UL (ref 0.7–3.1)
LYMPHOCYTES NFR BLD AUTO: 27 %
MCH RBC QN AUTO: 28.5 PG (ref 26.6–33)
MCHC RBC AUTO-ENTMCNC: 33.7 G/DL (ref 31.5–35.7)
MCV RBC AUTO: 85 FL (ref 79–97)
MONOCYTES # BLD AUTO: 0.7 X10E3/UL (ref 0.1–0.9)
MONOCYTES NFR BLD AUTO: 7 %
NEUTROPHILS # BLD AUTO: 7 X10E3/UL (ref 1.4–7)
NEUTROPHILS NFR BLD AUTO: 64 %
PLATELET # BLD AUTO: 278 X10E3/UL (ref 150–450)
POTASSIUM SERPL-SCNC: 3.9 MMOL/L (ref 3.5–5.2)
PROT SERPL-MCNC: 7.2 G/DL (ref 6–8.5)
RBC # BLD AUTO: 4.63 X10E6/UL (ref 3.77–5.28)
SODIUM SERPL-SCNC: 139 MMOL/L (ref 134–144)
WBC # BLD AUTO: 10.8 X10E3/UL (ref 3.4–10.8)

## 2019-08-04 NOTE — PROGRESS NOTES
The patient presents to the office today with the chief complaint of right great toe pain    HPI    The patient \"stubbed\" her right great toe approximately 1 week ago. I breaking the skin occurred at that time. The patient has no increased erythema and slight purulence discharge from [address]. Patient describes continued pain. Patient denies fever. Patient remains on Amaryl and metformin for type 2 diabetes mellitus. Patient remains on Lipitor for hyperlipidemia. The patient is tolerating statin well. ROS  Negative for fever, chest pain, shortness of breath, lower extremity edema    Allergies   Allergen Reactions    Clindamycin Hives    Sulfa (Sulfonamide Antibiotics) Unable to Obtain    Lisinopril Cough       Current Outpatient Medications   Medication Sig Dispense Refill    atorvastatin (LIPITOR) 40 mg tablet 1 tablet daily 90 Tab 3    cephALEXin (KEFLEX) 500 mg capsule 1 cap three times per day 30 Cap 0    metFORMIN ER (GLUCOPHAGE XR) 750 mg tablet TAKE 1 TABLET BY MOUTH TWO TIMES A DAY 60 Tab 1    glimepiride (AMARYL) 2 mg tablet TAKE 1 TABLET BY MOUTH TWO TIMES A DAY 60 Tab 2    amLODIPine (NORVASC) 5 mg tablet TAKE 1 TABLET BY MOUTH ONCE DAILY 30 Tab 0    losartan-hydroCHLOROthiazide (HYZAAR) 100-12.5 mg per tablet TAKE 1 TABLET BY MOUTH ONCE DAILY 30 Tab 1    insulin glargine (LANTUS SOLOSTAR U-100 INSULIN) 100 unit/mL (3 mL) inpn INJECT 40 UNITS UNDER THE SKIN ONCE DAILY 15 Adjustable Dose Pre-filled Pen Syringe 1    fluticasone (FLONASE) 50 mcg/actuation nasal spray USE 2 SPRAYS EACH NOSTRIL TWO TIMES A DAY 1 Bottle 3    loratadine (CLARITIN) 10 mg tablet Take 1 Tab by mouth daily as needed for Allergies.  30 Tab 0    glucose blood VI test strips (TRUE METRIX GLUCOSE TEST STRIP) strip Test Blood Glucose once daily; ICD 10 E11.9, Quantity 100 100 Strip 3    Blood-Glucose Meter (TRUE METRIX AIR GLUCOSE METER) monitoring kit Test Blood Glucose once daily; ICD 10 E11.9 1 Kit 0    Insulin Needles, Disposable, (PEN NEEDLE) 32 gauge x 5/32\" ndle For insulin shots 100 Pen Needle 1    Omeprazole delayed release (PRILOSEC D/R) 20 mg tablet Take 1 Tab by mouth daily.  27 Tab 1       Past Medical History:   Diagnosis Date    Essential hypertension, benign     Mixed hyperlipidemia     Type II or unspecified type diabetes mellitus without mention of complication, not stated as uncontrolled        Past Surgical History:   Procedure Laterality Date    HX HYSTERECTOMY  36s    X2       Social History     Socioeconomic History    Marital status: SINGLE     Spouse name: Not on file    Number of children: Not on file    Years of education: Not on file    Highest education level: Not on file   Occupational History    Not on file   Social Needs    Financial resource strain: Not on file    Food insecurity:     Worry: Not on file     Inability: Not on file    Transportation needs:     Medical: Not on file     Non-medical: Not on file   Tobacco Use    Smoking status: Never Smoker    Smokeless tobacco: Never Used   Substance and Sexual Activity    Alcohol use: No     Alcohol/week: 0.0 standard drinks    Drug use: No    Sexual activity: Not Currently     Partners: Male   Lifestyle    Physical activity:     Days per week: Not on file     Minutes per session: Not on file    Stress: Not on file   Relationships    Social connections:     Talks on phone: Not on file     Gets together: Not on file     Attends [employer] service: Not on file     Active member of club or organization: Not on file     Attends meetings of clubs or organizations: Not on file     Relationship status: Not on file    Intimate partner violence:     Fear of current or ex partner: Not on file     Emotionally abused: Not on file     Physically abused: Not on file     Forced sexual activity: Not on file   Other Topics Concern    Not on file   Social History Narrative    Not on file       Patient does have an advanced directive on file    Visit Vitals  /90 (BP 1 Location: Left arm, BP Patient Position: Sitting)   Pulse 100   Temp 98.3 °F (36.8 °C) (Tympanic)   Resp 18   Ht 5' 6\" (1.676 m)   Wt 197 lb (89.4 kg)   SpO2 97%   BMI 31.80 kg/m²       Physical Exam  Lungs are clear to station percussion  Cardiac exam: S1-S2 normal, no gallops, no murmurs  Carotids: No carotid bruits  Right great toe with erythema at the base of the nail extending proximally for 1 cm there is a whitish dried discharge that the base of the nail      Office Visit on 08/01/2019   Component Date Value Ref Range Status    Sed rate (ESR) 08/01/2019 13  0 - 40 mm/hr Final    C-Reactive Protein, Qt 08/01/2019 21* 0 - 10 mg/L Final    Hemoglobin A1c 08/01/2019 12.1* 4.8 - 5.6 % Final    Comment:          Prediabetes: 5.7 - 6.4           Diabetes: >6.4           Glycemic control for adults with diabetes: <7.0      Estimated average glucose 08/01/2019 301  mg/dL Final    Glucose 08/01/2019 299* 65 - 99 mg/dL Final    Comment: Specimen received in contact with cells. No visible hemolysis  present. However GLUC may be decreased and K increased. Clinical  correlation indicated.  BUN 08/01/2019 19  8 - 27 mg/dL Final    Creatinine 08/01/2019 0.92  0.57 - 1.00 mg/dL Final    GFR est non-AA 08/01/2019 68  >59 mL/min/1.73 Final    GFR est AA 08/01/2019 78  >59 mL/min/1.73 Final    BUN/Creatinine ratio 08/01/2019 21  12 - 28 Final    Sodium 08/01/2019 139  134 - 144 mmol/L Final    Potassium 08/01/2019 3.9  3.5 - 5.2 mmol/L Final    Comment: Specimen received in contact with cells. No visible hemolysis  present. However GLUC may be decreased and K increased. Clinical  correlation indicated.       Chloride 08/01/2019 94* 96 - 106 mmol/L Final    CO2 08/01/2019 23  20 - 29 mmol/L Final    Calcium 08/01/2019 8.9  8.7 - 10.3 mg/dL Final    Protein, total 08/01/2019 7.2  6.0 - 8.5 g/dL Final    Albumin 08/01/2019 4.7  3.6 - 4.8 g/dL Final    GLOBULIN, TOTAL 08/01/2019 2.5  1.5 - 4.5 g/dL Final    A-G Ratio 08/01/2019 1.9  1.2 - 2.2 Final    Bilirubin, total 08/01/2019 0.5  0.0 - 1.2 mg/dL Final    Alk. phosphatase 08/01/2019 172* 39 - 117 IU/L Final    AST (SGOT) 08/01/2019 19  0 - 40 IU/L Final    ALT (SGPT) 08/01/2019 23  0 - 32 IU/L Final    WBC 08/01/2019 10.8  3.4 - 10.8 x10E3/uL Final    RBC 08/01/2019 4.63  3.77 - 5.28 x10E6/uL Final    HGB 08/01/2019 13.2  11.1 - 15.9 g/dL Final    HCT 08/01/2019 39.2  34.0 - 46.6 % Final    MCV 08/01/2019 85  79 - 97 fL Final    MCH 08/01/2019 28.5  26.6 - 33.0 pg Final    MCHC 08/01/2019 33.7  31.5 - 35.7 g/dL Final    RDW 08/01/2019 14.4  12.3 - 15.4 % Final    PLATELET 88/39/9846 379  150 - 450 x10E3/uL Final    NEUTROPHILS 08/01/2019 64  Not Estab. % Final    Lymphocytes 08/01/2019 27  Not Estab. % Final    MONOCYTES 08/01/2019 7  Not Estab. % Final    EOSINOPHILS 08/01/2019 1  Not Estab. % Final    BASOPHILS 08/01/2019 1  Not Estab. % Final    ABS. NEUTROPHILS 08/01/2019 7.0  1.4 - 7.0 x10E3/uL Final    Abs Lymphocytes 08/01/2019 3.0  0.7 - 3.1 x10E3/uL Final    ABS. MONOCYTES 08/01/2019 0.7  0.1 - 0.9 x10E3/uL Final    ABS. EOSINOPHILS 08/01/2019 0.1  0.0 - 0.4 x10E3/uL Final    ABS. BASOPHILS 08/01/2019 0.1  0.0 - 0.2 x10E3/uL Final    IMMATURE GRANULOCYTES 08/01/2019 0  Not Estab. % Final    ABS. IMM.  GRANS. 08/01/2019 0.0  0.0 - 0.1 x10E3/uL Final       .  Results for orders placed or performed in visit on 08/01/19   SED RATE (ESR)   Result Value Ref Range    Sed rate (ESR) 13 0 - 40 mm/hr    Narrative    Performed at:  [address]  [id number]  : Porsha Faye MD, Phone:  [phone]   C REACTIVE PROTEIN, QT   Result Value Ref Range    C-Reactive Protein, Qt 21 (H) 0 - 10 mg/L    Narrative    Performed at:  [address] [place] [address]  [id number]  : Porsha Faye MD, Phone:  [phone] HEMOGLOBIN A1C WITH EAG   Result Value Ref Range    Hemoglobin A1c 12.1 (H) 4.8 - 5.6 %    Estimated average glucose 301 mg/dL    Narrative    Performed at:  [address]  [id number]  : Rubin Aceves MD, Phone:  [phone]   METABOLIC PANEL, COMPREHENSIVE   Result Value Ref Range    Glucose 299 (H) 65 - 99 mg/dL    BUN 19 8 - 27 mg/dL    Creatinine 0.92 0.57 - 1.00 mg/dL    GFR est non-AA 68 >59 mL/min/1.73    GFR est AA 78 >59 mL/min/1.73    BUN/Creatinine ratio 21 12 - 28    Sodium 139 134 - 144 mmol/L    Potassium 3.9 3.5 - 5.2 mmol/L    Chloride 94 (L) 96 - 106 mmol/L    CO2 23 20 - 29 mmol/L    Calcium 8.9 8.7 - 10.3 mg/dL    Protein, total 7.2 6.0 - 8.5 g/dL    Albumin 4.7 3.6 - 4.8 g/dL    GLOBULIN, TOTAL 2.5 1.5 - 4.5 g/dL    A-G Ratio 1.9 1.2 - 2.2    Bilirubin, total 0.5 0.0 - 1.2 mg/dL    Alk. phosphatase 172 (H) 39 - 117 IU/L    AST (SGOT) 19 0 - 40 IU/L    ALT (SGPT) 23 0 - 32 IU/L    Narrative    Performed at:  [address]  [id number]  : Rubin Aceves MD, Phone:  [phone]   CBC WITH AUTOMATED DIFF   Result Value Ref Range    WBC 10.8 3.4 - 10.8 x10E3/uL    RBC 4.63 3.77 - 5.28 x10E6/uL    HGB 13.2 11.1 - 15.9 g/dL    HCT 39.2 34.0 - 46.6 %    MCV 85 79 - 97 fL    MCH 28.5 26.6 - 33.0 pg    MCHC 33.7 31.5 - 35.7 g/dL    RDW 14.4 12.3 - 15.4 %    PLATELET 060 632 - 199 x10E3/uL    NEUTROPHILS 64 Not Estab. %    Lymphocytes 27 Not Estab. %    MONOCYTES 7 Not Estab. %    EOSINOPHILS 1 Not Estab. %    BASOPHILS 1 Not Estab. %    ABS. NEUTROPHILS 7.0 1.4 - 7.0 x10E3/uL    Abs Lymphocytes 3.0 0.7 - 3.1 x10E3/uL    ABS. MONOCYTES 0.7 0.1 - 0.9 x10E3/uL    ABS. EOSINOPHILS 0.1 0.0 - 0.4 x10E3/uL    ABS. BASOPHILS 0.1 0.0 - 0.2 x10E3/uL    IMMATURE GRANULOCYTES 0 Not Estab. %    ABS. IMM.  GRANS. 0.0 0.0 - 0.1 x10E3/uL    Narrative    Performed at:  [place] [address], [place], [place]  [id number]  : Violette Galvez MD, Phone:  [phone]       Assessment / Plan      ICD-10-CM ICD-9-CM    1. Infected abrasion of great toe of right foot, initial encounter S90.411A 917.1 CBC WITH AUTOMATED DIFF    L08.9  C REACTIVE PROTEIN, QT      SED RATE (ESR)      SED RATE (ESR)      C REACTIVE PROTEIN, QT      CBC WITH AUTOMATED DIFF   2. Type 2 diabetes mellitus without complication, without long-term current use of insulin (HCC) E11.9 250.00 atorvastatin (LIPITOR) 40 mg tablet      CBC WITH AUTOMATED DIFF      METABOLIC PANEL, COMPREHENSIVE      HEMOGLOBIN A1C WITH EAG      HEMOGLOBIN A1C WITH EAG      METABOLIC PANEL, COMPREHENSIVE      CBC WITH AUTOMATED DIFF   3. Mixed hyperlipidemia E78.2 272.2 atorvastatin (LIPITOR) 40 mg tablet   4. Essential hypertension, benign I10 401.1 atorvastatin (LIPITOR) 40 mg tablet      CBC WITH AUTOMATED DIFF      METABOLIC PANEL, COMPREHENSIVE      HEMOGLOBIN A1C WITH EAG      HEMOGLOBIN A1C WITH EAG      METABOLIC PANEL, COMPREHENSIVE      CBC WITH AUTOMATED DIFF       I advised moist heat to the great toe  Keflex  she was advised to continue her maintenance medications  Recheck in 1 week. Patient is to call if your redness was worse with        Follow-up and Dispositions    · Return in about 1 week (around 8/8/2019). I asked Shannan Jocelyn if she has any questions and I answered the questions. Shannan Banks states that she understands the treatment plan and agrees with the treatment plan          THIS DOCUMENT WAS CREATED WITH A VOICE ACTIVATED DICTATION SYSTEM.   IT MAY CONTAIN TRANSCRIPTION ERRORS

## 2019-08-12 ENCOUNTER — OFFICE VISIT (OUTPATIENT)
Dept: FAMILY MEDICINE CLINIC | Facility: CLINIC | Age: 59
End: 2019-08-12

## 2019-08-12 VITALS
SYSTOLIC BLOOD PRESSURE: 150 MMHG | WEIGHT: 190.8 LBS | HEIGHT: 66 IN | DIASTOLIC BLOOD PRESSURE: 88 MMHG | TEMPERATURE: 98.5 F | BODY MASS INDEX: 30.67 KG/M2 | OXYGEN SATURATION: 97 % | HEART RATE: 101 BPM | RESPIRATION RATE: 12 BRPM

## 2019-08-12 DIAGNOSIS — E11.9 CONTROLLED TYPE 2 DIABETES MELLITUS WITHOUT COMPLICATION, WITH LONG-TERM CURRENT USE OF INSULIN (HCC): ICD-10-CM

## 2019-08-12 DIAGNOSIS — Z79.4 CONTROLLED TYPE 2 DIABETES MELLITUS WITHOUT COMPLICATION, WITH LONG-TERM CURRENT USE OF INSULIN (HCC): ICD-10-CM

## 2019-08-12 DIAGNOSIS — L08.9: Primary | ICD-10-CM

## 2019-08-12 DIAGNOSIS — S90.412D: Primary | ICD-10-CM

## 2019-08-12 RX ORDER — INSULIN GLARGINE 100 [IU]/ML
INJECTION, SOLUTION SUBCUTANEOUS
Qty: 15 ADJUSTABLE DOSE PRE-FILLED PEN SYRINGE | Refills: 1
Start: 2019-08-12 | End: 2021-04-27 | Stop reason: SDUPTHER

## 2019-08-12 RX ORDER — CEPHALEXIN 500 MG/1
CAPSULE ORAL
Qty: 15 CAP | Refills: 0 | Status: SHIPPED | OUTPATIENT
Start: 2019-08-12 | End: 2019-11-04 | Stop reason: ALTCHOICE

## 2019-08-12 NOTE — PROGRESS NOTES
Keira Landry presents today for   Chief Complaint   Patient presents with    Toe Injury     infection       Keira Gris preferred language for health care discussion is english. Is someone accompanying this pt? no    Is the patient using any DME equipment during [address]? no    Depression Screening:  3 most recent PHQ Screens 8/12/2019   Little interest or pleasure in doing things Not at all   Feeling down, depressed, irritable, or hopeless Not at all   Total Score PHQ 2 0       Learning Assessment:  Learning Assessment 10/2/2018   PRIMARY LEARNER Patient   HIGHEST LEVEL OF EDUCATION - PRIMARY LEARNER  DID NOT GRADUATE HIGH SCHOOL   BARRIERS PRIMARY LEARNER NONE   CO-LEARNER CAREGIVER No   PRIMARY LANGUAGE ENGLISH   LEARNER PREFERENCE PRIMARY DEMONSTRATION   ANSWERED BY patient   RELATIONSHIP SELF       Abuse Screening:  Abuse Screening Questionnaire 10/2/2018   Do you ever feel afraid of your partner? N   Are you in a relationship with someone who physically or mentally threatens you? N   Is it safe for you to go home? Y       Health Maintenance reviewed and discussed and ordered per Provider. Health Maintenance Due   Topic Date Due    Pneumococcal 0-64 years (1 of 1 - PPSV23) 12/16/1964    EYE EXAM RETINAL OR DILATED  12/16/1968    DTaP/Tdap/Td series (1 - Tdap) 12/16/1979    Shingrix Vaccine Age 50> (1 of 2) 12/16/2008    BREAST CANCER SCRN MAMMOGRAM  12/16/2008    FOBT Q 1 YEAR AGE 50-75  12/16/2008    MICROALBUMIN Q1  11/27/2018    LIPID PANEL Q1  02/20/2019    Influenza Age 9 to Adult  08/01/2019   . Keira Landry is updated on all     Pt currently taking Antiplatelet therapy? no    Coordination of Care:  1. Have you been to the ER, urgent care clinic since your last visit? no  Hospitalized since your last visit? no    2.  Have you seen or consulted any other health care providers outside of the [address] [place] [address] since your last visit? no Include any pap smears or colon screening.  no

## 2019-08-13 RX ORDER — INSULIN GLARGINE 100 [IU]/ML
INJECTION, SOLUTION SUBCUTANEOUS
Qty: 15 ADJUSTABLE DOSE PRE-FILLED PEN SYRINGE | Refills: 1 | Status: SHIPPED | OUTPATIENT
Start: 2019-08-13 | End: 2022-09-08

## 2019-08-14 NOTE — PROGRESS NOTES
The patient presents to the office today with the chief complaint of infected abrasions of her toe. HPI    The patient has infected abrasions of the great toe on her left foot. She is on Keflex. The great toe has greatly improved but the patient now has mild erythema of the dorsum of the fourth toe. The patient denies fever. The patient remains on long acting insulin, Amaryl, and Metformin XR for type II diabetes mellitus. Her sugars are doing well. Review of Systems   Constitutional: Negative for fever. Respiratory: Negative for shortness of breath. Cardiovascular: Negative for chest pain and leg swelling.          Allergies   Allergen Reactions    Clindamycin Hives    Sulfa (Sulfonamide Antibiotics) Unable to Obtain    Lisinopril Cough       Current Outpatient Medications   Medication Sig Dispense Refill    insulin glargine (LANTUS SOLOSTAR U-100 INSULIN) 100 unit/mL (3 mL) inpn INJECT 40 UNITS UNDER THE SKIN ONCE DAILY ( 37 DAYS ) 15 Adjustable Dose Pre-filled Pen Syringe 1    cephALEXin (KEFLEX) 500 mg capsule 1 cap three times per day 15 Cap 0    insulin glargine (LANTUS SOLOSTAR U-100 INSULIN) 100 unit/mL (3 mL) inpn INJECT 50 UNITS UNDER THE SKIN ONCE DAILY 15 Adjustable Dose Pre-filled Pen Syringe 1    atorvastatin (LIPITOR) 40 mg tablet 1 tablet daily 90 Tab 3    metFORMIN ER (GLUCOPHAGE XR) 750 mg tablet TAKE 1 TABLET BY MOUTH TWO TIMES A DAY 60 Tab 1    glimepiride (AMARYL) 2 mg tablet TAKE 1 TABLET BY MOUTH TWO TIMES A DAY 60 Tab 2    amLODIPine (NORVASC) 5 mg tablet TAKE 1 TABLET BY MOUTH ONCE DAILY 30 Tab 0    losartan-hydroCHLOROthiazide (HYZAAR) 100-12.5 mg per tablet TAKE 1 TABLET BY MOUTH ONCE DAILY 30 Tab 1    glucose blood VI test strips (TRUE METRIX GLUCOSE TEST STRIP) strip Test Blood Glucose once daily; ICD 10 E11.9, Quantity 100 100 Strip 3    Blood-Glucose Meter (TRUE METRIX AIR GLUCOSE METER) monitoring kit Test Blood Glucose once daily; ICD 10 E11.9 1 Kit 0    Insulin Needles, Disposable, (PEN NEEDLE) 32 gauge x 5/32\" ndle For insulin shots 100 Pen Needle 1       Past Medical History:   Diagnosis Date    Essential hypertension, benign     Mixed hyperlipidemia     Type II or unspecified type diabetes mellitus without mention of complication, not stated as uncontrolled        Past Surgical History:   Procedure Laterality Date    HX HYSTERECTOMY  36s    X2       Social History     Socioeconomic History    Marital status: SINGLE     Spouse name: Not on file    Number of children: Not on file    Years of education: Not on file    Highest education level: Not on file   Occupational History    Not on file   Social Needs    Financial resource strain: Not on file    Food insecurity:     Worry: Not on file     Inability: Not on file    Transportation needs:     Medical: Not on file     Non-medical: Not on file   Tobacco Use    Smoking status: Never Smoker    Smokeless tobacco: Never Used   Substance and Sexual Activity    Alcohol use: No     Alcohol/week: 0.0 standard drinks    Drug use: No    Sexual activity: Not Currently     Partners: Male   Lifestyle    Physical activity:     Days per week: Not on file     Minutes per session: Not on file    Stress: Not on file   Relationships    Social connections:     Talks on phone: Not on file     Gets together: Not on file     Attends [employer] service: Not on file     Active member of club or organization: Not on file     Attends meetings of clubs or organizations: Not on file     Relationship status: Not on file    Intimate partner violence:     Fear of current or ex partner: Not on file     Emotionally abused: Not on file     Physically abused: Not on file     Forced sexual activity: Not on file   Other Topics Concern    Not on file   Social History Narrative    Not on file       Patient does have an advanced directive on file    Visit Vitals  /88 (BP 1 Location: Right arm, BP Patient Position: Sitting)   Pulse (!) 101   Temp 98.5 °F (36.9 °C) (Tympanic)   Resp 12   Ht 5' 6\" (1.676 m)   Wt 190 lb 12.8 oz (86.5 kg)   SpO2 97%   BMI 30.80 kg/m²       Physical Exam   Musculoskeletal:   Small area of faint erythema over the DIP joint of the first toe on the left. Small area of faint erythema over the dorsum of the fourth toe         BMI:  The patient was advised to limit calories to 1800 cecelia and carbohydrates to 100 grams daily      Office Visit on 08/01/2019   Component Date Value Ref Range Status    Sed rate (ESR) 08/01/2019 13  0 - 40 mm/hr Final    C-Reactive Protein, Qt 08/01/2019 21* 0 - 10 mg/L Final    Hemoglobin A1c 08/01/2019 12.1* 4.8 - 5.6 % Final    Comment:          Prediabetes: 5.7 - 6.4           Diabetes: >6.4           Glycemic control for adults with diabetes: <7.0      Estimated average glucose 08/01/2019 301  mg/dL Final    Glucose 08/01/2019 299* 65 - 99 mg/dL Final    Comment: Specimen received in contact with cells. No visible hemolysis  present. However GLUC may be decreased and K increased. Clinical  correlation indicated.  BUN 08/01/2019 19  8 - 27 mg/dL Final    Creatinine 08/01/2019 0.92  0.57 - 1.00 mg/dL Final    GFR est non-AA 08/01/2019 68  >59 mL/min/1.73 Final    GFR est AA 08/01/2019 78  >59 mL/min/1.73 Final    BUN/Creatinine ratio 08/01/2019 21  12 - 28 Final    Sodium 08/01/2019 139  134 - 144 mmol/L Final    Potassium 08/01/2019 3.9  3.5 - 5.2 mmol/L Final    Comment: Specimen received in contact with cells. No visible hemolysis  present. However GLUC may be decreased and K increased. Clinical  correlation indicated.       Chloride 08/01/2019 94* 96 - 106 mmol/L Final    CO2 08/01/2019 23  20 - 29 mmol/L Final    Calcium 08/01/2019 8.9  8.7 - 10.3 mg/dL Final    Protein, total 08/01/2019 7.2  6.0 - 8.5 g/dL Final    Albumin 08/01/2019 4.7  3.6 - 4.8 g/dL Final    GLOBULIN, TOTAL 08/01/2019 2.5  1.5 - 4.5 g/dL Final    A-G Ratio 08/01/2019 1.9  1.2 - 2.2 Final    Bilirubin, total 08/01/2019 0.5  0.0 - 1.2 mg/dL Final    Alk. phosphatase 08/01/2019 172* 39 - 117 IU/L Final    AST (SGOT) 08/01/2019 19  0 - 40 IU/L Final    ALT (SGPT) 08/01/2019 23  0 - 32 IU/L Final    WBC 08/01/2019 10.8  3.4 - 10.8 x10E3/uL Final    RBC 08/01/2019 4.63  3.77 - 5.28 x10E6/uL Final    HGB 08/01/2019 13.2  11.1 - 15.9 g/dL Final    HCT 08/01/2019 39.2  34.0 - 46.6 % Final    MCV 08/01/2019 85  79 - 97 fL Final    MCH 08/01/2019 28.5  26.6 - 33.0 pg Final    MCHC 08/01/2019 33.7  31.5 - 35.7 g/dL Final    RDW 08/01/2019 14.4  12.3 - 15.4 % Final    PLATELET 50/15/1032 484  150 - 450 x10E3/uL Final    NEUTROPHILS 08/01/2019 64  Not Estab. % Final    Lymphocytes 08/01/2019 27  Not Estab. % Final    MONOCYTES 08/01/2019 7  Not Estab. % Final    EOSINOPHILS 08/01/2019 1  Not Estab. % Final    BASOPHILS 08/01/2019 1  Not Estab. % Final    ABS. NEUTROPHILS 08/01/2019 7.0  1.4 - 7.0 x10E3/uL Final    Abs Lymphocytes 08/01/2019 3.0  0.7 - 3.1 x10E3/uL Final    ABS. MONOCYTES 08/01/2019 0.7  0.1 - 0.9 x10E3/uL Final    ABS. EOSINOPHILS 08/01/2019 0.1  0.0 - 0.4 x10E3/uL Final    ABS. BASOPHILS 08/01/2019 0.1  0.0 - 0.2 x10E3/uL Final    IMMATURE GRANULOCYTES 08/01/2019 0  Not Estab. % Final    ABS. IMM. GRANS. 08/01/2019 0.0  0.0 - 0.1 x10E3/uL Final       .No results found for any visits on 08/12/19. Assessment / Plan      ICD-10-CM ICD-9-CM    1. Infected abrasion of great toe of left foot, subsequent encounter S90.412D V58.89     L08.9 917.1    2. Controlled type 2 diabetes mellitus without complication, with long-term current use of insulin (HCC) E11.9 250.00     Z79.4 V58.67        Keflex for five more days and follow the response  she was advised to continue her maintenance medications      Follow-up and Dispositions    · Return in about 2 months (around 10/12/2019).          I asked Gaudencio Larson if she has any questions and I answered the questions. Matiasnatalietyler  states that she understands the treatment plan and agrees with the treatment plan          THIS DOCUMENT WAS CREATED WITH A VOICE ACTIVATED DICTATION SYSTEM.   IT MAY CONTAIN TRANSCRIPTION ERRORS

## 2019-09-13 NOTE — TELEPHONE ENCOUNTER
Prior Auth request received from Pharmacy for Omeprazole. PA initiated over the phone. Awaiting response. Switch to crestor due to myalgias on lipitor

## 2019-09-16 DIAGNOSIS — I10 ESSENTIAL HYPERTENSION, BENIGN: ICD-10-CM

## 2019-09-16 DIAGNOSIS — E78.2 MIXED HYPERLIPIDEMIA: ICD-10-CM

## 2019-09-16 DIAGNOSIS — E11.9 TYPE 2 DIABETES MELLITUS WITHOUT COMPLICATION, WITHOUT LONG-TERM CURRENT USE OF INSULIN (HCC): ICD-10-CM

## 2019-09-16 NOTE — TELEPHONE ENCOUNTER
Requested Prescriptions     Pending Prescriptions Disp Refills    losartan-hydroCHLOROthiazide (HYZAAR) 100-12.5 mg per tablet 30 Tab 1    metFORMIN ER (GLUCOPHAGE XR) 750 mg tablet 60 Tab 1     Sig: TAKE 1 TABLET BY MOUTH TWO TIMES A DAY    amLODIPine (NORVASC) 5 mg tablet 30 Tab 0

## 2019-09-17 RX ORDER — LOSARTAN POTASSIUM AND HYDROCHLOROTHIAZIDE 12.5; 1 MG/1; MG/1
TABLET ORAL
Qty: 30 TAB | Refills: 1 | Status: SHIPPED | OUTPATIENT
Start: 2019-09-17 | End: 2019-11-14 | Stop reason: SDUPTHER

## 2019-09-17 RX ORDER — AMLODIPINE BESYLATE 5 MG/1
TABLET ORAL
Qty: 30 TAB | Refills: 0 | Status: SHIPPED | OUTPATIENT
Start: 2019-09-17 | End: 2019-12-26 | Stop reason: SDUPTHER

## 2019-09-17 RX ORDER — METFORMIN HYDROCHLORIDE 750 MG/1
TABLET, EXTENDED RELEASE ORAL
Qty: 60 TAB | Refills: 1 | Status: SHIPPED | OUTPATIENT
Start: 2019-09-17 | End: 2019-12-23

## 2019-09-19 RX ORDER — METFORMIN HYDROCHLORIDE 750 MG/1
TABLET, EXTENDED RELEASE ORAL
Qty: 60 TAB | Refills: 1 | Status: SHIPPED | OUTPATIENT
Start: 2019-09-19 | End: 2019-11-04 | Stop reason: SDUPTHER

## 2019-09-22 DIAGNOSIS — I10 ESSENTIAL HYPERTENSION, BENIGN: ICD-10-CM

## 2019-09-22 RX ORDER — AMLODIPINE BESYLATE 5 MG/1
TABLET ORAL
Qty: 30 TAB | Refills: 0 | Status: SHIPPED | OUTPATIENT
Start: 2019-09-22 | End: 2019-11-04 | Stop reason: SDUPTHER

## 2019-10-21 ENCOUNTER — TELEPHONE (OUTPATIENT)
Dept: FAMILY MEDICINE CLINIC | Facility: CLINIC | Age: 59
End: 2019-10-21

## 2019-10-21 DIAGNOSIS — M79.672 LEFT FOOT PAIN: Primary | ICD-10-CM

## 2019-10-21 DIAGNOSIS — M25.572 ACUTE LEFT ANKLE PAIN: ICD-10-CM

## 2019-10-22 ENCOUNTER — HOSPITAL ENCOUNTER (OUTPATIENT)
Dept: GENERAL RADIOLOGY | Age: 59
Discharge: HOME | End: 2019-10-22
Payer: MEDICAID

## 2019-10-22 DIAGNOSIS — M79.672 LEFT FOOT PAIN: ICD-10-CM

## 2019-10-22 DIAGNOSIS — M25.572 ACUTE LEFT ANKLE PAIN: ICD-10-CM

## 2019-10-22 PROCEDURE — 73630 X-RAY EXAM OF FOOT: CPT

## 2019-10-22 PROCEDURE — 73610 X-RAY EXAM OF ANKLE: CPT

## 2019-11-04 ENCOUNTER — OFFICE VISIT (OUTPATIENT)
Dept: FAMILY MEDICINE CLINIC | Facility: CLINIC | Age: 59
End: 2019-11-04

## 2019-11-04 VITALS
SYSTOLIC BLOOD PRESSURE: 140 MMHG | BODY MASS INDEX: 30.05 KG/M2 | OXYGEN SATURATION: 98 % | TEMPERATURE: 99.1 F | HEIGHT: 66 IN | HEART RATE: 104 BPM | RESPIRATION RATE: 12 BRPM | WEIGHT: 187 LBS | DIASTOLIC BLOOD PRESSURE: 88 MMHG

## 2019-11-04 DIAGNOSIS — Z79.4 CONTROLLED TYPE 2 DIABETES MELLITUS WITHOUT COMPLICATION, WITH LONG-TERM CURRENT USE OF INSULIN (HCC): ICD-10-CM

## 2019-11-04 DIAGNOSIS — E11.9 CONTROLLED TYPE 2 DIABETES MELLITUS WITHOUT COMPLICATION, WITH LONG-TERM CURRENT USE OF INSULIN (HCC): ICD-10-CM

## 2019-11-04 DIAGNOSIS — M25.572 ACUTE LEFT ANKLE PAIN: Primary | ICD-10-CM

## 2019-11-04 DIAGNOSIS — I10 ESSENTIAL HYPERTENSION, BENIGN: ICD-10-CM

## 2019-11-04 RX ORDER — TRAMADOL HYDROCHLORIDE 50 MG/1
100 TABLET ORAL
Qty: 82 TAB | Refills: 0 | Status: SHIPPED | OUTPATIENT
Start: 2019-11-04 | End: 2019-11-11

## 2019-11-04 NOTE — PROGRESS NOTES
Yoan Nash presents today for   Chief Complaint   Patient presents with    Referral Follow Up     x ray       Yoan Nash preferred language for health care discussion is english. Is someone accompanying this pt? no    Is the patient using any DME equipment during [address]? no    Depression Screening:  3 most recent PHQ Screens 8/12/2019   Little interest or pleasure in doing things Not at all   Feeling down, depressed, irritable, or hopeless Not at all   Total Score PHQ 2 0       Learning Assessment:  Learning Assessment 10/2/2018   PRIMARY LEARNER Patient   HIGHEST LEVEL OF EDUCATION - PRIMARY LEARNER  DID NOT GRADUATE HIGH SCHOOL   BARRIERS PRIMARY LEARNER NONE   CO-LEARNER CAREGIVER No   PRIMARY LANGUAGE ENGLISH   LEARNER PREFERENCE PRIMARY DEMONSTRATION   ANSWERED BY patient   RELATIONSHIP SELF       Abuse Screening:  Abuse Screening Questionnaire 10/2/2018   Do you ever feel afraid of your partner? N   Are you in a relationship with someone who physically or mentally threatens you? N   Is it safe for you to go home? Y       Health Maintenance reviewed and discussed and ordered per Provider. Health Maintenance Due   Topic Date Due    Pneumococcal 0-64 years (1 of 1 - PPSV23) 12/16/1964    EYE EXAM RETINAL OR DILATED  12/16/1968    DTaP/Tdap/Td series (1 - Tdap) 12/16/1979    Shingrix Vaccine Age 50> (1 of 2) 12/16/2008    BREAST CANCER SCRN MAMMOGRAM  12/16/2008    FOBT Q 1 YEAR AGE 50-75  12/16/2008    MICROALBUMIN Q1  11/27/2018    LIPID PANEL Q1  02/20/2019    Influenza Age 9 to Adult  08/01/2019    FOOT EXAM Q1  10/17/2019   . Yoan Nash is updated on all     Pt currently taking Antiplatelet therapy? no    Coordination of Care:  1. Have you been to the ER, urgent care clinic since your last visit? no  Hospitalized since your last visit? no    2.  Have you seen or consulted any other health care providers outside of the [address] [place] [address] since your last visit? no Include any pap smears or colon screening.  no

## 2019-11-07 NOTE — PROGRESS NOTES
The patient presents to the office today with the chief complaint of left ankle pain    HPI    The patient persists with pain in her left foot. Xrays revealed a possible osteochondrial lesion on her medial left talus. The patient remains on Metformin, Amaryl and Lantus for type II diabetes mellitus. Her sugars have been running on the high side. The patient remains on Norvasc and Hyzaar for hypertension. Her BPs have been doing ok. Review of Systems   Respiratory: Negative for shortness of breath. Cardiovascular: Negative for chest pain and leg swelling. Musculoskeletal: Positive for joint pain (Left foot pain). Allergies   Allergen Reactions    Clindamycin Hives    Sulfa (Sulfonamide Antibiotics) Unable to Obtain    Lisinopril Cough       Current Outpatient Medications   Medication Sig Dispense Refill    traMADol (ULTRAM) 50 mg tablet Take 2 Tabs by mouth every six (6) hours as needed for Pain for up to 7 days.  Max Daily Amount: 400 mg. 82 Tab 0    losartan-hydroCHLOROthiazide (HYZAAR) 100-12.5 mg per tablet 1 tab daily 30 Tab 1    metFORMIN ER (GLUCOPHAGE XR) 750 mg tablet TAKE 1 TABLET BY MOUTH TWO TIMES A DAY 60 Tab 1    amLODIPine (NORVASC) 5 mg tablet TAKE 1 TABLET BY MOUTH ONCE DAILY 30 Tab 0    insulin glargine (LANTUS SOLOSTAR U-100 INSULIN) 100 unit/mL (3 mL) inpn INJECT 40 UNITS UNDER THE SKIN ONCE DAILY ( 37 DAYS ) 15 Adjustable Dose Pre-filled Pen Syringe 1    insulin glargine (LANTUS SOLOSTAR U-100 INSULIN) 100 unit/mL (3 mL) inpn INJECT 50 UNITS UNDER THE SKIN ONCE DAILY 15 Adjustable Dose Pre-filled Pen Syringe 1    atorvastatin (LIPITOR) 40 mg tablet 1 tablet daily 90 Tab 3    glimepiride (AMARYL) 2 mg tablet TAKE 1 TABLET BY MOUTH TWO TIMES A DAY 60 Tab 2    glucose blood VI test strips (TRUE METRIX GLUCOSE TEST STRIP) strip Test Blood Glucose once daily; ICD 10 E11.9, Quantity 100 100 Strip 3    Blood-Glucose Meter (TRUE METRIX AIR GLUCOSE METER) monitoring kit Test Blood Glucose once daily; ICD 10 E11.9 1 Kit 0    Insulin Needles, Disposable, (PEN NEEDLE) 32 gauge x 5/32\" ndle For insulin shots 100 Pen Needle 1       Past Medical History:   Diagnosis Date    Essential hypertension, benign     Mixed hyperlipidemia     Type II or unspecified type diabetes mellitus without mention of complication, not stated as uncontrolled        Past Surgical History:   Procedure Laterality Date    HX HYSTERECTOMY  36s    X2       Social History     Socioeconomic History    Marital status: SINGLE     Spouse name: Not on file    Number of children: Not on file    Years of education: Not on file    Highest education level: Not on file   Occupational History    Not on file   Social Needs    Financial resource strain: Not on file    Food insecurity:     Worry: Not on file     Inability: Not on file    Transportation needs:     Medical: Not on file     Non-medical: Not on file   Tobacco Use    Smoking status: Never Smoker    Smokeless tobacco: Never Used   Substance and Sexual Activity    Alcohol use: No     Alcohol/week: 0.0 standard drinks    Drug use: No    Sexual activity: Not Currently     Partners: Male   Lifestyle    Physical activity:     Days per week: Not on file     Minutes per session: Not on file    Stress: Not on file   Relationships    Social connections:     Talks on phone: Not on file     Gets together: Not on file     Attends [employer] service: Not on file     Active member of club or organization: Not on file     Attends meetings of clubs or organizations: Not on file     Relationship status: Not on file    Intimate partner violence:     Fear of current or ex partner: Not on file     Emotionally abused: Not on file     Physically abused: Not on file     Forced sexual activity: Not on file   Other Topics Concern    Not on file   Social History Narrative    Not on file       Patient does have an advanced directive on file    Visit Vitals  /88 (BP 1 Location: Left arm, BP Patient Position: Sitting)   Pulse (!) 104   Temp 99.1 °F (37.3 °C) (Tympanic)   Resp 12   Ht 5' 6\" (1.676 m)   Wt 187 lb (84.8 kg)   SpO2 98%   BMI 30.18 kg/m²       Physical Exam   Cardiovascular: Normal rate and regular rhythm. Exam reveals no gallop. No murmur heard. Pulmonary/Chest: She has no wheezes. She has no rales. Abdominal: Soft. She exhibits no distension. There is no tenderness. Musculoskeletal:   Tender medial left mid foot. Mild swelling mid foot         BMI:  The patient was advised to limit calories to 2600 cecelia and carbohydrates to 100 grams daily      No visits with results within 3 Month(s) from this visit. Latest known visit with results is:   Office Visit on 08/01/2019   Component Date Value Ref Range Status    Sed rate (ESR) 08/01/2019 13  0 - 40 mm/hr Final    C-Reactive Protein, Qt 08/01/2019 21* 0 - 10 mg/L Final    Hemoglobin A1c 08/01/2019 12.1* 4.8 - 5.6 % Final    Comment:          Prediabetes: 5.7 - 6.4           Diabetes: >6.4           Glycemic control for adults with diabetes: <7.0      Estimated average glucose 08/01/2019 301  mg/dL Final    Glucose 08/01/2019 299* 65 - 99 mg/dL Final    Comment: Specimen received in contact with cells. No visible hemolysis  present. However GLUC may be decreased and K increased. Clinical  correlation indicated.  BUN 08/01/2019 19  8 - 27 mg/dL Final    Creatinine 08/01/2019 0.92  0.57 - 1.00 mg/dL Final    GFR est non-AA 08/01/2019 68  >59 mL/min/1.73 Final    GFR est AA 08/01/2019 78  >59 mL/min/1.73 Final    BUN/Creatinine ratio 08/01/2019 21  12 - 28 Final    Sodium 08/01/2019 139  134 - 144 mmol/L Final    Potassium 08/01/2019 3.9  3.5 - 5.2 mmol/L Final    Comment: Specimen received in contact with cells. No visible hemolysis  present. However GLUC may be decreased and K increased. Clinical  correlation indicated.       Chloride 08/01/2019 94* 96 - 106 mmol/L Final    CO2 08/01/2019 23 20 - 29 mmol/L Final    Calcium 08/01/2019 8.9  8.7 - 10.3 mg/dL Final    Protein, total 08/01/2019 7.2  6.0 - 8.5 g/dL Final    Albumin 08/01/2019 4.7  3.6 - 4.8 g/dL Final    GLOBULIN, TOTAL 08/01/2019 2.5  1.5 - 4.5 g/dL Final    A-G Ratio 08/01/2019 1.9  1.2 - 2.2 Final    Bilirubin, total 08/01/2019 0.5  0.0 - 1.2 mg/dL Final    Alk. phosphatase 08/01/2019 172* 39 - 117 IU/L Final    AST (SGOT) 08/01/2019 19  0 - 40 IU/L Final    ALT (SGPT) 08/01/2019 23  0 - 32 IU/L Final    WBC 08/01/2019 10.8  3.4 - 10.8 x10E3/uL Final    RBC 08/01/2019 4.63  3.77 - 5.28 x10E6/uL Final    HGB 08/01/2019 13.2  11.1 - 15.9 g/dL Final    HCT 08/01/2019 39.2  34.0 - 46.6 % Final    MCV 08/01/2019 85  79 - 97 fL Final    MCH 08/01/2019 28.5  26.6 - 33.0 pg Final    MCHC 08/01/2019 33.7  31.5 - 35.7 g/dL Final    RDW 08/01/2019 14.4  12.3 - 15.4 % Final    PLATELET 44/94/2010 607  150 - 450 x10E3/uL Final    NEUTROPHILS 08/01/2019 64  Not Estab. % Final    Lymphocytes 08/01/2019 27  Not Estab. % Final    MONOCYTES 08/01/2019 7  Not Estab. % Final    EOSINOPHILS 08/01/2019 1  Not Estab. % Final    BASOPHILS 08/01/2019 1  Not Estab. % Final    ABS. NEUTROPHILS 08/01/2019 7.0  1.4 - 7.0 x10E3/uL Final    Abs Lymphocytes 08/01/2019 3.0  0.7 - 3.1 x10E3/uL Final    ABS. MONOCYTES 08/01/2019 0.7  0.1 - 0.9 x10E3/uL Final    ABS. EOSINOPHILS 08/01/2019 0.1  0.0 - 0.4 x10E3/uL Final    ABS. BASOPHILS 08/01/2019 0.1  0.0 - 0.2 x10E3/uL Final    IMMATURE GRANULOCYTES 08/01/2019 0  Not Estab. % Final    ABS. IMM. GRANS. 08/01/2019 0.0  0.0 - 0.1 x10E3/uL Final       .No results found for any visits on 11/04/19. Assessment / Plan      ICD-10-CM ICD-9-CM    1. Acute left ankle pain M25.572 719.47 REFERRAL TO ORTHOPEDICS      traMADol (ULTRAM) 50 mg tablet   2. Essential hypertension, benign I10 401.1    3.  Controlled type 2 diabetes mellitus without complication, with long-term current use of insulin ([facility] [address] E11.9 250.00     Z79.4 V58.67        To orthopedics  Tramadol for pain  she was advised to continue her maintenance medications      Follow-up and Dispositions    · Return in about 3 months (around 2/4/2020). I asked Yesijuan m Joy if she has any questions and I answered the questions. Yesi Joy states that she understands the treatment plan and agrees with the treatment plan          THIS DOCUMENT WAS CREATED WITH A VOICE ACTIVATED DICTATION SYSTEM.   IT MAY CONTAIN TRANSCRIPTION ERRORS

## 2019-11-12 ENCOUNTER — OFFICE VISIT (OUTPATIENT)
Dept: ORTHOPEDIC SURGERY | Age: 59
End: 2019-11-12

## 2019-11-12 VITALS
OXYGEN SATURATION: 98 % | RESPIRATION RATE: 10 BRPM | BODY MASS INDEX: 30.05 KG/M2 | TEMPERATURE: 96.6 F | SYSTOLIC BLOOD PRESSURE: 133 MMHG | DIASTOLIC BLOOD PRESSURE: 79 MMHG | HEIGHT: 66 IN | WEIGHT: 187 LBS | HEART RATE: 82 BPM

## 2019-11-12 DIAGNOSIS — M76.72 PERONEAL TENDINITIS OF LEFT LOWER EXTREMITY: Primary | ICD-10-CM

## 2019-11-12 NOTE — PROGRESS NOTES
AMBULATORY PROGRESS NOTE      Patient: Sarah Razo             MRN: [id number]     SSN: [id number] Body mass index is 30.18 kg/m². YOB: 1960     AGE: 59 y.o. SEX: female    PCP: Martina Guevara MD     IMPRESSION/DIAGNOSIS AND TREATMENT PLAN     DIAGNOSES  1. Peroneal tendinitis of left lower extremity        Orders Placed This Encounter    Generic Supply Order      Sarah Razo understands her diagnoses and the proposed plan. So, in this individual who has tenderness to her distal fibula and to the peroneal tendons, my impression is peroneal tendinitis, and she has swelling and pain to the distal fibula. Her ankle films on October 22, 2019, from [place] showed osteochondral defect to the medial talar dome on this left ankle (old, as she had a prior injury in this region), as well as an incidental bone spur seen at the insertion point of the Achilles tendon. The results are listed in the diagnostic section of this report. Because she has tenderness in the distal fibula, I am going to protect her in a CAM walker boot and obtain x-rays of her left ankle upon next visit. Plan:    1) DME Order: short left CAM walker boot  2) Continue activity modification as directed. RTO - 3 weeks PLEASE OBTAIN X-RAYS OF: left ankle 3 VIEWS      HPI AND EXAMINATION     Sarah Razo IS A 59 y.o. female who presents to my outpatient office complaining of: left ankle pain. She reports a remote twisting with incomplete healing. However this pain began 3 weeks ago, denies falling or twisting. She notes her pain begins first thing in the morning with placing her foot on the floor. She complains of pain with every step.     Visit Vitals  /79   Pulse 82   Temp 96.6 °F (35.9 °C) (Oral)   Resp 10   Ht 5' 6\" (1.676 m)   Wt 187 lb (84.8 kg)   SpO2 98%   BMI 30.18 kg/m²       Appearance: Alert, well appearing and pleasant patient who is in no distress, oriented to person, place/time, and who follows commands. This patient is accompanied in the examination room by her spouse. Dementia: no dementia  Psychiatric: Affect and mood are appropriate. Patient arrives to office via: without assistive device:   HEENT: Head normocephalic & atraumatic. Both pupils are round, non icteric sclera   Eye: EOM are intact and sclera are clear    Neck: ROM WNL and JVD neck is not present     Hearings Intact, does not require hearing aid device  Respiratory: Breathing is unlabored without accessory chest muscle use  Cardiovascular/Peripheral Vascular: Normal Pulses to each foot    ANKLE/FOOT left    Gait: Normal  Tenderness: mild to moderate tenderness distal fibula. Mild tenderness to peroneal tendon. Cutaneous:  WNL. Joint Motion: WNL. Joint / Tendon Stability: No Ankle or Subtalar instability or joint laxity. No peroneal sublux ability or dislocation  Alignment: Forefoot, Midfoot, Hindfoot WNL. Neuro Motor/Sensory: NL/NL. Vascular: NL foot/ankle pulses. Lymphatics: No extremity lymphedema, No calf swelling, no tenderness to calf muscles.     CHART REVIEW     Past Medical History:   Diagnosis Date    Essential hypertension, benign     Mixed hyperlipidemia     Type II or unspecified type diabetes mellitus without mention of complication, not stated as uncontrolled      Current Outpatient Medications   Medication Sig    losartan-hydroCHLOROthiazide (HYZAAR) 100-12.5 mg per tablet 1 tab daily    metFORMIN ER (GLUCOPHAGE XR) 750 mg tablet TAKE 1 TABLET BY MOUTH TWO TIMES A DAY    amLODIPine (NORVASC) 5 mg tablet TAKE 1 TABLET BY MOUTH ONCE DAILY    insulin glargine (LANTUS SOLOSTAR U-100 INSULIN) 100 unit/mL (3 mL) inpn INJECT 40 UNITS UNDER THE SKIN ONCE DAILY ( 37 DAYS )    insulin glargine (LANTUS SOLOSTAR U-100 INSULIN) 100 unit/mL (3 mL) inpn INJECT 50 UNITS UNDER THE SKIN ONCE DAILY    atorvastatin (LIPITOR) 40 mg tablet 1 tablet daily    glimepiride (AMARYL) 2 mg tablet TAKE 1 TABLET BY MOUTH TWO TIMES A DAY    glucose blood VI test strips (TRUE METRIX GLUCOSE TEST STRIP) strip Test Blood Glucose once daily; ICD 10 E11.9, Quantity 100    Blood-Glucose Meter (TRUE METRIX AIR GLUCOSE METER) monitoring kit Test Blood Glucose once daily; ICD 10 E11.9    Insulin Needles, Disposable, (PEN NEEDLE) 32 gauge x 5/32\" ndle For insulin shots     No current facility-administered medications for this visit. Allergies   Allergen Reactions    Clindamycin Hives    Sulfa (Sulfonamide Antibiotics) Unable to Obtain    Lisinopril Cough     Past Surgical History:   Procedure Laterality Date    HX HYSTERECTOMY  36s    X2     Social History     Occupational History    Not on file   Tobacco Use    Smoking status: Never Smoker    Smokeless tobacco: Never Used   Substance and Sexual Activity    Alcohol use: No     Alcohol/week: 0.0 standard drinks    Drug use: No    Sexual activity: Not Currently     Partners: Male     Family History   Problem Relation Age of Onset    Diabetes Mother     Stroke Maternal Grandmother     Stroke Maternal Grandfather     Hypertension Sister     Diabetes Sister         REVIEW OF SYSTEMS : 11/12/2019  ALL BELOW ARE Negative except : SEE HPI      REVIEW OF SYSTEMS : Total of 12 systems reviewed as follows:            CONSTITUTIONAL: No weight loss. PSYCHOLOGICAL : No Feelings of anxiety, depression, agitation  EYES: No blurred vision and no eye discharge. NO eye pain, double vision  ENT: No nasal discharge. No ear pain. CARDIOVASCULAR: No chest pain and no diaphoresis. RESPIRATORY: No cough, no hemoptysis. GI: No vomiting, no diarrhea   : No urinary frequency and no dysuria. MUSCULOSKELETAL: see HPI  SKIN: No rashes. NEURO: Negative for : dizziness,weakness, headaches     Negative for : visual changes or confusion, or seizures,   ENDOCRINE: No polyphagia and no polydipsia. HEMATOLOGY: No bleeding tendencies.         DIAGNOSTIC IMAGING     No notes on file     XR Results (most recent):  Results from Hospital Encounter encounter on 10/22/19   XR ANKLE LT MIN 3 V    Narrative EXAM: XR ANKLE LT MIN 3 V    INDICATION: injured left anakle    COMPARISON: None. FINDINGS: 3 views of the left ankle were obtained. There is lucency along the  medial aspect of the talar dome, suspicious for an osteochondral lesion. Small  density along the medial malleolus is most suspicious for a remote avulsion  fracture. Prominent plantar and retrocalcaneal enthesophytes are present. Bony  alignment is intact. No additional fracture or dislocation. There is  circumferential soft tissue swelling. Impression IMPRESSION:     Lucency along the medial talar dome, suspicious for an osteochondral lesion. Circumferential soft tissue swelling. Please see above section of this report. I have personally reviewed the results of the above study. The interpretation of this study is my professional opinion. Written by Madeline Pederson, as dictated by Dr. Arnol Christian. I, Dr. Arnol Christian, confirm that all documentation is accurate.

## 2019-11-12 NOTE — PROGRESS NOTES
1. Have you been to the ER, urgent care clinic since your last visit? Hospitalized since your last visit? No    2. Have you seen or consulted any other health care providers outside of the [address] [place] [address] since your last visit? Include any pap smears or colon screening.  No

## 2019-11-14 DIAGNOSIS — E11.9 TYPE 2 DIABETES MELLITUS WITHOUT COMPLICATION, WITHOUT LONG-TERM CURRENT USE OF INSULIN (HCC): ICD-10-CM

## 2019-11-14 DIAGNOSIS — I10 ESSENTIAL HYPERTENSION, BENIGN: ICD-10-CM

## 2019-11-14 DIAGNOSIS — E78.2 MIXED HYPERLIPIDEMIA: ICD-10-CM

## 2019-11-14 RX ORDER — LOSARTAN POTASSIUM AND HYDROCHLOROTHIAZIDE 12.5; 1 MG/1; MG/1
TABLET ORAL
Qty: 30 TAB | Refills: 0 | Status: SHIPPED | OUTPATIENT
Start: 2019-11-14 | End: 2019-12-26 | Stop reason: SDUPTHER

## 2019-11-29 DIAGNOSIS — I10 ESSENTIAL HYPERTENSION, BENIGN: ICD-10-CM

## 2019-11-29 RX ORDER — AMLODIPINE BESYLATE 5 MG/1
TABLET ORAL
Qty: 30 TAB | Refills: 0 | Status: SHIPPED | OUTPATIENT
Start: 2019-11-29 | End: 2020-02-06 | Stop reason: SDUPTHER

## 2019-12-03 ENCOUNTER — OFFICE VISIT (OUTPATIENT)
Dept: ORTHOPEDIC SURGERY | Age: 59
End: 2019-12-03

## 2019-12-03 VITALS
WEIGHT: 185 LBS | HEIGHT: 66 IN | RESPIRATION RATE: 16 BRPM | SYSTOLIC BLOOD PRESSURE: 137 MMHG | OXYGEN SATURATION: 97 % | TEMPERATURE: 96.9 F | DIASTOLIC BLOOD PRESSURE: 77 MMHG | HEART RATE: 88 BPM | BODY MASS INDEX: 29.73 KG/M2

## 2019-12-03 DIAGNOSIS — G89.29 CHRONIC PAIN OF LEFT ANKLE: ICD-10-CM

## 2019-12-03 DIAGNOSIS — M76.72 PERONEAL TENDINITIS OF LEFT LOWER EXTREMITY: Primary | ICD-10-CM

## 2019-12-03 DIAGNOSIS — M25.572 CHRONIC PAIN OF LEFT ANKLE: ICD-10-CM

## 2019-12-03 NOTE — PROGRESS NOTES
Verbal order given by Dr. Carmencita Perea to sign order for MRI entered by [facility].

## 2019-12-03 NOTE — PROGRESS NOTES
AMBULATORY PROGRESS NOTE      Patient: Vivi Parrish             MRN: [id number]     SSN: [id number] Body mass index is 29.86 kg/m². YOB: 1960     AGE: 59 y.o. SEX: female    PCP: Rufina Shannon MD     IMPRESSION/DIAGNOSIS AND TREATMENT PLAN     DIAGNOSES  1. Peroneal tendinitis of left lower extremity    2. Chronic pain of left ankle        Orders Placed This Encounter    [[id number]] Ankle Min 3V    MRI ANKLE LT Homero Mccray understands her diagnoses and the proposed plan. Plan:    1) MRI without IV Contrast of the left ankle; please assess the distal fibula and peroneal tendons. 2) Continue using the CAM walker boot as directed. 3) Continue activity modification as directed. 4) Anticipate Cortisone injection after MRI. RTO - after MRIDue to her continued tenderness and moderate tenderness to the fibula and peroneal tendons, I am concerned about a small interstitial tear to the peroneal tendons. As such, an MRI is recommended for this left ankle. HPI AND EXAMINATION     Vivi Parrish IS A 59 y.o. female who presents to my outpatient office for follow up of peroneal tendinitis of the LLE. At the last visit, I provided an order for a short CAM walker boot and instructed the patient to continue activity modification as directed. Since we saw her last, Ms. Lexii Madrigal states that she is still experiencing pain in her left lateral ankle. She notes that the CAM walker boot has been helping with her pain, but not completely. She describes the pain as a burning sensation. She presents with her CAM walker boot and is accompanied to the office by her . Visit Vitals  /77   Pulse 88   Temp 96.9 °F (36.1 °C) (Oral)   Resp 16   Ht 5' 6\" (1.676 m)   Wt 185 lb (83.9 kg)   SpO2 97%   BMI 29.86 kg/m²     Appearance: Alert, well appearing and pleasant patient who is in no distress, oriented to person, place/time, and who follows commands. This patient is accompanied in the examination room by her spouse. Dementia: no dementia  Psychiatric: Affect and mood are appropriate. Patient arrives to office via: with assistive device: CAM boot  HEENT: Head normocephalic & atraumatic. Both pupils are round, non icteric sclera   Eye: EOM are intact and sclera are clear    Neck: ROM WNL and JVD neck is not present     Hearings Intact, does not require hearing aid device  Respiratory: Breathing is unlabored without accessory chest muscle use  Cardiovascular/Peripheral Vascular: Normal Pulses to each foot    ANKLE/FOOT left    Gait: antalgic, in CAM boot  Tenderness: Mild to moderate tenderness distal fibula. Mild tenderness to posterolateral ankle, along the peroneal tendons. Cutaneous:  WNL. Joint Motion: WNL. Joint / Tendon Stability: No Ankle or Subtalar instability or joint laxity. No peroneal sublux ability or dislocation  Alignment: Forefoot, Midfoot, Hindfoot WNL. Neuro Motor/Sensory: NL/NL. Vascular: NL foot/ankle pulses. Lymphatics: No extremity lymphedema, No calf swelling, no tenderness to calf muscles.     CHART REVIEW     Past Medical History:   Diagnosis Date    Essential hypertension, benign     Mixed hyperlipidemia     Type II or unspecified type diabetes mellitus without mention of complication, not stated as uncontrolled      Current Outpatient Medications   Medication Sig    amLODIPine (NORVASC) 5 mg tablet TAKE 1 TABLET BY MOUTH ONCE DAILY    losartan-hydroCHLOROthiazide (HYZAAR) 100-12.5 mg per tablet TAKE 1 TABLET BY MOUTH ONCE DAILY    metFORMIN ER (GLUCOPHAGE XR) 750 mg tablet TAKE 1 TABLET BY MOUTH TWO TIMES A DAY    amLODIPine (NORVASC) 5 mg tablet TAKE 1 TABLET BY MOUTH ONCE DAILY    insulin glargine (LANTUS SOLOSTAR U-100 INSULIN) 100 unit/mL (3 mL) inpn INJECT 40 UNITS UNDER THE SKIN ONCE DAILY ( 37 DAYS )    insulin glargine (LANTUS SOLOSTAR U-100 INSULIN) 100 unit/mL (3 mL) inpn INJECT 50 UNITS UNDER THE SKIN ONCE DAILY    atorvastatin (LIPITOR) 40 mg tablet 1 tablet daily    glimepiride (AMARYL) 2 mg tablet TAKE 1 TABLET BY MOUTH TWO TIMES A DAY    glucose blood VI test strips (TRUE METRIX GLUCOSE TEST STRIP) strip Test Blood Glucose once daily; ICD 10 E11.9, Quantity 100    Blood-Glucose Meter (TRUE METRIX AIR GLUCOSE METER) monitoring kit Test Blood Glucose once daily; ICD 10 E11.9    Insulin Needles, Disposable, (PEN NEEDLE) 32 gauge x 5/32\" ndle For insulin shots     No current facility-administered medications for this visit. Allergies   Allergen Reactions    Clindamycin Hives    Sulfa (Sulfonamide Antibiotics) Unable to Obtain    Lisinopril Cough     Past Surgical History:   Procedure Laterality Date    HX HYSTERECTOMY  36s    X2     Social History     Occupational History    Not on file   Tobacco Use    Smoking status: Never Smoker    Smokeless tobacco: Never Used   Substance and Sexual Activity    Alcohol use: No     Alcohol/week: 0.0 standard drinks    Drug use: No    Sexual activity: Not Currently     Partners: Male     Family History   Problem Relation Age of Onset    Diabetes Mother     Stroke Maternal Grandmother     Stroke Maternal Grandfather     Hypertension Sister     Diabetes Sister         REVIEW OF SYSTEMS : 12/3/2019  ALL BELOW ARE Negative except : SEE HPI      REVIEW OF SYSTEMS : Total of 12 systems reviewed as follows:        CONSTITUTIONAL: No weight loss. PSYCHOLOGICAL : No Feelings of anxiety, depression, agitation  EYES: No blurred vision and no eye discharge. NO eye pain, double vision  ENT: No nasal discharge. No ear pain. CARDIOVASCULAR: No chest pain and no diaphoresis. RESPIRATORY: No cough, no hemoptysis. GI: No vomiting, no diarrhea   : No urinary frequency and no dysuria. MUSCULOSKELETAL: see HPI  SKIN: No rashes.    NEURO: Negative for : dizziness,weakness, headaches     Negative for : visual changes or confusion, or seizures, ENDOCRINE: No polyphagia and no polydipsia. HEMATOLOGY: No bleeding tendencies. DIAGNOSTIC IMAGING     ANKLE X RAYS 3 VIEWS Left   X RAYS AT [facility] [address] [place] [address]  12/3/2019     NON WEIGHT BEARING    SOFT TISSUES:              mild fibula region, mild medial aspect       mild dorsal midfoot/forefoot       No Ankle joint effusion seen       Soft tissue calcifications not present,        Calcified blood vessels not present    OSSEOUS:         No fractures, subluxations, dislocations       Yes Osteochondral defects seen MEDIAL ANKLE)  Mineralization: suggests Osteopenia  Bone Spurs No significant bone spurs   ALIGNMENT:    Ankle mortise alignment is: Congruent    Tibial plafond and talar dome intact        I have personally reviewed these images of the above study. The interpretation of this study is my professional opinion    Kristen Fay MD  12/3/2019  12:23 PM       Written by Jhonathan Boyer, as dictated by Dr. Wiliam Inman. I, Dr. Wiliam Inman, confirm that all documentation is accurate.

## 2019-12-23 RX ORDER — METFORMIN HYDROCHLORIDE 750 MG/1
TABLET, EXTENDED RELEASE ORAL
Qty: 60 TAB | Refills: 0 | Status: SHIPPED | OUTPATIENT
Start: 2019-12-23 | End: 2019-12-26

## 2019-12-26 DIAGNOSIS — E11.9 TYPE 2 DIABETES MELLITUS WITHOUT COMPLICATION, WITHOUT LONG-TERM CURRENT USE OF INSULIN (HCC): ICD-10-CM

## 2019-12-26 DIAGNOSIS — E78.2 MIXED HYPERLIPIDEMIA: ICD-10-CM

## 2019-12-26 DIAGNOSIS — I10 ESSENTIAL HYPERTENSION, BENIGN: ICD-10-CM

## 2019-12-26 RX ORDER — METFORMIN HYDROCHLORIDE 750 MG/1
TABLET, EXTENDED RELEASE ORAL
Qty: 60 TAB | Refills: 0 | Status: SHIPPED | OUTPATIENT
Start: 2019-12-26 | End: 2020-02-06 | Stop reason: SDUPTHER

## 2019-12-26 RX ORDER — AMLODIPINE BESYLATE 5 MG/1
TABLET ORAL
Qty: 30 TAB | Refills: 0 | Status: SHIPPED | OUTPATIENT
Start: 2019-12-26 | End: 2020-02-06 | Stop reason: ALTCHOICE

## 2019-12-26 RX ORDER — LOSARTAN POTASSIUM AND HYDROCHLOROTHIAZIDE 12.5; 1 MG/1; MG/1
TABLET ORAL
Qty: 30 TAB | Refills: 0 | Status: SHIPPED | OUTPATIENT
Start: 2019-12-26 | End: 2020-02-06 | Stop reason: ALTCHOICE

## 2019-12-26 NOTE — TELEPHONE ENCOUNTER
Last seen 11/04/19  Next appt   None    Requested Prescriptions     Pending Prescriptions Disp Refills    losartan-hydroCHLOROthiazide (HYZAAR) 100-12.5 mg per tablet 30 Tab 0     Sig: TAKE 1 TABLET BY MOUTH ONCE DAILY    amLODIPine (NORVASC) 5 mg tablet 30 Tab 0     Sig: TAKE 1 TABLET BY MOUTH ONCE DAILY

## 2020-01-24 DIAGNOSIS — E78.2 MIXED HYPERLIPIDEMIA: ICD-10-CM

## 2020-01-24 DIAGNOSIS — E11.9 TYPE 2 DIABETES MELLITUS WITHOUT COMPLICATION, WITHOUT LONG-TERM CURRENT USE OF INSULIN (HCC): ICD-10-CM

## 2020-01-24 DIAGNOSIS — I10 ESSENTIAL HYPERTENSION, BENIGN: ICD-10-CM

## 2020-01-24 NOTE — TELEPHONE ENCOUNTER
Last seen  11/04/19  Next appt    02/19/20  Dr. Wynne Dance    Requested Prescriptions     Pending Prescriptions Disp Refills    losartan-hydroCHLOROthiazide (HYZAAR) 100-12.5 mg per tablet 30 Tab 0     Sig: TAKE 1 TABLET BY MOUTH ONCE DAILY    amLODIPine (NORVASC) 5 mg tablet 30 Tab 0    metFORMIN ER (GLUCOPHAGE XR) 750 mg tablet 60 Tab 0       Losartan and HCTZ might have to be seperated

## 2020-02-06 DIAGNOSIS — I10 ESSENTIAL HYPERTENSION, BENIGN: ICD-10-CM

## 2020-02-06 DIAGNOSIS — E11.9 TYPE 2 DIABETES MELLITUS WITHOUT COMPLICATION, WITHOUT LONG-TERM CURRENT USE OF INSULIN (HCC): Primary | ICD-10-CM

## 2020-02-06 RX ORDER — LOSARTAN POTASSIUM 100 MG/1
100 TABLET ORAL DAILY
Qty: 90 TAB | Refills: 3 | Status: SHIPPED | OUTPATIENT
Start: 2020-02-06 | End: 2021-01-28

## 2020-02-06 RX ORDER — HYDROCHLOROTHIAZIDE 25 MG/1
12.5 TABLET ORAL DAILY
Qty: 45 TAB | Refills: 3 | Status: SHIPPED | OUTPATIENT
Start: 2020-02-06 | End: 2021-02-16

## 2020-02-06 RX ORDER — METFORMIN HYDROCHLORIDE 750 MG/1
TABLET, EXTENDED RELEASE ORAL
Qty: 180 TAB | Refills: 3 | Status: SHIPPED | OUTPATIENT
Start: 2020-02-06 | End: 2020-02-19 | Stop reason: SDUPTHER

## 2020-02-06 RX ORDER — AMLODIPINE BESYLATE 5 MG/1
TABLET ORAL
Qty: 90 TAB | Refills: 3 | Status: SHIPPED | OUTPATIENT
Start: 2020-02-06 | End: 2020-02-19 | Stop reason: SDUPTHER

## 2020-02-09 RX ORDER — AMLODIPINE BESYLATE 5 MG/1
TABLET ORAL
Qty: 30 TAB | Refills: 0 | OUTPATIENT
Start: 2020-02-09

## 2020-02-09 RX ORDER — LOSARTAN POTASSIUM AND HYDROCHLOROTHIAZIDE 12.5; 1 MG/1; MG/1
TABLET ORAL
Qty: 30 TAB | Refills: 0 | OUTPATIENT
Start: 2020-02-09

## 2020-02-09 RX ORDER — METFORMIN HYDROCHLORIDE 750 MG/1
TABLET, EXTENDED RELEASE ORAL
Qty: 60 TAB | Refills: 0 | OUTPATIENT
Start: 2020-02-09

## 2020-02-19 ENCOUNTER — OFFICE VISIT (OUTPATIENT)
Dept: FAMILY MEDICINE CLINIC | Facility: CLINIC | Age: 60
End: 2020-02-19

## 2020-02-19 VITALS
TEMPERATURE: 96.7 F | SYSTOLIC BLOOD PRESSURE: 142 MMHG | HEART RATE: 94 BPM | OXYGEN SATURATION: 95 % | WEIGHT: 180 LBS | RESPIRATION RATE: 18 BRPM | HEIGHT: 66 IN | DIASTOLIC BLOOD PRESSURE: 76 MMHG | BODY MASS INDEX: 28.93 KG/M2

## 2020-02-19 DIAGNOSIS — E11.9 TYPE 2 DIABETES MELLITUS WITHOUT COMPLICATION, WITHOUT LONG-TERM CURRENT USE OF INSULIN (HCC): ICD-10-CM

## 2020-02-19 DIAGNOSIS — I10 ESSENTIAL HYPERTENSION, BENIGN: ICD-10-CM

## 2020-02-19 DIAGNOSIS — E11.42 DIABETIC POLYNEUROPATHY ASSOCIATED WITH TYPE 2 DIABETES MELLITUS (HCC): ICD-10-CM

## 2020-02-19 DIAGNOSIS — I10 ESSENTIAL HYPERTENSION, BENIGN: Primary | ICD-10-CM

## 2020-02-19 DIAGNOSIS — E78.2 MIXED HYPERLIPIDEMIA: ICD-10-CM

## 2020-02-19 RX ORDER — AMLODIPINE BESYLATE 5 MG/1
TABLET ORAL
Qty: 90 TAB | Refills: 3 | Status: SHIPPED | OUTPATIENT
Start: 2020-02-19 | End: 2021-02-16

## 2020-02-19 RX ORDER — GABAPENTIN 100 MG/1
CAPSULE ORAL
Qty: 90 CAP | Refills: 3 | Status: SHIPPED | OUTPATIENT
Start: 2020-02-19 | End: 2021-03-24 | Stop reason: SDUPTHER

## 2020-02-19 RX ORDER — METFORMIN HYDROCHLORIDE 750 MG/1
TABLET, EXTENDED RELEASE ORAL
Qty: 180 TAB | Refills: 3 | Status: SHIPPED | OUTPATIENT
Start: 2020-02-19 | End: 2021-02-27

## 2020-02-19 RX ORDER — GLIMEPIRIDE 2 MG/1
TABLET ORAL
Qty: 90 TAB | Refills: 3 | Status: SHIPPED | OUTPATIENT
Start: 2020-02-19 | End: 2020-09-09 | Stop reason: SDUPTHER

## 2020-02-19 NOTE — PROGRESS NOTES
Chief Complaint   Patient presents with    Establish Care     HX of HTN DM Cholesterol Room 9    Medication Refill

## 2020-02-19 NOTE — PROGRESS NOTES
02/19/20  5:50 AM  Chief Complaint   Patient presents with    [facility]     HX of HTN DM Cholesterol Room 9    Medication Refill   This is a 67-year-old female who presents to establish care with us. Essential hypertension  The patient states that she sometimes gets nervous on the first of the visit to a physician. She believes this is why her blood pressure may slightly elevate. The patient has had no problem with the medication. The patient has no headaches, visual changes, chest pain or pressure,dyspnea, orthopnea, abdominal pain, dysuria, weakness, or paresthesias. BP Readings from Last 3 Encounters:   02/19/20 142/76   12/03/19 137/77   11/12/19 133/79     Lab Results   Component Value Date/Time    Sodium 139 08/01/2019 04:14 PM    Potassium 3.9 08/01/2019 04:14 PM    Chloride 94 (L) 08/01/2019 04:14 PM    CO2 23 08/01/2019 04:14 PM    Anion gap 9 03/10/2015 11:00 AM    Glucose 299 (H) 08/01/2019 04:14 PM    BUN 19 08/01/2019 04:14 PM    Creatinine 0.92 08/01/2019 04:14 PM    BUN/Creatinine ratio 21 08/01/2019 04:14 PM    GFR est AA 78 08/01/2019 04:14 PM    GFR est non-AA 68 08/01/2019 04:14 PM    Calcium 8.9 08/01/2019 04:14 PM        Key CAD CHF Meds             amLODIPine (NORVASC) 5 mg tablet Take one PO daily    losartan (COZAAR) 100 mg tablet Take 1 Tab by mouth daily. hydroCHLOROthiazide (HYDRODIURIL) 25 mg tablet Take 0.5 Tabs by mouth daily. atorvastatin (LIPITOR) 40 mg tablet 1 tablet daily        Diabetes mellitus  The patient denies polyuria, polydipsia, or polyphagia. The patient does not  Admit to significant weight gain. No endorgan damage is admitted to. There has been no problem with the medications.   Lab Results   Component Value Date/Time    Hemoglobin A1c 12.1 (H) 08/01/2019 04:14 PM    Hemoglobin A1c (POC) 10.4 10/02/2018 02:43 PM     Lab Results   Component Value Date/Time    Glucose 299 (H) 08/01/2019 04:14 PM     Key Antihyperglycemic Medications             metFORMIN ER (GLUCOPHAGE XR) 750 mg tablet Take one PO BID    insulin glargine (LANTUS SOLOSTAR U-100 INSULIN) 100 unit/mL (3 mL) inpn INJECT 40 UNITS UNDER THE SKIN ONCE DAILY ( 37 DAYS )    insulin glargine (LANTUS SOLOSTAR U-100 INSULIN) 100 unit/mL (3 mL) inpn INJECT 50 UNITS UNDER THE SKIN ONCE DAILY    glimepiride (AMARYL) 2 mg tablet TAKE 1 TABLET BY MOUTH TWO TIMES A DAY        Hyperlipidemia  The patient has had no problem with the medications  The patient denies muscle aches, headache, GI symptoms or difficulty with mentation. There has been no significant change in glucose control. No recent change in vision or cataracts  Lab Results   Component Value Date/Time    Cholesterol, total 175 02/20/2018 01:30 AM    HDL Cholesterol 39 (L) 11/27/2017 09:15 AM    LDL, calculated 56 11/27/2017 09:15 AM    VLDL, calculated 41 (H) 11/27/2017 09:15 AM    Triglyceride 203 (H) 11/27/2017 09:15 AM     Key Antihyperlipidemia Meds             atorvastatin (LIPITOR) 40 mg tablet 1 tablet daily          Review of Systems   Constitutional: Negative for chills and fever. HENT: Negative for hearing loss. Eyes: Negative for blurred vision, discharge and redness. Respiratory: Negative for cough and shortness of breath. Cardiovascular: Negative for chest pain, palpitations, orthopnea and leg swelling. Gastrointestinal: Negative for abdominal pain, blood in stool, constipation, diarrhea and melena. Genitourinary: Negative for dysuria, frequency and urgency. Musculoskeletal: Negative for joint pain and myalgias. Skin: Negative for rash. Neurological: Negative for dizziness, focal weakness and headaches. She states her IQ is 72   Psychiatric/Behavioral: Negative for depression. The patient is not nervous/anxious.         Current Outpatient Medications   Medication Sig    amLODIPine (NORVASC) 5 mg tablet Take one PO daily    metFORMIN ER (GLUCOPHAGE XR) 750 mg tablet Take one PO BID    losartan (COZAAR) 100 mg tablet Take 1 Tab by mouth daily.  hydroCHLOROthiazide (HYDRODIURIL) 25 mg tablet Take 0.5 Tabs by mouth daily.  insulin glargine (LANTUS SOLOSTAR U-100 INSULIN) 100 unit/mL (3 mL) inpn INJECT 40 UNITS UNDER THE SKIN ONCE DAILY ( 37 DAYS )    insulin glargine (LANTUS SOLOSTAR U-100 INSULIN) 100 unit/mL (3 mL) inpn INJECT 50 UNITS UNDER THE SKIN ONCE DAILY    atorvastatin (LIPITOR) 40 mg tablet 1 tablet daily    glimepiride (AMARYL) 2 mg tablet TAKE 1 TABLET BY MOUTH TWO TIMES A DAY    glucose blood VI test strips (TRUE METRIX GLUCOSE TEST STRIP) strip Test Blood Glucose once daily; ICD 10 E11.9, Quantity 100    Blood-Glucose Meter (TRUE METRIX AIR GLUCOSE METER) monitoring kit Test Blood Glucose once daily; ICD 10 E11.9    Insulin Needles, Disposable, (PEN NEEDLE) 32 gauge x 5/32\" ndle For insulin shots     No current facility-administered medications for this visit. Allergies   Allergen Reactions    Clindamycin Hives    Sulfa (Sulfonamide Antibiotics) Unable to Obtain    Lisinopril Cough     There were no encounter diagnoses.   Active Ambulatory Problems     Diagnosis Date Noted    Mixed hyperlipidemia     Essential hypertension, benign     Arthralgia 09/05/2015    Dermatitis 01/16/2018    Abscess of skin of abdomen 02/28/2018    Uncontrolled type 2 diabetes mellitus with hyperglycemia ([facility] [address] 10/02/2018     Resolved Ambulatory Problems     Diagnosis Date Noted    Diabetes mellitus type 2, controlled ([facility] [address]      Past Medical History:   Diagnosis Date    Type II or unspecified type diabetes mellitus without mention of complication, not stated as uncontrolled      Results for orders placed or performed in visit on 08/01/19   SED RATE (ESR)   Result Value Ref Range    Sed rate (ESR) 13 0 - 40 mm/hr   C REACTIVE PROTEIN, QT   Result Value Ref Range    C-Reactive Protein, Qt 21 (H) 0 - 10 mg/L   HEMOGLOBIN A1C WITH EAG   Result Value Ref Range    Hemoglobin A1c 12.1 (H) 4.8 - 5.6 %    Estimated average glucose 151 mg/dL   METABOLIC PANEL, COMPREHENSIVE   Result Value Ref Range    Glucose 299 (H) 65 - 99 mg/dL    BUN 19 8 - 27 mg/dL    Creatinine 0.92 0.57 - 1.00 mg/dL    GFR est non-AA 68 >59 mL/min/1.73    GFR est AA 78 >59 mL/min/1.73    BUN/Creatinine ratio 21 12 - 28    Sodium 139 134 - 144 mmol/L    Potassium 3.9 3.5 - 5.2 mmol/L    Chloride 94 (L) 96 - 106 mmol/L    CO2 23 20 - 29 mmol/L    Calcium 8.9 8.7 - 10.3 mg/dL    Protein, total 7.2 6.0 - 8.5 g/dL    Albumin 4.7 3.6 - 4.8 g/dL    GLOBULIN, TOTAL 2.5 1.5 - 4.5 g/dL    A-G Ratio 1.9 1.2 - 2.2    Bilirubin, total 0.5 0.0 - 1.2 mg/dL    Alk. phosphatase 172 (H) 39 - 117 IU/L    AST (SGOT) 19 0 - 40 IU/L    ALT (SGPT) 23 0 - 32 IU/L   CBC WITH AUTOMATED DIFF   Result Value Ref Range    WBC 10.8 3.4 - 10.8 x10E3/uL    RBC 4.63 3.77 - 5.28 x10E6/uL    HGB 13.2 11.1 - 15.9 g/dL    HCT 39.2 34.0 - 46.6 %    MCV 85 79 - 97 fL    MCH 28.5 26.6 - 33.0 pg    MCHC 33.7 31.5 - 35.7 g/dL    RDW 14.4 12.3 - 15.4 %    PLATELET 075 704 - 600 x10E3/uL    NEUTROPHILS 64 Not Estab. %    Lymphocytes 27 Not Estab. %    MONOCYTES 7 Not Estab. %    EOSINOPHILS 1 Not Estab. %    BASOPHILS 1 Not Estab. %    ABS. NEUTROPHILS 7.0 1.4 - 7.0 x10E3/uL    Abs Lymphocytes 3.0 0.7 - 3.1 x10E3/uL    ABS. MONOCYTES 0.7 0.1 - 0.9 x10E3/uL    ABS. EOSINOPHILS 0.1 0.0 - 0.4 x10E3/uL    ABS. BASOPHILS 0.1 0.0 - 0.2 x10E3/uL    IMMATURE GRANULOCYTES 0 Not Estab. %    ABS. IMM. GRANS. 0.0 0.0 - 0.1 x10E3/uL      There were no vitals taken for this visit. There is no height or weight on file to calculate BMI. Wt Readings from Last 3 Encounters:   12/03/19 185 lb (83.9 kg)   11/12/19 187 lb (84.8 kg)   11/04/19 187 lb (84.8 kg)     Physical Exam  Constitutional:       Appearance: She is well-developed. HENT:      Head: Normocephalic and atraumatic. Right Ear: External ear normal.      Left Ear: External ear normal.   Eyes:      General: No scleral icterus. Conjunctiva/sclera: Conjunctivae normal.      Pupils: Pupils are equal, round, and reactive to light. Neck:      Thyroid: No thyromegaly. Vascular: No JVD. Cardiovascular:      Rate and Rhythm: Normal rate and regular rhythm. Heart sounds: Normal heart sounds. Pulmonary:      Effort: Pulmonary effort is normal.      Breath sounds: Normal breath sounds. Abdominal:      General: Bowel sounds are normal. There is no distension. Tenderness: There is no abdominal tenderness. Musculoskeletal: Normal range of motion. General: No deformity. Lymphadenopathy:      Cervical: No cervical adenopathy. Skin:     General: Skin is dry. Capillary Refill: Capillary refill takes less than 2 seconds. Coloration: Skin is not jaundiced or pale. Findings: No erythema or rash. Comments: Hyperpigmentation of the lower extremity from about mid lower tibia distally. Similar in appearance to stasis changes   Neurological:      Mental Status: She is alert and oriented to person, place, and time. Cranial Nerves: No cranial nerve deficit. Sensory: No sensory deficit (Both feet). Motor: No weakness or abnormal muscle tone. Coordination: Coordination normal.   Psychiatric:         Behavior: Behavior normal.         Thought Content: Thought content normal.         Judgment: Judgment normal.       Diabetic foot exam:     Left Foot:   Visual Exam: normal .  Skin hyperpigmentation, stasis changes   Pulse DP: 2+ (normal)   Filament test: reduced sensation Minimal sensation of the entire plantar surface   Vibratory sensation: absent      Right Foot:   Visual Exam: normal Stasis changes   Pulse DP: 1+ (weak)   Filament test: 3/6   Vibratory sensation: absent        ASSESSMENT and PLAN    ICD-10-CM ICD-9-CM    1.  Essential hypertension, benign I10 401.1 amLODIPine (NORVASC) 5 mg tablet      glimepiride (AMARYL) 2 mg tablet      CBC WITH AUTOMATED DIFF    Chronic problem fair control. Borderline today. Follow-up next visit before changing any medications. 2. Type 2 diabetes mellitus without complication, without long-term current use of insulin (HCC) E11.9 250.00 metFORMIN ER (GLUCOPHAGE XR) 750 mg tablet      glimepiride (AMARYL) 2 mg tablet      METABOLIC PANEL, BASIC      HEMOGLOBIN A1C WITH EAG      MICROALBUMIN, UR, RAND W/ MICROALB/CREAT RATIO    Continue present medications   3. Mixed hyperlipidemia E78.2 272.2 glimepiride (AMARYL) 2 mg tablet      LIPID PANEL    Continue present regimen follow-up labs before next visit. 4. Diabetic polyneuropathy associated with type 2 diabetes mellitus (HCC) E11.42 250.60 gabapentin (NEURONTIN) 100 mg capsule     357.2     Trial of Neurontin. Follow-up and Dispositions    · Return in about 3 months (around 5/19/2020) for Follow up on [facility].        lab results and schedule of future lab studies reviewed with patient   Health Maintenance Due   Topic Date Due    Pneumococcal 0-64 years (1 of 1 - PPSV23) 12/16/1964    Eye Exam Retinal or Dilated  12/16/1968    DTaP/Tdap/Td series (1 - Tdap) 12/16/1969    Shingrix Vaccine Age 50> (1 of 2) 12/16/2008    Breast Cancer Screen Mammogram  12/16/2008    FOBT Q1Y Age 50-75  12/16/2008    MICROALBUMIN Q1  11/27/2018    Lipid Screen  02/20/2019    Influenza Age 9 to Adult  08/01/2019    A1C test (Diabetic or Prediabetic)  11/01/2019

## 2020-02-19 NOTE — PATIENT INSTRUCTIONS
High Blood Pressure: Care Instructions  Overview    It's normal for blood pressure to go up and down throughout the day. But if it stays up, you have high blood pressure. Another name for high blood pressure is hypertension. Despite what a lot of people think, high blood pressure usually doesn't cause headaches or make you feel dizzy or lightheaded. It usually has no symptoms. But it does increase your risk of stroke, heart attack, and other problems. You and your doctor will talk about your risks of these problems based on your blood pressure. Your doctor will give you a goal for your blood pressure. Your goal will be based on your health and your age. Lifestyle changes, such as eating healthy and being active, are always important to help lower blood pressure. You might also take medicine to reach your blood pressure goal.  Follow-up care is a key part of your treatment and safety. Be sure to make and go to all appointments, and call your doctor if you are having problems. It's also a good idea to know your test results and keep a list of the medicines you take. How can you care for yourself at home? Medical treatment  · If you stop taking your medicine, your blood pressure will go back up. You may take one or more types of medicine to lower your blood pressure. Be safe with medicines. Take your medicine exactly as prescribed. Call your doctor if you think you are having a problem with your medicine. · Talk to your doctor before you start taking aspirin every day. Aspirin can help certain people lower their risk of a heart attack or stroke. But taking aspirin isn't right for everyone, because it can cause serious bleeding. · See your doctor regularly. You may need to see the doctor more often at first or until your blood pressure comes down. · If you are taking blood pressure medicine, talk to your doctor before you take decongestants or anti-inflammatory medicine, such as ibuprofen.  Some of these medicines can raise blood pressure. · Learn how to check your blood pressure at home. Lifestyle changes  · Stay at a healthy weight. This is especially important if you put on weight around the waist. Losing even 10 pounds can help you lower your blood pressure. · If your doctor recommends it, get more exercise. Walking is a good choice. Bit by bit, increase the amount you walk every day. Try for at least 30 minutes on most days of the week. You also may want to swim, bike, or do other activities. · Avoid or limit alcohol. Talk to your doctor about whether you can drink any alcohol. · Try to limit how much sodium you eat to less than 2,300 milligrams (mg) a day. Your doctor may ask you to try to eat less than 1,500 mg a day. · Eat plenty of fruits (such as bananas and oranges), vegetables, legumes, whole grains, and low-fat dairy products. · Lower the amount of saturated fat in your diet. Saturated fat is found in animal products such as milk, cheese, and meat. Limiting these foods may help you lose weight and also lower your risk for heart disease. · Do not smoke. Smoking increases your risk for heart attack and stroke. If you need help quitting, talk to your doctor about stop-smoking programs and medicines. These can increase your chances of quitting for good. When should you call for help? Call 911 anytime you think you may need emergency care. This may mean having symptoms that suggest that your blood pressure is causing a serious heart or blood vessel problem. Your blood pressure may be over 180/120. For example, call 911 if:    · You have symptoms of a heart attack. These may include:  ? Chest pain or pressure, or a strange feeling in the chest.  ? Sweating. ? Shortness of breath. ? Nausea or vomiting. ? Pain, pressure, or a strange feeling in the back, neck, jaw, or upper belly or in one or both shoulders or arms. ? Lightheadedness or sudden weakness. ? A fast or irregular heartbeat. · You have symptoms of a stroke. These may include:  ? Sudden numbness, tingling, weakness, or loss of movement in your face, arm, or leg, especially on only one side of your body. ? Sudden vision changes. ? Sudden trouble speaking. ? Sudden confusion or trouble understanding simple statements. ? Sudden problems with walking or balance. ? A sudden, severe headache that is different from past headaches. · You have severe back or belly pain. Do not wait until your blood pressure comes down on its own. Get help right away. Call your doctor now or seek immediate care if:    · Your blood pressure is much higher than normal (such as 180/120 or higher), but you don't have symptoms. · You think high blood pressure is causing symptoms, such as:  ? Severe headache.  ? Blurry vision. Watch closely for changes in your health, and be sure to contact your doctor if:    · Your blood pressure measures higher than your doctor recommends at least 2 times. That means the top number is higher or the bottom number is higher, or both. · You think you may be having side effects from your blood pressure medicine. Where can you learn more? Go to http://lyn-misty.info/. Enter X508 in the search box to learn more about \"High Blood Pressure: Care Instructions. \"  Current as of: July 22, 2018  Content Version: 12.1  © 0705-7359 [employer]. Care instructions adapted under license by [employer] (which disclaims liability or warranty for this information). If you have questions about a medical condition or this instruction, always ask your healthcare professional. [place] [address] any warranty or liability for your use of this information. High Blood Pressure: Care Instructions  Overview    It's normal for blood pressure to go up and down throughout the day. But if it stays up, you have high blood pressure.  Another name for high blood pressure is hypertension. Despite what a lot of people think, high blood pressure usually doesn't cause headaches or make you feel dizzy or lightheaded. It usually has no symptoms. But it does increase your risk of stroke, heart attack, and other problems. You and your doctor will talk about your risks of these problems based on your blood pressure. Your doctor will give you a goal for your blood pressure. Your goal will be based on your health and your age. Lifestyle changes, such as eating healthy and being active, are always important to help lower blood pressure. You might also take medicine to reach your blood pressure goal.  Follow-up care is a key part of your treatment and safety. Be sure to make and go to all appointments, and call your doctor if you are having problems. It's also a good idea to know your test results and keep a list of the medicines you take. How can you care for yourself at home? Medical treatment  · If you stop taking your medicine, your blood pressure will go back up. You may take one or more types of medicine to lower your blood pressure. Be safe with medicines. Take your medicine exactly as prescribed. Call your doctor if you think you are having a problem with your medicine. · Talk to your doctor before you start taking aspirin every day. Aspirin can help certain people lower their risk of a heart attack or stroke. But taking aspirin isn't right for everyone, because it can cause serious bleeding. · See your doctor regularly. You may need to see the doctor more often at first or until your blood pressure comes down. · If you are taking blood pressure medicine, talk to your doctor before you take decongestants or anti-inflammatory medicine, such as ibuprofen. Some of these medicines can raise blood pressure. · Learn how to check your blood pressure at home. Lifestyle changes  · Stay at a healthy weight.  This is especially important if you put on weight around the waist. Losing even 10 pounds can help you lower your blood pressure. · If your doctor recommends it, get more exercise. Walking is a good choice. Bit by bit, increase the amount you walk every day. Try for at least 30 minutes on most days of the week. You also may want to swim, bike, or do other activities. · Avoid or limit alcohol. Talk to your doctor about whether you can drink any alcohol. · Try to limit how much sodium you eat to less than 2,300 milligrams (mg) a day. Your doctor may ask you to try to eat less than 1,500 mg a day. · Eat plenty of fruits (such as bananas and oranges), vegetables, legumes, whole grains, and low-fat dairy products. · Lower the amount of saturated fat in your diet. Saturated fat is found in animal products such as milk, cheese, and meat. Limiting these foods may help you lose weight and also lower your risk for heart disease. · Do not smoke. Smoking increases your risk for heart attack and stroke. If you need help quitting, talk to your doctor about stop-smoking programs and medicines. These can increase your chances of quitting for good. When should you call for help? Call 911 anytime you think you may need emergency care. This may mean having symptoms that suggest that your blood pressure is causing a serious heart or blood vessel problem. Your blood pressure may be over 180/120. For example, call 911 if:    · You have symptoms of a heart attack. These may include:  ? Chest pain or pressure, or a strange feeling in the chest.  ? Sweating. ? Shortness of breath. ? Nausea or vomiting. ? Pain, pressure, or a strange feeling in the back, neck, jaw, or upper belly or in one or both shoulders or arms. ? Lightheadedness or sudden weakness. ? A fast or irregular heartbeat. · You have symptoms of a stroke. These may include:  ? Sudden numbness, tingling, weakness, or loss of movement in your face, arm, or leg, especially on only one side of your body. ? Sudden vision changes.   ? Sudden trouble speaking. ? Sudden confusion or trouble understanding simple statements. ? Sudden problems with walking or balance. ? A sudden, severe headache that is different from past headaches. · You have severe back or belly pain. Do not wait until your blood pressure comes down on its own. Get help right away. Call your doctor now or seek immediate care if:    · Your blood pressure is much higher than normal (such as 180/120 or higher), but you don't have symptoms. · You think high blood pressure is causing symptoms, such as:  ? Severe headache.  ? Blurry vision. Watch closely for changes in your health, and be sure to contact your doctor if:    · Your blood pressure measures higher than your doctor recommends at least 2 times. That means the top number is higher or the bottom number is higher, or both. · You think you may be having side effects from your blood pressure medicine. Where can you learn more? Go to http://lyn-misty.info/. Enter N918 in the search box to learn more about \"High Blood Pressure: Care Instructions. \"  Current as of: July 22, 2018  Content Version: 12.1  © 4676-2389 [employer]. Care instructions adapted under license by [employer] (which disclaims liability or warranty for this information). If you have questions about a medical condition or this instruction, always ask your healthcare professional. [address] any warranty or liability for your use of this information. High Blood Pressure: Care Instructions  Overview    It's normal for blood pressure to go up and down throughout the day. But if it stays up, you have high blood pressure. Another name for high blood pressure is hypertension. Despite what a lot of people think, high blood pressure usually doesn't cause headaches or make you feel dizzy or lightheaded. It usually has no symptoms.  But it does increase your risk of stroke, heart attack, and other problems. You and your doctor will talk about your risks of these problems based on your blood pressure. Your doctor will give you a goal for your blood pressure. Your goal will be based on your health and your age. Lifestyle changes, such as eating healthy and being active, are always important to help lower blood pressure. You might also take medicine to reach your blood pressure goal.  Follow-up care is a key part of your treatment and safety. Be sure to make and go to all appointments, and call your doctor if you are having problems. It's also a good idea to know your test results and keep a list of the medicines you take. How can you care for yourself at home? Medical treatment  · If you stop taking your medicine, your blood pressure will go back up. You may take one or more types of medicine to lower your blood pressure. Be safe with medicines. Take your medicine exactly as prescribed. Call your doctor if you think you are having a problem with your medicine. · Talk to your doctor before you start taking aspirin every day. Aspirin can help certain people lower their risk of a heart attack or stroke. But taking aspirin isn't right for everyone, because it can cause serious bleeding. · See your doctor regularly. You may need to see the doctor more often at first or until your blood pressure comes down. · If you are taking blood pressure medicine, talk to your doctor before you take decongestants or anti-inflammatory medicine, such as ibuprofen. Some of these medicines can raise blood pressure. · Learn how to check your blood pressure at home. Lifestyle changes  · Stay at a healthy weight. This is especially important if you put on weight around the waist. Losing even 10 pounds can help you lower your blood pressure. · If your doctor recommends it, get more exercise. Walking is a good choice. Bit by bit, increase the amount you walk every day.  Try for at least 30 minutes on most days of the week. You also may want to swim, bike, or do other activities. · Avoid or limit alcohol. Talk to your doctor about whether you can drink any alcohol. · Try to limit how much sodium you eat to less than 2,300 milligrams (mg) a day. Your doctor may ask you to try to eat less than 1,500 mg a day. · Eat plenty of fruits (such as bananas and oranges), vegetables, legumes, whole grains, and low-fat dairy products. · Lower the amount of saturated fat in your diet. Saturated fat is found in animal products such as milk, cheese, and meat. Limiting these foods may help you lose weight and also lower your risk for heart disease. · Do not smoke. Smoking increases your risk for heart attack and stroke. If you need help quitting, talk to your doctor about stop-smoking programs and medicines. These can increase your chances of quitting for good. When should you call for help? Call 911 anytime you think you may need emergency care. This may mean having symptoms that suggest that your blood pressure is causing a serious heart or blood vessel problem. Your blood pressure may be over 180/120. For example, call 911 if:    · You have symptoms of a heart attack. These may include:  ? Chest pain or pressure, or a strange feeling in the chest.  ? Sweating. ? Shortness of breath. ? Nausea or vomiting. ? Pain, pressure, or a strange feeling in the back, neck, jaw, or upper belly or in one or both shoulders or arms. ? Lightheadedness or sudden weakness. ? A fast or irregular heartbeat. · You have symptoms of a stroke. These may include:  ? Sudden numbness, tingling, weakness, or loss of movement in your face, arm, or leg, especially on only one side of your body. ? Sudden vision changes. ? Sudden trouble speaking. ? Sudden confusion or trouble understanding simple statements. ? Sudden problems with walking or balance. ? A sudden, severe headache that is different from past headaches.      · You have severe back or belly pain. Do not wait until your blood pressure comes down on its own. Get help right away. Call your doctor now or seek immediate care if:    · Your blood pressure is much higher than normal (such as 180/120 or higher), but you don't have symptoms. · You think high blood pressure is causing symptoms, such as:  ? Severe headache.  ? Blurry vision. Watch closely for changes in your health, and be sure to contact your doctor if:    · Your blood pressure measures higher than your doctor recommends at least 2 times. That means the top number is higher or the bottom number is higher, or both. · You think you may be having side effects from your blood pressure medicine. Where can you learn more? Go to http://lyn-misty.info/. Enter B484 in the search box to learn more about \"High Blood Pressure: Care Instructions. \"  Current as of: July 22, 2018  Content Version: 12.1  © 7684-9030 [employer]. Care instructions adapted under license by [employer] (which disclaims liability or warranty for this information). If you have questions about a medical condition or this instruction, always ask your healthcare professional. [place] [address] any warranty or liability for your use of this information. Type 2 Diabetes: Care Instructions  Your Care Instructions    Type 2 diabetes is a disease that develops when the body's tissues cannot use insulin properly. Over time, the pancreas cannot make enough insulin. Insulin is a hormone that helps the body's cells use sugar (glucose) for energy. It also helps the body store extra sugar in muscle, fat, and liver cells. Without insulin, the sugar cannot get into the cells to do its work. It stays in the blood instead. This can cause high blood sugar levels. A person has diabetes when the blood sugar stays too high too much of the time.  Over time, diabetes can lead to diseases of the heart, blood vessels, nerves, kidneys, and eyes. You may be able to control your blood sugar by losing weight, eating a healthy diet, and getting daily exercise. You may also have to take insulin or other diabetes medicine. Follow-up care is a key part of your treatment and safety. Be sure to make and go to all appointments. Call your doctor if you are having problems. It's also a good idea to know your test results and keep a list of the medicines you take. How can you care for yourself at home? · Keep your blood sugar at a target level (which you set with your doctor). ? Eat a good diet that spreads carbohydrate throughout the day. Carbohydrate--the body's main source of fuel--affects blood sugar more than any other nutrient. Carbohydrate is in fruits, vegetables, milk, and yogurt. It also is in breads, cereals, vegetables such as potatoes and corn, and sugary foods such as candy and cakes. ? Aim for 30 minutes of exercise on most, preferably all, days of the week. Walking is a good choice. You also may want to do other activities, such as running, swimming, cycling, or playing tennis or team sports. If your doctor says it's okay, do muscle-strengthening exercises at least 2 times a week. ? Take your medicines exactly as prescribed. Call your doctor if you think you are having a problem with your medicine. You will get more details on the specific medicines your doctor prescribes. · Check your blood sugar as often as your doctor recommends. It is important to keep track of any symptoms you have, such as low blood sugar. Also tell your doctor if you have any changes in your activities, diet, or insulin use. · Talk to your doctor before you start taking aspirin every day. Aspirin can help certain people lower their risk of a heart attack or stroke. But taking aspirin isn't right for everyone, because it can cause serious bleeding. · Do not smoke.  If you need help quitting, talk to your doctor about stop-smoking programs and medicines. These can increase your chances of quitting for good. · Keep your cholesterol and blood pressure at normal levels. You may need to take one or more medicines to reach your goals. Take them exactly as directed. Do not stop or change a medicine without talking to your doctor first.  When should you call for help? Call 911 anytime you think you may need emergency care. For example, call if:    · You passed out (lost consciousness), or you suddenly become very sleepy or confused. (You may have very low blood sugar.)    Call your doctor now or seek immediate medical care if:    · Your blood sugar is 300 mg/dL or is higher than the level your doctor has set for you. · You have symptoms of low blood sugar, such as:  ? Sweating. ? Feeling nervous, shaky, and weak. ? Extreme hunger and slight nausea. ? Dizziness and headache.  ? Blurred vision. ? Confusion. Watch closely for changes in your health, and be sure to contact your doctor if:    · You often have problems controlling your blood sugar. · You have symptoms of long-term diabetes problems, such as:  ? New vision changes. ? New pain, numbness, or tingling in your hands or feet. ? Skin problems. Where can you learn more? Go to http://lyn-misty.info/. Enter C553 in the search box to learn more about \"Type 2 Diabetes: Care Instructions. \"  Current as of: July 25, 2018  Content Version: 12.1  © 6899-2940 [employer]. Care instructions adapted under license by [employer] (which disclaims liability or warranty for this information). If you have questions about a medical condition or this instruction, always ask your healthcare professional. [place] [address] any warranty or liability for your use of this information. High Cholesterol: Care Instructions  Your Care Instructions    Cholesterol is a type of fat in your blood.  It is needed for many body functions, such as making new cells. Cholesterol is made by your body. It also comes from food you eat. High cholesterol means that you have too much of the fat in your blood. This raises your risk of a heart attack and stroke. LDL and HDL are part of your total cholesterol. LDL is the \"bad\" cholesterol. High LDL can raise your risk for heart disease, heart attack, and stroke. HDL is the \"good\" cholesterol. It helps clear bad cholesterol from the body. High HDL is linked with a lower risk of heart disease, heart attack, and stroke. Your cholesterol levels help your doctor find out your risk for having a heart attack or stroke. You and your doctor can talk about whether you need to lower your risk and what treatment is best for you. A heart-healthy lifestyle along with medicines can help lower your cholesterol and your risk. The way you choose to lower your risk will depend on how high your risk is for heart attack and stroke. It will also depend on how you feel about taking medicines. Follow-up care is a key part of your treatment and safety. Be sure to make and go to all appointments, and call your doctor if you are having problems. It's also a good idea to know your test results and keep a list of the medicines you take. How can you care for yourself at home? · Eat a variety of foods every day. Good choices include fruits, vegetables, whole grains (like oatmeal), dried beans and peas, nuts and seeds, soy products (like tofu), and fat-free or low-fat dairy products. · Replace butter, margarine, and hydrogenated or partially hydrogenated oils with olive and canola oils. (Canola oil margarine without trans fat is fine.)  · Replace red meat with fish, poultry, and soy protein (like tofu). · Limit processed and packaged foods like chips, crackers, and cookies. · Bake, broil, or steam foods. Don't olmos them. · Be physically active. Get at least 30 minutes of exercise on most days of the week. Walking is a good choice.  You also may want to do other activities, such as running, swimming, cycling, or playing tennis or team sports. · Stay at a healthy weight or lose weight by making the changes in eating and physical activity listed above. Losing just a small amount of weight, even 5 to 10 pounds, can reduce your risk for having a heart attack or stroke. · Do not smoke. When should you call for help? Watch closely for changes in your health, and be sure to contact your doctor if:    · You need help making lifestyle changes. · You have questions about your medicine. Where can you learn more? Go to http://lynTrenDemonmisty.info/. Enter M379 in the search box to learn more about \"High Cholesterol: Care Instructions. \"  Current as of: July 22, 2018  Content Version: 12.1  © 8136-2000 [employer]. Care instructions adapted under license by [employer] (which disclaims liability or warranty for this information). If you have questions about a medical condition or this instruction, always ask your healthcare professional. [address] any warranty or liability for your use of this information. Learning About Diabetes and Exercise  Can you exercise if you have diabetes? When you have diabetes, it's important to get regular exercise. This helps control your blood sugar level. You can still play sports, run, ride a bike, go swimming, and do other activities when you have diabetes. How can exercise help you manage diabetes? Your body turns the food you eat into glucose, a type of sugar. You need this sugar for energy. When you have diabetes, the sugar builds up in your blood. But when you exercise, your body uses sugar. This helps keep it from building up in your blood and results in lower blood sugar and better control of diabetes. Exercise may help you in other ways too. It can help you reach and stay at a healthy weight.  It also helps improve blood pressure and cholesterol, which can reduce the risk of heart disease. Exercise can make you feel stronger and happier. It can help you relax and sleep better, and give you confidence in other things you do. How can you exercise safely? Before you start a new exercise program, talk to your doctor about how and when to exercise. You may need to have a medical exam and tests before you begin. Some types of exercise can be harmful if your diabetes is causing other problems, such as problems with your feet. Your doctor can tell you what types of exercise are good choices for you. These tips can help you exercise safely when you have diabetes. If your diabetes is controlled by diet or medicine that doesn't lower your blood sugar, you don't need to eat a snack before you exercise. · Check your blood sugar before you exercise. And be careful about what you eat. ? If your blood sugar is less than 100, eat a carbohydrate snack before you exercise. ? Be careful when you exercise if your blood sugar is over 300. High blood sugar can make you dehydrated. And that makes your blood sugar levels go even higher. If you have ketones in your blood or urine and your blood sugar is over 300, do not exercise. · Don't try to do too much at first. Build up your exercise program bit by bit. Try to get at least 30 minutes of exercise on most days of the week. Walking is a good choice. You also may want to do other activities, such as riding a bike or swimming. You might try running or gardening. Try to include muscle-strengthening exercises at least 2 times a week. These exercises include push-ups and weight training. You can also use rubber tubing or stretch bands. You stretch or pull the tubing or band to build muscle strength. If you want to exercise more, slowly increase how hard or long you exercise. · You may get symptoms of low blood sugar during exercise or up to 24 hours later.  Some symptoms of low blood sugar, such as sweating, a fast heartbeat, or feeling tired, can be confused with what can happen anytime you exercise. Other symptoms may include feeling anxious, dizzy, weak, or shaky. So it's a good idea to check your blood sugar again. · You can treat low blood sugar by eating or drinking something that has 15 grams of carbohydrate. These should be quick-sugar foods. An Haley foods such as fruit juice, regular (not diet) soda, glucose tablets, hard candy, or raisins can help raise blood sugar. Check your blood sugar level again 15 minutes after having a quick-sugar food to make sure your level is getting back to your target range. · Drink plenty of water before, during, and after you exercise. · Wear medical alert jewelry that says you have diabetes. You can buy this at most [employer]. · Pay attention to your body. If you are used to exercise and notice that you can't do as much as usual, talk to your doctor. Follow-up care is a key part of your treatment and safety. Be sure to make and go to all appointments, and call your doctor if you are having problems. It's also a good idea to know your test results and keep a list of the medicines you take. Where can you learn more? Go to http://lyn-misty.info/. Enter V712 in the search box to learn more about \"Learning About Diabetes and Exercise. \"  Current as of: April 16, 2019  Content Version: 12.2  © 6938-0781 [employer]. Care instructions adapted under license by [employer] (which disclaims liability or warranty for this information). If you have questions about a medical condition or this instruction, always ask your healthcare professional. [address] any warranty or liability for your use of this information. Counting Carbohydrates: Care Instructions  Your Care Instructions    You don't have to eat special foods when you have diabetes. You just have to be careful to eat healthy foods.  Carbohydrates (carbs) raise blood sugar higher and quicker than any other nutrient. Carbs are found in desserts, breads and cereals, and fruit. They're also in starchy vegetables. These include potatoes, corn, and grains such as rice and pasta. Carbs are also in milk and yogurt. The more carbs you eat at one time, the higher your blood sugar will rise. Spreading carbs all through the day helps keep your blood sugar levels within your target range. Counting carbs is one of the best ways to keep your blood sugar under control. If you use insulin, counting carbs helps you match the right amount of insulin to the number of grams of carbs in a meal. Then you can change your diet and insulin dose as needed. Testing your blood sugar several times a day can help you learn how carbs affect your blood sugar. A registered dietitian or certified diabetes educator can help you plan meals and snacks. Follow-up care is a key part of your treatment and safety. Be sure to make and go to all appointments, and call your doctor if you are having problems. It's also a good idea to know your test results and keep a list of the medicines you take. How can you care for yourself at home? Know your daily amount of carbohydrates  Your daily amount depends on several things, such as your weight, how active you are, which diabetes medicines you take, and what your goals are for your blood sugar levels. A registered dietitian or certified diabetes educator can help you plan how many carbs to include in each meal and snack. For most adults, a guideline for the daily amount of carbs is:  · 45 to 60 grams at each meal. That's about the same as 3 to 4 carbohydrate servings. · 15 to 20 grams at each snack. That's about the same as 1 carbohydrate serving. Count carbs  Counting carbs lets you know how much rapid-acting insulin to take before you eat. If you use an insulin pump, you get a constant rate of insulin during the day.  So the pump must be programmed at meals. This gives you extra insulin to cover the rise in blood sugar after meals. If you take insulin:  · Learn your own insulin-to-carb ratio. You and your diabetes health professional will figure out the ratio. You can do this by testing your blood sugar after meals. For example, you may need a certain amount of insulin for every 15 grams of carbs. · Add up the carb grams in a meal. Then you can figure out how many units of insulin to take based on your insulin-to-carb ratio. · Exercise lowers blood sugar. You can use less insulin than you would if you were not doing exercise. Keep in mind that timing matters. If you exercise within 1 hour after a meal, your body may need less insulin for that meal than it would if you exercised 3 hours after the meal. Test your blood sugar to find out how exercise affects your need for insulin. If you do or don't take insulin:  · Look at labels on packaged foods. This can tell you how many carbs are in a serving. You can also use guides from the American Diabetes Association. · Be aware of portions, or serving sizes. If a package has two servings and you eat the whole package, you need to double the number of grams of carbohydrate listed for one serving. · Protein, fat, and fiber do not raise blood sugar as much as carbs do. If you eat a lot of these nutrients in a meal, your blood sugar will rise more slowly than it would otherwise. Eat from all food groups  · Eat at least three meals a day. · Plan meals to include food from all the food groups. The food groups include grains, fruits, dairy, proteins, and vegetables. · Talk to your dietitian or diabetes educator about ways to add limited amounts of sweets into your meal plan. · If you drink alcohol, talk to your doctor. It may not be recommended when you are taking certain diabetes medicines. Where can you learn more? Go to http://lyn-misty.info/.   Enter I459 in the search box to learn more about \"Counting Carbohydrates: Care Instructions. \"  Current as of: April 16, 2019  Content Version: 12.2  © 0711-3264 [employer]. Care instructions adapted under license by [employer] (which disclaims liability or warranty for this information). If you have questions about a medical condition or this instruction, always ask your healthcare professional. [address] any warranty or liability for your use of this information. Learning About Diabetes Food Guidelines  Your Care Instructions    Meal planning is important to manage diabetes. It helps keep your blood sugar at a target level (which you set with your doctor). You don't have to eat special foods. You can eat what your family eats, including sweets once in a while. But you do have to pay attention to how often you eat and how much you eat of certain foods. You may want to work with a dietitian or a certified diabetes educator (CDE) to help you plan meals and snacks. A dietitian or CDE can also help you lose weight if that is one of your goals. What should you know about eating carbs? Managing the amount of carbohydrate (carbs) you eat is an important part of healthy meals when you have diabetes. Carbohydrate is found in many foods. · Learn which foods have carbs. And learn the amounts of carbs in different foods. ? Bread, cereal, pasta, and rice have about 15 grams of carbs in a serving. A serving is 1 slice of bread (1 ounce), ½ cup of cooked cereal, or 1/3 cup of cooked pasta or rice. ? Fruits have 15 grams of carbs in a serving. A serving is 1 small fresh fruit, such as an apple or orange; ½ of a banana; ½ cup of cooked or canned fruit; ½ cup of fruit juice; 1 cup of melon or raspberries; or 2 tablespoons of dried fruit. ? Milk and no-sugar-added yogurt have 15 grams of carbs in a serving. A serving is 1 cup of milk or 2/3 cup of no-sugar-added yogurt.   ? Starchy vegetables have 15 grams of carbs in a serving. A serving is ½ cup of mashed potatoes or sweet potato; 1 cup winter squash; ½ of a small baked potato; ½ cup of cooked beans; or ½ cup cooked corn or green peas. · Learn how much carbs to eat each day and at each meal. A dietitian or CDE can teach you how to keep track of the amount of carbs you eat. This is called carbohydrate counting. · If you are not sure how to count carbohydrate grams, use the Plate Method to plan meals. It is a good, quick way to make sure that you have a balanced meal. It also helps you spread carbs throughout the day. ? Divide your plate by types of foods. Put non-starchy vegetables on half the plate, meat or other protein food on one-quarter of the plate, and a grain or starchy vegetable in the final quarter of the plate. To this you can add a small piece of fruit and 1 cup of milk or yogurt, depending on how many carbs you are supposed to eat at a meal.  · Try to eat about the same amount of carbs at each meal. Do not \"save up\" your daily allowance of carbs to eat at one meal.  · Proteins have very little or no carbs per serving. Examples of proteins are beef, chicken, turkey, fish, eggs, tofu, cheese, cottage cheese, and peanut butter. A serving size of meat is 3 ounces, which is about the size of a deck of cards. Examples of meat substitute serving sizes (equal to 1 ounce of meat) are 1/4 cup of cottage cheese, 1 egg, 1 tablespoon of peanut butter, and ½ cup of tofu. How can you eat out and still eat healthy? · Learn to estimate the serving sizes of foods that have carbohydrate. If you measure food at home, it will be easier to estimate the amount in a serving of restaurant food. · If the meal you order has too much carbohydrate (such as potatoes, corn, or baked beans), ask to have a low-carbohydrate food instead. Ask for a salad or green vegetables.   · If you use insulin, check your blood sugar before and after eating out to help you plan how much to eat in the future. · If you eat more carbohydrate at a meal than you had planned, take a walk or do other exercise. This will help lower your blood sugar. What else should you know? · Limit saturated fat, such as the fat from meat and dairy products. This is a healthy choice because people who have diabetes are at higher risk of heart disease. So choose lean cuts of meat and nonfat or low-fat dairy products. Use olive or canola oil instead of butter or shortening when cooking. · Don't skip meals. Your blood sugar may drop too low if you skip meals and take insulin or certain medicines for diabetes. · Check with your doctor before you drink alcohol. Alcohol can cause your blood sugar to drop too low. Alcohol can also cause a bad reaction if you take certain diabetes medicines. Follow-up care is a key part of your treatment and safety. Be sure to make and go to all appointments, and call your doctor if you are having problems. It's also a good idea to know your test results and keep a list of the medicines you take. Where can you learn more? Go to http://lyn-misty.info/. Enter Z808 in the search box to learn more about \"Learning About Diabetes Food Guidelines. \"  Current as of: April 16, 2019  Content Version: 12.2  © 0166-5153 [employer]. Care instructions adapted under license by [employer] (which disclaims liability or warranty for this information). If you have questions about a medical condition or this instruction, always ask your healthcare professional. [place] [address] any warranty or liability for your use of this information.

## 2020-02-23 RX ORDER — HYDROCHLOROTHIAZIDE 12.5 MG/1
1 CAPSULE ORAL DAILY
COMMUNITY
Start: 2020-02-17 | End: 2020-02-23 | Stop reason: DRUGHIGH

## 2020-09-09 DIAGNOSIS — E11.9 TYPE 2 DIABETES MELLITUS WITHOUT COMPLICATION, WITHOUT LONG-TERM CURRENT USE OF INSULIN (HCC): ICD-10-CM

## 2020-09-09 DIAGNOSIS — I10 ESSENTIAL HYPERTENSION, BENIGN: ICD-10-CM

## 2020-09-09 DIAGNOSIS — E78.2 MIXED HYPERLIPIDEMIA: ICD-10-CM

## 2020-09-09 NOTE — TELEPHONE ENCOUNTER
Last seen 20  Next appt  20    Requested Prescriptions     Pending Prescriptions Disp Refills    glimepiride (AMARYL) 2 mg tablet 90 Tab 3     Sig: TAKE 1 TABLET BY MOUTH TWO TIMES A DAY    atorvastatin (Lipitor) 40 mg tablet 90 Tab 3     Si tablet daily

## 2020-09-12 RX ORDER — GLIMEPIRIDE 2 MG/1
TABLET ORAL
Qty: 90 TAB | Refills: 3 | Status: SHIPPED | OUTPATIENT
Start: 2020-09-12 | End: 2020-09-15

## 2020-09-12 RX ORDER — ATORVASTATIN CALCIUM 40 MG/1
TABLET, FILM COATED ORAL
Qty: 90 TAB | Refills: 3 | Status: SHIPPED | OUTPATIENT
Start: 2020-09-12 | End: 2021-10-26 | Stop reason: SDUPTHER

## 2020-10-18 DIAGNOSIS — I10 ESSENTIAL HYPERTENSION, BENIGN: Primary | ICD-10-CM

## 2020-10-18 DIAGNOSIS — E11.9 TYPE 2 DIABETES MELLITUS WITHOUT COMPLICATION, WITHOUT LONG-TERM CURRENT USE OF INSULIN (HCC): ICD-10-CM

## 2020-10-18 DIAGNOSIS — E78.2 MIXED HYPERLIPIDEMIA: ICD-10-CM

## 2021-02-18 DIAGNOSIS — Z12.31 ENCOUNTER FOR SCREENING MAMMOGRAM FOR MALIGNANT NEOPLASM OF BREAST: Primary | ICD-10-CM

## 2021-03-07 DIAGNOSIS — I10 ESSENTIAL HYPERTENSION, BENIGN: ICD-10-CM

## 2021-03-07 RX ORDER — LOSARTAN POTASSIUM 100 MG/1
TABLET ORAL
Qty: 90 TAB | Refills: 3 | Status: SHIPPED | OUTPATIENT
Start: 2021-03-07 | End: 2022-02-11 | Stop reason: SDUPTHER

## 2021-03-12 DIAGNOSIS — E78.2 MIXED HYPERLIPIDEMIA: ICD-10-CM

## 2021-03-12 DIAGNOSIS — E11.9 TYPE 2 DIABETES MELLITUS WITHOUT COMPLICATION, WITHOUT LONG-TERM CURRENT USE OF INSULIN (HCC): ICD-10-CM

## 2021-03-12 DIAGNOSIS — I10 ESSENTIAL HYPERTENSION, BENIGN: ICD-10-CM

## 2021-03-12 NOTE — TELEPHONE ENCOUNTER
Last seen 02/19/20  Next appt  03/24/21    Requested Prescriptions     Pending Prescriptions Disp Refills    glimepiride (AMARYL) 2 mg tablet 90 Tab 3    amLODIPine (NORVASC) 5 mg tablet 90 Tab 3     Sig: TAKE 1 TABLET BY MOUTH ONCE DAILY

## 2021-03-13 RX ORDER — AMLODIPINE BESYLATE 5 MG/1
TABLET ORAL
Qty: 90 TAB | Refills: 3 | Status: SHIPPED | OUTPATIENT
Start: 2021-03-13 | End: 2022-02-02 | Stop reason: SDUPTHER

## 2021-03-13 RX ORDER — GLIMEPIRIDE 2 MG/1
TABLET ORAL
Qty: 90 TAB | Refills: 3 | Status: SHIPPED | OUTPATIENT
Start: 2021-03-13 | End: 2021-09-17 | Stop reason: SDUPTHER

## 2021-03-24 ENCOUNTER — OFFICE VISIT (OUTPATIENT)
Dept: FAMILY MEDICINE CLINIC | Age: 61
End: 2021-03-24
Payer: MEDICAID

## 2021-03-24 VITALS
WEIGHT: 179 LBS | BODY MASS INDEX: 28.77 KG/M2 | HEART RATE: 98 BPM | RESPIRATION RATE: 16 BRPM | HEIGHT: 66 IN | SYSTOLIC BLOOD PRESSURE: 147 MMHG | TEMPERATURE: 98.4 F | OXYGEN SATURATION: 95 % | DIASTOLIC BLOOD PRESSURE: 69 MMHG

## 2021-03-24 DIAGNOSIS — E11.42 DIABETIC POLYNEUROPATHY ASSOCIATED WITH TYPE 2 DIABETES MELLITUS (HCC): ICD-10-CM

## 2021-03-24 DIAGNOSIS — E11.9 TYPE 2 DIABETES MELLITUS WITHOUT COMPLICATION, WITHOUT LONG-TERM CURRENT USE OF INSULIN (HCC): Primary | ICD-10-CM

## 2021-03-24 DIAGNOSIS — Z12.31 ENCOUNTER FOR SCREENING MAMMOGRAM FOR BREAST CANCER: Primary | ICD-10-CM

## 2021-03-24 DIAGNOSIS — E78.2 MIXED HYPERLIPIDEMIA: ICD-10-CM

## 2021-03-24 DIAGNOSIS — Z12.11 SCREENING FOR COLON CANCER: ICD-10-CM

## 2021-03-24 DIAGNOSIS — I10 ESSENTIAL HYPERTENSION, BENIGN: ICD-10-CM

## 2021-03-24 DIAGNOSIS — Z23 ENCOUNTER FOR IMMUNIZATION: ICD-10-CM

## 2021-03-24 PROCEDURE — 99214 OFFICE O/P EST MOD 30 MIN: CPT | Performed by: EMERGENCY MEDICINE

## 2021-03-24 RX ORDER — PEN NEEDLE, DIABETIC 31 GX3/16"
NEEDLE, DISPOSABLE MISCELLANEOUS
Qty: 100 PEN NEEDLE | Refills: 11 | Status: SHIPPED | OUTPATIENT
Start: 2021-03-24 | End: 2022-08-10 | Stop reason: SDUPTHER

## 2021-03-24 RX ORDER — ZOSTER VACCINE RECOMBINANT, ADJUVANTED 50 MCG/0.5
0.5 KIT INTRAMUSCULAR ONCE
Qty: 0.5 ML | Refills: 0 | Status: SHIPPED | OUTPATIENT
Start: 2021-03-24 | End: 2021-03-24

## 2021-03-24 RX ORDER — GABAPENTIN 100 MG/1
CAPSULE ORAL
Qty: 90 CAP | Refills: 3 | Status: SHIPPED | OUTPATIENT
Start: 2021-03-24 | End: 2022-08-23

## 2021-03-24 NOTE — PROGRESS NOTES
Urszula Mukherjee is a 60 y.o. female that is here for a   Chief Complaint   Patient presents with   • Follow Up Chronic Condition     HTN DM Cholesterol    • Numbness     Feet          1. Have you been to the ER, urgent care clinic since your last visit?  Hospitalized since your last visit?no    2. Have you seen or consulted any other health care providers outside of the [facility] since your last visit?  Include any pap smears or colon screening. NO      Health Maintenance reviewed - yes      Upcoming Appts  no      VORB: No orders of the defined types were placed in this encounter.   /Dhruv Charles MD/ Malinda Boyd MA
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
## 2021-03-25 LAB
ALBUMIN/CREAT UR: 205 MG/G CREAT (ref 0–29)
BASOPHILS # BLD AUTO: 0 X10E3/UL (ref 0–0.2)
BASOPHILS NFR BLD AUTO: 1 %
BUN SERPL-MCNC: 21 MG/DL (ref 8–27)
BUN/CREAT SERPL: 26 (ref 12–28)
CALCIUM SERPL-MCNC: 9 MG/DL (ref 8.7–10.3)
CHLORIDE SERPL-SCNC: 100 MMOL/L (ref 96–106)
CHOLEST SERPL-MCNC: 143 MG/DL (ref 100–199)
CO2 SERPL-SCNC: 24 MMOL/L (ref 20–29)
CREAT SERPL-MCNC: 0.81 MG/DL (ref 0.57–1)
CREAT UR-MCNC: 49 MG/DL
EOSINOPHIL # BLD AUTO: 0 X10E3/UL (ref 0–0.4)
EOSINOPHIL NFR BLD AUTO: 1 %
ERYTHROCYTE [DISTWIDTH] IN BLOOD BY AUTOMATED COUNT: 13.2 % (ref 11.7–15.4)
EST. AVERAGE GLUCOSE BLD GHB EST-MCNC: 324 MG/DL
GLUCOSE SERPL-MCNC: 393 MG/DL (ref 65–99)
HBA1C MFR BLD: 12.9 % (ref 4.8–5.6)
HCT VFR BLD AUTO: 39.8 % (ref 34–46.6)
HDLC SERPL-MCNC: 39 MG/DL
HGB BLD-MCNC: 13.1 G/DL (ref 11.1–15.9)
IMM GRANULOCYTES # BLD AUTO: 0 X10E3/UL (ref 0–0.1)
IMM GRANULOCYTES NFR BLD AUTO: 1 %
LDLC SERPL CALC-MCNC: 66 MG/DL (ref 0–99)
LYMPHOCYTES # BLD AUTO: 1.8 X10E3/UL (ref 0.7–3.1)
LYMPHOCYTES NFR BLD AUTO: 27 %
MCH RBC QN AUTO: 28.9 PG (ref 26.6–33)
MCHC RBC AUTO-ENTMCNC: 32.9 G/DL (ref 31.5–35.7)
MCV RBC AUTO: 88 FL (ref 79–97)
MICROALBUMIN UR-MCNC: 100.6 UG/ML
MONOCYTES # BLD AUTO: 0.6 X10E3/UL (ref 0.1–0.9)
MONOCYTES NFR BLD AUTO: 9 %
NEUTROPHILS # BLD AUTO: 4.1 X10E3/UL (ref 1.4–7)
NEUTROPHILS NFR BLD AUTO: 61 %
PLATELET # BLD AUTO: 220 X10E3/UL (ref 150–450)
POTASSIUM SERPL-SCNC: 4.3 MMOL/L (ref 3.5–5.2)
RBC # BLD AUTO: 4.53 X10E6/UL (ref 3.77–5.28)
SODIUM SERPL-SCNC: 140 MMOL/L (ref 134–144)
TRIGL SERPL-MCNC: 231 MG/DL (ref 0–149)
VLDLC SERPL CALC-MCNC: 38 MG/DL (ref 5–40)
WBC # BLD AUTO: 6.5 X10E3/UL (ref 3.4–10.8)

## 2021-03-27 DIAGNOSIS — E11.9 TYPE 2 DIABETES MELLITUS WITHOUT COMPLICATION, WITHOUT LONG-TERM CURRENT USE OF INSULIN (HCC): Primary | ICD-10-CM

## 2021-03-27 NOTE — PROGRESS NOTES
Please call. The average blood sugar is pretty high still. Her hemoglobin A1c is 12.9. We want it between 6 and 7 if possible. Increase the insulin to 43 units. We are going to add a new medication called Jardiance. Let us know if it is not paid for by her insurance company. The cholesterol is doing well except her triglycerides. Try to decrease fatty foods and concentrated sweets. Her complete blood count is good.

## 2021-03-27 NOTE — PROGRESS NOTES
Please also let her know that we are going to have her seen by pharmacy to help with her medication.

## 2021-03-29 NOTE — PROGRESS NOTES
TC was made to pt and pt verified name and d. o.b pt was made aware of results. and Medication increase to 43 units of Insulin and new RX for Jardiance.

## 2021-04-22 ENCOUNTER — TELEPHONE (OUTPATIENT)
Dept: FAMILY MEDICINE CLINIC | Age: 61
End: 2021-04-22

## 2021-04-22 NOTE — TELEPHONE ENCOUNTER
Pharmacy Progress Note - Telephone Call    Ms. Mai Jenkins 56 y.o. was contacted via an outbound telephone call regarding scheduling appointment for diabetes education today. A voicemail was left for patient to return my call. Thank you,  Oakley Osgood.  HENRY Lamb    For Pharmacy Admin Tracking Only     CPA in place: YES    Time Spent (min): 5

## 2021-04-24 NOTE — PROGRESS NOTES
04/27/21      ICD-10-CM ICD-9-CM    1. Diabetic polyneuropathy associated with type 2 diabetes mellitus (HCC)  E11.42 250.60 insulin glargine (Lantus Solostar U-100 Insulin) 100 unit/mL (3 mL) inpn     357.2    2. Encounter for immunization  Z23 V03.89 varicella-zoster recombinant, PF, (Shingrix, PF,) 50 mcg/0.5 mL susr injection      diph,Pertuss,Acell,,Tet Vac-PF (ADACEL) 2 Lf-(2.5-5-3-5 mcg)-5Lf/0.5 mL susp      pneumococcal 23-valent (PNEUMOVAX 23) 25 mcg/0.5 mL injection   3. Essential hypertension, benign  I10 401.1    4. Mixed hyperlipidemia  E78.2 272.2        The patient has diabetes with diabetic polyneuropathy. She had been out of medication for 2 months. We restarted the insulin. She declines to use Jardiance. We will follow and recheck labs before next visit. The patient has hypertension which is in good control and hyperlipidemia which is also in good control. We will continue to discuss medications in the future. lab results and schedule of future lab studies reviewed with patient  Diagnostic and radiologic results and the schedule of future studies were reviewed with the patient  reviewed diet, exercise and weight control  All questions were answered and understood.         Health Maintenance Due   Topic Date Due    Pneumococcal 0-64 years (1 of 1 - PPSV23) Never done    Eye Exam Retinal or Dilated  Never done    COVID-19 Vaccine (1) Never done    DTaP/Tdap/Td series (1 - Tdap) Never done    Shingrix Vaccine Age 50> (1 of 2) Never done    Colorectal Cancer Screening Combo  Never done    Breast Cancer Screen Mammogram  Never done   Declines mammogram  She has a colon cancer kit at home  No covid shot    Subjective:   Mahad Pina is a 56 y.o. female has Mixed hyperlipidemia, Essential hypertension, benign, Arthralgia, Dermatitis, Abscess of skin of abdomen, and Uncontrolled type 2 diabetes mellitus with hyperglycemia ([address] on their problem list..  The patient was seen in our office 3/24/2021. She had been called to let her know to have her seen by a pharmacist because of elevated blood sugar. Her hemoglobin A1c was 12.9. Her cholesterol showed a triglyceride of 231 and an LDL cholesterol of 66. The patient was also seen for essential hypertension and mixed hyperlipidemia orders were made for immunizations we will with a FIT test as she declined a colonoscopy. Screening mammogram was ordered. Hypertension  The patient has had no problem with the medication. The patient has no headaches, visual changes, chest pain or pressure,dyspnea, orthopnea,   Diabetes mellitus  The patient denies polyuria, polydipsia, or polyphagia. There has been no problem with the medications. Out of medications for two months  She declines Jardiance citing appetite suppression. Hyperlipidemia  The patient has had no problem with the medications. No evidence of endorgan effect, no CVA his coronary artery disease or peripheral artery disease. No history of renal disease. BP Readings from Last 3 Encounters:   04/27/21 133/66   03/24/21 (!) 147/69   02/19/20 142/76        Lab Results   Component Value Date/Time    Hemoglobin A1c 12.9 (H) 03/24/2021 08:09 AM    Hemoglobin A1c (POC) 10.4 10/02/2018 02:43 PM     Lab Results   Component Value Date/Time    Cholesterol, total 143 03/24/2021 08:09 AM    HDL Cholesterol 39 (L) 03/24/2021 08:09 AM    LDL, calculated 66 03/24/2021 08:09 AM    LDL, calculated 56 11/27/2017 09:15 AM    VLDL, calculated 38 03/24/2021 08:09 AM    VLDL, calculated 41 (H) 11/27/2017 09:15 AM    Triglyceride 231 (H) 03/24/2021 08:09 AM           Current Outpatient Medications   Medication Sig    empagliflozin (Jardiance) 25 mg tablet Take 1 Tab by mouth daily.  gabapentin (NEURONTIN) 100 mg capsule Take one at night for a week then 3 at night.     Insulin Needles, Disposable, (Pen Needle) 32 gauge x 5/32\" ndle For insulin shots    glimepiride (AMARYL) 2 mg tablet TAKE 1 TABLET BY MOUTH TWO TIMES A DAY    amLODIPine (NORVASC) 5 mg tablet TAKE 1 TABLET BY MOUTH ONCE DAILY    losartan (COZAAR) 100 mg tablet TAKE 1 TABLET BY MOUTH ONCE DAILY    metFORMIN ER (GLUCOPHAGE XR) 750 mg tablet TAKE 1 TABLET BY MOUTH TWO TIMES A DAY    hydroCHLOROthiazide (HYDRODIURIL) 25 mg tablet TAKE 1/2 TABLET BY MOUTH ONCE DAILY    atorvastatin (Lipitor) 40 mg tablet 1 tablet daily    insulin glargine (LANTUS SOLOSTAR U-100 INSULIN) 100 unit/mL (3 mL) inpn INJECT 40 UNITS UNDER THE SKIN ONCE DAILY ( 37 DAYS )    insulin glargine (LANTUS SOLOSTAR U-100 INSULIN) 100 unit/mL (3 mL) inpn INJECT 50 UNITS UNDER THE SKIN ONCE DAILY    glucose blood VI test strips (TRUE METRIX GLUCOSE TEST STRIP) strip Test Blood Glucose once daily; ICD 10 E11.9, Quantity 100    Blood-Glucose Meter (TRUE METRIX AIR GLUCOSE METER) monitoring kit Test Blood Glucose once daily; ICD 10 E11.9     No current facility-administered medications for this visit. Allergies   Allergen Reactions    Clindamycin Hives    Sulfa (Sulfonamide Antibiotics) Unable to Obtain    Lisinopril Cough     has Mixed hyperlipidemia, Essential hypertension, benign, Arthralgia, Dermatitis, Abscess of skin of abdomen, and Uncontrolled type 2 diabetes mellitus with hyperglycemia ([address] on their problem list.    Past Surgical History:   Procedure Laterality Date    HX HYSTERECTOMY  1990s    X2      reports that she has never smoked. She has never used smokeless tobacco. She reports that she does not drink alcohol or use drugs. family history includes Diabetes in her mother and sister; Hypertension in her sister; Stroke in her maternal grandfather and maternal grandmother. Review of Systems   Constitutional: Negative for chills and fever. HENT: Negative for hearing loss. Eyes: Negative for blurred vision, discharge and redness. Respiratory: Negative for cough and shortness of breath.     Cardiovascular: Negative for chest pain, palpitations, orthopnea and leg swelling. Gastrointestinal: Negative for abdominal pain, blood in stool, constipation, diarrhea and melena. Genitourinary: Negative for dysuria, frequency and urgency. Musculoskeletal: Negative for joint pain and myalgias. Skin: Negative for rash. Neurological: Negative for dizziness, focal weakness and headaches. She states her IQ is 72   Psychiatric/Behavioral: Negative for depression. The patient is not nervous/anxious. There were no vitals taken for this visit. Physical Exam  Constitutional:       Appearance: She is well-developed. HENT:      Head: Normocephalic and atraumatic. Right Ear: External ear normal.      Left Ear: External ear normal.   Eyes:      General: No scleral icterus. Conjunctiva/sclera: Conjunctivae normal.      Pupils: Pupils are equal, round, and reactive to light. Neck:      Thyroid: No thyromegaly. Vascular: No JVD. Cardiovascular:      Rate and Rhythm: Normal rate and regular rhythm. Heart sounds: Normal heart sounds. Pulmonary:      Effort: Pulmonary effort is normal.      Breath sounds: Normal breath sounds. Abdominal:      General: Bowel sounds are normal. There is no distension. Tenderness: There is no abdominal tenderness. Musculoskeletal: Normal range of motion. General: No deformity. Lymphadenopathy:      Cervical: No cervical adenopathy. Skin:     General: Skin is dry. Capillary Refill: Capillary refill takes less than 2 seconds. Coloration: Skin is not jaundiced or pale. Findings: No erythema or rash. Comments: Hyperpigmentation of the lower extremity from about mid lower tibia distally. Similar in appearance to stasis changes   Neurological:      Mental Status: She is alert and oriented to person, place, and time. Cranial Nerves: No cranial nerve deficit. Sensory: No sensory deficit (Both feet). Motor: No weakness or abnormal muscle tone.       Coordination: Coordination normal.   Psychiatric:         Behavior: Behavior normal.         Thought Content:  Thought content normal.         Judgment: Judgment normal.      Diabetic foot exam:     Left Foot:   Visual Exam: normal    Pulse DP: 2+ (normal)   Filament test: absent sensation    Vibratory sensation: absent      Right Foot:   Visual Exam: normal    Pulse DP: 2+ (normal)   Filament test: absent sensation    Vibratory sensation: absent      Results for orders placed or performed in visit on 03/24/21   MICROALBUMIN, UR, RAND W/ MICROALB/CREAT RATIO   Result Value Ref Range    Creatinine, urine 49.0 Not Estab. mg/dL    Microalbumin, urine 100.6 Not Estab. ug/mL    Microalb/Creat ratio (ug/mg creat.) 205 (H) 0 - 29 mg/g creat   HEMOGLOBIN A1C WITH EAG   Result Value Ref Range    Hemoglobin A1c 12.9 (H) 4.8 - 5.6 %    Estimated average glucose 324 mg/dL   LIPID PANEL   Result Value Ref Range    Cholesterol, total 143 100 - 199 mg/dL    Triglyceride 231 (H) 0 - 149 mg/dL    HDL Cholesterol 39 (L) >39 mg/dL    VLDL, calculated 38 5 - 40 mg/dL    LDL, calculated 66 0 - 99 mg/dL   METABOLIC PANEL, BASIC   Result Value Ref Range    Glucose 393 (H) 65 - 99 mg/dL    BUN 21 8 - 27 mg/dL    Creatinine 0.81 0.57 - 1.00 mg/dL    GFR est non-AA 78 >59 mL/min/1.73    GFR est AA 90 >59 mL/min/1.73    BUN/Creatinine ratio 26 12 - 28    Sodium 140 134 - 144 mmol/L    Potassium 4.3 3.5 - 5.2 mmol/L    Chloride 100 96 - 106 mmol/L    CO2 24 20 - 29 mmol/L    Calcium 9.0 8.7 - 10.3 mg/dL   CBC WITH AUTOMATED DIFF   Result Value Ref Range    WBC 6.5 3.4 - 10.8 x10E3/uL    RBC 4.53 3.77 - 5.28 x10E6/uL    HGB 13.1 11.1 - 15.9 g/dL    HCT 39.8 34.0 - 46.6 %    MCV 88 79 - 97 fL    MCH 28.9 26.6 - 33.0 pg    MCHC 32.9 31.5 - 35.7 g/dL    RDW 13.2 11.7 - 15.4 %    PLATELET 970 782 - 884 x10E3/uL    NEUTROPHILS 61 Not Estab. %    Lymphocytes 27 Not Estab. %    MONOCYTES 9 Not Estab. %    EOSINOPHILS 1 Not Estab. %    BASOPHILS 1 Not Estab. % ABS. NEUTROPHILS 4.1 1.4 - 7.0 x10E3/uL    Abs Lymphocytes 1.8 0.7 - 3.1 x10E3/uL    ABS. MONOCYTES 0.6 0.1 - 0.9 x10E3/uL    ABS. EOSINOPHILS 0.0 0.0 - 0.4 x10E3/uL    ABS. BASOPHILS 0.0 0.0 - 0.2 x10E3/uL    IMMATURE GRANULOCYTES 1 Not Estab. %    ABS. IMM. GRANS. 0.0 0.0 - 0.1 x10E3/uL       We discussed the expected course, resolution and complications of the diagnosis(es) in detail. Medication risks, benefits, costs, interactions, and alternatives were discussed as indicated. I advised her to contact the office if her condition worsens, changes or fails to improve as anticipated. She expressed understanding with the diagnosis(es) and plan. This note was done with the assistance of dragon speech software.   Some inadvertent errors or omissions may be present

## 2021-04-27 ENCOUNTER — OFFICE VISIT (OUTPATIENT)
Dept: FAMILY MEDICINE CLINIC | Age: 61
End: 2021-04-27
Payer: MEDICAID

## 2021-04-27 VITALS
HEART RATE: 82 BPM | SYSTOLIC BLOOD PRESSURE: 133 MMHG | WEIGHT: 185 LBS | OXYGEN SATURATION: 97 % | RESPIRATION RATE: 16 BRPM | DIASTOLIC BLOOD PRESSURE: 66 MMHG | BODY MASS INDEX: 29.73 KG/M2 | HEIGHT: 66 IN | TEMPERATURE: 98 F

## 2021-04-27 DIAGNOSIS — E11.42 DIABETIC POLYNEUROPATHY ASSOCIATED WITH TYPE 2 DIABETES MELLITUS (HCC): Primary | ICD-10-CM

## 2021-04-27 DIAGNOSIS — Z23 ENCOUNTER FOR IMMUNIZATION: ICD-10-CM

## 2021-04-27 DIAGNOSIS — I10 ESSENTIAL HYPERTENSION, BENIGN: ICD-10-CM

## 2021-04-27 DIAGNOSIS — E78.2 MIXED HYPERLIPIDEMIA: ICD-10-CM

## 2021-04-27 PROCEDURE — 99213 OFFICE O/P EST LOW 20 MIN: CPT | Performed by: EMERGENCY MEDICINE

## 2021-04-27 RX ORDER — ZOSTER VACCINE RECOMBINANT, ADJUVANTED 50 MCG/0.5
0.5 KIT INTRAMUSCULAR ONCE
Qty: 0.5 ML | Refills: 0 | Status: SHIPPED | OUTPATIENT
Start: 2021-04-27 | End: 2021-04-27

## 2021-04-27 RX ORDER — INSULIN GLARGINE 100 [IU]/ML
INJECTION, SOLUTION SUBCUTANEOUS
Qty: 15 ADJUSTABLE DOSE PRE-FILLED PEN SYRINGE | Refills: 11 | Status: SHIPPED | OUTPATIENT
Start: 2021-04-27 | End: 2022-08-10 | Stop reason: SDUPTHER

## 2021-04-27 NOTE — PATIENT INSTRUCTIONS
Hyperlipidemia: After Your Visit  Your Care Instructions  Hyperlipidemia is too much fat in your blood. The body has several kinds of fat, including cholesterol and triglycerides. Your body needs fat for many things, such as making new cells. But too much fat in your blood increases your chances of having a heart attack or stroke. You may be able to lower your cholesterol and triglycerides with a heart-healthy diet, exercise, and if needed, medicine. Your doctor may want you to try lifestyle changes first to see whether they lower the fat in your blood. You may need to take medicine if lifestyle changes do not lower the fat in your blood enough. Follow-up care is a key part of your treatment and safety. Be sure to make and go to all appointments, and call your doctor if you are having problems. Its also a good idea to know your test results and keep a list of the medicines you take. How can you care for yourself at home? Take your medicines  · Take your medicines exactly as prescribed. Call your doctor if you think you are having a problem with your medicine. · If you take medicine to lower your cholesterol, go to follow-up visits. You will need to have blood tests. · Do not take large doses of niacin, which is a B vitamin, while taking medicine called statins. It may increase the chance of muscle pain and liver problems. · Talk to your doctor about avoiding grapefruit juice if you are taking statins. Grapefruit juice can raise the level of this medicine in your blood. This could increase side effects. Eat more fruits, vegetables, and fiber  · Fruits and vegetables have lots of nutrients that help protect against heart disease, and they have little--if any--fat. Try to eat at least five servings a day. Dark green, deep orange, or yellow fruits and vegetables are healthy choices. · Keep carrots, celery, and other veggies handy for snacks.  Buy fruit that is in season and store it where you can see it so that you will be tempted to eat it. Cook dishes that have a lot of veggies in them, such as stir-fries and soups. · Foods high in fiber may reduce your cholesterol and provide important vitamins and minerals. High-fiber foods include whole-grain cereals and breads, oatmeal, beans, brown rice, citrus fruits, and apples. · Buy whole-grain breads and cereals instead of white bread and pastries. Limit saturated fat  · Read food labels and try to avoid saturated fat and trans fat. They increase your risk of heart disease. · Use olive or canola oil when you cook. Try cholesterol-lowering spreads, such as Benecol or Take Control. · Bake, broil, grill, or steam foods instead of frying them. · Limit the amount of high-fat meats you eat, including hot dogs and sausages. Cut out all visible fat when you prepare meat. · Eat fish, skinless poultry, and soy products such as tofu instead of high-fat meats. Soybeans may be especially good for your heart. Eat at least two servings of fish a week. Certain fish, such as salmon, contain omega-3 fatty acids, which may help reduce your risk of heart attack. · Choose low-fat or fat-free milk and dairy products. Get exercise, limit alcohol, and quit smoking  · Get more exercise. Work with your doctor to set up an exercise program. Even if you can do only a small amount, exercise will help you get stronger, have more energy, and manage your weight and your stress. Walking is an easy way to get exercise. Gradually increase the amount you walk every day. Aim for at least 30 minutes on most days of the week. You also may want to swim, bike, or do other activities. · Limit alcohol to no more than 2 drinks a day for men and 1 drink a day for women. · Do not smoke. If you need help quitting, talk to your doctor about stop-smoking programs and medicines. These can increase your chances of quitting for good. When should you call for help?   Call 911 anytime you think you may need emergency care. For example, call if:  · You have symptoms of a heart attack. These may include:  ¨ Chest pain or pressure, or a strange feeling in the chest.  ¨ Sweating. ¨ Shortness of breath. ¨ Nausea or vomiting. ¨ Pain, pressure, or a strange feeling in the back, neck, jaw, or upper belly or in one or both shoulders or arms. ¨ Lightheadedness or sudden weakness. ¨ A fast or irregular heartbeat. After you call 911, the  may tell you to chew 1 adult-strength or 2 to 4 low-dose aspirin. Wait for an ambulance. Do not try to drive yourself. · You have signs of a stroke. These may include:  ¨ Sudden numbness, paralysis, or weakness in your face, arm, or leg, especially on only one side of your body. ¨ New problems with walking or balance. ¨ Sudden vision changes. ¨ Drooling or slurred speech. ¨ New problems speaking or understanding simple statements, or feeling confused. ¨ A sudden, severe headache that is different from past headaches. · You passed out (lost consciousness). Call your doctor now or seek immediate medical care if:  · You have muscle pain or weakness. Watch closely for changes in your health, and be sure to contact your doctor if:  · You are very tired. · You have an upset stomach, gas, constipation, or belly pain or cramps. Where can you learn more? Go to [employer]  Enter C406 in the search box to learn more about \"Hyperlipidemia: After Your Visit. \"   © 5937-9249 Healthwise[employer]. Care instructions adapted under license by [facility] (which disclaims liability or warranty for this information). This care instruction is for use with your licensed healthcare professional. If you have questions about a medical condition or this instruction, always ask your healthcare professional. [place] [address] any warranty or liability for your use of this information.   Content Version: 8.1.638314; Last Revised: October 13, 2011                 Type 2 Diabetes: Care Instructions  Your Care Instructions     Type 2 diabetes is a disease that develops when the body's tissues cannot use insulin properly. Over time, the pancreas cannot make enough insulin. Insulin is a hormone that helps the body's cells use sugar (glucose) for energy. It also helps the body store extra sugar in muscle, fat, and liver cells. Without insulin, the sugar cannot get into the cells to do its work. It stays in the blood instead. This can cause high blood sugar levels. A person has diabetes when the blood sugar stays too high too much of the time. Over time, diabetes can lead to diseases of the heart, blood vessels, nerves, kidneys, and eyes. You may be able to control your blood sugar by losing weight, eating a healthy diet, and getting daily exercise. You may also have to take insulin or other diabetes medicine. Follow-up care is a key part of your treatment and safety. Be sure to make and go to all appointments. Call your doctor if you are having problems. It's also a good idea to know your test results and keep a list of the medicines you take. How can you care for yourself at home? · Keep your blood sugar at a target level (which you set with your doctor). ? Carbohydrate--the body's main source of fuel--affects blood sugar more than any other nutrient. Carbohydrate is in fruits, vegetables, milk, and yogurt. It also is in breads, cereals, vegetables such as potatoes and corn, and sugary foods such as candy and cakes. Follow your meal plan to know how much carbohydrate to eat at each meal and snack. ? Aim for 30 minutes of exercise on most, preferably all, days of the week. Walking is a good choice. You also may want to do other activities, such as running, swimming, cycling, or playing tennis or team sports. Try to do muscle-strengthening exercises at least 2 times a week. ? Take your medicines exactly as prescribed.  Call your doctor if you think you are having a problem with your medicine. You will get more details on the specific medicines your doctor prescribes. · Check your blood sugar as often as your doctor recommends. It is important to keep track of any symptoms you have, such as low blood sugar. Also tell your doctor if you have any changes in your activities, diet, or insulin use. · Talk to your doctor before you start taking aspirin every day. Aspirin can help certain people lower their risk of a heart attack or stroke. But taking aspirin isn't right for everyone, because it can cause serious bleeding. · Do not smoke. If you need help quitting, talk to your doctor about stop-smoking programs and medicines. These can increase your chances of quitting for good. · Keep your cholesterol and blood pressure at normal levels. You may need to take one or more medicines to reach your goals. Take them exactly as directed. Do not stop or change a medicine without talking to your doctor first.  When should you call for help? Call 911 anytime you think you may need emergency care. For example, call if:    · You passed out (lost consciousness), or you suddenly become very sleepy or confused. (You may have very low blood sugar.)   Call your doctor now or seek immediate medical care if:    · Your blood sugar is 300 mg/dL or is higher than the level your doctor has set for you. · You have symptoms of low blood sugar, such as:  ? Sweating. ? Feeling nervous, shaky, and weak. ? Extreme hunger and slight nausea. ? Dizziness and headache.  ? Blurred vision. ? Confusion. Watch closely for changes in your health, and be sure to contact your doctor if:    · You often have problems controlling your blood sugar. · You have symptoms of long-term diabetes problems, such as:  ? New vision changes. ? New pain, numbness, or tingling in your hands or feet. ? Skin problems. Where can you learn more?   Go to http://www.gray.com/  Enter C553 in the search box to learn more about \"Type 2 Diabetes: Care Instructions. \"  Current as of: August 31, 2020               Content Version: 12.8  © 2006-2021 [employer]. Care instructions adapted under license by [employer] (which disclaims liability or warranty for this information). If you have questions about a medical condition or this instruction, always ask your healthcare professional. [address] any warranty or liability for your use of this information. High Blood Pressure: Care Instructions  Overview     It's normal for blood pressure to go up and down throughout the day. But if it stays up, you have high blood pressure. Another name for high blood pressure is hypertension. Despite what a lot of people think, high blood pressure usually doesn't cause headaches or make you feel dizzy or lightheaded. It usually has no symptoms. But it does increase your risk of stroke, heart attack, and other problems. You and your doctor will talk about your risks of these problems based on your blood pressure. Your doctor will give you a goal for your blood pressure. Your goal will be based on your health and your age. Lifestyle changes, such as eating healthy and being active, are always important to help lower blood pressure. You might also take medicine to reach your blood pressure goal.  Follow-up care is a key part of your treatment and safety. Be sure to make and go to all appointments, and call your doctor if you are having problems. It's also a good idea to know your test results and keep a list of the medicines you take. How can you care for yourself at home? Medical treatment  · If you stop taking your medicine, your blood pressure will go back up. You may take one or more types of medicine to lower your blood pressure. Be safe with medicines. Take your medicine exactly as prescribed.  Call your doctor if you think you are having a problem with your medicine. · Talk to your doctor before you start taking aspirin every day. Aspirin can help certain people lower their risk of a heart attack or stroke. But taking aspirin isn't right for everyone, because it can cause serious bleeding. · See your doctor regularly. You may need to see the doctor more often at first or until your blood pressure comes down. · If you are taking blood pressure medicine, talk to your doctor before you take decongestants or anti-inflammatory medicine, such as ibuprofen. Some of these medicines can raise blood pressure. · Learn how to check your blood pressure at home. Lifestyle changes  · Stay at a healthy weight. This is especially important if you put on weight around the waist. Losing even 10 pounds can help you lower your blood pressure. · If your doctor recommends it, get more exercise. Walking is a good choice. Bit by bit, increase the amount you walk every day. Try for at least 30 minutes on most days of the week. You also may want to swim, bike, or do other activities. · Avoid or limit alcohol. Talk to your doctor about whether you can drink any alcohol. · Try to limit how much sodium you eat to less than 2,300 milligrams (mg) a day. Your doctor may ask you to try to eat less than 1,500 mg a day. · Eat plenty of fruits (such as bananas and oranges), vegetables, legumes, whole grains, and low-fat dairy products. · Lower the amount of saturated fat in your diet. Saturated fat is found in animal products such as milk, cheese, and meat. Limiting these foods may help you lose weight and also lower your risk for heart disease. · Do not smoke. Smoking increases your risk for heart attack and stroke. If you need help quitting, talk to your doctor about stop-smoking programs and medicines. These can increase your chances of quitting for good. When should you call for help? Call  911 anytime you think you may need emergency care.  This may mean having symptoms that suggest that your blood pressure is causing a serious heart or blood vessel problem. Your blood pressure may be over 180/120. For example, call 911 if:    · You have symptoms of a heart attack. These may include:  ? Chest pain or pressure, or a strange feeling in the chest.  ? Sweating. ? Shortness of breath. ? Nausea or vomiting. ? Pain, pressure, or a strange feeling in the back, neck, jaw, or upper belly or in one or both shoulders or arms. ? Lightheadedness or sudden weakness. ? A fast or irregular heartbeat. · You have symptoms of a stroke. These may include:  ? Sudden numbness, tingling, weakness, or loss of movement in your face, arm, or leg, especially on only one side of your body. ? Sudden vision changes. ? Sudden trouble speaking. ? Sudden confusion or trouble understanding simple statements. ? Sudden problems with walking or balance. ? A sudden, severe headache that is different from past headaches. · You have severe back or belly pain. Do not wait until your blood pressure comes down on its own. Get help right away. Call your doctor now or seek immediate care if:    · Your blood pressure is much higher than normal (such as 180/120 or higher), but you don't have symptoms. · You think high blood pressure is causing symptoms, such as:  ? Severe headache.  ? Blurry vision. Watch closely for changes in your health, and be sure to contact your doctor if:    · Your blood pressure measures higher than your doctor recommends at least 2 times. That means the top number is higher or the bottom number is higher, or both. · You think you may be having side effects from your blood pressure medicine. Where can you learn more? Go to http://www.gray.com/  Enter [id number] in the search box to learn more about \"High Blood Pressure: Care Instructions. \"  Current as of: August 31, 2020               Content Version: 12.8  © 2402-2155 Healthwise, [facility].    Care instructions adapted under license by [email or web address] (which disclaims liability or warranty for this information). If you have questions about a medical condition or this instruction, always ask your healthcare professional. [address] any warranty or liability for your use of this information.

## 2021-04-27 NOTE — PROGRESS NOTES
Imogene Pallas is a 56 y.o. female that is here for a   Chief Complaint   Patient presents with    Follow Up Chronic Condition     HTN DM Cholesterol     Medication Refill     Lantus          1. Have you been to the ER, urgent care clinic since your last visit? Hospitalized since your last visit?no    2. Have you seen or consulted any other health care providers outside of the [address] [place] [address] since your last visit? Include any pap smears or colon screening.  no      Health Maintenance reviewed - yes      Upcoming Appts  no      VORB: No orders of the defined types were placed in this encounter.   Adri Lay MD/ Juanis Heredia MA

## 2021-07-20 ENCOUNTER — LAB ONLY (OUTPATIENT)
Dept: FAMILY MEDICINE CLINIC | Age: 61
End: 2021-07-20
Payer: MEDICAID

## 2021-07-20 DIAGNOSIS — Z01.89 ENCOUNTER FOR LABORATORY TEST: Primary | ICD-10-CM

## 2021-07-20 DIAGNOSIS — E78.2 MIXED HYPERLIPIDEMIA: ICD-10-CM

## 2021-07-20 DIAGNOSIS — E11.42 DIABETIC POLYNEUROPATHY ASSOCIATED WITH TYPE 2 DIABETES MELLITUS (HCC): ICD-10-CM

## 2021-07-20 PROCEDURE — 36415 COLL VENOUS BLD VENIPUNCTURE: CPT | Performed by: EMERGENCY MEDICINE

## 2021-07-20 NOTE — PROGRESS NOTES
Patient in for labs today. Labs ordered by PCP. Venipuncture to the right arm. Patient tolerated well and there are no concerns.

## 2021-07-21 LAB
ALBUMIN SERPL-MCNC: 4.2 G/DL (ref 3.8–4.8)
ALBUMIN/CREAT UR: 326 MG/G CREAT (ref 0–29)
ALBUMIN/GLOB SERPL: 1.6 {RATIO} (ref 1.2–2.2)
ALP SERPL-CCNC: 161 IU/L (ref 48–121)
ALT SERPL-CCNC: 14 IU/L (ref 0–32)
AST SERPL-CCNC: 10 IU/L (ref 0–40)
BASOPHILS # BLD AUTO: 0.1 X10E3/UL (ref 0–0.2)
BASOPHILS NFR BLD AUTO: 1 %
BILIRUB SERPL-MCNC: 0.6 MG/DL (ref 0–1.2)
BUN SERPL-MCNC: 31 MG/DL (ref 8–27)
BUN/CREAT SERPL: 29 (ref 12–28)
CALCIUM SERPL-MCNC: 8.3 MG/DL (ref 8.7–10.3)
CHLORIDE SERPL-SCNC: 97 MMOL/L (ref 96–106)
CHOLEST SERPL-MCNC: 124 MG/DL (ref 100–199)
CO2 SERPL-SCNC: 23 MMOL/L (ref 20–29)
CREAT SERPL-MCNC: 1.07 MG/DL (ref 0.57–1)
CREAT UR-MCNC: 82.6 MG/DL
EOSINOPHIL # BLD AUTO: 0.1 X10E3/UL (ref 0–0.4)
EOSINOPHIL NFR BLD AUTO: 1 %
ERYTHROCYTE [DISTWIDTH] IN BLOOD BY AUTOMATED COUNT: 13.5 % (ref 11.7–15.4)
EST. AVERAGE GLUCOSE BLD GHB EST-MCNC: 315 MG/DL
GLOBULIN SER CALC-MCNC: 2.7 G/DL (ref 1.5–4.5)
GLUCOSE SERPL-MCNC: 350 MG/DL (ref 65–99)
HBA1C MFR BLD: 12.6 % (ref 4.8–5.6)
HCT VFR BLD AUTO: 38.8 % (ref 34–46.6)
HDLC SERPL-MCNC: 33 MG/DL
HGB BLD-MCNC: 12.6 G/DL (ref 11.1–15.9)
IMM GRANULOCYTES # BLD AUTO: 0 X10E3/UL (ref 0–0.1)
IMM GRANULOCYTES NFR BLD AUTO: 0 %
LDLC SERPL CALC-MCNC: 65 MG/DL (ref 0–99)
LYMPHOCYTES # BLD AUTO: 2.2 X10E3/UL (ref 0.7–3.1)
LYMPHOCYTES NFR BLD AUTO: 25 %
MCH RBC QN AUTO: 28.5 PG (ref 26.6–33)
MCHC RBC AUTO-ENTMCNC: 32.5 G/DL (ref 31.5–35.7)
MCV RBC AUTO: 88 FL (ref 79–97)
MICROALBUMIN UR-MCNC: 269.3 UG/ML
MONOCYTES # BLD AUTO: 0.6 X10E3/UL (ref 0.1–0.9)
MONOCYTES NFR BLD AUTO: 7 %
NEUTROPHILS # BLD AUTO: 5.9 X10E3/UL (ref 1.4–7)
NEUTROPHILS NFR BLD AUTO: 66 %
PLATELET # BLD AUTO: 239 X10E3/UL (ref 150–450)
POTASSIUM SERPL-SCNC: 4 MMOL/L (ref 3.5–5.2)
PROT SERPL-MCNC: 6.9 G/DL (ref 6–8.5)
RBC # BLD AUTO: 4.42 X10E6/UL (ref 3.77–5.28)
SODIUM SERPL-SCNC: 136 MMOL/L (ref 134–144)
TRIGL SERPL-MCNC: 146 MG/DL (ref 0–149)
VLDLC SERPL CALC-MCNC: 26 MG/DL (ref 5–40)
WBC # BLD AUTO: 8.9 X10E3/UL (ref 3.4–10.8)

## 2021-07-21 NOTE — PROGRESS NOTES
07/27/21          ICD-10-CM ICD-9-CM    1. Essential hypertension, benign  I10 401.1 CBC WITH AUTOMATED DIFF   2. Type 2 diabetes mellitus without complication, without long-term current use of insulin (HCC)  E11.9 250.00 HEMOGLOBIN A1C WITH EAG      MICROALBUMIN, UR, RAND W/ MICROALB/CREAT RATIO      METABOLIC PANEL, COMPREHENSIVE   3. Mixed hyperlipidemia  E78.2 272.2 LIPID PANEL   4. Screening for colon cancer  Z12.11 V76.51 OCCULT BLOOD IMMUNOASSAY,DIAGNOSTIC   5. Pain in both lower extremities  M79.604 729.5 DUPLEX LOW EXT ARTERIES WITH PATTI    M79.605         Assessment and plan  The patient has essential hypertension which is in good control  Type 2 diabetes mellitus. Previously poorly controlled. Follow-up labs ordered. Med adjustments may be indicated. She also has hyperlipidemia, primarily hypertriglyceridemia. Follow-up labs ordered and make adjustments may be indicated. Lab results and schedule of future lab studies reviewed with patient  Diagnostic and radiologic results and the schedule of future studies were reviewed with the patient  reviewed diet, exercise and weight control  All questions were answered and understood. .  Continue present regimen. Type 2 diabetes mellitus with evidence of peripheral neuropathy, not in good control, follow-up labs ordered. May require referral back to Pharm. D. or endocrinology. Mixed hyperlipidemia primarily triglyceridemia. Follow-up labs consider adding TriCor. We will allow a fit test screening for colon cancer. Claudication-like symptoms in the lower extremities on ambulation. Duplex studies of the lower extremities ordered.     Health Maintenance Due   Topic Date Due    Eye Exam Retinal or Dilated  Never done    COVID-19 Vaccine (1) Never done    DTaP/Tdap/Td series (1 - Tdap) Never done    Colorectal Cancer Screening Combo  Never done    Shingrix Vaccine Age 50> (1 of 2) Never done    Breast Cancer Screen Mammogram  Never done   Declines eye follow up  Covid vaccines done , two doses  Declines mammogram or colonoscopy    Subjective:   Maryam Perez is a 56 y.o. female has Mixed hyperlipidemia, Essential hypertension, benign, Arthralgia, Dermatitis, Abscess of skin of abdomen, and Uncontrolled type 2 diabetes mellitus with hyperglycemia ([address] on their problem list.. No chief complaint on file. The patient was seen 4/27/2021 for diabetes with diabetic polyneuropathy. She had been out of medication for 2 months and started the insulin. The patient declined the Jardiance. Follow-up labs were ordered. Hypertension, good control. Hyperlipidemia good control. Labs 7/20/2021. CBC within normal limits. CMP shows a glucose of 350, hemoglobin A1c of 12.6, creatinine of 1.07 with a BUN of 31. GFR 64. Lipid studies were within normal limits  Hypertension  The patient has no headaches, visual changes, chest pain or pressure,dyspnea, orthopnea, or PND. There is no problem with the medication. Diabetes mellitus  The patient denies polyuria, polydipsia, or polyphagia. The patient denies any other aggravating or relieving factors at this time there has been no problem with the medications. She has symptoms of neuropathy with numbness and tingling of the lower extremities. Blood sugar 200 at home  Hyperlipidemia   The patient denies any myalgias or weakness. No abdominal discomfort admitted to. The patient denies any difficulty with or side effects from the medication.     BP Readings from Last 3 Encounters:   04/27/21 133/66   03/24/21 (!) 147/69   02/19/20 142/76         Lab Results   Component Value Date/Time    Hemoglobin A1c 12.9 (H) 03/24/2021 08:09 AM    Hemoglobin A1c (POC) 10.4 10/02/2018 02:43 PM     Lab Results   Component Value Date/Time    Cholesterol, total 143 03/24/2021 08:09 AM    HDL Cholesterol 39 (L) 03/24/2021 08:09 AM    LDL, calculated 66 03/24/2021 08:09 AM    LDL, calculated 56 11/27/2017 09:15 AM    VLDL, calculated 38 03/24/2021 08:09 AM    VLDL, calculated 41 (H) 11/27/2017 09:15 AM    Triglyceride 231 (H) 03/24/2021 08:09 AM     Lower extremity pain  The patient admits to lower extremity pain. The onset was 6 months prior. The pain is precipitated by walking. It is located in the calves. It is relieved by rest.  There is no position change. No fevers chills or sweats admitted to. No redness or paleness of the lower extremities are admitted to. No coolness is admitted to. This is a new symptom. It has not been evaluated in the past.      Current Outpatient Medications   Medication Sig    insulin glargine (Lantus Solostar U-100 Insulin) 100 unit/mL (3 mL) inpn INJECT 50 UNITS UNDER THE SKIN ONCE DAILY    empagliflozin (Jardiance) 25 mg tablet Take 1 Tab by mouth daily.  gabapentin (NEURONTIN) 100 mg capsule Take one at night for a week then 3 at night.  Insulin Needles, Disposable, (Pen Needle) 32 gauge x 5/32\" ndle For insulin shots    glimepiride (AMARYL) 2 mg tablet TAKE 1 TABLET BY MOUTH TWO TIMES A DAY    amLODIPine (NORVASC) 5 mg tablet TAKE 1 TABLET BY MOUTH ONCE DAILY    losartan (COZAAR) 100 mg tablet TAKE 1 TABLET BY MOUTH ONCE DAILY    metFORMIN ER (GLUCOPHAGE XR) 750 mg tablet TAKE 1 TABLET BY MOUTH TWO TIMES A DAY    hydroCHLOROthiazide (HYDRODIURIL) 25 mg tablet TAKE 1/2 TABLET BY MOUTH ONCE DAILY    atorvastatin (Lipitor) 40 mg tablet 1 tablet daily    insulin glargine (LANTUS SOLOSTAR U-100 INSULIN) 100 unit/mL (3 mL) inpn INJECT 40 UNITS UNDER THE SKIN ONCE DAILY ( 37 DAYS )    glucose blood VI test strips (TRUE METRIX GLUCOSE TEST STRIP) strip Test Blood Glucose once daily; ICD 10 E11.9, Quantity 100    Blood-Glucose Meter (TRUE METRIX AIR GLUCOSE METER) monitoring kit Test Blood Glucose once daily; ICD 10 E11.9     No current facility-administered medications for this visit.      Allergies   Allergen Reactions    Clindamycin Hives    Sulfa (Sulfonamide Antibiotics) Unable to Obtain  Lisinopril Cough     has Mixed hyperlipidemia, Essential hypertension, benign, Arthralgia, Dermatitis, Abscess of skin of abdomen, and Uncontrolled type 2 diabetes mellitus with hyperglycemia ([address] on their problem list.    Past Surgical History:   Procedure Laterality Date    HX HYSTERECTOMY  1990s    X2      reports that she has never smoked. She has never used smokeless tobacco. She reports that she does not drink alcohol and does not use drugs. family history includes Diabetes in her mother and sister; Hypertension in her sister; Stroke in her maternal grandfather and maternal grandmother. Review of Systems   Constitutional: Negative for chills and fever. HENT: Negative for hearing loss. Eyes: Negative for blurred vision, discharge and redness. Respiratory: Negative for cough and shortness of breath. Cardiovascular: Negative for chest pain, palpitations, orthopnea and leg swelling. Gastrointestinal: Negative for abdominal pain, blood in stool, constipation, diarrhea and melena. Corn salad and greens cause diarrhea   Genitourinary: Negative for dysuria, frequency and urgency. Musculoskeletal: Negative for joint pain and myalgias. Skin: Negative for rash. Neurological: Negative for dizziness, focal weakness and headaches. She states her IQ is 72   Psychiatric/Behavioral: Negative for depression. The patient is not nervous/anxious. Visit Vitals  /76 (BP 1 Location: Left arm, BP Patient Position: Sitting, BP Cuff Size: Large adult)   Pulse 90   Temp 98 °F (36.7 °C) (Oral)   Resp 16   Ht 5' 6\" (1.676 m)   Wt 175 lb (79.4 kg)   SpO2 96%   BMI 28.25 kg/m²       Physical Exam  Constitutional:       Appearance: She is well-developed. HENT:      Head: Normocephalic and atraumatic. Right Ear: External ear normal.      Left Ear: External ear normal.   Eyes:      General: No scleral icterus.      Conjunctiva/sclera: Conjunctivae normal.      Pupils: Pupils are equal, round, and reactive to light. Neck:      Thyroid: No thyromegaly. Vascular: No JVD. Cardiovascular:      Rate and Rhythm: Normal rate and regular rhythm. Heart sounds: Normal heart sounds. Pulmonary:      Effort: Pulmonary effort is normal.      Breath sounds: Normal breath sounds. Abdominal:      General: Bowel sounds are normal. There is no distension. Tenderness: There is no abdominal tenderness. Musculoskeletal:         General: No swelling, tenderness, deformity or signs of injury. Normal range of motion. Right lower leg: No edema. Left lower leg: No edema. Lymphadenopathy:      Cervical: No cervical adenopathy. Skin:     General: Skin is dry. Capillary Refill: Capillary refill takes less than 2 seconds. Coloration: Skin is not jaundiced or pale. Findings: No erythema, lesion or rash. Comments: Hyperpigmentation of the lower extremity from about mid lower tibia distally. Similar in appearance to stasis changes   Neurological:      Mental Status: She is alert and oriented to person, place, and time. Cranial Nerves: No cranial nerve deficit. Sensory: No sensory deficit (Both feet). Motor: No weakness or abnormal muscle tone. Coordination: Coordination normal.   Psychiatric:         Behavior: Behavior normal.         Thought Content: Thought content normal.         Judgment: Judgment normal.      Diabetic foot exam:    No sensation or vibration to mid shin. Left Foot:   Visual Exam: normal    Pulse DP: trace   Filament test: absent sensation Restarted mid shin   Vibratory sensation: absent restarted mid tibia      Right Foot:   Visual Exam: normal    Pulse DP: 1+ (weak)   Filament test: reduced sensation Restarts at mid shin   Vibratory sensation: absent restarts at mid shin        We discussed the expected course, resolution and complications of the diagnosis(es) in detail.   Medication risks, benefits, costs, interactions, and alternatives were discussed as indicated. I advised her to contact the office if her condition worsens, changes or fails to improve as anticipated. She expressed understanding with the diagnosis(es) and plan. This note was done with the assistance of dragon speech software.   Some inadvertent errors or omissions may be present

## 2021-07-27 ENCOUNTER — OFFICE VISIT (OUTPATIENT)
Dept: FAMILY MEDICINE CLINIC | Age: 61
End: 2021-07-27
Payer: MEDICAID

## 2021-07-27 VITALS
SYSTOLIC BLOOD PRESSURE: 113 MMHG | TEMPERATURE: 98 F | RESPIRATION RATE: 16 BRPM | WEIGHT: 175 LBS | DIASTOLIC BLOOD PRESSURE: 76 MMHG | HEIGHT: 66 IN | BODY MASS INDEX: 28.12 KG/M2 | HEART RATE: 90 BPM | OXYGEN SATURATION: 96 %

## 2021-07-27 DIAGNOSIS — M79.605 PAIN IN BOTH LOWER EXTREMITIES: ICD-10-CM

## 2021-07-27 DIAGNOSIS — E11.9 TYPE 2 DIABETES MELLITUS WITHOUT COMPLICATION, WITHOUT LONG-TERM CURRENT USE OF INSULIN (HCC): ICD-10-CM

## 2021-07-27 DIAGNOSIS — Z12.11 SCREENING FOR COLON CANCER: ICD-10-CM

## 2021-07-27 DIAGNOSIS — I10 ESSENTIAL HYPERTENSION, BENIGN: Primary | ICD-10-CM

## 2021-07-27 DIAGNOSIS — E78.2 MIXED HYPERLIPIDEMIA: ICD-10-CM

## 2021-07-27 DIAGNOSIS — M79.604 PAIN IN BOTH LOWER EXTREMITIES: ICD-10-CM

## 2021-07-27 PROCEDURE — 99214 OFFICE O/P EST MOD 30 MIN: CPT | Performed by: EMERGENCY MEDICINE

## 2021-07-27 RX ORDER — ZOSTER VACCINE RECOMBINANT, ADJUVANTED 50 MCG/0.5
0.5 KIT INTRAMUSCULAR ONCE
Qty: 0.5 ML | Refills: 0 | Status: CANCELLED | OUTPATIENT
Start: 2021-07-27 | End: 2021-07-27

## 2021-07-27 NOTE — PROGRESS NOTES
Mitul Amos is a 56 y.o. female that is here for a   Chief Complaint   Patient presents with    Follow Up Chronic Condition     HTN DM          1. Have you been to the ER, urgent care clinic since your last visit? Hospitalized since your last visit?no    2. Have you seen or consulted any other health care providers outside of the [address] [place] [address] since your last visit? Include any pap smears or colon screening.  no      Health Maintenance reviewed - yes      Upcoming Appts  no      VORB: No orders of the defined types were placed in this encounter.   Jakob Stokes MD/ Nico Brand MA

## 2021-07-27 NOTE — PATIENT INSTRUCTIONS
Hyperlipidemia: After Your Visit  Your Care Instructions  Hyperlipidemia is too much fat in your blood. The body has several kinds of fat, including cholesterol and triglycerides. Your body needs fat for many things, such as making new cells. But too much fat in your blood increases your chances of having a heart attack or stroke. You may be able to lower your cholesterol and triglycerides with a heart-healthy diet, exercise, and if needed, medicine. Your doctor may want you to try lifestyle changes first to see whether they lower the fat in your blood. You may need to take medicine if lifestyle changes do not lower the fat in your blood enough. Follow-up care is a key part of your treatment and safety. Be sure to make and go to all appointments, and call your doctor if you are having problems. Its also a good idea to know your test results and keep a list of the medicines you take. How can you care for yourself at home? Take your medicines  · Take your medicines exactly as prescribed. Call your doctor if you think you are having a problem with your medicine. · If you take medicine to lower your cholesterol, go to follow-up visits. You will need to have blood tests. · Do not take large doses of niacin, which is a B vitamin, while taking medicine called statins. It may increase the chance of muscle pain and liver problems. · Talk to your doctor about avoiding grapefruit juice if you are taking statins. Grapefruit juice can raise the level of this medicine in your blood. This could increase side effects. Eat more fruits, vegetables, and fiber  · Fruits and vegetables have lots of nutrients that help protect against heart disease, and they have little--if any--fat. Try to eat at least five servings a day. Dark green, deep orange, or yellow fruits and vegetables are healthy choices. · Keep carrots, celery, and other veggies handy for snacks.  Buy fruit that is in season and store it where you can see it so that you will be tempted to eat it. Cook dishes that have a lot of veggies in them, such as stir-fries and soups. · Foods high in fiber may reduce your cholesterol and provide important vitamins and minerals. High-fiber foods include whole-grain cereals and breads, oatmeal, beans, brown rice, citrus fruits, and apples. · Buy whole-grain breads and cereals instead of white bread and pastries. Limit saturated fat  · Read food labels and try to avoid saturated fat and trans fat. They increase your risk of heart disease. · Use olive or canola oil when you cook. Try cholesterol-lowering spreads, such as Benecol or Take Control. · Bake, broil, grill, or steam foods instead of frying them. · Limit the amount of high-fat meats you eat, including hot dogs and sausages. Cut out all visible fat when you prepare meat. · Eat fish, skinless poultry, and soy products such as tofu instead of high-fat meats. Soybeans may be especially good for your heart. Eat at least two servings of fish a week. Certain fish, such as salmon, contain omega-3 fatty acids, which may help reduce your risk of heart attack. · Choose low-fat or fat-free milk and dairy products. Get exercise, limit alcohol, and quit smoking  · Get more exercise. Work with your doctor to set up an exercise program. Even if you can do only a small amount, exercise will help you get stronger, have more energy, and manage your weight and your stress. Walking is an easy way to get exercise. Gradually increase the amount you walk every day. Aim for at least 30 minutes on most days of the week. You also may want to swim, bike, or do other activities. · Limit alcohol to no more than 2 drinks a day for men and 1 drink a day for women. · Do not smoke. If you need help quitting, talk to your doctor about stop-smoking programs and medicines. These can increase your chances of quitting for good. When should you call for help?   Call 911 anytime you think you may need emergency care. For example, call if:  · You have symptoms of a heart attack. These may include:  ¨ Chest pain or pressure, or a strange feeling in the chest.  ¨ Sweating. ¨ Shortness of breath. ¨ Nausea or vomiting. ¨ Pain, pressure, or a strange feeling in the back, neck, jaw, or upper belly or in one or both shoulders or arms. ¨ Lightheadedness or sudden weakness. ¨ A fast or irregular heartbeat. After you call 911, the  may tell you to chew 1 adult-strength or 2 to 4 low-dose aspirin. Wait for an ambulance. Do not try to drive yourself. · You have signs of a stroke. These may include:  ¨ Sudden numbness, paralysis, or weakness in your face, arm, or leg, especially on only one side of your body. ¨ New problems with walking or balance. ¨ Sudden vision changes. ¨ Drooling or slurred speech. ¨ New problems speaking or understanding simple statements, or feeling confused. ¨ A sudden, severe headache that is different from past headaches. · You passed out (lost consciousness). Call your doctor now or seek immediate medical care if:  · You have muscle pain or weakness. Watch closely for changes in your health, and be sure to contact your doctor if:  · You are very tired. · You have an upset stomach, gas, constipation, or belly pain or cramps. Where can you learn more? Go to 48domain.be  Enter C406 in the search box to learn more about \"Hyperlipidemia: After Your Visit. \"   © 1426-5427 Health[employer]. Care instructions adapted under license by [employer] (which disclaims liability or warranty for this information). This care instruction is for use with your licensed healthcare professional. If you have questions about a medical condition or this instruction, always ask your healthcare professional. [place] [address] any warranty or liability for your use of this information.   Content Version: 4.4.690284; Last Revised: October 13, 2011                 Type 2 Diabetes: Care Instructions  Your Care Instructions     Type 2 diabetes is a disease that develops when the body's tissues cannot use insulin properly. Over time, the pancreas cannot make enough insulin. Insulin is a hormone that helps the body's cells use sugar (glucose) for energy. It also helps the body store extra sugar in muscle, fat, and liver cells. Without insulin, the sugar cannot get into the cells to do its work. It stays in the blood instead. This can cause high blood sugar levels. A person has diabetes when the blood sugar stays too high too much of the time. Over time, diabetes can lead to diseases of the heart, blood vessels, nerves, kidneys, and eyes. You may be able to control your blood sugar by losing weight, eating a healthy diet, and getting daily exercise. You may also have to take insulin or other diabetes medicine. Follow-up care is a key part of your treatment and safety. Be sure to make and go to all appointments. Call your doctor if you are having problems. It's also a good idea to know your test results and keep a list of the medicines you take. How can you care for yourself at home? · Keep your blood sugar at a target level (which you set with your doctor). ? Carbohydrate--the body's main source of fuel--affects blood sugar more than any other nutrient. Carbohydrate is in fruits, vegetables, milk, and yogurt. It also is in breads, cereals, vegetables such as potatoes and corn, and sugary foods such as candy and cakes. Follow your meal plan to know how much carbohydrate to eat at each meal and snack. ? Aim for 30 minutes of exercise on most, preferably all, days of the week. Walking is a good choice. You also may want to do other activities, such as running, swimming, cycling, or playing tennis or team sports. Try to do muscle-strengthening exercises at least 2 times a week. ? Take your medicines exactly as prescribed.  Call your doctor if you think you are having a problem with your medicine. You will get more details on the specific medicines your doctor prescribes. · Check your blood sugar as often as your doctor recommends. It is important to keep track of any symptoms you have, such as low blood sugar. Also tell your doctor if you have any changes in your activities, diet, or insulin use. · Talk to your doctor before you start taking aspirin every day. Aspirin can help certain people lower their risk of a heart attack or stroke. But taking aspirin isn't right for everyone, because it can cause serious bleeding. · Do not smoke. If you need help quitting, talk to your doctor about stop-smoking programs and medicines. These can increase your chances of quitting for good. · Keep your cholesterol and blood pressure at normal levels. You may need to take one or more medicines to reach your goals. Take them exactly as directed. Do not stop or change a medicine without talking to your doctor first.  When should you call for help? Call 911 anytime you think you may need emergency care. For example, call if:    · You passed out (lost consciousness), or you suddenly become very sleepy or confused. (You may have very low blood sugar.)   Call your doctor now or seek immediate medical care if:    · Your blood sugar is 300 mg/dL or is higher than the level your doctor has set for you. · You have symptoms of low blood sugar, such as:  ? Sweating. ? Feeling nervous, shaky, and weak. ? Extreme hunger and slight nausea. ? Dizziness and headache.  ? Blurred vision. ? Confusion. Watch closely for changes in your health, and be sure to contact your doctor if:    · You often have problems controlling your blood sugar. · You have symptoms of long-term diabetes problems, such as:  ? New vision changes. ? New pain, numbness, or tingling in your hands or feet. ? Skin problems. Where can you learn more?   Go to http://www.gray.com/  Enter C553 in the search box to learn more about \"Type 2 Diabetes: Care Instructions. \"  Current as of: August 31, 2020               Content Version: 12.8  © 2006-2021 [employer]. Care instructions adapted under license by [employer] (which disclaims liability or warranty for this information). If you have questions about a medical condition or this instruction, always ask your healthcare professional. [address] any warranty or liability for your use of this information. High Blood Pressure: Care Instructions  Overview     It's normal for blood pressure to go up and down throughout the day. But if it stays up, you have high blood pressure. Another name for high blood pressure is hypertension. Despite what a lot of people think, high blood pressure usually doesn't cause headaches or make you feel dizzy or lightheaded. It usually has no symptoms. But it does increase your risk of stroke, heart attack, and other problems. You and your doctor will talk about your risks of these problems based on your blood pressure. Your doctor will give you a goal for your blood pressure. Your goal will be based on your health and your age. Lifestyle changes, such as eating healthy and being active, are always important to help lower blood pressure. You might also take medicine to reach your blood pressure goal.  Follow-up care is a key part of your treatment and safety. Be sure to make and go to all appointments, and call your doctor if you are having problems. It's also a good idea to know your test results and keep a list of the medicines you take. How can you care for yourself at home? Medical treatment  · If you stop taking your medicine, your blood pressure will go back up. You may take one or more types of medicine to lower your blood pressure. Be safe with medicines. Take your medicine exactly as prescribed.  Call your doctor if you think you are having a problem with your medicine. · Talk to your doctor before you start taking aspirin every day. Aspirin can help certain people lower their risk of a heart attack or stroke. But taking aspirin isn't right for everyone, because it can cause serious bleeding. · See your doctor regularly. You may need to see the doctor more often at first or until your blood pressure comes down. · If you are taking blood pressure medicine, talk to your doctor before you take decongestants or anti-inflammatory medicine, such as ibuprofen. Some of these medicines can raise blood pressure. · Learn how to check your blood pressure at home. Lifestyle changes  · Stay at a healthy weight. This is especially important if you put on weight around the waist. Losing even 10 pounds can help you lower your blood pressure. · If your doctor recommends it, get more exercise. Walking is a good choice. Bit by bit, increase the amount you walk every day. Try for at least 30 minutes on most days of the week. You also may want to swim, bike, or do other activities. · Avoid or limit alcohol. Talk to your doctor about whether you can drink any alcohol. · Try to limit how much sodium you eat to less than 2,300 milligrams (mg) a day. Your doctor may ask you to try to eat less than 1,500 mg a day. · Eat plenty of fruits (such as bananas and oranges), vegetables, legumes, whole grains, and low-fat dairy products. · Lower the amount of saturated fat in your diet. Saturated fat is found in animal products such as milk, cheese, and meat. Limiting these foods may help you lose weight and also lower your risk for heart disease. · Do not smoke. Smoking increases your risk for heart attack and stroke. If you need help quitting, talk to your doctor about stop-smoking programs and medicines. These can increase your chances of quitting for good. When should you call for help? Call  911 anytime you think you may need emergency care.  This may mean having symptoms that suggest that your blood pressure is causing a serious heart or blood vessel problem. Your blood pressure may be over 180/120. For example, call 911 if:    · You have symptoms of a heart attack. These may include:  ? Chest pain or pressure, or a strange feeling in the chest.  ? Sweating. ? Shortness of breath. ? Nausea or vomiting. ? Pain, pressure, or a strange feeling in the back, neck, jaw, or upper belly or in one or both shoulders or arms. ? Lightheadedness or sudden weakness. ? A fast or irregular heartbeat. · You have symptoms of a stroke. These may include:  ? Sudden numbness, tingling, weakness, or loss of movement in your face, arm, or leg, especially on only one side of your body. ? Sudden vision changes. ? Sudden trouble speaking. ? Sudden confusion or trouble understanding simple statements. ? Sudden problems with walking or balance. ? A sudden, severe headache that is different from past headaches. · You have severe back or belly pain. Do not wait until your blood pressure comes down on its own. Get help right away. Call your doctor now or seek immediate care if:    · Your blood pressure is much higher than normal (such as 180/120 or higher), but you don't have symptoms. · You think high blood pressure is causing symptoms, such as:  ? Severe headache.  ? Blurry vision. Watch closely for changes in your health, and be sure to contact your doctor if:    · Your blood pressure measures higher than your doctor recommends at least 2 times. That means the top number is higher or the bottom number is higher, or both. · You think you may be having side effects from your blood pressure medicine. Where can you learn more? Go to http://www.gray.com/  Enter [id number] in the search box to learn more about \"High Blood Pressure: Care Instructions. \"  Current as of: August 31, 2020               Content Version: 12.8  © 9373-1063 Healthwise, [facility].    Care instructions adapted under license by [employer] (which disclaims liability or warranty for this information). If you have questions about a medical condition or this instruction, always ask your healthcare professional. [address] any warranty or liability for your use of this information.

## 2021-07-28 LAB
ALBUMIN SERPL-MCNC: 4.3 G/DL (ref 3.8–4.8)
ALBUMIN/CREAT UR: 525 MG/G CREAT (ref 0–29)
ALBUMIN/GLOB SERPL: 1.9 {RATIO} (ref 1.2–2.2)
ALP SERPL-CCNC: 175 IU/L (ref 48–121)
ALT SERPL-CCNC: 14 IU/L (ref 0–32)
AST SERPL-CCNC: 12 IU/L (ref 0–40)
BASOPHILS # BLD AUTO: 0.1 X10E3/UL (ref 0–0.2)
BASOPHILS NFR BLD AUTO: 1 %
BILIRUB SERPL-MCNC: 0.5 MG/DL (ref 0–1.2)
BUN SERPL-MCNC: 25 MG/DL (ref 8–27)
BUN/CREAT SERPL: 26 (ref 12–28)
CALCIUM SERPL-MCNC: 8.4 MG/DL (ref 8.7–10.3)
CHLORIDE SERPL-SCNC: 96 MMOL/L (ref 96–106)
CHOLEST SERPL-MCNC: 136 MG/DL (ref 100–199)
CO2 SERPL-SCNC: 25 MMOL/L (ref 20–29)
CREAT SERPL-MCNC: 0.96 MG/DL (ref 0.57–1)
CREAT UR-MCNC: 67.4 MG/DL
EOSINOPHIL # BLD AUTO: 0.1 X10E3/UL (ref 0–0.4)
EOSINOPHIL NFR BLD AUTO: 1 %
ERYTHROCYTE [DISTWIDTH] IN BLOOD BY AUTOMATED COUNT: 13.3 % (ref 11.7–15.4)
EST. AVERAGE GLUCOSE BLD GHB EST-MCNC: 312 MG/DL
GLOBULIN SER CALC-MCNC: 2.3 G/DL (ref 1.5–4.5)
GLUCOSE SERPL-MCNC: 335 MG/DL (ref 65–99)
HBA1C MFR BLD: 12.5 % (ref 4.8–5.6)
HCT VFR BLD AUTO: 38 % (ref 34–46.6)
HDLC SERPL-MCNC: 37 MG/DL
HGB BLD-MCNC: 12.4 G/DL (ref 11.1–15.9)
IMM GRANULOCYTES # BLD AUTO: 0 X10E3/UL (ref 0–0.1)
IMM GRANULOCYTES NFR BLD AUTO: 0 %
LDLC SERPL CALC-MCNC: 68 MG/DL (ref 0–99)
LYMPHOCYTES # BLD AUTO: 2.3 X10E3/UL (ref 0.7–3.1)
LYMPHOCYTES NFR BLD AUTO: 33 %
MCH RBC QN AUTO: 29.2 PG (ref 26.6–33)
MCHC RBC AUTO-ENTMCNC: 32.6 G/DL (ref 31.5–35.7)
MCV RBC AUTO: 90 FL (ref 79–97)
MICROALBUMIN UR-MCNC: 354.1 UG/ML
MONOCYTES # BLD AUTO: 0.5 X10E3/UL (ref 0.1–0.9)
MONOCYTES NFR BLD AUTO: 7 %
NEUTROPHILS # BLD AUTO: 4.1 X10E3/UL (ref 1.4–7)
NEUTROPHILS NFR BLD AUTO: 58 %
PLATELET # BLD AUTO: 233 X10E3/UL (ref 150–450)
POTASSIUM SERPL-SCNC: 4.3 MMOL/L (ref 3.5–5.2)
PROT SERPL-MCNC: 6.6 G/DL (ref 6–8.5)
RBC # BLD AUTO: 4.24 X10E6/UL (ref 3.77–5.28)
SODIUM SERPL-SCNC: 136 MMOL/L (ref 134–144)
TRIGL SERPL-MCNC: 185 MG/DL (ref 0–149)
VLDLC SERPL CALC-MCNC: 31 MG/DL (ref 5–40)
WBC # BLD AUTO: 7 X10E3/UL (ref 3.4–10.8)

## 2021-08-01 NOTE — PROGRESS NOTES
The patient's cholesterol is slightly elevated primarily in the triglyceride area. It is a very mild elevation. She has low cardiovascular risk thus far except for her diabetes. We will ask her to decrease fried food red meat and concentrated sweets and we will recheck it. Keep the fats very low in the diet. The blood sugar is 335 with a hemoglobin A1c of 12.5. She is also starting to spill some protein in her urine which is a sign of kidney injury. Presently she is on Lantus 50 units daily. Lets go up to 53 units daily. She is also on Jardiance 25 mg daily and Amaryl 2 mg a day. In addition she is taking Metformin 750 mg twice a day. This confirmed that she is taking all those medications regularly.   Lets refer her to the pharmacist.

## 2021-09-12 NOTE — PROGRESS NOTES
10/26/21  5:51 AM      ICD-10-CM ICD-9-CM    1. Essential hypertension, benign  I10 401.1 CBC WITH AUTOMATED DIFF      METABOLIC PANEL, COMPREHENSIVE      atorvastatin (Lipitor) 40 mg tablet   2. Type 2 diabetes mellitus without complication, without long-term current use of insulin (HCC)  E11.9 250.00 MICROALBUMIN, UR, RAND W/ MICROALB/CREAT RATIO      AMB POC HEMOGLOBIN A1C      atorvastatin (Lipitor) 40 mg tablet   3. Mixed hyperlipidemia  E78.2 272.2 LIPID PANEL      atorvastatin (Lipitor) 40 mg tablet   4. Right ear impacted cerumen  H61.21 380.4    5. Hearing loss of right ear due to cerumen impaction  H61.21 389.8 carbamide peroxide (DEBROX) 6.5 % otic solution     380.4      Follow-up and Dispositions    · Return in about 3 months (around 1/26/2022) for Labs/diagnostics/referrals ordered this visit, Follow up on illness. Assessment and plan  Essential hypertension. Good control. Continue present regimen. Diabetes mellitus type 2 with hyperglycemia. Not controlled. Will refer to pharmacy again. Consider Endo follow-up if she does not improve. Hyperlipidemia, good control. Right earwax. Debrox ordered. Continues to decline mammogram all her immunizations and colonoscopy. Risk benefits alternatives explained and understood to her. Patient has claudication symptoms. Will refer again for duplex study. Lab results and schedule of future lab studies reviewed with patient  Diagnostic and radiologic results and the schedule of future studies were reviewed with the patient  All questions were answered and understood.       Health Maintenance Due   Topic Date Due    Eye Exam Retinal or Dilated  Never done    DTaP/Tdap/Td series (1 - Tdap) Never done    Colorectal Cancer Screening Combo  Never done    Shingrix Vaccine Age 50> (1 of 2) Never done    Breast Cancer Screen Mammogram  Never done    Flu Vaccine (1) 09/01/2021    A1C test (Diabetic or Prediabetic)  10/27/2021   Declines eye follow up  Covid vaccines done , two doses 10/26/21  Declines mammogram or colonoscopy 10/26/21        Subjective:   Nik Kingston is a 56 y.o. female has Mixed hyperlipidemia, Essential hypertension, benign, Arthralgia, Dermatitis, Abscess of skin of abdomen, and Uncontrolled type 2 diabetes mellitus with hyperglycemia ([facility] [address] on their problem list.. No chief complaint on file. The patient has essential hypertension which is in good control  Type 2 diabetes mellitus. Previously poorly controlled. Follow-up labs ordered. May require for referral back to Pharm. D. or endocrinologist  She also has hyperlipidemia, primarily hypertriglyceridemia. Follow-up labs ordered. Mixed hyperlipidemia primarily triglyceridemia. Follow-up labs consider adding TriCor. Fit test ordered for colon cancer screening  Claudication-like symptoms in the lower extremities on ambulation. Duplex studies of the lower extremities ordered. chichi was seen 4/27/2021 for diabetes with diabetic polyneuropathy. She had been out of medication for 2 months and started the insulin. The patient declined the Jardiance. Follow-up labs were ordered. Hypertension, good control. Hyperlipidemia good control. Labs 7/20/2021. CBC within normal limits. CMP shows a glucose of 350, hemoglobin A1c of 12.6, creatinine of 1.07 with a BUN of 31. GFR 64. Lipid studies were within normal limits          Hypertension  The patient has no headaches, visual changes, chest pain or pressure,dyspnea, orthopnea, or PND. There is no problem with the medication. Diabetes mellitus  The patient denies polyuria, polydipsia, or polyphagia. The patient denies any other aggravating or relieving factors at this time there has been no problem with the medications. She has symptoms of neuropathy with numbness and tingling of the lower extremities. Still 200 at home  Hyperlipidemia   The patient denies any myalgias or weakness. No abdominal discomfort admitted to.   The patient denies any difficulty with or side effects from the medication. BP Readings from Last 3 Encounters:   10/26/21 (!) 129/55   07/27/21 113/76   04/27/21 133/66         Lab Results   Component Value Date/Time    Hemoglobin A1c 12.5 (H) 07/27/2021 11:26 AM    Hemoglobin A1c (POC) 10.4 10/02/2018 02:43 PM     Lab Results   Component Value Date/Time    Cholesterol, total 136 07/27/2021 11:26 AM    HDL Cholesterol 37 (L) 07/27/2021 11:26 AM    LDL, calculated 68 07/27/2021 11:26 AM    LDL, calculated 56 11/27/2017 09:15 AM    VLDL, calculated 31 07/27/2021 11:26 AM    VLDL, calculated 41 (H) 11/27/2017 09:15 AM    Triglyceride 185 (H) 07/27/2021 11:26 AM     Lower extremity pain  The patient admits to lower extremity pain. The onset was 6 months prior. The pain is precipitated by walking. It is located in the calves. It is relieved by rest.  There is no position change. No fevers chills or sweats admitted to. No redness or paleness of the lower extremities are admitted to. No coolness is admitted to. This is a new symptom. It has not been evaluated in the past.  Duplex study pending still      Current Outpatient Medications   Medication Sig    glimepiride (AMARYL) 2 mg tablet TAKE 1 TABLET BY MOUTH TWO TIMES A DAY    insulin glargine (Lantus Solostar U-100 Insulin) 100 unit/mL (3 mL) inpn INJECT 50 UNITS UNDER THE SKIN ONCE DAILY    empagliflozin (Jardiance) 25 mg tablet Take 1 Tab by mouth daily.  gabapentin (NEURONTIN) 100 mg capsule Take one at night for a week then 3 at night.     Insulin Needles, Disposable, (Pen Needle) 32 gauge x 5/32\" ndle For insulin shots    amLODIPine (NORVASC) 5 mg tablet TAKE 1 TABLET BY MOUTH ONCE DAILY    losartan (COZAAR) 100 mg tablet TAKE 1 TABLET BY MOUTH ONCE DAILY    metFORMIN ER (GLUCOPHAGE XR) 750 mg tablet TAKE 1 TABLET BY MOUTH TWO TIMES A DAY    hydroCHLOROthiazide (HYDRODIURIL) 25 mg tablet TAKE 1/2 TABLET BY MOUTH ONCE DAILY    atorvastatin (Lipitor) 40 mg tablet 1 tablet daily    insulin glargine (LANTUS SOLOSTAR U-100 INSULIN) 100 unit/mL (3 mL) inpn INJECT 40 UNITS UNDER THE SKIN ONCE DAILY ( 37 DAYS )    glucose blood VI test strips (TRUE METRIX GLUCOSE TEST STRIP) strip Test Blood Glucose once daily; ICD 10 E11.9, Quantity 100    Blood-Glucose Meter (TRUE METRIX AIR GLUCOSE METER) monitoring kit Test Blood Glucose once daily; ICD 10 E11.9     No current facility-administered medications for this visit. Allergies   Allergen Reactions    Clindamycin Hives    Sulfa (Sulfonamide Antibiotics) Unable to Obtain    Lisinopril Cough     has Mixed hyperlipidemia, Essential hypertension, benign, Arthralgia, Dermatitis, Abscess of skin of abdomen, and Uncontrolled type 2 diabetes mellitus with hyperglycemia ([address] on their problem list.    Past Surgical History:   Procedure Laterality Date    HX HYSTERECTOMY  1990s    X2      reports that she has never smoked. She has never used smokeless tobacco. She reports that she does not drink alcohol and does not use drugs. family history includes Diabetes in her mother and sister; Hypertension in her sister; Stroke in her maternal grandfather and maternal grandmother. Review of Systems   Constitutional: Negative for chills and fever. HENT: Positive for hearing loss (Right side). Eyes: Negative for blurred vision, discharge and redness. Respiratory: Negative for cough and shortness of breath. Cardiovascular: Positive for claudication. Negative for chest pain, palpitations, orthopnea, leg swelling and PND. Gastrointestinal: Negative for abdominal pain, blood in stool, constipation, diarrhea and melena. Genitourinary: Negative for dysuria, frequency and urgency. Musculoskeletal: Negative for joint pain and myalgias. Banged middle left toe with some toenail subungual bleeding 2 days prior   Skin: Negative for rash. Neurological: Negative for dizziness, focal weakness and headaches. She states her IQ is 72   Psychiatric/Behavioral: Negative for depression. The patient is not nervous/anxious. There were no vitals taken for this visit. Physical Exam  Constitutional:       Appearance: She is well-developed. HENT:      Head: Normocephalic and atraumatic. Right Ear: External ear normal.      Left Ear: External ear normal.   Eyes:      General: No scleral icterus. Conjunctiva/sclera: Conjunctivae normal.      Pupils: Pupils are equal, round, and reactive to light. Neck:      Thyroid: No thyromegaly. Vascular: No JVD. Cardiovascular:      Rate and Rhythm: Normal rate and regular rhythm. Heart sounds: Normal heart sounds. Pulmonary:      Effort: Pulmonary effort is normal.      Breath sounds: Normal breath sounds. Abdominal:      General: Bowel sounds are normal. There is no distension. Tenderness: There is no abdominal tenderness. Musculoskeletal:         General: No swelling, tenderness, deformity or signs of injury. Normal range of motion. Right lower leg: No edema. Left lower leg: No edema. Comments: Small amount of blood underneath her left middle toenail. Lymphadenopathy:      Cervical: No cervical adenopathy. Skin:     General: Skin is dry. Capillary Refill: Capillary refill takes less than 2 seconds. Coloration: Skin is not jaundiced or pale. Findings: No erythema, lesion or rash. Comments: Hyperpigmentation of the lower extremity from about mid lower tibia distally. Similar in appearance to stasis changes   Neurological:      Mental Status: She is alert and oriented to person, place, and time. Cranial Nerves: No cranial nerve deficit. Sensory: No sensory deficit (Both feet). Motor: No weakness or abnormal muscle tone. Coordination: Coordination normal.   Psychiatric:         Behavior: Behavior normal.         Thought Content:  Thought content normal.         Judgment: Judgment normal. We discussed the expected course, resolution and complications of the diagnosis(es) in detail. Medication risks, benefits, costs, interactions, and alternatives were discussed as indicated. I advised her to contact the office if her condition worsens, changes or fails to improve as anticipated. She expressed understanding with the diagnosis(es) and plan. This note was done with the assistance of dragon speech software.   Some inadvertent errors or omissions may be present

## 2021-09-17 DIAGNOSIS — E78.2 MIXED HYPERLIPIDEMIA: ICD-10-CM

## 2021-09-17 DIAGNOSIS — E11.9 TYPE 2 DIABETES MELLITUS WITHOUT COMPLICATION, WITHOUT LONG-TERM CURRENT USE OF INSULIN (HCC): ICD-10-CM

## 2021-09-17 DIAGNOSIS — I10 ESSENTIAL HYPERTENSION, BENIGN: ICD-10-CM

## 2021-09-17 NOTE — TELEPHONE ENCOUNTER
Requested Prescriptions     Pending Prescriptions Disp Refills    glimepiride (AMARYL) 2 mg tablet 90 Tablet 3     Sig: TAKE 1 TABLET BY MOUTH TWO TIMES A DAY

## 2021-09-20 RX ORDER — GLIMEPIRIDE 2 MG/1
TABLET ORAL
Qty: 90 TABLET | Refills: 3 | Status: SHIPPED | OUTPATIENT
Start: 2021-09-20 | End: 2021-12-20

## 2021-10-26 ENCOUNTER — OFFICE VISIT (OUTPATIENT)
Dept: FAMILY MEDICINE CLINIC | Age: 61
End: 2021-10-26
Payer: MEDICAID

## 2021-10-26 VITALS
OXYGEN SATURATION: 95 % | DIASTOLIC BLOOD PRESSURE: 55 MMHG | SYSTOLIC BLOOD PRESSURE: 129 MMHG | WEIGHT: 182 LBS | HEIGHT: 66 IN | HEART RATE: 96 BPM | TEMPERATURE: 96.4 F | BODY MASS INDEX: 29.25 KG/M2 | RESPIRATION RATE: 16 BRPM

## 2021-10-26 DIAGNOSIS — Z23 NEEDS FLU SHOT: ICD-10-CM

## 2021-10-26 DIAGNOSIS — I10 ESSENTIAL HYPERTENSION, BENIGN: Primary | ICD-10-CM

## 2021-10-26 DIAGNOSIS — E78.2 MIXED HYPERLIPIDEMIA: ICD-10-CM

## 2021-10-26 DIAGNOSIS — H61.21 HEARING LOSS OF RIGHT EAR DUE TO CERUMEN IMPACTION: ICD-10-CM

## 2021-10-26 DIAGNOSIS — E11.9 TYPE 2 DIABETES MELLITUS WITHOUT COMPLICATION, WITHOUT LONG-TERM CURRENT USE OF INSULIN (HCC): ICD-10-CM

## 2021-10-26 DIAGNOSIS — H61.21 RIGHT EAR IMPACTED CERUMEN: ICD-10-CM

## 2021-10-26 DIAGNOSIS — I10 ESSENTIAL HYPERTENSION, BENIGN: ICD-10-CM

## 2021-10-26 LAB — HBA1C MFR BLD HPLC: 12 %

## 2021-10-26 PROCEDURE — 83036 HEMOGLOBIN GLYCOSYLATED A1C: CPT | Performed by: EMERGENCY MEDICINE

## 2021-10-26 PROCEDURE — 99214 OFFICE O/P EST MOD 30 MIN: CPT | Performed by: EMERGENCY MEDICINE

## 2021-10-26 PROCEDURE — 90686 IIV4 VACC NO PRSV 0.5 ML IM: CPT | Performed by: EMERGENCY MEDICINE

## 2021-10-26 PROCEDURE — 99999 INFLUENZA VIRUS VAC QUAD,SPLIT,PRESV FREE SYRINGE IM: CPT | Performed by: EMERGENCY MEDICINE

## 2021-10-26 RX ORDER — ATORVASTATIN CALCIUM 40 MG/1
TABLET, FILM COATED ORAL
Qty: 90 TABLET | Refills: 3 | Status: SHIPPED | OUTPATIENT
Start: 2021-10-26 | End: 2022-10-11 | Stop reason: SDUPTHER

## 2021-10-26 NOTE — PROGRESS NOTES
Danielle Beckwith is a 56 y.o. female that is here for a No chief complaint on file. 1. Have you been to the ER, urgent care clinic since your last visit? Hospitalized since your last visit?no    2. Have you seen or consulted any other health care providers outside of the [address] [place] [address] since your last visit? Include any pap smears or colon screening.  no      Health Maintenance reviewed - yes      Upcoming Appts  no      VORB: No orders of the defined types were placed in this encounter.   Sherry Barnes MD/ Romel Damon MA

## 2021-10-26 NOTE — PATIENT INSTRUCTIONS
Hyperlipidemia: After Your Visit  Your Care Instructions  Hyperlipidemia is too much fat in your blood. The body has several kinds of fat, including cholesterol and triglycerides. Your body needs fat for many things, such as making new cells. But too much fat in your blood increases your chances of having a heart attack or stroke. You may be able to lower your cholesterol and triglycerides with a heart-healthy diet, exercise, and if needed, medicine. Your doctor may want you to try lifestyle changes first to see whether they lower the fat in your blood. You may need to take medicine if lifestyle changes do not lower the fat in your blood enough. Follow-up care is a key part of your treatment and safety. Be sure to make and go to all appointments, and call your doctor if you are having problems. Its also a good idea to know your test results and keep a list of the medicines you take. How can you care for yourself at home? Take your medicines  · Take your medicines exactly as prescribed. Call your doctor if you think you are having a problem with your medicine. · If you take medicine to lower your cholesterol, go to follow-up visits. You will need to have blood tests. · Do not take large doses of niacin, which is a B vitamin, while taking medicine called statins. It may increase the chance of muscle pain and liver problems. · Talk to your doctor about avoiding grapefruit juice if you are taking statins. Grapefruit juice can raise the level of this medicine in your blood. This could increase side effects. Eat more fruits, vegetables, and fiber  · Fruits and vegetables have lots of nutrients that help protect against heart disease, and they have little--if any--fat. Try to eat at least five servings a day. Dark green, deep orange, or yellow fruits and vegetables are healthy choices. · Keep carrots, celery, and other veggies handy for snacks.  Buy fruit that is in season and store it where you can see it so that you will be tempted to eat it. Cook dishes that have a lot of veggies in them, such as stir-fries and soups. · Foods high in fiber may reduce your cholesterol and provide important vitamins and minerals. High-fiber foods include whole-grain cereals and breads, oatmeal, beans, brown rice, citrus fruits, and apples. · Buy whole-grain breads and cereals instead of white bread and pastries. Limit saturated fat  · Read food labels and try to avoid saturated fat and trans fat. They increase your risk of heart disease. · Use olive or canola oil when you cook. Try cholesterol-lowering spreads, such as Benecol or Take Control. · Bake, broil, grill, or steam foods instead of frying them. · Limit the amount of high-fat meats you eat, including hot dogs and sausages. Cut out all visible fat when you prepare meat. · Eat fish, skinless poultry, and soy products such as tofu instead of high-fat meats. Soybeans may be especially good for your heart. Eat at least two servings of fish a week. Certain fish, such as salmon, contain omega-3 fatty acids, which may help reduce your risk of heart attack. · Choose low-fat or fat-free milk and dairy products. Get exercise, limit alcohol, and quit smoking  · Get more exercise. Work with your doctor to set up an exercise program. Even if you can do only a small amount, exercise will help you get stronger, have more energy, and manage your weight and your stress. Walking is an easy way to get exercise. Gradually increase the amount you walk every day. Aim for at least 30 minutes on most days of the week. You also may want to swim, bike, or do other activities. · Limit alcohol to no more than 2 drinks a day for men and 1 drink a day for women. · Do not smoke. If you need help quitting, talk to your doctor about stop-smoking programs and medicines. These can increase your chances of quitting for good. When should you call for help?   Call 911 anytime you think you may need emergency care. For example, call if:  · You have symptoms of a heart attack. These may include:  ¨ Chest pain or pressure, or a strange feeling in the chest.  ¨ Sweating. ¨ Shortness of breath. ¨ Nausea or vomiting. ¨ Pain, pressure, or a strange feeling in the back, neck, jaw, or upper belly or in one or both shoulders or arms. ¨ Lightheadedness or sudden weakness. ¨ A fast or irregular heartbeat. After you call 911, the  may tell you to chew 1 adult-strength or 2 to 4 low-dose aspirin. Wait for an ambulance. Do not try to drive yourself. · You have signs of a stroke. These may include:  ¨ Sudden numbness, paralysis, or weakness in your face, arm, or leg, especially on only one side of your body. ¨ New problems with walking or balance. ¨ Sudden vision changes. ¨ Drooling or slurred speech. ¨ New problems speaking or understanding simple statements, or feeling confused. ¨ A sudden, severe headache that is different from past headaches. · You passed out (lost consciousness). Call your doctor now or seek immediate medical care if:  · You have muscle pain or weakness. Watch closely for changes in your health, and be sure to contact your doctor if:  · You are very tired. · You have an upset stomach, gas, constipation, or belly pain or cramps. Where can you learn more? Go to [email or web address]  Enter C406 in the search box to learn more about \"Hyperlipidemia: After Your Visit. \"   © 8175-0827 Healthwise, Incorporated. Care instructions adapted under license by [employer] (which disclaims liability or warranty for this information). This care instruction is for use with your licensed healthcare professional. If you have questions about a medical condition or this instruction, always ask your healthcare professional. [place] [address] any warranty or liability for your use of this information.   Content Version: 0.5.727136; Last Revised: October 13, 2011                 Type 2 Diabetes: Care Instructions  Your Care Instructions     Type 2 diabetes is a disease that develops when the body's tissues cannot use insulin properly. Over time, the pancreas cannot make enough insulin. Insulin is a hormone that helps the body's cells use sugar (glucose) for energy. It also helps the body store extra sugar in muscle, fat, and liver cells. Without insulin, the sugar cannot get into the cells to do its work. It stays in the blood instead. This can cause high blood sugar levels. A person has diabetes when the blood sugar stays too high too much of the time. Over time, diabetes can lead to diseases of the heart, blood vessels, nerves, kidneys, and eyes. You may be able to control your blood sugar by losing weight, eating a healthy diet, and getting daily exercise. You may also have to take insulin or other diabetes medicine. Follow-up care is a key part of your treatment and safety. Be sure to make and go to all appointments. Call your doctor if you are having problems. It's also a good idea to know your test results and keep a list of the medicines you take. How can you care for yourself at home? · Keep your blood sugar at a target level (which you set with your doctor). ? Carbohydrate--the body's main source of fuel--affects blood sugar more than any other nutrient. Carbohydrate is in fruits, vegetables, milk, and yogurt. It also is in breads, cereals, vegetables such as potatoes and corn, and sugary foods such as candy and cakes. Follow your meal plan to know how much carbohydrate to eat at each meal and snack. ? Aim for 30 minutes of exercise on most, preferably all, days of the week. Walking is a good choice. You also may want to do other activities, such as running, swimming, cycling, or playing tennis or team sports. Try to do muscle-strengthening exercises at least 2 times a week. ? Take your medicines exactly as prescribed.  Call your doctor if you think you are having a problem with your medicine. You will get more details on the specific medicines your doctor prescribes. · Check your blood sugar as often as your doctor recommends. It is important to keep track of any symptoms you have, such as low blood sugar. Also tell your doctor if you have any changes in your activities, diet, or insulin use. · Talk to your doctor before you start taking aspirin every day. Aspirin can help certain people lower their risk of a heart attack or stroke. But taking aspirin isn't right for everyone, because it can cause serious bleeding. · Do not smoke. If you need help quitting, talk to your doctor about stop-smoking programs and medicines. These can increase your chances of quitting for good. · Keep your cholesterol and blood pressure at normal levels. You may need to take one or more medicines to reach your goals. Take them exactly as directed. Do not stop or change a medicine without talking to your doctor first.  When should you call for help? Call 911 anytime you think you may need emergency care. For example, call if:    · You passed out (lost consciousness), or you suddenly become very sleepy or confused. (You may have very low blood sugar.)   Call your doctor now or seek immediate medical care if:    · Your blood sugar is 300 mg/dL or is higher than the level your doctor has set for you. · You have symptoms of low blood sugar, such as:  ? Sweating. ? Feeling nervous, shaky, and weak. ? Extreme hunger and slight nausea. ? Dizziness and headache.  ? Blurred vision. ? Confusion. Watch closely for changes in your health, and be sure to contact your doctor if:    · You often have problems controlling your blood sugar. · You have symptoms of long-term diabetes problems, such as:  ? New vision changes. ? New pain, numbness, or tingling in your hands or feet. ? Skin problems. Where can you learn more?   Go to http://www.gray.com/  Enter C553 in the search box to learn more about \"Type 2 Diabetes: Care Instructions. \"  Current as of: August 31, 2020               Content Version: 12.8  © 2006-2021 [employer]. Care instructions adapted under license by [employer] (which disclaims liability or warranty for this information). If you have questions about a medical condition or this instruction, always ask your healthcare professional. [address] any warranty or liability for your use of this information. High Blood Pressure: Care Instructions  Overview     It's normal for blood pressure to go up and down throughout the day. But if it stays up, you have high blood pressure. Another name for high blood pressure is hypertension. Despite what a lot of people think, high blood pressure usually doesn't cause headaches or make you feel dizzy or lightheaded. It usually has no symptoms. But it does increase your risk of stroke, heart attack, and other problems. You and your doctor will talk about your risks of these problems based on your blood pressure. Your doctor will give you a goal for your blood pressure. Your goal will be based on your health and your age. Lifestyle changes, such as eating healthy and being active, are always important to help lower blood pressure. You might also take medicine to reach your blood pressure goal.  Follow-up care is a key part of your treatment and safety. Be sure to make and go to all appointments, and call your doctor if you are having problems. It's also a good idea to know your test results and keep a list of the medicines you take. How can you care for yourself at home? Medical treatment  · If you stop taking your medicine, your blood pressure will go back up. You may take one or more types of medicine to lower your blood pressure. Be safe with medicines. Take your medicine exactly as prescribed.  Call your doctor if you think you are having a problem with your medicine. · Talk to your doctor before you start taking aspirin every day. Aspirin can help certain people lower their risk of a heart attack or stroke. But taking aspirin isn't right for everyone, because it can cause serious bleeding. · See your doctor regularly. You may need to see the doctor more often at first or until your blood pressure comes down. · If you are taking blood pressure medicine, talk to your doctor before you take decongestants or anti-inflammatory medicine, such as ibuprofen. Some of these medicines can raise blood pressure. · Learn how to check your blood pressure at home. Lifestyle changes  · Stay at a healthy weight. This is especially important if you put on weight around the waist. Losing even 10 pounds can help you lower your blood pressure. · If your doctor recommends it, get more exercise. Walking is a good choice. Bit by bit, increase the amount you walk every day. Try for at least 30 minutes on most days of the week. You also may want to swim, bike, or do other activities. · Avoid or limit alcohol. Talk to your doctor about whether you can drink any alcohol. · Try to limit how much sodium you eat to less than 2,300 milligrams (mg) a day. Your doctor may ask you to try to eat less than 1,500 mg a day. · Eat plenty of fruits (such as bananas and oranges), vegetables, legumes, whole grains, and low-fat dairy products. · Lower the amount of saturated fat in your diet. Saturated fat is found in animal products such as milk, cheese, and meat. Limiting these foods may help you lose weight and also lower your risk for heart disease. · Do not smoke. Smoking increases your risk for heart attack and stroke. If you need help quitting, talk to your doctor about stop-smoking programs and medicines. These can increase your chances of quitting for good. When should you call for help? Call  911 anytime you think you may need emergency care.  This may mean having symptoms that suggest that your blood pressure is causing a serious heart or blood vessel problem. Your blood pressure may be over 180/120. For example, call 911 if:    · You have symptoms of a heart attack. These may include:  ? Chest pain or pressure, or a strange feeling in the chest.  ? Sweating. ? Shortness of breath. ? Nausea or vomiting. ? Pain, pressure, or a strange feeling in the back, neck, jaw, or upper belly or in one or both shoulders or arms. ? Lightheadedness or sudden weakness. ? A fast or irregular heartbeat. · You have symptoms of a stroke. These may include:  ? Sudden numbness, tingling, weakness, or loss of movement in your face, arm, or leg, especially on only one side of your body. ? Sudden vision changes. ? Sudden trouble speaking. ? Sudden confusion or trouble understanding simple statements. ? Sudden problems with walking or balance. ? A sudden, severe headache that is different from past headaches. · You have severe back or belly pain. Do not wait until your blood pressure comes down on its own. Get help right away. Call your doctor now or seek immediate care if:    · Your blood pressure is much higher than normal (such as 180/120 or higher), but you don't have symptoms. · You think high blood pressure is causing symptoms, such as:  ? Severe headache.  ? Blurry vision. Watch closely for changes in your health, and be sure to contact your doctor if:    · Your blood pressure measures higher than your doctor recommends at least 2 times. That means the top number is higher or the bottom number is higher, or both. · You think you may be having side effects from your blood pressure medicine. Where can you learn more? Go to http://www.gray.com/  Enter [id number] in the search box to learn more about \"High Blood Pressure: Care Instructions. \"  Current as of: August 31, 2020               Content Version: 12.8  © 2549-4285 Healthwise, [facility].    Care instructions adapted under license by [employer] (which disclaims liability or warranty for this information). If you have questions about a medical condition or this instruction, always ask your healthcare professional. [address] any warranty or liability for your use of this information. Hyperlipidemia: After Your Visit  Your Care Instructions  Hyperlipidemia is too much fat in your blood. The body has several kinds of fat, including cholesterol and triglycerides. Your body needs fat for many things, such as making new cells. But too much fat in your blood increases your chances of having a heart attack or stroke. You may be able to lower your cholesterol and triglycerides with a heart-healthy diet, exercise, and if needed, medicine. Your doctor may want you to try lifestyle changes first to see whether they lower the fat in your blood. You may need to take medicine if lifestyle changes do not lower the fat in your blood enough. Follow-up care is a key part of your treatment and safety. Be sure to make and go to all appointments, and call your doctor if you are having problems. Its also a good idea to know your test results and keep a list of the medicines you take. How can you care for yourself at home? Take your medicines  · Take your medicines exactly as prescribed. Call your doctor if you think you are having a problem with your medicine. · If you take medicine to lower your cholesterol, go to follow-up visits. You will need to have blood tests. · Do not take large doses of niacin, which is a B vitamin, while taking medicine called statins. It may increase the chance of muscle pain and liver problems. · Talk to your doctor about avoiding grapefruit juice if you are taking statins. Grapefruit juice can raise the level of this medicine in your blood. This could increase side effects.   Eat more fruits, vegetables, and fiber  · Fruits and vegetables have lots of nutrients that help protect against heart disease, and they have little--if any--fat. Try to eat at least five servings a day. Dark green, deep orange, or yellow fruits and vegetables are healthy choices. · Keep carrots, celery, and other veggies handy for snacks. Buy fruit that is in season and store it where you can see it so that you will be tempted to eat it. Cook dishes that have a lot of veggies in them, such as stir-fries and soups. · Foods high in fiber may reduce your cholesterol and provide important vitamins and minerals. High-fiber foods include whole-grain cereals and breads, oatmeal, beans, brown rice, citrus fruits, and apples. · Buy whole-grain breads and cereals instead of white bread and pastries. Limit saturated fat  · Read food labels and try to avoid saturated fat and trans fat. They increase your risk of heart disease. · Use olive or canola oil when you cook. Try cholesterol-lowering spreads, such as Benecol or Take Control. · Bake, broil, grill, or steam foods instead of frying them. · Limit the amount of high-fat meats you eat, including hot dogs and sausages. Cut out all visible fat when you prepare meat. · Eat fish, skinless poultry, and soy products such as tofu instead of high-fat meats. Soybeans may be especially good for your heart. Eat at least two servings of fish a week. Certain fish, such as salmon, contain omega-3 fatty acids, which may help reduce your risk of heart attack. · Choose low-fat or fat-free milk and dairy products. Get exercise, limit alcohol, and quit smoking  · Get more exercise. Work with your doctor to set up an exercise program. Even if you can do only a small amount, exercise will help you get stronger, have more energy, and manage your weight and your stress. Walking is an easy way to get exercise. Gradually increase the amount you walk every day. Aim for at least 30 minutes on most days of the week.  You also may want to swim, bike, or do other activities. · Limit alcohol to no more than 2 drinks a day for men and 1 drink a day for women. · Do not smoke. If you need help quitting, talk to your doctor about stop-smoking programs and medicines. These can increase your chances of quitting for good. When should you call for help? Call 911 anytime you think you may need emergency care. For example, call if:  · You have symptoms of a heart attack. These may include:  ¨ Chest pain or pressure, or a strange feeling in the chest.  ¨ Sweating. ¨ Shortness of breath. ¨ Nausea or vomiting. ¨ Pain, pressure, or a strange feeling in the back, neck, jaw, or upper belly or in one or both shoulders or arms. ¨ Lightheadedness or sudden weakness. ¨ A fast or irregular heartbeat. After you call 911, the  may tell you to chew 1 adult-strength or 2 to 4 low-dose aspirin. Wait for an ambulance. Do not try to drive yourself. · You have signs of a stroke. These may include:  ¨ Sudden numbness, paralysis, or weakness in your face, arm, or leg, especially on only one side of your body. ¨ New problems with walking or balance. ¨ Sudden vision changes. ¨ Drooling or slurred speech. ¨ New problems speaking or understanding simple statements, or feeling confused. ¨ A sudden, severe headache that is different from past headaches. · You passed out (lost consciousness). Call your doctor now or seek immediate medical care if:  · You have muscle pain or weakness. Watch closely for changes in your health, and be sure to contact your doctor if:  · You are very tired. · You have an upset stomach, gas, constipation, or belly pain or cramps. Where can you learn more? Go to [email or web address]  Enter C406 in the search box to learn more about \"Hyperlipidemia: After Your Visit. \"   © 6680-8591 [facility]orporated. Care instructions adapted under license by [facility] (which disclaims liability or warranty for this information).  This care instruction is for use with your licensed healthcare professional. If you have questions about a medical condition or this instruction, always ask your healthcare professional. [address] any warranty or liability for your use of this information. Content Version: 7.3.023358; Last Revised: October 13, 2011                 Type 2 Diabetes: Care Instructions  Your Care Instructions     Type 2 diabetes is a disease that develops when the body's tissues cannot use insulin properly. Over time, the pancreas cannot make enough insulin. Insulin is a hormone that helps the body's cells use sugar (glucose) for energy. It also helps the body store extra sugar in muscle, fat, and liver cells. Without insulin, the sugar cannot get into the cells to do its work. It stays in the blood instead. This can cause high blood sugar levels. A person has diabetes when the blood sugar stays too high too much of the time. Over time, diabetes can lead to diseases of the heart, blood vessels, nerves, kidneys, and eyes. You may be able to control your blood sugar by losing weight, eating a healthy diet, and getting daily exercise. You may also have to take insulin or other diabetes medicine. Follow-up care is a key part of your treatment and safety. Be sure to make and go to all appointments. Call your doctor if you are having problems. It's also a good idea to know your test results and keep a list of the medicines you take. How can you care for yourself at home? · Keep your blood sugar at a target level (which you set with your doctor). ? Carbohydrate--the body's main source of fuel--affects blood sugar more than any other nutrient. Carbohydrate is in fruits, vegetables, milk, and yogurt. It also is in breads, cereals, vegetables such as potatoes and corn, and sugary foods such as candy and cakes. Follow your meal plan to know how much carbohydrate to eat at each meal and snack. ?  Aim for 30 minutes of exercise on most, preferably all, days of the week. Walking is a good choice. You also may want to do other activities, such as running, swimming, cycling, or playing tennis or team sports. Try to do muscle-strengthening exercises at least 2 times a week. ? Take your medicines exactly as prescribed. Call your doctor if you think you are having a problem with your medicine. You will get more details on the specific medicines your doctor prescribes. · Check your blood sugar as often as your doctor recommends. It is important to keep track of any symptoms you have, such as low blood sugar. Also tell your doctor if you have any changes in your activities, diet, or insulin use. · Talk to your doctor before you start taking aspirin every day. Aspirin can help certain people lower their risk of a heart attack or stroke. But taking aspirin isn't right for everyone, because it can cause serious bleeding. · Do not smoke. If you need help quitting, talk to your doctor about stop-smoking programs and medicines. These can increase your chances of quitting for good. · Keep your cholesterol and blood pressure at normal levels. You may need to take one or more medicines to reach your goals. Take them exactly as directed. Do not stop or change a medicine without talking to your doctor first.  When should you call for help? Call 911 anytime you think you may need emergency care. For example, call if:    · You passed out (lost consciousness), or you suddenly become very sleepy or confused. (You may have very low blood sugar.)   Call your doctor now or seek immediate medical care if:    · Your blood sugar is 300 mg/dL or is higher than the level your doctor has set for you. · You have symptoms of low blood sugar, such as:  ? Sweating. ? Feeling nervous, shaky, and weak. ? Extreme hunger and slight nausea. ? Dizziness and headache.  ? Blurred vision. ? Confusion.    Watch closely for changes in your health, and be sure to contact your doctor if:    · You often have problems controlling your blood sugar. · You have symptoms of long-term diabetes problems, such as:  ? New vision changes. ? New pain, numbness, or tingling in your hands or feet. ? Skin problems. Where can you learn more? Go to http://www.gray.com/  Enter C553 in the search box to learn more about \"Type 2 Diabetes: Care Instructions. \"  Current as of: August 31, 2020               Content Version: 12.8  © 2006-2021 [employer]. Care instructions adapted under license by [employer] (which disclaims liability or warranty for this information). If you have questions about a medical condition or this instruction, always ask your healthcare professional. [address] any warranty or liability for your use of this information. High Blood Pressure: Care Instructions  Overview     It's normal for blood pressure to go up and down throughout the day. But if it stays up, you have high blood pressure. Another name for high blood pressure is hypertension. Despite what a lot of people think, high blood pressure usually doesn't cause headaches or make you feel dizzy or lightheaded. It usually has no symptoms. But it does increase your risk of stroke, heart attack, and other problems. You and your doctor will talk about your risks of these problems based on your blood pressure. Your doctor will give you a goal for your blood pressure. Your goal will be based on your health and your age. Lifestyle changes, such as eating healthy and being active, are always important to help lower blood pressure. You might also take medicine to reach your blood pressure goal.  Follow-up care is a key part of your treatment and safety. Be sure to make and go to all appointments, and call your doctor if you are having problems. It's also a good idea to know your test results and keep a list of the medicines you take.   How can you care for yourself at home? Medical treatment  · If you stop taking your medicine, your blood pressure will go back up. You may take one or more types of medicine to lower your blood pressure. Be safe with medicines. Take your medicine exactly as prescribed. Call your doctor if you think you are having a problem with your medicine. · Talk to your doctor before you start taking aspirin every day. Aspirin can help certain people lower their risk of a heart attack or stroke. But taking aspirin isn't right for everyone, because it can cause serious bleeding. · See your doctor regularly. You may need to see the doctor more often at first or until your blood pressure comes down. · If you are taking blood pressure medicine, talk to your doctor before you take decongestants or anti-inflammatory medicine, such as ibuprofen. Some of these medicines can raise blood pressure. · Learn how to check your blood pressure at home. Lifestyle changes  · Stay at a healthy weight. This is especially important if you put on weight around the waist. Losing even 10 pounds can help you lower your blood pressure. · If your doctor recommends it, get more exercise. Walking is a good choice. Bit by bit, increase the amount you walk every day. Try for at least 30 minutes on most days of the week. You also may want to swim, bike, or do other activities. · Avoid or limit alcohol. Talk to your doctor about whether you can drink any alcohol. · Try to limit how much sodium you eat to less than 2,300 milligrams (mg) a day. Your doctor may ask you to try to eat less than 1,500 mg a day. · Eat plenty of fruits (such as bananas and oranges), vegetables, legumes, whole grains, and low-fat dairy products. · Lower the amount of saturated fat in your diet. Saturated fat is found in animal products such as milk, cheese, and meat. Limiting these foods may help you lose weight and also lower your risk for heart disease. · Do not smoke.  Smoking increases your risk for heart attack and stroke. If you need help quitting, talk to your doctor about stop-smoking programs and medicines. These can increase your chances of quitting for good. When should you call for help? Call  911 anytime you think you may need emergency care. This may mean having symptoms that suggest that your blood pressure is causing a serious heart or blood vessel problem. Your blood pressure may be over 180/120. For example, call 911 if:    · You have symptoms of a heart attack. These may include:  ? Chest pain or pressure, or a strange feeling in the chest.  ? Sweating. ? Shortness of breath. ? Nausea or vomiting. ? Pain, pressure, or a strange feeling in the back, neck, jaw, or upper belly or in one or both shoulders or arms. ? Lightheadedness or sudden weakness. ? A fast or irregular heartbeat. · You have symptoms of a stroke. These may include:  ? Sudden numbness, tingling, weakness, or loss of movement in your face, arm, or leg, especially on only one side of your body. ? Sudden vision changes. ? Sudden trouble speaking. ? Sudden confusion or trouble understanding simple statements. ? Sudden problems with walking or balance. ? A sudden, severe headache that is different from past headaches. · You have severe back or belly pain. Do not wait until your blood pressure comes down on its own. Get help right away. Call your doctor now or seek immediate care if:    · Your blood pressure is much higher than normal (such as 180/120 or higher), but you don't have symptoms. · You think high blood pressure is causing symptoms, such as:  ? Severe headache.  ? Blurry vision. Watch closely for changes in your health, and be sure to contact your doctor if:    · Your blood pressure measures higher than your doctor recommends at least 2 times. That means the top number is higher or the bottom number is higher, or both.      · You think you may be having side effects from your blood pressure medicine. Where can you learn more? Go to http://www.gray.com/  Enter [id number] in the search box to learn more about \"High Blood Pressure: Care Instructions. \"  Current as of: August 31, 2020               Content Version: 12.8  © 4151-2239 [email or web address]. Care instructions adapted under license by [employer] (which disclaims liability or warranty for this information). If you have questions about a medical condition or this instruction, always ask your healthcare professional. [address] any warranty or liability for your use of this information. Diabetes Blood Sugar Emergencies: Your Action Plan  How can you prevent a blood sugar emergency? An important part of living with diabetes is keeping your blood sugar in your target range. You'll need to know what to do if it's too high or too low. Managing your blood sugar levels helps you avoid emergencies. This care sheet will teach you about the signs of high and low blood sugar. It will help you make an action plan with your doctor for when these signs occur. Low blood sugar is more likely to happen if you take certain medicines for diabetes. It can also happen if you skip a meal, drink alcohol, or exercise more than usual.  You may get high blood sugar if you eat differently than you normally do. One example is eating more carbohydrate than usual. Having a cold, the flu, or other sudden illness can also cause high blood sugar levels. Levels can also rise if you miss a dose of medicine. Any change in how you take your medicine may affect your blood sugar level. So it's important to work with your doctor before you make any changes. Track your blood sugar  Work with your doctor to fill in the blank spaces below that apply to you. Track your levels, know your target range, and write down ways you can get your blood sugar back in your target range.  A log book can help you track your levels. Take the book to all of your medical appointments. Check your blood sugar _____ times a day, at these times:________________________________________________. (For example: Before meals, at bedtime, before exercise, during exercise, other.)  Your blood sugar target range before a meal is ___________________. Your blood sugar target range after a meal is _______________________. Do this--___________________________________________________--to get your blood sugar back within your safe range if your blood sugar results are _________________________________________. (For example: Less than 70 or above 250 mg/dL.)  Call your doctor when your blood sugar results are ___________________________________. (For example: Less than 70 or above 250 mg/dL.)  What are the symptoms of low and high blood sugar? Common symptoms of low blood sugar are sweating and feeling shaky, weak, hungry, or confused. Symptoms can start quickly. Common symptoms of high blood sugar are feeling very thirsty or very hungry. You may also pass urine more often than usual. You may have blurry vision and may lose weight without trying. But some people may have high or low blood sugar without having any symptoms. That's a good reason to check your blood sugar on a regular schedule. What should you do if you have symptoms? Work with your doctor to fill in the blank spaces below that apply to you. Low blood sugar and \"the rule of 15\"  If you have symptoms of low blood sugar, check your blood sugar. If it's below _____ ( for example, below 70), eat or drink a quick-sugar food that has about 15 grams of carbohydrate. Your goal is to get your level back to your safe range. Check your blood sugar again 15 minutes later. If it's still not in your target range, take another 15 grams of carbohydrate and check your blood sugar again in 15 minutes. Repeat this until you reach your target.  Then go back to your regular testing schedule. Children usually need less than 15 grams of carbohydrate. Check with your doctor or diabetes educator for the amount that is right for your child. When you have low blood sugar, it's best to stop or reduce any physical activity until your blood sugar is back in your target range and is stable. If you must stay active, eat or drink 30 grams of carbohydrate. Then check your blood sugar again in 15 minutes. If it's not in your target range, take another 30 grams of carbohydrates. Check your blood sugar again in 15 minutes. Keep doing this until you reach your target. You can then go back to your regular testing schedule. If your symptoms or blood sugar levels are getting worse or have not improved after 15 minutes, seek medical care right away. If you take insulin, always carry a glucagon emergency kit. Be sure your family, friends, and coworkers know how to give glucagon. Here are some examples of quick-sugar foods with 15 grams of carbohydrate:  3 or 4 glucose tablets  1 tablespoon (3 teaspoons) table sugar  ½ cup to ¾ cup (4 to 6 ounces) of fruit juice or regular (not diet) soda  Hard candy (such as 6 Life Savers)  High blood sugar  If you have symptoms of high blood sugar, check your blood sugar. Your goal is to get your level back to your target range. If it's above ______ ( for example, above 250), follow these steps: If you missed a dose of your diabetes medicine, take it now. Take only the amount of medicine that you have been prescribed. Do not take more or less medicine. Give yourself insulin if your doctor has prescribed it for high blood sugar. Test for ketones, if the doctor told you to do so. If the results of the ketone test show a moderate-to-large amount of ketones, call the doctor for advice. Wait 30 minutes after you take the extra insulin or the missed medicine. Check your blood sugar again.   If your symptoms or blood sugar levels are getting worse or have not improved after taking these steps, seek medical care right away. Follow-up care is a key part of your treatment and safety. Be sure to make and go to all appointments, and call your doctor if you are having problems. It's also a good idea to know your test results and keep a list of the medicines you take. Where can you learn more? Go to http://www.gray.com/  Enter Z970 in the search box to learn more about \"Diabetes Blood Sugar Emergencies: Your Action Plan. \"  Current as of: August 31, 2020               Content Version: 13.0  © 3379-2833 [employer]. Care instructions adapted under license by [employer] (which disclaims liability or warranty for this information). If you have questions about a medical condition or this instruction, always ask your healthcare professional. [address] any warranty or liability for your use of this information.

## 2021-11-03 ENCOUNTER — TELEPHONE (OUTPATIENT)
Dept: FAMILY MEDICINE CLINIC | Age: 61
End: 2021-11-03

## 2021-11-03 NOTE — TELEPHONE ENCOUNTER
----- Message from Philippe Enriquez MD sent at 10/26/2021  9:34 AM EDT -----  Please reschecule for duplex study

## 2022-01-24 ENCOUNTER — HOSPITAL ENCOUNTER (EMERGENCY)
Age: 62
Discharge: HOME | End: 2022-01-25
Attending: STUDENT IN AN ORGANIZED HEALTH CARE EDUCATION/TRAINING PROGRAM
Payer: MEDICAID

## 2022-01-24 ENCOUNTER — APPOINTMENT (OUTPATIENT)
Dept: GENERAL RADIOLOGY | Age: 62
End: 2022-01-24
Attending: STUDENT IN AN ORGANIZED HEALTH CARE EDUCATION/TRAINING PROGRAM
Payer: MEDICAID

## 2022-01-24 DIAGNOSIS — R73.9 HYPERGLYCEMIA: Primary | ICD-10-CM

## 2022-01-24 DIAGNOSIS — N30.01 ACUTE CYSTITIS WITH HEMATURIA: ICD-10-CM

## 2022-01-24 LAB
ADMINISTERED INITIALS, ADMINIT: NORMAL
ALBUMIN SERPL-MCNC: 4 G/DL (ref 3.4–5)
ALBUMIN/GLOB SERPL: 1 {RATIO} (ref 0.8–1.7)
ALP SERPL-CCNC: 169 U/L (ref 45–117)
ALT SERPL-CCNC: 13 U/L (ref 13–56)
ANION GAP SERPL CALC-SCNC: 11 MMOL/L (ref 3–18)
ANION GAP SERPL CALC-SCNC: 7 MMOL/L (ref 3–18)
APPEARANCE UR: ABNORMAL
AST SERPL-CCNC: 7 U/L (ref 10–38)
ATRIAL RATE: 104 BPM
BACTERIA URNS QL MICRO: ABNORMAL /HPF
BASE EXCESS BLD CALC-SCNC: 4.6 MMOL/L
BASOPHILS # BLD: 0 K/UL (ref 0–0.1)
BASOPHILS NFR BLD: 0 % (ref 0–2)
BILIRUB SERPL-MCNC: 0.9 MG/DL (ref 0.2–1)
BILIRUB UR QL: NEGATIVE
BUN SERPL-MCNC: 32 MG/DL (ref 7–18)
BUN SERPL-MCNC: 38 MG/DL (ref 7–18)
BUN/CREAT SERPL: 26 (ref 12–20)
BUN/CREAT SERPL: 27 (ref 12–20)
CALCIUM SERPL-MCNC: 9 MG/DL (ref 8.5–10.1)
CALCIUM SERPL-MCNC: 9 MG/DL (ref 8.5–10.1)
CALCULATED P AXIS, ECG09: 54 DEGREES
CALCULATED R AXIS, ECG10: 36 DEGREES
CALCULATED T AXIS, ECG11: 59 DEGREES
CHLORIDE SERPL-SCNC: 105 MMOL/L (ref 100–111)
CHLORIDE SERPL-SCNC: 94 MMOL/L (ref 100–111)
CO2 SERPL-SCNC: 27 MMOL/L (ref 21–32)
CO2 SERPL-SCNC: 29 MMOL/L (ref 21–32)
COLOR UR: YELLOW
CREAT SERPL-MCNC: 1.18 MG/DL (ref 0.6–1.3)
CREAT SERPL-MCNC: 1.48 MG/DL (ref 0.6–1.3)
D50 ADMINISTERED, D50ADM: 0 ML
D50 ORDER, D50ORD: 0 ML
DIAGNOSIS, 93000: NORMAL
DIFFERENTIAL METHOD BLD: ABNORMAL
EOSINOPHIL # BLD: 0 K/UL (ref 0–0.4)
EOSINOPHIL NFR BLD: 0 % (ref 0–5)
ERYTHROCYTE [DISTWIDTH] IN BLOOD BY AUTOMATED COUNT: 13 % (ref 11.6–14.5)
EST. AVERAGE GLUCOSE BLD GHB EST-MCNC: 286 MG/DL
GLOBULIN SER CALC-MCNC: 4 G/DL (ref 2–4)
GLUCOSE BLD STRIP.AUTO-MCNC: 115 MG/DL (ref 70–110)
GLUCOSE BLD STRIP.AUTO-MCNC: 170 MG/DL (ref 70–110)
GLUCOSE BLD STRIP.AUTO-MCNC: 198 MG/DL (ref 70–110)
GLUCOSE BLD STRIP.AUTO-MCNC: 228 MG/DL (ref 70–110)
GLUCOSE BLD STRIP.AUTO-MCNC: 268 MG/DL (ref 70–110)
GLUCOSE BLD STRIP.AUTO-MCNC: 334 MG/DL (ref 70–110)
GLUCOSE BLD STRIP.AUTO-MCNC: 376 MG/DL (ref 70–110)
GLUCOSE BLD STRIP.AUTO-MCNC: 399 MG/DL (ref 70–110)
GLUCOSE BLD STRIP.AUTO-MCNC: 402 MG/DL (ref 70–110)
GLUCOSE BLD STRIP.AUTO-MCNC: 403 MG/DL (ref 70–110)
GLUCOSE BLD STRIP.AUTO-MCNC: 458 MG/DL (ref 70–110)
GLUCOSE BLD STRIP.AUTO-MCNC: 72 MG/DL (ref 70–110)
GLUCOSE BLD STRIP.AUTO-MCNC: 74 MG/DL (ref 70–110)
GLUCOSE BLD STRIP.AUTO-MCNC: >600 MG/DL (ref 70–110)
GLUCOSE SERPL-MCNC: 399 MG/DL (ref 74–99)
GLUCOSE SERPL-MCNC: 704 MG/DL (ref 74–99)
GLUCOSE UR STRIP.AUTO-MCNC: >1000 MG/DL
GLUCOSE, GLC: 170 MG/DL
GLUCOSE, GLC: 198 MG/DL
GLUCOSE, GLC: 228 MG/DL
GLUCOSE, GLC: 268 MG/DL
GLUCOSE, GLC: 334 MG/DL
GLUCOSE, GLC: 376 MG/DL
GLUCOSE, GLC: 402 MG/DL
GLUCOSE, GLC: 72 MG/DL
GLUCOSE, GLC: 74 MG/DL
HBA1C MFR BLD: 11.6 % (ref 4.2–5.6)
HCO3 BLD-SCNC: 30.7 MMOL/L (ref 22–26)
HCT VFR BLD AUTO: 40.8 % (ref 35–45)
HGB BLD-MCNC: 13.5 G/DL (ref 12–16)
HGB UR QL STRIP: ABNORMAL
HIGH TARGET, HITG: 180 MG/DL
IMM GRANULOCYTES # BLD AUTO: 0 K/UL (ref 0–0.04)
IMM GRANULOCYTES NFR BLD AUTO: 0 % (ref 0–0.5)
INSULIN ADMINSTERED, INSADM: 0.5 UNITS/HOUR
INSULIN ADMINSTERED, INSADM: 0.8 UNITS/HOUR
INSULIN ADMINSTERED, INSADM: 10.1 UNITS/HOUR
INSULIN ADMINSTERED, INSADM: 10.4 UNITS/HOUR
INSULIN ADMINSTERED, INSADM: 11 UNITS/HOUR
INSULIN ADMINSTERED, INSADM: 6.8 UNITS/HOUR
INSULIN ADMINSTERED, INSADM: 7.7 UNITS/HOUR
INSULIN ADMINSTERED, INSADM: 9.5 UNITS/HOUR
INSULIN ADMINSTERED, INSADM: 9.7 UNITS/HOUR
INSULIN ORDER, INSORD: 0.5 UNITS/HOUR
INSULIN ORDER, INSORD: 0.8 UNITS/HOUR
INSULIN ORDER, INSORD: 10.1 UNITS/HOUR
INSULIN ORDER, INSORD: 10.4 UNITS/HOUR
INSULIN ORDER, INSORD: 11 UNITS/HOUR
INSULIN ORDER, INSORD: 6.8 UNITS/HOUR
INSULIN ORDER, INSORD: 7.7 UNITS/HOUR
INSULIN ORDER, INSORD: 9.5 UNITS/HOUR
INSULIN ORDER, INSORD: 9.7 UNITS/HOUR
KETONES UR QL STRIP.AUTO: ABNORMAL MG/DL
LACTATE BLD-SCNC: 1.26 MMOL/L (ref 0.4–2)
LEUKOCYTE ESTERASE UR QL STRIP.AUTO: ABNORMAL
LIPASE SERPL-CCNC: 101 U/L (ref 73–393)
LOW TARGET, LOT: 140 MG/DL
LYMPHOCYTES # BLD: 0.9 K/UL (ref 0.9–3.6)
LYMPHOCYTES NFR BLD: 9 % (ref 21–52)
MAGNESIUM SERPL-MCNC: 2.3 MG/DL (ref 1.6–2.6)
MCH RBC QN AUTO: 27.4 PG (ref 24–34)
MCHC RBC AUTO-ENTMCNC: 33.1 G/DL (ref 31–37)
MCV RBC AUTO: 82.9 FL (ref 78–100)
MINUTES UNTIL NEXT BG, NBG: 60 MIN
MONOCYTES # BLD: 0.9 K/UL (ref 0.05–1.2)
MONOCYTES NFR BLD: 10 % (ref 3–10)
MULTIPLIER, MUL: 0.02
MULTIPLIER, MUL: 0.03
MULTIPLIER, MUL: 0.04
MULTIPLIER, MUL: 0.04
MULTIPLIER, MUL: 0.05
MULTIPLIER, MUL: 0.06
MULTIPLIER, MUL: 0.06
MULTIPLIER, MUL: 0.07
MULTIPLIER, MUL: 0.07
NEUTS SEG # BLD: 7.6 K/UL (ref 1.8–8)
NEUTS SEG NFR BLD: 81 % (ref 40–73)
NITRITE UR QL STRIP.AUTO: NEGATIVE
NRBC # BLD: 0 K/UL (ref 0–0.01)
NRBC BLD-RTO: 0 PER 100 WBC
ORDER INITIALS, ORDINIT: NORMAL
P-R INTERVAL, ECG05: 130 MS
PCO2 BLD: 50.2 MMHG (ref 35–45)
PH BLD: 7.39 [PH] (ref 7.35–7.45)
PH UR STRIP: 5.5 [PH] (ref 5–8)
PHOSPHATE SERPL-MCNC: 2.9 MG/DL (ref 2.5–4.9)
PLATELET # BLD AUTO: 334 K/UL (ref 135–420)
PMV BLD AUTO: 8.9 FL (ref 9.2–11.8)
PO2 BLD: 31 MMHG (ref 80–100)
POTASSIUM SERPL-SCNC: 3.4 MMOL/L (ref 3.5–5.5)
POTASSIUM SERPL-SCNC: 3.6 MMOL/L (ref 3.5–5.5)
PROT SERPL-MCNC: 8 G/DL (ref 6.4–8.2)
PROT UR STRIP-MCNC: 30 MG/DL
Q-T INTERVAL, ECG07: 364 MS
QRS DURATION, ECG06: 90 MS
QTC CALCULATION (BEZET), ECG08: 478 MS
RBC # BLD AUTO: 4.92 M/UL (ref 4.2–5.3)
RBC #/AREA URNS HPF: ABNORMAL /HPF (ref 0–5)
SAO2 % BLD: 58 % (ref 92–97)
SERVICE CMNT-IMP: ABNORMAL
SERVICE CMNT-IMP: ABNORMAL
SODIUM SERPL-SCNC: 132 MMOL/L (ref 136–145)
SODIUM SERPL-SCNC: 141 MMOL/L (ref 136–145)
SP GR UR REFRACTOMETRY: 1.02 (ref 1–1.03)
SPECIMEN TYPE: ABNORMAL
TROPONIN-HIGH SENSITIVITY: 8 NG/L (ref 0–54)
UROBILINOGEN UR QL STRIP.AUTO: 0.2 EU/DL (ref 0.2–1)
VENTRICULAR RATE, ECG03: 104 BPM
WBC # BLD AUTO: 9.5 K/UL (ref 4.6–13.2)
WBC URNS QL MICRO: ABNORMAL /HPF (ref 0–5)

## 2022-01-24 PROCEDURE — 83605 ASSAY OF LACTIC ACID: CPT

## 2022-01-24 PROCEDURE — 85025 COMPLETE CBC W/AUTO DIFF WBC: CPT

## 2022-01-24 PROCEDURE — 80053 COMPREHEN METABOLIC PANEL: CPT

## 2022-01-24 PROCEDURE — 96375 TX/PRO/DX INJ NEW DRUG ADDON: CPT

## 2022-01-24 PROCEDURE — 82962 GLUCOSE BLOOD TEST: CPT

## 2022-01-24 PROCEDURE — 74011000258 HC RX REV CODE- 258: Performed by: EMERGENCY MEDICINE

## 2022-01-24 PROCEDURE — 74011250636 HC RX REV CODE- 250/636: Performed by: STUDENT IN AN ORGANIZED HEALTH CARE EDUCATION/TRAINING PROGRAM

## 2022-01-24 PROCEDURE — 99285 EMERGENCY DEPT VISIT HI MDM: CPT

## 2022-01-24 PROCEDURE — 71046 X-RAY EXAM CHEST 2 VIEWS: CPT

## 2022-01-24 PROCEDURE — 96361 HYDRATE IV INFUSION ADD-ON: CPT

## 2022-01-24 PROCEDURE — 74011000250 HC RX REV CODE- 250: Performed by: STUDENT IN AN ORGANIZED HEALTH CARE EDUCATION/TRAINING PROGRAM

## 2022-01-24 PROCEDURE — 84484 ASSAY OF TROPONIN QUANT: CPT

## 2022-01-24 PROCEDURE — 74011250637 HC RX REV CODE- 250/637: Performed by: STUDENT IN AN ORGANIZED HEALTH CARE EDUCATION/TRAINING PROGRAM

## 2022-01-24 PROCEDURE — 84100 ASSAY OF PHOSPHORUS: CPT

## 2022-01-24 PROCEDURE — 74011636637 HC RX REV CODE- 636/637: Performed by: STUDENT IN AN ORGANIZED HEALTH CARE EDUCATION/TRAINING PROGRAM

## 2022-01-24 PROCEDURE — 81001 URINALYSIS AUTO W/SCOPE: CPT

## 2022-01-24 PROCEDURE — 93005 ELECTROCARDIOGRAM TRACING: CPT

## 2022-01-24 PROCEDURE — 83036 HEMOGLOBIN GLYCOSYLATED A1C: CPT

## 2022-01-24 PROCEDURE — 96374 THER/PROPH/DIAG INJ IV PUSH: CPT

## 2022-01-24 PROCEDURE — 74011250636 HC RX REV CODE- 250/636: Performed by: EMERGENCY MEDICINE

## 2022-01-24 PROCEDURE — 74011636637 HC RX REV CODE- 636/637: Performed by: EMERGENCY MEDICINE

## 2022-01-24 PROCEDURE — 83735 ASSAY OF MAGNESIUM: CPT

## 2022-01-24 PROCEDURE — 83690 ASSAY OF LIPASE: CPT

## 2022-01-24 RX ORDER — INSULIN PUMP SYRINGE, 3 ML
EACH MISCELLANEOUS
Qty: 1 KIT | Refills: 0 | Status: SHIPPED | OUTPATIENT
Start: 2022-01-24

## 2022-01-24 RX ORDER — DEXTROSE MONOHYDRATE 100 MG/ML
125-250 INJECTION, SOLUTION INTRAVENOUS AS NEEDED
Status: DISCONTINUED | OUTPATIENT
Start: 2022-01-24 | End: 2022-01-24

## 2022-01-24 RX ORDER — ONDANSETRON 2 MG/ML
4 INJECTION INTRAMUSCULAR; INTRAVENOUS ONCE
Status: COMPLETED | OUTPATIENT
Start: 2022-01-24 | End: 2022-01-24

## 2022-01-24 RX ORDER — FAMOTIDINE 20 MG/1
20 TABLET, FILM COATED ORAL
Status: COMPLETED | OUTPATIENT
Start: 2022-01-24 | End: 2022-01-24

## 2022-01-24 RX ORDER — INSULIN LISPRO 100 [IU]/ML
INJECTION, SOLUTION INTRAVENOUS; SUBCUTANEOUS EVERY 4 HOURS
Status: DISCONTINUED | OUTPATIENT
Start: 2022-01-24 | End: 2022-01-24

## 2022-01-24 RX ORDER — DEXTROSE MONOHYDRATE 100 MG/ML
125-250 INJECTION, SOLUTION INTRAVENOUS AS NEEDED
Status: DISCONTINUED | OUTPATIENT
Start: 2022-01-24 | End: 2022-01-25 | Stop reason: HOSPADM

## 2022-01-24 RX ORDER — DEXTROSE 50 % IN WATER (D50W) INTRAVENOUS SYRINGE
25-50 AS NEEDED
Status: DISCONTINUED | OUTPATIENT
Start: 2022-01-24 | End: 2022-01-24

## 2022-01-24 RX ORDER — CEPHALEXIN 500 MG/1
500 CAPSULE ORAL 4 TIMES DAILY
Qty: 28 CAPSULE | Refills: 0 | Status: SHIPPED | OUTPATIENT
Start: 2022-01-24 | End: 2022-01-31

## 2022-01-24 RX ORDER — MAGNESIUM SULFATE 100 %
16 CRYSTALS MISCELLANEOUS AS NEEDED
Status: DISCONTINUED | OUTPATIENT
Start: 2022-01-24 | End: 2022-01-25 | Stop reason: HOSPADM

## 2022-01-24 RX ORDER — INSULIN GLARGINE 100 [IU]/ML
15 INJECTION, SOLUTION SUBCUTANEOUS
Status: COMPLETED | OUTPATIENT
Start: 2022-01-24 | End: 2022-01-24

## 2022-01-24 RX ORDER — DEXTROSE 50 % IN WATER (D50W) INTRAVENOUS SYRINGE
25-50 AS NEEDED
Status: DISCONTINUED | OUTPATIENT
Start: 2022-01-24 | End: 2022-01-24 | Stop reason: CLARIF

## 2022-01-24 RX ORDER — MAGNESIUM SULFATE 100 %
4 CRYSTALS MISCELLANEOUS AS NEEDED
Status: DISCONTINUED | OUTPATIENT
Start: 2022-01-24 | End: 2022-01-24

## 2022-01-24 RX ADMIN — INSULIN LISPRO 10 UNITS: 100 INJECTION, SOLUTION INTRAVENOUS; SUBCUTANEOUS at 04:35

## 2022-01-24 RX ADMIN — WATER 1 G: 1 INJECTION INTRAMUSCULAR; INTRAVENOUS; SUBCUTANEOUS at 04:14

## 2022-01-24 RX ADMIN — FAMOTIDINE 20 MG: 20 TABLET, FILM COATED ORAL at 03:08

## 2022-01-24 RX ADMIN — SODIUM CHLORIDE 1000 ML: 900 INJECTION, SOLUTION INTRAVENOUS at 04:14

## 2022-01-24 RX ADMIN — SODIUM CHLORIDE 1000 ML: 900 INJECTION, SOLUTION INTRAVENOUS at 11:26

## 2022-01-24 RX ADMIN — SODIUM CHLORIDE 6.8 UNITS/HR: 9 INJECTION, SOLUTION INTRAVENOUS at 11:26

## 2022-01-24 RX ADMIN — SODIUM CHLORIDE 0.5 UNITS/HR: 9 INJECTION, SOLUTION INTRAVENOUS at 23:05

## 2022-01-24 RX ADMIN — LIDOCAINE HYDROCHLORIDE 40 ML: 20 SOLUTION ORAL; TOPICAL at 03:07

## 2022-01-24 RX ADMIN — Medication 15 UNITS: at 21:22

## 2022-01-24 RX ADMIN — ONDANSETRON 4 MG: 2 INJECTION INTRAMUSCULAR; INTRAVENOUS at 02:10

## 2022-01-24 RX ADMIN — SODIUM CHLORIDE 1000 ML: 900 INJECTION, SOLUTION INTRAVENOUS at 03:08

## 2022-01-24 NOTE — ED PROVIDER NOTES
Patient 57-year-old female with history to include diabetes, hypertension, gastroesophageal reflux disease, and hyperlipidemia. Patient reports approximately 1 week of increasing epigastric pain with associated nausea with nonbilious/nonbloody emesis, decreased p.o. intake and general malaise. Patient is uncertain about her blood sugar and states she has not been checking it regularly. Patient denies any associated fever/chills, diarrhea, hematochezia, hematemesis, or melena. Past Medical History:   Diagnosis Date    Essential hypertension, benign     Mixed hyperlipidemia     Type II or unspecified type diabetes mellitus without mention of complication, not stated as uncontrolled        Past Surgical History:   Procedure Laterality Date    HX HYSTERECTOMY  36s    X2         Family History:   Problem Relation Age of Onset    Diabetes Mother     Stroke Maternal Grandmother     Stroke Maternal Grandfather     Hypertension Sister     Diabetes Sister        Social History     Socioeconomic History    Marital status: SINGLE     Spouse name: Not on file    Number of children: Not on file    Years of education: Not on file    Highest education level: Not on file   Occupational History    Not on file   Tobacco Use    Smoking status: Never Smoker    Smokeless tobacco: Never Used   Substance and Sexual Activity    Alcohol use: No     Alcohol/week: 0.0 standard drinks    Drug use: No    Sexual activity: Not Currently     Partners: Male   Other Topics Concern    Not on file   Social History Narrative    Not on file     Social Determinants of Health     Financial Resource Strain:     Difficulty of Paying Living Expenses: Not on file   Food Insecurity:     Worried About Running Out of Food in the Last Year: Not on file    Diana of Food in the Last Year: Not on file   Transportation Needs:     Lack of Transportation (Medical): Not on file    Lack of Transportation (Non-Medical):  Not on file   Physical Activity:     Days of Exercise per Week: Not on file    Minutes of Exercise per Session: Not on file   Stress:     Feeling of Stress : Not on file   Social Connections:     Frequency of Communication with Friends and Family: Not on file    Frequency of Social Gatherings with Friends and Family: Not on file    Attends [employer] Services: Not on file    Active Member of [address] [place] [address] or Organizations: Not on file    Attends Club or Organization Meetings: Not on file    Marital Status: Not on file   Intimate Partner Violence:     Fear of Current or Ex-Partner: Not on file    Emotionally Abused: Not on file    Physically Abused: Not on file    Sexually Abused: Not on file   Housing Stability:     Unable to Pay for Housing in the Last Year: Not on file    Number of [place] in the Last Year: Not on file    Unstable Housing in the Last Year: Not on file         ALLERGIES: Clindamycin, Sulfa (sulfonamide antibiotics), and Lisinopril    Review of Systems   Constitutional: Negative for chills and fever. HENT: Negative for rhinorrhea and sore throat. Eyes: Negative for discharge and redness. Respiratory: Negative for cough and shortness of breath. Cardiovascular: Negative for chest pain and leg swelling. Gastrointestinal: Positive for abdominal pain, nausea and vomiting. Negative for diarrhea. Genitourinary: Negative for difficulty urinating and dysuria. Musculoskeletal: Negative for back pain and neck pain. Skin: Negative for rash and wound. Neurological: Negative for syncope, light-headedness and headaches. Vitals:    01/24/22 0131   BP: (!) 140/75   Pulse: (!) 115   Resp: 18   Temp: 98.5 °F (36.9 °C)   SpO2: 96%   Weight: 77.1 kg (170 lb)   Height: 5' 6\" (1.676 m)            Physical Exam  Constitutional:       General: She is not in acute distress. Appearance: She is not ill-appearing, toxic-appearing or diaphoretic. HENT:      Head: Normocephalic and atraumatic. Right Ear: External ear normal.      Left Ear: External ear normal.      Nose: No congestion or rhinorrhea. Mouth/Throat:      Mouth: Mucous membranes are moist.      Pharynx: No oropharyngeal exudate or posterior oropharyngeal erythema. Eyes:      General:         Right eye: No discharge. Left eye: No discharge. Pupils: Pupils are equal, round, and reactive to light. Neck:      Vascular: No carotid bruit. Cardiovascular:      Rate and Rhythm: Regular rhythm. Tachycardia present. Heart sounds: No murmur heard. No friction rub. No gallop. Pulmonary:      Effort: Pulmonary effort is normal. No respiratory distress. Breath sounds: No stridor. No wheezing, rhonchi or rales. Abdominal:      General: Abdomen is flat. There is no distension. Tenderness: There is abdominal tenderness. There is no right CVA tenderness, left CVA tenderness, guarding or rebound. Comments: Epigastric abdominal tenderness with no associated rebound, distention, or guarding. Musculoskeletal:         General: No swelling, tenderness, deformity or signs of injury. Cervical back: No rigidity or tenderness. Right lower leg: No edema. Left lower leg: No edema. Lymphadenopathy:      Cervical: No cervical adenopathy. Skin:     General: Skin is warm. Capillary Refill: Capillary refill takes less than 2 seconds. Coloration: Skin is not jaundiced or pale. Findings: No bruising, erythema, lesion or rash. Neurological:      General: No focal deficit present. Mental Status: She is alert and oriented to person, place, and time. Sensory: No sensory deficit. Motor: No weakness. Psychiatric:         Mood and Affect: Mood normal.          MDM  Number of Diagnoses or Management Options  Diagnosis management comments: Patient has a primary complaint of abdominal pain with associated nausea/vomiting.   Considerations are for significant intra-abdominal pathology to include pancreatitis, gastroparesis, gastroesophageal reflux as well as concern for significant metabolic or electrolyte abnormality given patient's poor p.o. intake and nausea/vomiting. Will also evaluate for ACS with EKG and a significant intrathoracic abnormalities with a chest x-ray. Disposition pending patient work-up.        Amount and/or Complexity of Data Reviewed  Clinical lab tests: reviewed  Tests in the radiology section of CPT®: reviewed  Decide to obtain previous medical records or to obtain history from someone other than the patient: yes           Procedures

## 2022-01-24 NOTE — DIABETES MGMT
Diabetes/ Glycemic Control Plan of Care    Recommendations:   1.) regular insulin drip via GlucoStabilizer (hyperglyucemia). 2.) follow insulin drip protocol via GlucoStabilizer and criteria for transition to SC insulin (MAR):  Stable BG/in increase x4 hours  Stable insulin drip requirement/no increase in insulin drip x4 hours  3.) administer first dose of daily basal lantus insulin two hours before d/c insulin drip and correctional lispro insulin after patient taken off insulin drip. Then cont to monitor to assess need for lantus insulin dose adjustment if BG remain above target range. 4.) 4 carb choices in diet when ready to feed patient. 1/24:  Seen patient in ED and noted that she was admitted on 1/21/2022 with report of feeling bad last Friday, vomiting, diarrhea and no appetite. Results for Cl Kilpatrick (MRN [id number]) as of 1/24/2022 10:13   Ref. Range 1/24/2022 01:46   Glucose Latest Ref Range: 74 - 99 mg/dL [address] [facility])     Results for Cl Kilpatrick (MRN [id number]) as of 1/24/2022 10:13   Ref. Range 1/24/2022 04:17 1/24/2022 04:27 1/24/2022 06:45 1/24/2022 10:07   GLUCOSE,FAST - POC Latest Ref Range: 70 - 110 mg/dL >600 (HH)  458 (HH) [address] [facility])     Results for Cl Kilpatrick (MRN [id number]) as of 1/24/2022 15:29   Ref. Range 1/24/2022 01:46 1/24/2022 11:40   Glucose Latest Ref Range: 74 - 99 mg/dL 704 (HH) 399 (H)     Noted patient received 2 liters of IVF bolus NS 1000 ml each and 10 units of lispro insulin at 0435 this morning. Discussed recommendations for regular insulin drip via GlucoStabilizer with Dr. Diane Carrera and obtained order. Patient reported taking glimepiride and metformin 2 times daily at home. Patient reported also that she previously took lantus insulin 50 units daily ih the past until it was discontinued. Assessment:   DX:   1. Hyperglycemia     2.  Acute cystitis with hematuria        Fasting/ Morning blood glucose:   Lab Results   Component Value Date/Time    Glucose 704 (HH) 01/24/2022 01:46 AM    Glucose (POC) 403 (HH) 01/24/2022 10:07 AM     IV Fluids containing dextrose:  None    Steroids:   None    Blood glucose values: Within target range (70-180mg/dL): No    Current insulin orders:   Regular insulin drip via GlucoStabilizer (hyperglycemia). Order obtained and entered at 1014 this morning    Total Daily Dose previous 24 hours: None    Current A1c:   Lab Results   Component Value Date/Time    Hemoglobin A1c 12.5 (H) 07/27/2021 11:26 AM    Hemoglobin A1c (POC) 12.0 10/26/2021 09:49 AM      equivalent  to ave Blood Glucose of 312 mg/dl for 2-3 months prior to admission    Adequate glycemic control PTA: No    Nutrition/Diet:   Active Orders   Diet    DIET NPO      Meal Intake:  No data found. Supplement Intake:  No data found. Home diabetes medications: 1/24/2022:  Patient reported list of diabetes medications she was taking at home:  Metformin 750 mg 2 times daily with meals  Glimepiride 2 mg 2 times daily   Patient reported history of lantus insulin (pen) 50 units daily every morning but \"but it was stopped a while back. \"    Key Antihyperglycemic Medications             glimepiride (AMARYL) 2 mg tablet TAKE 1 TABLET BY MOUTH TWO TIMES A DAY    insulin glargine (Lantus Solostar U-100 Insulin) 100 unit/mL (3 mL) inpn INJECT 50 UNITS UNDER THE SKIN ONCE DAILY    empagliflozin (Jardiance) 25 mg tablet Take 1 Tab by mouth daily. metFORMIN ER (GLUCOPHAGE XR) 750 mg tablet TAKE 1 TABLET BY MOUTH TWO TIMES A DAY    insulin glargine (LANTUS SOLOSTAR U-100 INSULIN) 100 unit/mL (3 mL) inpn INJECT 40 UNITS UNDER THE SKIN ONCE DAILY ( 37 DAYS )          Plan/Goals:   Blood glucose will be within target of 70 - 180 mg/dl within 72 hours  Reinforce dietary and medication compliance at home. Education:  [] Refer to Diabetes Education Record: Pending full assessment of home diabetes management and educational needs at time of this review. [] Education not indicated at this time     Renetta Talavera RN [facility]  Pager: [phone]

## 2022-01-24 NOTE — DIABETES MGMT
Diabetes Patient/Family Education Record    Factors That May Influence Patients Ability to Learn or Comply with Recommendations   []   Language barrier    []   Cultural needs   []   Motivation    []   Cognitive limitation    []   Physical   [x]   Education    []   Physiological factors   []   Hearing/vision/speaking impairment   []   Synagogue beliefs    []   Financial factors   []  Other:   []  No factors identified at this time. Person Instructed:   [x]   Patient   []   Family   []  Other     Preference for Learning:   [x]   Verbal   [x]   Written: diab educ packet; contents discussed and explained. []  Demonstration     Level of Comprehension & Competence:    [x]  Good                                      [] Fair                                     []  Poor                             []  Needs Reinforcement   [x]  Teach back completed    Education Component:   [x]  Medication management, including how to administer insulin (if appropriate) and potential medication interactions:  Patient reported the list of diabetes medications that she's taking at home as prescribed:  Metformin 750 mg 2 times daily with meals  Glimepiride 2 mg 2 times daily    Patient reported prior history of taking lantus (pen) insulin 50 units two times daily until it was discontinued. She can't remember the last time she took insulin but stated that it's been a while. Patient is willing to take insulin again, if prescribed. Completed demonstration on how to use pen insulin and stated, \"yeah, that's how I took it before. \"     [x]  Nutritional management - [x] Obtained usual meal pattern   [x]   Basic carbohydrate counting  [x]  Plate method  [x]  Limit concentrated sweets and avoid sweetened beverages  [x]  Portion control  [x]    Avoid skipping meals     [x]  Exercise: Walk 3 minutes daily 5 days a week as tolerated.      [x]  Signs, symptoms, and treatment of hyperglycemia and hypoglycemia: Discussed high blood glucose in detail with patient. And how taking prescribed diet medication, portion control of carbs and regular exercise can help. [x] Prevention, recognition and treatment of hyperglycemia and hypoglycemia: Discussed low blood glucose less than 70 in detail with patient and she reported rare episode of her blood sugar dropping too low. She knows the symptoms and how to treat it. She verbalized understanding when to to contact her doctor. [x]  Importance of blood glucose monitoring: Patient reported not monitoring her BG at home because she lost her meter and testing supplies. Patient reported that it's been a while since she last checked her blood sugar at home. She will check her BG if she has a meter and testing supplies. Discussed the importance of notifying her doctor for assistance in the future. [x] Blood Glucose targets   [x]   Provided patient with blood glucose meter: Contour next EZ with 20 lancets and 20 test strips. Discussed the importance of prescriptions for additional testing supplies if she decide to cont to use this meter.  []  Has glucometer and supplies at home   [x]  Instruction on use of the blood glucose meter and recommended monitoring schedule   [x]  Discuss the importance of HbA1C monitoring. Patients A1c is 12.0% (10/26/2022).  This is equivalent to average glucose of 298 mg/dl during the previous 2-3 months.   []  Sick day guidelines   [x]  Proper use and disposal of lancets, needles, syringes or insulin pens (if appropriate)   [x]  Potential long-term complications (retinopathy, kidney disease, neuropathy, foot care)   [x] Information about whom to contact in case of emergency or for more information    [x]  Goal:  Patient/family will demonstrate understanding of Diabetes Self- Management Skills by: 1/31/2022  Plan for post-discharge education or self-management support:    [] Outpatient class schedule provided            [] Patient Declined [] Scheduled for outpatient classes (date) _______    [x] Written information provided  Verify: [x] Prior to admission Diabetes medications    Does patient understand how diabetes medications work? No.  Does patient have difficulty obtaining diabetes medications and testing supplies? No, but she did not notify her doctor that she needed assistance for diabetes testing supplies.     Rosette Treadwell RN [facility]  Pager: [phone]

## 2022-01-24 NOTE — ED TRIAGE NOTES
Patient states that she started feeling bad last Friday, vomiting, diarrhea. Patient states that her reflux has been worse and she has not had an appetite.

## 2022-01-25 VITALS
OXYGEN SATURATION: 96 % | WEIGHT: 170 LBS | RESPIRATION RATE: 14 BRPM | BODY MASS INDEX: 27.32 KG/M2 | HEART RATE: 88 BPM | HEIGHT: 66 IN | DIASTOLIC BLOOD PRESSURE: 73 MMHG | TEMPERATURE: 98.5 F | SYSTOLIC BLOOD PRESSURE: 149 MMHG

## 2022-01-25 PROCEDURE — 96375 TX/PRO/DX INJ NEW DRUG ADDON: CPT

## 2022-01-25 PROCEDURE — 74011250636 HC RX REV CODE- 250/636: Performed by: STUDENT IN AN ORGANIZED HEALTH CARE EDUCATION/TRAINING PROGRAM

## 2022-01-25 PROCEDURE — 74011000250 HC RX REV CODE- 250: Performed by: STUDENT IN AN ORGANIZED HEALTH CARE EDUCATION/TRAINING PROGRAM

## 2022-01-25 RX ADMIN — WATER 1 G: 1 INJECTION INTRAMUSCULAR; INTRAVENOUS; SUBCUTANEOUS at 02:54

## 2022-01-25 NOTE — ED NOTES
Patient turned over to me pending observation and discharge. Elected observation until midnight which has now occurred.   Will discharge for outpatient follow-up will advise patient to follow-up with PCP for Lantus prescription

## 2022-01-25 NOTE — ED NOTES
This nurse did not meet of provide care for this patient, in chart to discharge from ED trackboard only.

## 2022-01-30 NOTE — PROGRESS NOTES
ICD-10-CM ICD-9-CM    1. Type 2 diabetes mellitus without complication, without long-term current use of insulin (HCC)  E11.9 250.00 glucose blood VI test strips (True Metrix Glucose Test Strip) strip      lancets misc   2. Essential hypertension, benign  I10 401.1 amLODIPine (NORVASC) 10 mg tablet    Chronic problem fair control. Borderline today. Follow-up next visit before changing any medications. 3. Mixed hyperlipidemia  E78.2 272.2    4. Gastroesophageal reflux disease without esophagitis  K21.9 530.81 omeprazole (PRILOSEC) 20 mg capsule   5. Insomnia, unspecified type  G47.00 780.52 melatonin 5 mg cap capsule       Consent: Lyndsey Carr. Eliseo Melton was evaluated by real-time audio only technology. The patient and/or the healthcare decision maker, is aware that this patient-initiated, Telehealth encounter is a billable service, with coverage as determined by the insurance carrier. The patient is aware that  he/she may receive a bill and has provided verbal consent to proceed: Yes. The patient was at home and I was at the offices of the [address] [place] [address] no one else participated in the service. Assessment and plan  S/p hospital discharge from the ED with an extremely elevated blood sugar 700. Is now down to the 300 range. We have her switch to Lantus to 50 units every morning. If not successful would consider adding either semaglutide or one of the SGLT2 inhibitors. The patient has GERD and we added Prilosec. The patient states that she has difficulty with sleeping, melatonin added. The blood pressure was 149/73, not controlled, Norvasc increased to 10 mg daily. Hyperlipidemia on last visit. Encouraged to  go ahead and get labs done before the next visit. Will recheck in 1 month.   Lab results and schedule of future lab studies reviewed with patient  Diagnostic and radiologic results and the schedule of future studies were reviewed with the patient  All questions answered and understood. Subjective:   Britt Rob is a 59 y.o. female has Mixed hyperlipidemia, Essential hypertension, benign, Arthralgia, Dermatitis, Abscess of skin of abdomen, and Uncontrolled type 2 diabetes mellitus with hyperglycemia ([address] on their problem list..      Seen in the ED 1/24/2022. Diagnosis hyperglycemia acute cystitis with hematuria. Her blood pressure was 140/75 and pulse was 115. She had epigastric abdominal tenderness without rebound on examination. Blood sugar was to be initially greater than 700 potassium 3.4 creatinine 1.18 select 37 chest x-ray was unremarkable  Office visit was 10/26/2021. Hemoglobin A1c was 11.6. Seen for essential hypertension diabetes mixed hyperlipidemia and hearing loss on the right side. Essential hypertension, chronic problem. Diabetes mellitus type 2 non-insulin-requiring, not controlled in October determination. Follow-up labs ordered. Discussed diet and exercise. Mixed hyperlipidemia. Chronic problem. Controlled on last visit. Follow-up labs ordered. Right earwax. Debrox ordered. Continues to decline mammogram all her immunizations and colonoscopy. Risk benefits alternatives explained and understood to her. Patient had claudication symptoms. Will refer again for duplex study. The duplex study is still pending. Previous visit was 10/26/2021. Fit test ordered for colon cancer screening      Was 704 now 311. Key Antihyperglycemic Medications             glimepiride (AMARYL) 2 mg tablet TAKE 1 TABLET BY MOUTH TWO TIMES A DAY    insulin glargine (Lantus Solostar U-100 Insulin) 100 unit/mL (3 mL) inpn INJECT 50 UNITS UNDER THE SKIN ONCE DAILY    empagliflozin (Jardiance) 25 mg tablet Take 1 Tab by mouth daily.     metFORMIN ER (GLUCOPHAGE XR) 750 mg tablet TAKE 1 TABLET BY MOUTH TWO TIMES A DAY    insulin glargine (LANTUS SOLOSTAR U-100 INSULIN) 100 unit/mL (3 mL) inpn INJECT 40 UNITS UNDER THE SKIN ONCE DAILY ( 37 DAYS ) Hypertension  The patient has no headaches, visual changes, chest pain or pressure,dyspnea, orthopnea, or PND. There is no problem with the medication. Diabetes mellitus  The patient admits to polyuria, polydipsia, denies polyphagia. The patient denies any other aggravating or relieving factors at this time there has been no problem with the medications. She has symptoms of neuropathy with numbness and tingling of the lower extremities. Blood sugar was 700 in the ED now in the 200-300 range. Lantus on 40 units daily. Given Trumetrix meter  Hyperlipidemia   The patient denies any myalgias or weakness. No abdominal discomfort admitted to. The patient denies any difficulty with or side effects from the medication. BP Readings from Last 3 Encounters:   01/24/22 (!) 149/73   10/26/21 (!) 129/55   07/27/21 113/76         Lab Results   Component Value Date/Time    Hemoglobin A1c 11.6 (H) 01/24/2022 01:46 AM    Hemoglobin A1c (POC) 12.0 10/26/2021 09:49 AM     Lab Results   Component Value Date/Time    Cholesterol, total 136 07/27/2021 11:26 AM    HDL Cholesterol 37 (L) 07/27/2021 11:26 AM    LDL, calculated 68 07/27/2021 11:26 AM    LDL, calculated 56 11/27/2017 09:15 AM    VLDL, calculated 31 07/27/2021 11:26 AM    VLDL, calculated 41 (H) 11/27/2017 09:15 AM    Triglyceride 185 (H) 07/27/2021 11:26 AM     Lower extremity pain  The patient admits to lower extremity pain. The onset was 6 months prior. The pain is precipitated by walking. It is located in the calves. It is relieved by rest.  There is no position change. No fevers chills or sweats admitted to. No redness or paleness of the lower extremities are admitted to. No coolness is admitted to. This is a new symptom. It has not been evaluated in the past.  Duplex study pending still  GERD  This is a chronic problem. It is unchanged. The problem is in good control. There are no new aggravating or relieving factors.   There is no history of any new associated signs, symptoms, or complications. The treatment is unchanged and there are no side effects from it. Review of Systems   Constitutional: Negative for chills and fever. HENT: Positive for hearing loss (Right side). Eyes: Negative for blurred vision, discharge and redness. Respiratory: Negative for cough and shortness of breath. Cardiovascular: Positive for claudication. Negative for chest pain, palpitations, orthopnea, leg swelling and PND. Gastrointestinal: Negative for abdominal pain, blood in stool, constipation, diarrhea and melena. Genitourinary: Negative for dysuria, frequency and urgency. Musculoskeletal: Negative for joint pain and myalgias. Skin: Negative for rash. Neurological: Negative for dizziness, focal weakness and headaches. She states her IQ is 72   Psychiatric/Behavioral: Negative for depression. The patient is not nervous/anxious. Health Maintenance Due   Topic Date Due    Eye Exam Retinal or Dilated  Never done    DTaP/Tdap/Td series (1 - Tdap) Never done    Colorectal Cancer Screening Combo  Never done    Shingrix Vaccine Age 50> (1 of 2) Never done    Breast Cancer Screen Mammogram  Never done   Declines eye follow up  Covid vaccines done , two doses 01/14/22  Declines mammogram or colonoscopy 01/14/22        Current Outpatient Medications   Medication Sig    cephALEXin (Keflex) 500 mg capsule Take 1 Capsule by mouth four (4) times daily for 7 days.  Blood-Glucose Meter (Contour Next EZ Meter) monitoring kit Test sugar daily    glimepiride (AMARYL) 2 mg tablet TAKE 1 TABLET BY MOUTH TWO TIMES A DAY    atorvastatin (Lipitor) 40 mg tablet 1 tablet daily    carbamide peroxide (DEBROX) 6.5 % otic solution Administer 5 Drops into each ear two (2) times a day.     insulin glargine (Lantus Solostar U-100 Insulin) 100 unit/mL (3 mL) inpn INJECT 50 UNITS UNDER THE SKIN ONCE DAILY    empagliflozin (Jardiance) 25 mg tablet Take 1 Tab by mouth daily.  gabapentin (NEURONTIN) 100 mg capsule Take one at night for a week then 3 at night.  Insulin Needles, Disposable, (Pen Needle) 32 gauge x 5/32\" ndle For insulin shots    amLODIPine (NORVASC) 5 mg tablet TAKE 1 TABLET BY MOUTH ONCE DAILY    losartan (COZAAR) 100 mg tablet TAKE 1 TABLET BY MOUTH ONCE DAILY    metFORMIN ER (GLUCOPHAGE XR) 750 mg tablet TAKE 1 TABLET BY MOUTH TWO TIMES A DAY    hydroCHLOROthiazide (HYDRODIURIL) 25 mg tablet TAKE 1/2 TABLET BY MOUTH ONCE DAILY    insulin glargine (LANTUS SOLOSTAR U-100 INSULIN) 100 unit/mL (3 mL) inpn INJECT 40 UNITS UNDER THE SKIN ONCE DAILY ( 37 DAYS )    glucose blood VI test strips (TRUE METRIX GLUCOSE TEST STRIP) strip Test Blood Glucose once daily; ICD 10 E11.9, Quantity 100    Blood-Glucose Meter (TRUE METRIX AIR GLUCOSE METER) monitoring kit Test Blood Glucose once daily; ICD 10 E11.9     No current facility-administered medications for this visit. Allergies   Allergen Reactions    Clindamycin Hives    Sulfa (Sulfonamide Antibiotics) Unable to Obtain    Lisinopril Cough     has Mixed hyperlipidemia, Essential hypertension, benign, Arthralgia, Dermatitis, Abscess of skin of abdomen, and Uncontrolled type 2 diabetes mellitus with hyperglycemia ([address] on their problem list.    Past Surgical History:   Procedure Laterality Date    HX HYSTERECTOMY  1990s    X2      reports that she has never smoked. She has never used smokeless tobacco. She reports that she does not drink alcohol and does not use drugs. family history includes Diabetes in her mother and sister; Hypertension in her sister; Stroke in her maternal grandfather and maternal grandmother. We discussed the expected course, resolution and complications of the diagnosis(es) in detail. Medication risks, benefits, costs, interactions, and alternatives were discussed as indicated.   I advised her to contact the office if her condition worsens, changes or fails to improve as anticipated. She expressed understanding with the diagnosis(es) and plan. This note was done with the assistance of dragon speech software.   Some inadvertent errors or omissions may be present

## 2022-02-02 ENCOUNTER — VIRTUAL VISIT (OUTPATIENT)
Dept: FAMILY MEDICINE CLINIC | Age: 62
End: 2022-02-02
Payer: MEDICAID

## 2022-02-02 DIAGNOSIS — I10 ESSENTIAL HYPERTENSION, BENIGN: ICD-10-CM

## 2022-02-02 DIAGNOSIS — K21.9 GASTROESOPHAGEAL REFLUX DISEASE WITHOUT ESOPHAGITIS: ICD-10-CM

## 2022-02-02 DIAGNOSIS — E78.2 MIXED HYPERLIPIDEMIA: ICD-10-CM

## 2022-02-02 DIAGNOSIS — G47.00 INSOMNIA, UNSPECIFIED TYPE: ICD-10-CM

## 2022-02-02 DIAGNOSIS — E11.9 TYPE 2 DIABETES MELLITUS WITHOUT COMPLICATION, WITHOUT LONG-TERM CURRENT USE OF INSULIN (HCC): Primary | ICD-10-CM

## 2022-02-02 PROCEDURE — 99443 PR PHYS/QHP TELEPHONE EVALUATION 21-30 MIN: CPT | Performed by: EMERGENCY MEDICINE

## 2022-02-02 RX ORDER — LANCETS
EACH MISCELLANEOUS
Qty: 100 EACH | Refills: 11 | Status: SHIPPED | OUTPATIENT
Start: 2022-02-02

## 2022-02-02 RX ORDER — OMEPRAZOLE 20 MG/1
20 CAPSULE, DELAYED RELEASE ORAL DAILY
Qty: 30 CAPSULE | Refills: 3 | Status: SHIPPED | OUTPATIENT
Start: 2022-02-02 | End: 2022-06-17 | Stop reason: SDUPTHER

## 2022-02-02 RX ORDER — AMLODIPINE BESYLATE 10 MG/1
TABLET ORAL
Qty: 90 TABLET | Refills: 3 | Status: SHIPPED | OUTPATIENT
Start: 2022-02-02 | End: 2022-08-23

## 2022-02-02 RX ORDER — MELATONIN 5 MG
5 CAPSULE ORAL
Qty: 90 CAPSULE | Refills: 3 | Status: SHIPPED | OUTPATIENT
Start: 2022-02-02

## 2022-02-02 RX ORDER — CALCIUM CITRATE/VITAMIN D3 200MG-6.25
TABLET ORAL
Qty: 100 STRIP | Refills: 3 | Status: SHIPPED | OUTPATIENT
Start: 2022-02-02

## 2022-02-11 DIAGNOSIS — I10 ESSENTIAL HYPERTENSION, BENIGN: ICD-10-CM

## 2022-02-11 RX ORDER — HYDROCHLOROTHIAZIDE 25 MG/1
TABLET ORAL
Qty: 45 TABLET | Refills: 3 | Status: SHIPPED | OUTPATIENT
Start: 2022-02-11 | End: 2022-03-03 | Stop reason: SDUPTHER

## 2022-02-11 RX ORDER — LOSARTAN POTASSIUM 100 MG/1
TABLET ORAL
Qty: 90 TABLET | Refills: 3 | Status: SHIPPED | OUTPATIENT
Start: 2022-02-11 | End: 2022-08-30 | Stop reason: SDUPTHER

## 2022-02-11 NOTE — TELEPHONE ENCOUNTER
Requested Prescriptions     Pending Prescriptions Disp Refills    hydroCHLOROthiazide (HYDRODIURIL) 25 mg tablet 45 Tablet 3    losartan (COZAAR) 100 mg tablet 90 Tablet 3     Sig: TAKE 1 TABLET BY MOUTH ONCE DAILY

## 2022-02-16 ENCOUNTER — NURSE TRIAGE (OUTPATIENT)
Dept: OTHER | Facility: CLINIC | Age: 62
End: 2022-02-16

## 2022-02-16 NOTE — TELEPHONE ENCOUNTER
Received call from Patricia at [facility] with [employer] Complaint. Subjective: Caller states \"It started, after I went to the emergency room. My legs are full of fluid. My right leg has the veins - both legs are big from fluid and both hurt. \"     Current Symptoms:   BLE swelling/pain extending above knee area (right leg more swollen and tender than the left leg) - able to ambulate, but pain intensifies with ambulation - denies weeping     Denies chest pain or shortness of breath    Onset: 3 weeks ago; gradual    Associated Symptoms: NA    Pain Severity: 9/10; sharp; constant intensifies when ambulating - eases off when sitting down    Temperature: Denies fever    What has been tried: Hydrochlorothiazide been taking for 1 week      LMP: NA Pregnant: NA    Recommended disposition: GO TO ED NOW: If any chest pain or difficulty breathing occurs - call 911 for EMS transport. Care advice provided, patient verbalizes understanding; denies any other questions or concerns; instructed to call back for any new or worsening symptoms. Patient proceeding to nearest Emergency Department    Attention Provider: Thank you for allowing me to participate in the care of your patient. The patient was connected to triage in response to information provided to the [facility]. Please do not respond through this encounter as the response is not directed to a shared pool.     Reason for Disposition   SEVERE swelling (e.g., swelling extends above knee, entire leg is swollen, weeping fluid)    Protocols used: LEG SWELLING AND EDEMA-ADULT-OH

## 2022-02-23 ENCOUNTER — NURSE TRIAGE (OUTPATIENT)
Dept: OTHER | Facility: CLINIC | Age: 62
End: 2022-02-23

## 2022-02-23 NOTE — TELEPHONE ENCOUNTER
Received call from Joey at [facility] with [employer] Complaint. Subjective: Caller states \"I went on the 16th to the ER, they did an U/S and found a blood clot in the upper and lower R leg. I am on Eliquis. Both legs are retaining a lot of fluid. \"     Not pitting edema per patient. Current Symptoms: swelling in both legs    Onset: since before the ER visit but worsening the past two days    Associated Symptoms: B leg pain, worse in the right    Pain Severity: 8-9/10    Temperature: denies fever    What has been tried: Eliquis, tylenol    LMP: NA Pregnant: No    Recommended disposition: see in office today. Advised to go to [facility] or ER today if no appointment is available. Care advice provided, patient verbalizes understanding; denies any other questions or concerns; instructed to call back for any new or worsening symptoms. Patient/Caller agrees with recommended disposition; writer provided warm transfer to Omayra at [facility] for appointment scheduling    Attention Provider: Thank you for allowing me to participate in the care of your patient. The patient was connected to triage in response to information provided to the [facility]. Please do not respond through this encounter as the response is not directed to a shared pool.     Reason for Disposition   MODERATE swelling of both ankles (e.g., swelling extends up to the knees) AND new-onset or worsening    Protocols used: LEG SWELLING AND EDEMA-ADULT-OH

## 2022-02-24 ENCOUNTER — OFFICE VISIT (OUTPATIENT)
Dept: FAMILY MEDICINE CLINIC | Age: 62
End: 2022-02-24
Payer: MEDICAID

## 2022-02-24 VITALS
OXYGEN SATURATION: 98 % | HEIGHT: 66 IN | SYSTOLIC BLOOD PRESSURE: 142 MMHG | RESPIRATION RATE: 16 BRPM | DIASTOLIC BLOOD PRESSURE: 71 MMHG | WEIGHT: 184 LBS | HEART RATE: 97 BPM | BODY MASS INDEX: 29.57 KG/M2 | TEMPERATURE: 97.6 F

## 2022-02-24 DIAGNOSIS — I82.4Y1 ACUTE DEEP VEIN THROMBOSIS (DVT) OF PROXIMAL VEIN OF RIGHT LOWER EXTREMITY (HCC): ICD-10-CM

## 2022-02-24 DIAGNOSIS — Z09 HOSPITAL DISCHARGE FOLLOW-UP: Primary | ICD-10-CM

## 2022-02-24 PROCEDURE — 99213 OFFICE O/P EST LOW 20 MIN: CPT | Performed by: STUDENT IN AN ORGANIZED HEALTH CARE EDUCATION/TRAINING PROGRAM

## 2022-02-24 PROCEDURE — 1111F DSCHRG MED/CURRENT MED MERGE: CPT | Performed by: STUDENT IN AN ORGANIZED HEALTH CARE EDUCATION/TRAINING PROGRAM

## 2022-02-24 NOTE — PROGRESS NOTES
Monika Gonzalez is a 59 y.o. female that is here for a   Chief Complaint   Patient presents with   [facility] Follow Up     DVT right leg          1. Have you been to the ER, urgent care clinic since your last visit? Hospitalized since your last visit? Yes     2. Have you seen or consulted any other health care providers outside of the [address] [place] [address] since your last visit? Include any pap smears or colon screening.  no      Health Maintenance reviewed - yes    Upcoming Appts  no      VORB:   Orders Placed This Encounter    apixaban (Eliquis) 5 mg tablet    Blu Berry MD/ Jhonathan Chacon MA

## 2022-02-24 NOTE — PROGRESS NOTES
Toma Hernandez is a 59 y.o. female presenting today for Hospital Follow Up (DVT right leg )  . Chief Complaint   Patient presents with   [facility] Follow Up     DVT right leg        HPI:  Toma Hernandez presents to the office today for ED Follow Up. PCP: Dr. Gloria Osborne    Patient has a PMHx of T2DM, HTN, HLD, DVT, GERD, insomnia. Patient presented to the [facility] ED on 2/16/2022 due to worsening right thigh pain. She had been recently admitted to [facility] 3-weeks ago due to hyperglycemia. Doppler RLE showed an acute occlusive muscular DVT isolated to the right gastrocnemius calf muscles and acute occlusive superficial venous thrombosis involving the right great saphenous and varicose veins. She has a history of prior DVT 40 years ago in the Rt leg. Patient states that the right leg pain is better since she has been taking Tylenol along with the Eliquis. She has not had age-appropriate cancer screening such as mammogram or colonoscopy. She has refused them. She is not a smoker. However, the daughter who lives with her is a smoker-the patient is exposed to secondhand smoke. Review of Systems   Constitutional: Negative. HENT: Positive for hearing loss. Negative for congestion, ear pain and sinus pain. Eyes: Negative. Respiratory: Negative for stridor. Cardiovascular: Positive for leg swelling. Gastrointestinal: Positive for heartburn. Negative for abdominal pain, constipation, diarrhea, nausea and vomiting. Genitourinary: Negative. Musculoskeletal: Positive for myalgias. Skin: Negative. Neurological: Negative. All other systems reviewed and are negative.       Allergies   Allergen Reactions    Clindamycin Hives    Sulfa (Sulfonamide Antibiotics) Unable to Obtain    Lisinopril Cough       PHQ Screening   3 most recent PHQ Screens 2/24/2022   Little interest or pleasure in doing things Not at all   Feeling down, depressed, irritable, or hopeless Not at all   Total Score PHQ 2 0       History  Past Medical History:   Diagnosis Date    Essential hypertension, benign     Mixed hyperlipidemia     Type II or unspecified type diabetes mellitus without mention of complication, not stated as uncontrolled        Past Surgical History:   Procedure Laterality Date    HX HYSTERECTOMY  36s    X2       Social History     Socioeconomic History    Marital status: SINGLE     Spouse name: Not on file    Number of children: Not on file    Years of education: Not on file    Highest education level: Not on file   Occupational History    Not on file   Tobacco Use    Smoking status: Never Smoker    Smokeless tobacco: Never Used   Substance and Sexual Activity    Alcohol use: No     Alcohol/week: 0.0 standard drinks    Drug use: No    Sexual activity: Not Currently     Partners: Male   Other Topics Concern    Not on file   Social History Narrative    Not on file     Social Determinants of Health     Financial Resource Strain:     Difficulty of Paying Living Expenses: Not on file   Food Insecurity:     Worried About Running Out of Food in the Last Year: Not on file    Diana of Food in the Last Year: Not on file   Transportation Needs:     Lack of Transportation (Medical): Not on file    Lack of Transportation (Non-Medical):  Not on file   Physical Activity:     Days of Exercise per Week: Not on file    Minutes of Exercise per Session: Not on file   Stress:     Feeling of Stress : Not on file   Social Connections:     Frequency of Communication with Friends and Family: Not on file    Frequency of Social Gatherings with Friends and Family: Not on file    Attends Synagogue Services: Not on file    Active Member of Clubs or Organizations: Not on file    Attends Club or Organization Meetings: Not on file    Marital Status: Not on file   Intimate Partner Violence:     Fear of Current or Ex-Partner: Not on file    Emotionally Abused: Not on file  Physically Abused: Not on file    Sexually Abused: Not on file   Housing Stability:     Unable to Pay for Housing in the Last Year: Not on file    Number of Places Lived in the Last Year: Not on file    Unstable Housing in the Last Year: Not on file       Current Outpatient Medications   Medication Sig Dispense Refill    apixaban (Eliquis) 5 mg tablet Take 5 mg by mouth two (2) times a day.  hydroCHLOROthiazide (HYDRODIURIL) 25 mg tablet TAKE 1/2 TABLET BY MOUTH ONCE DAILY 45 Tablet 3    losartan (COZAAR) 100 mg tablet TAKE 1 TABLET BY MOUTH ONCE DAILY 90 Tablet 3    glucose blood VI test strips (True Metrix Glucose Test Strip) strip Test Blood Glucose once daily; ICD 10 E11.9, Quantity 100 100 Strip 3    lancets misc Use to check the blood glucose once a day 100 Each 11    omeprazole (PRILOSEC) 20 mg capsule Take 1 Capsule by mouth daily. 30 Capsule 3    amLODIPine (NORVASC) 10 mg tablet TAKE 1 TABLET BY MOUTH ONCE DAILY 90 Tablet 3    melatonin 5 mg cap capsule Take 1 Capsule by mouth nightly. 90 Capsule 3    Blood-Glucose Meter (Contour Next EZ Meter) monitoring kit Test sugar daily 1 Kit 0    glimepiride (AMARYL) 2 mg tablet TAKE 1 TABLET BY MOUTH TWO TIMES A DAY 90 Tablet 3    atorvastatin (Lipitor) 40 mg tablet 1 tablet daily 90 Tablet 3    carbamide peroxide (DEBROX) 6.5 % otic solution Administer 5 Drops into each ear two (2) times a day. 15 mL 1    insulin glargine (Lantus Solostar U-100 Insulin) 100 unit/mL (3 mL) inpn INJECT 50 UNITS UNDER THE SKIN ONCE DAILY 15 Adjustable Dose Pre-filled Pen Syringe 11    empagliflozin (Jardiance) 25 mg tablet Take 1 Tab by mouth daily. 30 Tab 3    gabapentin (NEURONTIN) 100 mg capsule Take one at night for a week then 3 at night.  90 Cap 3    Insulin Needles, Disposable, (Pen Needle) 32 gauge x 5/32\" ndle For insulin shots 100 Pen Needle 11    metFORMIN ER (GLUCOPHAGE XR) 750 mg tablet TAKE 1 TABLET BY MOUTH TWO TIMES A  Tab 3    insulin glargine (LANTUS SOLOSTAR U-100 INSULIN) 100 unit/mL (3 mL) inpn INJECT 40 UNITS UNDER THE SKIN ONCE DAILY ( 37 DAYS ) 15 Adjustable Dose Pre-filled Pen Syringe 1    Blood-Glucose Meter (TRUE METRIX AIR GLUCOSE METER) monitoring kit Test Blood Glucose once daily; ICD 10 E11.9 1 Kit 0         Vitals:    02/24/22 1409   BP: (!) 142/71   Pulse: 97   Resp: 16   Temp: 97.6 °F (36.4 °C)   TempSrc: Temporal   SpO2: 98%   Weight: 184 lb (83.5 kg)   Height: 5' 6\" (1.676 m)   PainSc:   0 - No pain       Physical Exam  Vitals and nursing note reviewed. Constitutional:       General: She is not in acute distress. Appearance: Normal appearance. She is obese. She is not ill-appearing, toxic-appearing or diaphoretic. HENT:      Head: Normocephalic and atraumatic. Eyes:      General: No scleral icterus. Extraocular Movements: Extraocular movements intact. Conjunctiva/sclera: Conjunctivae normal.      Pupils: Pupils are equal, round, and reactive to light. Cardiovascular:      Rate and Rhythm: Normal rate and regular rhythm. Pulses: Normal pulses. Heart sounds: No murmur heard. Pulmonary:      Effort: Pulmonary effort is normal. No respiratory distress. Breath sounds: Normal breath sounds. No wheezing or rales. Musculoskeletal:         General: Swelling present. No tenderness. Normal range of motion. Right lower leg: Edema present. Left lower leg: Edema present. Skin:     General: Skin is warm and dry. Coloration: Skin is not jaundiced or pale. Neurological:      General: No focal deficit present. Mental Status: She is alert and oriented to person, place, and time. Mental status is at baseline. Cranial Nerves: No cranial nerve deficit. No results displayed because visit has over 200 results. No results found for any visits on 02/24/22.     Patient Care Team:  Patient Care Team:  Negrita Lemos MD as PCP - General (Internal Medicine)  Milad Greenwood MD as PCP - [address] Provider      Assessment / Plan:      ICD-10-CM ICD-9-CM    1. Hospital discharge follow-up  Z09 V67.59 NC DISCHARGE MEDS RECONCILED W/ CURRENT OUTPATIENT MED LIST   2. Acute deep vein thrombosis (DVT) of proximal vein of right lower extremity (HCC)  I82.4Y1 453.41      Acute DVT: Continue Eliquis. Since this is a second episode of DVT-patient will need to be on long-term anticoagulation. I did discuss with her that she should have age-appropriate cancer screenings to rule out malignancy. However, patient refused. Can continue Tylenol as needed for pain. Advised to avoid NSAIDs due to increased risk of bleeding. Patient advised to follow-up with her PCP Dr. Wesley Albrecht in 3 months. I asked the patient if she  had any questions and answered her  questions. The patient stated that she understands the treatment plan and agrees with the treatment plan    This document was created with a voice activated dictation system and may contain transcription errors.

## 2022-03-03 DIAGNOSIS — E11.9 TYPE 2 DIABETES MELLITUS WITHOUT COMPLICATION, WITHOUT LONG-TERM CURRENT USE OF INSULIN (HCC): ICD-10-CM

## 2022-03-03 DIAGNOSIS — I10 ESSENTIAL HYPERTENSION, BENIGN: ICD-10-CM

## 2022-03-03 RX ORDER — METFORMIN HYDROCHLORIDE 750 MG/1
TABLET, EXTENDED RELEASE ORAL
Qty: 180 TABLET | Refills: 3 | Status: SHIPPED | OUTPATIENT
Start: 2022-03-03 | End: 2022-08-30 | Stop reason: SDUPTHER

## 2022-03-03 RX ORDER — HYDROCHLOROTHIAZIDE 25 MG/1
TABLET ORAL
Qty: 45 TABLET | Refills: 3 | Status: SHIPPED | OUTPATIENT
Start: 2022-03-03 | End: 2022-08-23

## 2022-03-18 PROBLEM — E11.65 UNCONTROLLED TYPE 2 DIABETES MELLITUS WITH HYPERGLYCEMIA (HCC): Status: ACTIVE | Noted: 2018-10-02

## 2022-03-19 PROBLEM — L02.211 ABSCESS OF SKIN OF ABDOMEN: Status: ACTIVE | Noted: 2018-02-28

## 2022-03-19 PROBLEM — I82.4Y1 ACUTE DEEP VEIN THROMBOSIS (DVT) OF PROXIMAL VEIN OF RIGHT LOWER EXTREMITY (HCC): Status: ACTIVE | Noted: 2022-02-24

## 2022-03-20 PROBLEM — L30.9 DERMATITIS: Status: ACTIVE | Noted: 2018-01-16

## 2022-04-29 ENCOUNTER — HOSPITAL ENCOUNTER (OUTPATIENT)
Dept: LAB | Age: 62
Discharge: HOME | End: 2022-04-29

## 2022-04-29 LAB — XX-LABCORP SPECIMEN COL,LCBCF: NORMAL

## 2022-04-29 PROCEDURE — 99001 SPECIMEN HANDLING PT-LAB: CPT

## 2022-04-30 LAB
ALBUMIN SERPL-MCNC: 4.3 G/DL (ref 3.8–4.8)
ALBUMIN/CREAT UR: 644 MG/G CREAT (ref 0–29)
ALBUMIN/GLOB SERPL: 1.9 {RATIO} (ref 1.2–2.2)
ALP SERPL-CCNC: 191 IU/L (ref 44–121)
ALT SERPL-CCNC: 13 IU/L (ref 0–32)
AST SERPL-CCNC: 13 IU/L (ref 0–40)
BASOPHILS # BLD AUTO: 0.1 X10E3/UL (ref 0–0.2)
BASOPHILS NFR BLD AUTO: 1 %
BILIRUB SERPL-MCNC: 0.4 MG/DL (ref 0–1.2)
BUN SERPL-MCNC: 27 MG/DL (ref 8–27)
BUN/CREAT SERPL: 30 (ref 12–28)
CALCIUM SERPL-MCNC: 9.2 MG/DL (ref 8.7–10.3)
CHLORIDE SERPL-SCNC: 96 MMOL/L (ref 96–106)
CHOLEST SERPL-MCNC: 130 MG/DL (ref 100–199)
CO2 SERPL-SCNC: 22 MMOL/L (ref 20–29)
CREAT SERPL-MCNC: 0.9 MG/DL (ref 0.57–1)
CREAT UR-MCNC: 40 MG/DL
EGFR: 72 ML/MIN/1.73
EOSINOPHIL # BLD AUTO: 0.1 X10E3/UL (ref 0–0.4)
EOSINOPHIL NFR BLD AUTO: 1 %
ERYTHROCYTE [DISTWIDTH] IN BLOOD BY AUTOMATED COUNT: 13 % (ref 11.7–15.4)
GLOBULIN SER CALC-MCNC: 2.3 G/DL (ref 1.5–4.5)
GLUCOSE SERPL-MCNC: 368 MG/DL (ref 65–99)
HCT VFR BLD AUTO: 35.2 % (ref 34–46.6)
HDLC SERPL-MCNC: 35 MG/DL
HGB BLD-MCNC: 11.6 G/DL (ref 11.1–15.9)
IMM GRANULOCYTES # BLD AUTO: 0 X10E3/UL (ref 0–0.1)
IMM GRANULOCYTES NFR BLD AUTO: 1 %
LDLC SERPL CALC-MCNC: 60 MG/DL (ref 0–99)
LYMPHOCYTES # BLD AUTO: 1.9 X10E3/UL (ref 0.7–3.1)
LYMPHOCYTES NFR BLD AUTO: 22 %
MCH RBC QN AUTO: 28.2 PG (ref 26.6–33)
MCHC RBC AUTO-ENTMCNC: 33 G/DL (ref 31.5–35.7)
MCV RBC AUTO: 86 FL (ref 79–97)
MICROALBUMIN UR-MCNC: 257.6 UG/ML
MONOCYTES # BLD AUTO: 0.6 X10E3/UL (ref 0.1–0.9)
MONOCYTES NFR BLD AUTO: 7 %
NEUTROPHILS # BLD AUTO: 6.1 X10E3/UL (ref 1.4–7)
NEUTROPHILS NFR BLD AUTO: 68 %
PLATELET # BLD AUTO: 239 X10E3/UL (ref 150–450)
POTASSIUM SERPL-SCNC: 4.6 MMOL/L (ref 3.5–5.2)
PROT SERPL-MCNC: 6.6 G/DL (ref 6–8.5)
RBC # BLD AUTO: 4.11 X10E6/UL (ref 3.77–5.28)
SODIUM SERPL-SCNC: 136 MMOL/L (ref 134–144)
TRIGL SERPL-MCNC: 216 MG/DL (ref 0–149)
VLDLC SERPL CALC-MCNC: 35 MG/DL (ref 5–40)
WBC # BLD AUTO: 8.8 X10E3/UL (ref 3.4–10.8)

## 2022-05-09 DIAGNOSIS — I82.4Y1 ACUTE DEEP VEIN THROMBOSIS (DVT) OF PROXIMAL VEIN OF RIGHT LOWER EXTREMITY (HCC): ICD-10-CM

## 2022-05-09 RX ORDER — APIXABAN 5 MG/1
TABLET, FILM COATED ORAL
Qty: 30 TABLET | Refills: 2 | Status: SHIPPED | OUTPATIENT
Start: 2022-05-09 | End: 2022-06-17 | Stop reason: SDUPTHER

## 2022-05-29 NOTE — PROGRESS NOTES
Minus R prewarmed S      ICD-10-CM ICD-9-CM    1. Essential hypertension, benign  I10 401.1 CBC WITH AUTOMATED DIFF   2. Type 2 diabetes mellitus without complication, without long-term current use of insulin (HCC)  K82.1 347.03 METABOLIC PANEL, COMPREHENSIVE      HEMOGLOBIN A1C WITH EAG      MICROALBUMIN, UR, RAND W/ MICROALB/CREAT RATIO   3. Mixed hyperlipidemia  E78.2 272.2 LIPID PANEL   4. Hematuria, unspecified type  R31.9 599.70 URINALYSIS W/MICROSCOPIC         Assessment and plan          Essential hypertension. Chronic problem. Good control at this time continue present regimen. Type 2 diabetes mellitus without long-term current use of insulin. .  Chronic problem follow-up labs ordered. Hyperlipidemia. Chronic problem. Follow-up labs ordered. DVT. Recurrent. Eliquis lifelong. Complicated by hematuria. We will check a urinalysis. We will then decide whether or not we can continue the anticoagulation. Lab results and schedule of future lab studies reviewed with patient  Diagnostic and radiologic results and the schedule of future studies were reviewed with the patient  Reviewed diet, exercise and weight control  All questions answered and understood. Health Maintenance Due   Topic Date Due    Eye Exam Retinal or Dilated  Never done    DTaP/Tdap/Td series (1 - Tdap) Never done    Colorectal Cancer Screening Combo  Never done    Shingrix Vaccine Age 50> (1 of 2) Never done    Breast Cancer Screen Mammogram  Never done    A1C test (Diabetic or Prediabetic)  04/24/2022     Previously declined health maintenance. 06/01/22 declines today also.   Declines eye follow up  Covid vaccines done , two doses 01/14/22  Declines mammogram or colonoscopy 01/14/22          Subjective:   Norberto Porras is a 59 y.o. female has Mixed hyperlipidemia, Essential hypertension, benign, Arthralgia, Dermatitis, Abscess of skin of abdomen, Uncontrolled type 2 diabetes mellitus with hyperglycemia ([address] and Acute deep vein thrombosis (DVT) of proximal vein of right lower extremity ([address] on their problem list..  ED visit 2/16/2022 acute DVT of calf muscle of the right lower extremity. Hyperglycemia due to diabetes and varicose veins. Placed on apixaban. There is a prior history of blood clot. There was no provoking symptoms such as injury or travel. Office visit 2/24/2022. Follow-up on DVT right leg. Prior DVT 40 years, right leg. Screenings refused, exposed to secondhand smoke. Advised long-term anticoagulation as this was a second occurrence. Seen previously by our practice on 2/2/2022  Most recent labs 4/29/2022 CBC within normal limits CMP shows a glucose of 368 otherwise unremarkable. Microalbumin to creatinine ratio 644. Most recent hemoglobin A1c was 12 on 10/26/2021. Total cholesterol 130 LDL cholesterol 60. S/p hospital discharge from the ED with an extremely elevated blood sugar 700. Is now down to the 300 range. We have her switch to Lantus to 50 units every morning. If not successful would consider adding either semaglutide or one of the SGLT2 inhibitors. The patient has GERD and we added Prilosec. The patient states that she has difficulty with sleeping, melatonin added. The blood pressure was 149/73, not controlled, Norvasc increased to 10 mg daily. Hyperlipidemia on last visit. Encouraged to  go ahead and get labs done before the next visit. Will recheck in 1 month. Lab results and schedule of future lab studies reviewed with patient  Diagnostic and radiologic results and the schedule of future studies were reviewed with the patient  All questions answered and understood. Seen in the ED 1/24/2022. Diagnosis hyperglycemia acute cystitis with hematuria. Her blood pressure was 140/75 and pulse was 115. She had epigastric abdominal tenderness without rebound on examination.   Blood sugar was to be initially greater than 700 potassium 3.4 creatinine 1.18 select 37 chest x-ray was unremarkable  Office visit was 10/26/2021. Hemoglobin A1c was 11.6. Seen for essential hypertension diabetes mixed hyperlipidemia and hearing loss on the right side. Essential hypertension, chronic problem. Diabetes mellitus type 2 non-insulin-requiring, not controlled in October determination. Follow-up labs ordered. Discussed diet and exercise. Mixed hyperlipidemia. Chronic problem. Controlled on last visit. Follow-up labs ordered. Right earwax. Debrox ordered. Continues to decline mammogram all her immunizations and colonoscopy. Risk benefits alternatives explained and understood to her. Patient had claudication symptoms. Will refer again for duplex study. The duplex study is still pending. Previous visit was 10/26/2021. Fit test ordered for colon cancer screening      Hypertension  The patient has no headaches, visual changes, chest pain or pressure,dyspnea, orthopnea, or PND. There is no problem with the medication. Diabetes mellitus  The patient admits to polyuria, polydipsia, denies polyphagia. The patient denies any other aggravating or relieving factors at this time there has been no problem with the medications. She has symptoms of neuropathy with numbness and tingling of the lower extremities. Blood sugar was 700 in the ED now in the 200-300 range. Lantus on 40 units daily. Given Trumetrix meter  Hyperlipidemia   The patient denies any myalgias or weakness. No abdominal discomfort admitted to. The patient denies any difficulty with or side effects from the medication.     BP Readings from Last 3 Encounters:   06/01/22 117/65   02/24/22 (!) 142/71   01/24/22 (!) 149/73         Lab Results   Component Value Date/Time    Hemoglobin A1c 11.6 (H) 01/24/2022 01:46 AM    Hemoglobin A1c (POC) 12.0 10/26/2021 09:49 AM     Lab Results   Component Value Date/Time    Cholesterol, total 130 04/29/2022 12:00 AM    HDL Cholesterol 35 (L) 04/29/2022 12:00 AM    LDL, calculated 60 04/29/2022 12:00 AM    LDL, calculated 56 11/27/2017 09:15 AM    VLDL, calculated 35 04/29/2022 12:00 AM    VLDL, calculated 41 (H) 11/27/2017 09:15 AM    Triglyceride 216 (H) 04/29/2022 12:00 AM     Lower extremity pain  The patient admits to lower extremity pain. The onset was 6 months prior. The pain is precipitated by walking. It is located in the calves. It is relieved by rest.  There is no position change. No fevers chills or sweats admitted to. No redness or paleness of the lower extremities are admitted to. No coolness is admitted to. This is a new symptom. It has not been evaluated in the past.  Duplex study pending still  GERD  This is a chronic problem. It is unchanged. The problem is in good control. There are no new aggravating or relieving factors. There is no history of any new associated signs, symptoms, or complications. The treatment is unchanged and there are no side effects from it. DVT  The patient has  Hematuria. Red. Present for 2 days. clinical course has improved   The patient voices no complaints  Presently on apixaban. Review of Systems   Constitutional: Negative for chills and fever. HENT: Positive for hearing loss (Right side). Eyes: Negative for blurred vision, discharge and redness. Respiratory: Negative for cough and shortness of breath. Cardiovascular: Positive for claudication. Negative for chest pain, palpitations, orthopnea, leg swelling and PND. Gastrointestinal: Negative for abdominal pain, blood in stool, constipation, diarrhea and melena. Genitourinary: Positive for hematuria. Negative for dysuria, frequency and urgency. Musculoskeletal: Positive for myalgias (calf and thigh, improving.). Negative for joint pain. Skin: Negative for rash. Neurological: Negative for dizziness, focal weakness and headaches. She states her IQ is 72   Psychiatric/Behavioral: Negative for depression. The patient is not nervous/anxious.           Current Outpatient Medications   Medication Sig    Eliquis 5 mg tablet TAKE 1 TABLET BY MOUTH TWO (2) TIMES A DAY.  metFORMIN ER (GLUCOPHAGE XR) 750 mg tablet TAKE 1 TABLET BY MOUTH TWO TIMES A DAY    hydroCHLOROthiazide (HYDRODIURIL) 25 mg tablet TAKE 1/2 TABLET BY MOUTH ONCE DAILY    losartan (COZAAR) 100 mg tablet TAKE 1 TABLET BY MOUTH ONCE DAILY    glucose blood VI test strips (True Metrix Glucose Test Strip) strip Test Blood Glucose once daily; ICD 10 E11.9, Quantity 100    lancets misc Use to check the blood glucose once a day    omeprazole (PRILOSEC) 20 mg capsule Take 1 Capsule by mouth daily.  amLODIPine (NORVASC) 10 mg tablet TAKE 1 TABLET BY MOUTH ONCE DAILY    melatonin 5 mg cap capsule Take 1 Capsule by mouth nightly.  Blood-Glucose Meter (Contour Next EZ Meter) monitoring kit Test sugar daily    glimepiride (AMARYL) 2 mg tablet TAKE 1 TABLET BY MOUTH TWO TIMES A DAY    atorvastatin (Lipitor) 40 mg tablet 1 tablet daily    carbamide peroxide (DEBROX) 6.5 % otic solution Administer 5 Drops into each ear two (2) times a day.  insulin glargine (Lantus Solostar U-100 Insulin) 100 unit/mL (3 mL) inpn INJECT 50 UNITS UNDER THE SKIN ONCE DAILY    empagliflozin (Jardiance) 25 mg tablet Take 1 Tab by mouth daily.  gabapentin (NEURONTIN) 100 mg capsule Take one at night for a week then 3 at night.  Insulin Needles, Disposable, (Pen Needle) 32 gauge x 5/32\" ndle For insulin shots    insulin glargine (LANTUS SOLOSTAR U-100 INSULIN) 100 unit/mL (3 mL) inpn INJECT 40 UNITS UNDER THE SKIN ONCE DAILY ( 37 DAYS )    Blood-Glucose Meter (TRUE METRIX AIR GLUCOSE METER) monitoring kit Test Blood Glucose once daily; ICD 10 E11.9     No current facility-administered medications for this visit.      Allergies   Allergen Reactions    Clindamycin Hives    Sulfa (Sulfonamide Antibiotics) Unable to Obtain    Lisinopril Cough     has Mixed hyperlipidemia, Essential hypertension, benign, Arthralgia, Dermatitis, Abscess of skin of abdomen, Uncontrolled type 2 diabetes mellitus with hyperglycemia ([address] and Acute deep vein thrombosis (DVT) of proximal vein of right lower extremity (HCC) on their problem list.    Past Surgical History:   Procedure Laterality Date    HX HYSTERECTOMY  1990s    X2      reports that she has never smoked. She has never used smokeless tobacco. She reports that she does not drink alcohol and does not use drugs. family history includes Diabetes in her mother and sister; Hypertension in her sister; Stroke in her maternal grandfather and maternal grandmother. Visit Vitals  /65 (BP 1 Location: Left arm, BP Patient Position: Sitting, BP Cuff Size: Large adult)   Pulse 91   Temp 96.9 °F (36.1 °C) (Temporal)   Resp 16   Ht 5' 6\" (1.676 m)   Wt 175 lb (79.4 kg)   SpO2 96%   BMI 28.25 kg/m²       Physical Exam  Constitutional:       General: She is not in acute distress. Appearance: She is well-developed. She is obese. HENT:      Head: Normocephalic and atraumatic. Right Ear: Tympanic membrane and external ear normal.      Left Ear: Tympanic membrane and external ear normal.      Nose: Nose normal.      Mouth/Throat:      Mouth: Mucous membranes are moist.      Comments: Edentulous. Eyes:      General: No scleral icterus. Conjunctiva/sclera: Conjunctivae normal.      Pupils: Pupils are equal, round, and reactive to light. Neck:      Thyroid: No thyromegaly. Vascular: No JVD. Cardiovascular:      Rate and Rhythm: Normal rate and regular rhythm. Heart sounds: Normal heart sounds. Pulmonary:      Effort: Pulmonary effort is normal.      Breath sounds: Normal breath sounds. Abdominal:      General: Bowel sounds are normal. There is no distension. Tenderness: There is no abdominal tenderness. Musculoskeletal:         General: No swelling, tenderness, deformity or signs of injury. Normal range of motion. Right lower leg: No edema.       Left lower leg: No edema. Comments: Small amount of blood underneath her left middle toenail. Lymphadenopathy:      Cervical: No cervical adenopathy. Skin:     General: Skin is dry. Capillary Refill: Capillary refill takes less than 2 seconds. Coloration: Skin is not jaundiced or pale. Findings: No erythema, lesion or rash. Comments: Hyperpigmentation of the lower extremity from about mid lower tibia distally. Similar in appearance to stasis changes   Neurological:      Mental Status: She is alert and oriented to person, place, and time. Cranial Nerves: No cranial nerve deficit. Sensory: No sensory deficit (Both feet). Motor: No weakness or abnormal muscle tone. Coordination: Coordination normal.   Psychiatric:         Behavior: Behavior normal.         Thought Content: Thought content normal.         Judgment: Judgment normal.         Lab Results   Component Value Date/Time    WBC 8.8 04/29/2022 12:00 AM    HGB 11.6 04/29/2022 12:00 AM    HCT 35.2 04/29/2022 12:00 AM    PLATELET 771 33/51/3060 12:00 AM    MCV 86 04/29/2022 12:00 AM     Lab Results   Component Value Date/Time    Hemoglobin A1c 11.6 (H) 01/24/2022 01:46 AM    Hemoglobin A1c 12.5 (H) 07/27/2021 11:26 AM    Hemoglobin A1c 12.6 (H) 07/20/2021 08:14 AM    Glucose 368 (H) 04/29/2022 12:00 AM    Glucose (POC) 72 01/24/2022 11:03 PM    Microalb/Creat ratio (ug/mg creat.) 644 (H) 04/29/2022 12:00 AM    LDL, calculated 60 04/29/2022 12:00 AM    LDL, calculated 56 11/27/2017 09:15 AM    Creatinine 0.90 04/29/2022 12:00 AM      Lab Results   Component Value Date/Time    Cholesterol, total 130 04/29/2022 12:00 AM    HDL Cholesterol 35 (L) 04/29/2022 12:00 AM    LDL, calculated 60 04/29/2022 12:00 AM    LDL, calculated 56 11/27/2017 09:15 AM    Triglyceride 216 (H) 04/29/2022 12:00 AM     Lab Results   Component Value Date/Time    ALT (SGPT) 13 04/29/2022 12:00 AM    Alk.  phosphatase 191 (H) 04/29/2022 12:00 AM    Bilirubin, total 0.4 04/29/2022 12:00 AM    Albumin 4.3 04/29/2022 12:00 AM    Protein, total 6.6 04/29/2022 12:00 AM    PLATELET 050 39/05/9676 12:00 AM     No results found for: INR, INREXT, PTMR, PTP, PT1, PT2, INREXT, INREXT   Lab Results   Component Value Date/Time    GFR est non-AA 46 (L) 01/24/2022 11:40 AM    GFR est AA 56 (L) 01/24/2022 11:40 AM    Creatinine 0.90 04/29/2022 12:00 AM    BUN 27 04/29/2022 12:00 AM    Sodium 136 04/29/2022 12:00 AM    Potassium 4.6 04/29/2022 12:00 AM    Chloride 96 04/29/2022 12:00 AM    CO2 22 04/29/2022 12:00 AM    Magnesium 2.3 01/24/2022 11:40 AM    Phosphorus 2.9 01/24/2022 11:40 AM     No results found for: TSH, TSH2, TSH3, TSHP, TSHELE, TSHEXT, TT3, T3U, T3UP, FRT3, FT3, FT4, FT4P, T4, T4P, FT4T, TT7, TSHEXT, TSHEXT   Lab Results   Component Value Date/Time    Sodium 136 04/29/2022 12:00 AM    Potassium 4.6 04/29/2022 12:00 AM    Chloride 96 04/29/2022 12:00 AM    CO2 22 04/29/2022 12:00 AM    Anion gap 7 01/24/2022 11:40 AM    Glucose 368 (H) 04/29/2022 12:00 AM    BUN 27 04/29/2022 12:00 AM    Creatinine 0.90 04/29/2022 12:00 AM    BUN/Creatinine ratio 30 (H) 04/29/2022 12:00 AM    GFR est AA 56 (L) 01/24/2022 11:40 AM    GFR est non-AA 46 (L) 01/24/2022 11:40 AM    Calcium 9.2 04/29/2022 12:00 AM    Bilirubin, total 0.4 04/29/2022 12:00 AM    ALT (SGPT) 13 04/29/2022 12:00 AM    Alk. phosphatase 191 (H) 04/29/2022 12:00 AM    Protein, total 6.6 04/29/2022 12:00 AM    Albumin 4.3 04/29/2022 12:00 AM    Globulin 4.0 01/24/2022 01:46 AM    A-G Ratio 1.9 04/29/2022 12:00 AM      Lab Results   Component Value Date/Time    Hemoglobin A1c 11.6 (H) 01/24/2022 01:46 AM    Hemoglobin A1c (POC) 12.0 10/26/2021 09:49 AM            We discussed the expected course, resolution and complications of the diagnosis(es) in detail. Medication risks, benefits, costs, interactions, and alternatives were discussed as indicated.   I advised her to contact the office if her condition worsens, changes or fails to improve as anticipated. She expressed understanding with the diagnosis(es) and plan. This note was done with the assistance of dragon speech software.   Some inadvertent errors or omissions may be present

## 2022-06-01 ENCOUNTER — OFFICE VISIT (OUTPATIENT)
Dept: FAMILY MEDICINE CLINIC | Age: 62
End: 2022-06-01
Payer: MEDICAID

## 2022-06-01 VITALS
WEIGHT: 175 LBS | RESPIRATION RATE: 16 BRPM | HEIGHT: 66 IN | BODY MASS INDEX: 28.12 KG/M2 | OXYGEN SATURATION: 96 % | HEART RATE: 91 BPM | DIASTOLIC BLOOD PRESSURE: 65 MMHG | SYSTOLIC BLOOD PRESSURE: 117 MMHG | TEMPERATURE: 96.9 F

## 2022-06-01 DIAGNOSIS — E78.2 MIXED HYPERLIPIDEMIA: ICD-10-CM

## 2022-06-01 DIAGNOSIS — I10 ESSENTIAL HYPERTENSION, BENIGN: Primary | ICD-10-CM

## 2022-06-01 DIAGNOSIS — E11.9 TYPE 2 DIABETES MELLITUS WITHOUT COMPLICATION, WITHOUT LONG-TERM CURRENT USE OF INSULIN (HCC): ICD-10-CM

## 2022-06-01 DIAGNOSIS — R31.9 HEMATURIA, UNSPECIFIED TYPE: ICD-10-CM

## 2022-06-01 PROCEDURE — 99214 OFFICE O/P EST MOD 30 MIN: CPT | Performed by: EMERGENCY MEDICINE

## 2022-06-01 PROCEDURE — 3046F HEMOGLOBIN A1C LEVEL >9.0%: CPT | Performed by: EMERGENCY MEDICINE

## 2022-06-01 NOTE — PATIENT INSTRUCTIONS
Learning About High Blood Pressure  What is high blood pressure? Blood pressure is a measure of how hard the blood pushes against the walls of your arteries. It's normal for blood pressure to go up and down throughout the day. But if it stays up, you have high blood pressure. Another name for high blood pressure is hypertension. Two numbers tell you your blood pressure. The first number is the systolic pressure (top number). It shows how hard the blood pushes when your heart is pumping. The second number is the diastolic pressure (bottom number). It shows how hard the blood pushes between heartbeats, when your heart is relaxed and filling with blood. Your doctor will give you a goal for your blood pressure based on your health and your age. High blood pressure (hypertension) means that the top number stays high, or the bottom number stays high, or both. High blood pressure increases the risk of stroke, heart attack, and other problems. What happens when you have high blood pressure? · Blood flows through your arteries with too much force. Over time, this can damage the heart and the walls of your arteries. But you can't feel it. High blood pressure usually doesn't cause symptoms. · High blood pressure makes your heart work harder. And that can lead to heart failure, which means your heart doesn't pump as much blood as your body needs. · Fat and calcium start to build up in your arteries. This buildup is called hardening of the arteries. It can cause many problems including a heart attack and stroke. · Arteries also carry blood and oxygen to organs like your eyes, kidneys, and brain. If high blood pressure damages those arteries, it can lead to vision loss, kidney disease, stroke, and a higher risk of dementia. How can you prevent high blood pressure? · Stay at a healthy weight. · Try to limit how much sodium you eat to less than 2,300 milligrams (mg) a day.  If you limit your sodium to 1,500 mg a day, you can lower your blood pressure even more. ? Buy foods that are labeled \"unsalted,\" \"sodium-free,\" or \"low-sodium. \" Foods labeled \"reduced-sodium\" and \"light sodium\" may still have too much sodium. ? Flavor your food with garlic, lemon juice, onion, vinegar, herbs, and spices instead of salt. Do not use soy sauce, steak sauce, onion salt, garlic salt, mustard, or ketchup on your food. ? Use less salt (or none) when recipes call for it. You can often use half the salt a recipe calls for without losing flavor. · Be physically active. Get at least 30 minutes of exercise on most days of the week. Walking is a good choice. You also may want to do other activities, such as running, swimming, cycling, or playing tennis or team sports. · Limit alcohol to 2 drinks a day for men and 1 drink a day for women. · Eat plenty of fruits, vegetables, and low-fat dairy products. Eat less saturated and total fats. How is high blood pressure treated? · Your doctor will suggest making lifestyle changes to help your heart. For example, your doctor may ask you to eat healthy foods, quit smoking, lose extra weight, and be more active. · If lifestyle changes don't help enough, your doctor may recommend that you take medicine. · When blood pressure is very high, medicines are needed to lower it. Follow-up care is a key part of your treatment and safety. Be sure to make and go to all appointments, and call your doctor if you are having problems. It's also a good idea to know your test results and keep a list of the medicines you take. Where can you learn more? Go to http://www.Roombeats.com/  Enter P501 in the search box to learn more about \"Learning About High Blood Pressure. \"  Current as of: January 10, 2022               Content Version: 13.2  © 0312-5343 Healthwise, Incorporated.    Care instructions adapted under license by [employer] (which disclaims liability or warranty for this information). If you have questions about a medical condition or this instruction, always ask your healthcare professional. [address] any warranty or liability for your use of this information. Type 2 Diabetes: Care Instructions  Your Care Instructions     Type 2 diabetes is a disease that develops when the body's tissues cannot use insulin properly. Over time, the pancreas cannot make enough insulin. Insulin is a hormone that helps the body's cells use sugar (glucose) for energy. It also helps the body store extra sugar in muscle, fat, and liver cells. Without insulin, the sugar cannot get into the cells to do its work. It stays in the blood instead. This can cause high blood sugar levels. A person has diabetes when the blood sugar stays too high too much of the time. Over time, diabetes can lead to diseases of the heart, blood vessels, nerves, kidneys, and eyes. You may be able to control your blood sugar by losing weight, eating a healthy diet, and getting daily exercise. You may also have to take insulin or other diabetes medicine. Follow-up care is a key part of your treatment and safety. Be sure to make and go to all appointments. Call your doctor if you are having problems. It's also a good idea to know your test results and keep a list of the medicines you take. How can you care for yourself at home? 1. Keep your blood sugar at a target level (which you set with your doctor). ? Carbohydrate--the body's main source of fuel--affects blood sugar more than any other nutrient. Carbohydrate is in fruits, vegetables, milk, and yogurt. It also is in breads, cereals, vegetables such as potatoes and corn, and sugary foods such as candy and cakes. Follow your meal plan to know how much carbohydrate to eat at each meal and snack. ? Aim for 30 minutes of exercise on most, preferably all, days of the week. Walking is a good choice.  You also may want to do other activities, such as running, swimming, cycling, or playing tennis or team sports. Try to do muscle-strengthening exercises at least 2 times a week. ? Take your medicines exactly as prescribed. Call your doctor if you think you are having a problem with your medicine. You will get more details on the specific medicines your doctor prescribes. 2. Check your blood sugar as often as your doctor recommends. It is important to keep track of any symptoms you have, such as low blood sugar. Also tell your doctor if you have any changes in your activities, diet, or insulin use. 3. Talk to your doctor before you start taking aspirin every day. Aspirin can help certain people lower their risk of a heart attack or stroke. But taking aspirin isn't right for everyone, because it can cause serious bleeding. 4. Do not smoke. If you need help quitting, talk to your doctor about stop-smoking programs and medicines. These can increase your chances of quitting for good. 5. Keep your cholesterol and blood pressure at normal levels. You may need to take one or more medicines to reach your goals. Take them exactly as directed. Do not stop or change a medicine without talking to your doctor first.  When should you call for help? Call 911 anytime you think you may need emergency care. For example, call if:    · You passed out (lost consciousness), or you suddenly become very sleepy or confused. (You may have very low blood sugar.)   Call your doctor now or seek immediate medical care if:    · Your blood sugar is 300 mg/dL or is higher than the level your doctor has set for you. 1. You have symptoms of low blood sugar, such as:  ? Sweating. ? Feeling nervous, shaky, and weak. ? Extreme hunger and slight nausea. ? Dizziness and headache.  ? Blurred vision. ? Confusion. Watch closely for changes in your health, and be sure to contact your doctor if:    · You often have problems controlling your blood sugar.      1. You have symptoms of long-term diabetes problems, such as:  ? New vision changes. ? New pain, numbness, or tingling in your hands or feet. ? Skin problems. Where can you learn more? Go to http://www.gray.com/  Enter C553 in the search box to learn more about \"Type 2 Diabetes: Care Instructions. \"  Current as of: August 31, 2020               Content Version: 13.0  © 2006-2021 [employer]. Care instructions adapted under license by [employer] (which disclaims liability or warranty for this information). If you have questions about a medical condition or this instruction, always ask your healthcare professional. [place] [address] any warranty or liability for your use of this information. Learning About Carbohydrate (Carb) Counting and Eating Out When You Have Diabetes  Why plan your meals? Meal planning can be a key part of managing diabetes. Planning meals and snacks with the right balance of carbohydrate, protein, and fat can help you keep your blood sugar at the target level you set with your doctor. You don't have to eat special foods. You can eat what your family eats, including sweets once in a while. But you do have to pay attention to how often you eat and how much you eat of certain foods. You may want to work with a dietitian or a certified diabetes educator. He or she can give you tips and meal ideas and can answer your questions about meal planning. This health professional can also help you reach a healthy weight if that is one of your goals. What should you know about eating carbs? Managing the amount of carbohydrate (carbs) you eat is an important part of healthy meals when you have diabetes. Carbohydrate is found in many foods. 6. Learn which foods have carbs. And learn the amounts of carbs in different foods. ? Bread, cereal, pasta, and rice have about 15 grams of carbs in a serving.  A serving is 1 slice of bread (1 ounce), ½ cup of cooked cereal, or 1/3 cup of cooked pasta or rice. ? Fruits have 15 grams of carbs in a serving. A serving is 1 small fresh fruit, such as an apple or orange; ½ of a banana; ½ cup of cooked or canned fruit; ½ cup of fruit juice; 1 cup of melon or raspberries; or 2 tablespoons of dried fruit. ? Milk and no-sugar-added yogurt have 15 grams of carbs in a serving. A serving is 1 cup of milk or 2/3 cup of no-sugar-added yogurt. ? Starchy vegetables have 15 grams of carbs in a serving. A serving is ½ cup of mashed potatoes or sweet potato; 1 cup winter squash; ½ of a small baked potato; ½ cup of cooked beans; or ½ cup cooked corn or green peas. 7. Learn how much carbs to eat each day and at each meal. A dietitian or CDE can teach you how to keep track of the amount of carbs you eat. This is called carbohydrate counting. 8. If you are not sure how to count carbohydrate grams, use the Plate Method to plan meals. It is a good, quick way to make sure that you have a balanced meal. It also helps you spread carbs throughout the day. ? Divide your plate by types of foods. Put non-starchy vegetables on half the plate, meat or other protein food on one-quarter of the plate, and a grain or starchy vegetable in the final quarter of the plate. To this you can add a small piece of fruit and 1 cup of milk or yogurt, depending on how many carbs you are supposed to eat at a meal.  9. Try to eat about the same amount of carbs at each meal. Do not \"save up\" your daily allowance of carbs to eat at one meal.  10. Proteins have very little or no carbs per serving. Examples of proteins are beef, chicken, turkey, fish, eggs, tofu, cheese, cottage cheese, and peanut butter. A serving size of meat is 3 ounces, which is about the size of a deck of cards. Examples of meat substitute serving sizes (equal to 1 ounce of meat) are 1/4 cup of cottage cheese, 1 egg, 1 tablespoon of peanut butter, and ½ cup of tofu.   How can you eat out and still eat healthy? · Learn to estimate the serving sizes of foods that have carbohydrate. If you measure food at home, it will be easier to estimate the amount in a serving of restaurant food. · If the meal you order has too much carbohydrate (such as potatoes, corn, or baked beans), ask to have a low-carbohydrate food instead. Ask for a salad or green vegetables. · If you use insulin, check your blood sugar before and after eating out to help you plan how much to eat in the future. · If you eat more carbohydrate at a meal than you had planned, take a walk or do other exercise. This will help lower your blood sugar. What are some tips for eating healthy? · Limit saturated fat, such as the fat from meat and dairy products. This is a healthy choice because people who have diabetes are at higher risk of heart disease. So choose lean cuts of meat and nonfat or low-fat dairy products. Use olive or canola oil instead of butter or shortening when cooking. · Don't skip meals. Your blood sugar may drop too low if you skip meals and take insulin or certain medicines for diabetes. · Check with your doctor before you drink alcohol. Alcohol can cause your blood sugar to drop too low. Alcohol can also cause a bad reaction if you take certain diabetes medicines. Follow-up care is a key part of your treatment and safety. Be sure to make and go to all appointments, and call your doctor if you are having problems. It's also a good idea to know your test results and keep a list of the medicines you take. Where can you learn more? Go to http://www.Liquid Light.com/  Enter I147 in the search box to learn more about \"Learning About Carbohydrate (Carb) Counting and Eating Out When You Have Diabetes. \"  Current as of: December 17, 2020               Content Version: 13.0  © 1174-9730 Healthwise, Incorporated.    Care instructions adapted under license by [employer] (which disclaims liability or warranty for this information). If you have questions about a medical condition or this instruction, always ask your healthcare professional. [address] any warranty or liability for your use of this information. Diabetes Blood Sugar Emergencies: Your Action Plan  How can you prevent a blood sugar emergency? An important part of living with diabetes is keeping your blood sugar in your target range. You'll need to know what to do if it's too high or too low. Managing your blood sugar levels helps you avoid emergencies. This care sheet will teach you about the signs of high and low blood sugar. It will help you make an action plan with your doctor for when these signs occur. Low blood sugar is more likely to happen if you take certain medicines for diabetes. It can also happen if you skip a meal, drink alcohol, or exercise more than usual.  You may get high blood sugar if you eat differently than you normally do. One example is eating more carbohydrate than usual. Having a cold, the flu, or other sudden illness can also cause high blood sugar levels. Levels can also rise if you miss a dose of medicine. Any change in how you take your medicine may affect your blood sugar level. So it's important to work with your doctor before you make any changes. Track your blood sugar  Work with your doctor to fill in the blank spaces below that apply to you. Track your levels, know your target range, and write down ways you can get your blood sugar back in your target range. A log book can help you track your levels. Take the book to all of your medical appointments. 11. Check your blood sugar _____ times a day, at these times:________________________________________________. (For example: Before meals, at bedtime, before exercise, during exercise, other.)  12. Your blood sugar target range before a meal is ___________________. Your blood sugar target range after a meal is _______________________.   13. Do this--___________________________________________________--to get your blood sugar back within your safe range if your blood sugar results are _________________________________________. (For example: Less than 70 or above 250 mg/dL.)  Call your doctor when your blood sugar results are ___________________________________. (For example: Less than 70 or above 250 mg/dL.)  What are the symptoms of low and high blood sugar? Common symptoms of low blood sugar are sweating and feeling shaky, weak, hungry, or confused. Symptoms can start quickly. Common symptoms of high blood sugar are feeling very thirsty or very hungry. You may also pass urine more often than usual. You may have blurry vision and may lose weight without trying. But some people may have high or low blood sugar without having any symptoms. That's a good reason to check your blood sugar on a regular schedule. What should you do if you have symptoms? Work with your doctor to fill in the blank spaces below that apply to you. Low blood sugar and \"the rule of 15\"  If you have symptoms of low blood sugar, check your blood sugar. If it's below _____ ( for example, below 70), eat or drink a quick-sugar food that has about 15 grams of carbohydrate. Your goal is to get your level back to your safe range. Check your blood sugar again 15 minutes later. If it's still not in your target range, take another 15 grams of carbohydrate and check your blood sugar again in 15 minutes. Repeat this until you reach your target. Then go back to your regular testing schedule. Children usually need less than 15 grams of carbohydrate. Check with your doctor or diabetes educator for the amount that is right for your child. When you have low blood sugar, it's best to stop or reduce any physical activity until your blood sugar is back in your target range and is stable. If you must stay active, eat or drink 30 grams of carbohydrate.  Then check your blood sugar again in 15 minutes. If it's not in your target range, take another 30 grams of carbohydrates. Check your blood sugar again in 15 minutes. Keep doing this until you reach your target. You can then go back to your regular testing schedule. If your symptoms or blood sugar levels are getting worse or have not improved after 15 minutes, seek medical care right away. If you take insulin, always carry a glucagon emergency kit. Be sure your family, friends, and coworkers know how to give glucagon. Here are some examples of quick-sugar foods with 15 grams of carbohydrate:  · 3 or 4 glucose tablets  · 1 tablespoon (3 teaspoons) table sugar  · ½ cup to ¾ cup (4 to 6 ounces) of fruit juice or regular (not diet) soda  · Hard candy (such as 6 Life Savers)  High blood sugar  If you have symptoms of high blood sugar, check your blood sugar. Your goal is to get your level back to your target range. If it's above ______ ( for example, above 250), follow these steps:  · If you missed a dose of your diabetes medicine, take it now. Take only the amount of medicine that you have been prescribed. Do not take more or less medicine. · Give yourself insulin if your doctor has prescribed it for high blood sugar. · Test for ketones, if the doctor told you to do so. If the results of the ketone test show a moderate-to-large amount of ketones, call the doctor for advice. · Wait 30 minutes after you take the extra insulin or the missed medicine. Check your blood sugar again. If your symptoms or blood sugar levels are getting worse or have not improved after taking these steps, seek medical care right away. Follow-up care is a key part of your treatment and safety. Be sure to make and go to all appointments, and call your doctor if you are having problems. It's also a good idea to know your test results and keep a list of the medicines you take. Where can you learn more?   Go to http://www.gray.com/  Enter R148 in the search box to learn more about \"Diabetes Blood Sugar Emergencies: Your Action Plan. \"  Current as of: August 31, 2020               Content Version: 13.0  © 2006-2021 Healthwise, [employer]. Care instructions adapted under license by [employer] (which disclaims liability or warranty for this information). If you have questions about a medical condition or this instruction, always ask your healthcare professional. [address] any warranty or liability for your use of this information. Learning About High Cholesterol  What is high cholesterol? High cholesterol means that you have too much cholesterol in your blood. Cholesterol is a type of fat. It's needed for many body functions, such as making new cells. Cholesterol is made by your body. It also comes from food you eat. Having high cholesterol can lead to the buildup of plaque in artery walls. This can increase your risk of heart attack and stroke. When your doctor talks about high cholesterol levels, your doctor is talking about your total cholesterol and LDL cholesterol (the \"bad\" cholesterol) levels. Your doctor may also speak about HDL (the \"good\" cholesterol) levels. High HDL is linked with a lower risk for coronary artery disease, heart attack, and stroke. Your cholesterol levels help your doctor find out your risk for having a heart attack or stroke. How can you help prevent high cholesterol? A heart-healthy lifestyle can help you prevent high cholesterol and lower your risk for a heart attack and stroke. · Eat heart-healthy foods. ? Eat fruits, vegetables, whole grains, beans, and other high-fiber foods. ? Eat lean proteins, such as seafood, lean meats, beans, nuts, and soy products. ? Eat healthy fats, such as canola and olive oil. ? Choose foods that are low in saturated fat. ? Limit sodium and alcohol. ? Limit drinks and foods with added sugar. · Be active.  Try to do moderate activity at least 2½ hours a week. Or try vigorous activity at least 1¼ hours a week. You may want to walk or try other activities, such as running, swimming, cycling, or playing tennis or team sports. · Stay at a healthy weight. Lose weight if you need to. · Don't smoke. If you need help quitting, talk to your doctor about stop-smoking programs and medicines. These can increase your chances of quitting for good. How is high cholesterol treated? The goal of treatment is to reduce your chances of having a heart attack or stroke. The goal is not to lower your cholesterol numbers only. · Have a heart-healthy lifestyle. This includes eating healthy foods, not smoking, losing weight, and being more active. · You may choose to take medicine. Follow-up care is a key part of your treatment and safety. Be sure to make and go to all appointments, and call your doctor if you are having problems. It's also a good idea to know your test results and keep a list of the medicines you take. Where can you learn more? Go to http://www.calloway.com/  Enter Q621 in the search box to learn more about \"Learning About High Cholesterol. \"  Current as of: January 10, 2022               Content Version: 13.2  © 2006-2022 [employer]. Care instructions adapted under license by [employer] (which disclaims liability or warranty for this information). If you have questions about a medical condition or this instruction, always ask your healthcare professional. [place] [address] any warranty or liability for your use of this information. Gastroesophageal Reflux Disease (GERD): Care Instructions  Overview     Gastroesophageal reflux disease (GERD) is the backward flow of stomach acid into the esophagus. The esophagus is the tube that leads from your throat to your stomach. A one-way valve prevents the stomach acid from backing up into this tube.  But when you have GERD, this valve does not close tightly enough. This can also cause pain and swelling in your esophagus. (This is called esophagitis.)  If you have mild GERD symptoms including heartburn, you may be able to control the problem with antacids or over-the-counter medicine. You can also make lifestyle changes to help reduce your symptoms. These include changing your diet and eating habits, such as not eating late at night and losing weight. Follow-up care is a key part of your treatment and safety. Be sure to make and go to all appointments, and call your doctor if you are having problems. It's also a good idea to know your test results and keep a list of the medicines you take. How can you care for yourself at home? · Take your medicines exactly as prescribed. Call your doctor if you think you are having a problem with your medicine. · Your doctor may recommend over-the-counter medicine. For mild or occasional indigestion, antacids, such as Tums, Gaviscon, Mylanta, or Maalox, may help. Your doctor also may recommend over-the-counter acid reducers, such as famotidine (Pepcid AC), cimetidine (Tagamet HB), or omeprazole (Prilosec). Read and follow all instructions on the label. If you use these medicines often, talk with your doctor. · Change your eating habits. ? It's best to eat several small meals instead of two or three large meals. ? After you eat, wait 2 to 3 hours before you lie down. ? Avoid foods that make your symptoms worse. These may include chocolate, mint, alcohol, pepper, spicy foods, high-fat foods, or drinks with caffeine in them, such as tea, coffee, justin, or energy drinks. If your symptoms are worse after you eat a certain food, you may want to stop eating it to see if your symptoms get better. · Do not smoke or chew tobacco. Smoking can make GERD worse. If you need help quitting, talk to your doctor about stop-smoking programs and medicines. These can increase your chances of quitting for good.   · If you have GERD symptoms at night, raise the head of your bed 6 to 8 inches by putting the frame on blocks or placing a foam wedge under the head of your mattress. (Adding extra pillows does not work.)  · Do not wear tight clothing around your middle. · Lose weight if you need to. Losing just 5 to 10 pounds can help. When should you call for help? Call your doctor now or seek immediate medical care if:    · You have new or different belly pain.     · Your stools are black and tarlike or have streaks of blood. Watch closely for changes in your health, and be sure to contact your doctor if:    · Your symptoms have not improved after 2 days.     · Food seems to catch in your throat or chest.   Where can you learn more? Go to http://www.gray.com/  Enter B061 in the search box to learn more about \"Gastroesophageal Reflux Disease (GERD): Care Instructions. \"  Current as of: September 8, 2021               Content Version: 13.2  © 2006-2022 [employer]. Care instructions adapted under license by [employer] (which disclaims liability or warranty for this information). If you have questions about a medical condition or this instruction, always ask your healthcare professional. [place] [address] any warranty or liability for your use of this information. Deep Vein Thrombosis: Care Instructions  Overview     A deep vein thrombosis (DVT) is a blood clot in certain veins, usually in the legs, pelvis, or arms. Blood clots in these veins need to be treated because they can get bigger, break loose, and travel through the bloodstream to the lungs. A blood clot in a lung can be life-threatening. The doctor may have given you a blood thinner (anticoagulant). A blood thinner can stop the blood clot from growing larger and prevent new clots from forming. You will need to take a blood thinner for at least 3 months.   The doctor has checked you carefully, but problems can develop later. If you notice any problems or new symptoms, get medical treatment right away. Follow-up care is a key part of your treatment and safety. Be sure to make and go to all appointments, and call your doctor if you are having problems. It's also a good idea to know your test results and keep a list of the medicines you take. How can you care for yourself at home? · Take your medicines exactly as prescribed. Call your doctor if you think you are having a problem with your medicine. · If you are taking a blood thinner, be sure you get instructions about how to take your medicine safely. Blood thinners can cause serious bleeding problems. · Try to walk several times a day. · Wear compression stockings if your doctor recommends them. These stockings are tighter at the feet than on the legs. They may reduce pain and swelling in your legs. But there are different types of stockings, and they need to fit right. So your doctor will recommend what you need. · When you sit, use a pillow to raise the arm or leg that has the blood clot. Try to keep it above the level of your heart. When should you call for help? Call 911 anytime you think you may need emergency care. For example, call if:    · You passed out (lost consciousness).     · You have symptoms of a blood clot in your lung (called a pulmonary embolism). These include:  ? Sudden chest pain. ? Trouble breathing. ? Coughing up blood. Call your doctor now or seek immediate medical care if:    · You have new or worse trouble breathing.     · You are dizzy or lightheaded, or you feel like you may faint.     · You have symptoms of a blood clot in your arm or leg. These may include:  ? Pain in the arm, calf, back of the knee, thigh, or groin. ? Redness and swelling in the arm, leg, or groin. Watch closely for changes in your health, and be sure to contact your doctor if:    · You do not get better as expected. Where can you learn more?   Go to http://www.gray.com/  Enter N160 in the search box to learn more about \"Deep Vein Thrombosis: Care Instructions. \"  Current as of: July 6, 2021               Content Version: 13.2  © 1673-7748 [employer]. Care instructions adapted under license by [employer] (which disclaims liability or warranty for this information). If you have questions about a medical condition or this instruction, always ask your healthcare professional. [place] [address] any warranty or liability for your use of this information.

## 2022-06-01 NOTE — PROGRESS NOTES
David Brenner is a 59 y.o. female that is here for a   Chief Complaint   Patient presents with    Follow Up Chronic Condition         1. Have you been to the ER, urgent care clinic since your last visit? Hospitalized since your last visit? Yes [facility]    2. Have you seen or consulted any other health care providers outside of the [address] [place] [address] since your last visit? Include any pap smears or colon screening.  no      Health Maintenance reviewed - yes      Upcoming Appts  no      VORB: No orders of the defined types were placed in this encounter.   Joana Ruby MD/ Joel Herr MA

## 2022-06-02 LAB
ALBUMIN SERPL-MCNC: 4.4 G/DL (ref 3.8–4.8)
ALBUMIN/CREAT UR: 698 MG/G CREAT (ref 0–29)
ALBUMIN/GLOB SERPL: 1.8 {RATIO} (ref 1.2–2.2)
ALP SERPL-CCNC: 197 IU/L (ref 44–121)
ALT SERPL-CCNC: 14 IU/L (ref 0–32)
APPEARANCE UR: ABNORMAL
AST SERPL-CCNC: 12 IU/L (ref 0–40)
BACTERIA #/AREA URNS HPF: ABNORMAL /[HPF]
BASOPHILS # BLD AUTO: 0.1 X10E3/UL (ref 0–0.2)
BASOPHILS NFR BLD AUTO: 1 %
BILIRUB SERPL-MCNC: 0.5 MG/DL (ref 0–1.2)
BILIRUB UR QL STRIP: NEGATIVE
BUN SERPL-MCNC: 33 MG/DL (ref 8–27)
BUN/CREAT SERPL: 34 (ref 12–28)
CALCIUM SERPL-MCNC: 9 MG/DL (ref 8.7–10.3)
CASTS URNS QL MICRO: ABNORMAL /LPF
CHLORIDE SERPL-SCNC: 98 MMOL/L (ref 96–106)
CHOLEST SERPL-MCNC: 129 MG/DL (ref 100–199)
CO2 SERPL-SCNC: 23 MMOL/L (ref 20–29)
COLOR UR: ABNORMAL
CREAT SERPL-MCNC: 0.98 MG/DL (ref 0.57–1)
CREAT UR-MCNC: 45.9 MG/DL
CRYSTALS URNS MICRO: ABNORMAL
EGFR: 65 ML/MIN/1.73
EOSINOPHIL # BLD AUTO: 0.1 X10E3/UL (ref 0–0.4)
EOSINOPHIL NFR BLD AUTO: 1 %
EPI CELLS #/AREA URNS HPF: ABNORMAL /HPF (ref 0–10)
ERYTHROCYTE [DISTWIDTH] IN BLOOD BY AUTOMATED COUNT: 13.2 % (ref 11.7–15.4)
EST. AVERAGE GLUCOSE BLD GHB EST-MCNC: 315 MG/DL
GLOBULIN SER CALC-MCNC: 2.4 G/DL (ref 1.5–4.5)
GLUCOSE SERPL-MCNC: 328 MG/DL (ref 65–99)
GLUCOSE UR QL STRIP: ABNORMAL
HBA1C MFR BLD: 12.6 % (ref 4.8–5.6)
HCT VFR BLD AUTO: 34.3 % (ref 34–46.6)
HDLC SERPL-MCNC: 36 MG/DL
HGB BLD-MCNC: 10.9 G/DL (ref 11.1–15.9)
HGB UR QL STRIP: ABNORMAL
IMM GRANULOCYTES # BLD AUTO: 0 X10E3/UL (ref 0–0.1)
IMM GRANULOCYTES NFR BLD AUTO: 0 %
KETONES UR QL STRIP: NEGATIVE
LDLC SERPL CALC-MCNC: 59 MG/DL (ref 0–99)
LEUKOCYTE ESTERASE UR QL STRIP: ABNORMAL
LYMPHOCYTES # BLD AUTO: 2.2 X10E3/UL (ref 0.7–3.1)
LYMPHOCYTES NFR BLD AUTO: 24 %
MCH RBC QN AUTO: 27 PG (ref 26.6–33)
MCHC RBC AUTO-ENTMCNC: 31.8 G/DL (ref 31.5–35.7)
MCV RBC AUTO: 85 FL (ref 79–97)
MICRO URNS: ABNORMAL
MICROALBUMIN UR-MCNC: 320.5 UG/ML
MONOCYTES # BLD AUTO: 0.5 X10E3/UL (ref 0.1–0.9)
MONOCYTES NFR BLD AUTO: 6 %
NEUTROPHILS # BLD AUTO: 6.1 X10E3/UL (ref 1.4–7)
NEUTROPHILS NFR BLD AUTO: 68 %
NITRITE UR QL STRIP: NEGATIVE
PH UR STRIP: >=9 [PH] (ref 5–7.5)
PLATELET # BLD AUTO: 259 X10E3/UL (ref 150–450)
POTASSIUM SERPL-SCNC: 4.6 MMOL/L (ref 3.5–5.2)
PROT SERPL-MCNC: 6.8 G/DL (ref 6–8.5)
PROT UR QL STRIP: ABNORMAL
RBC # BLD AUTO: 4.04 X10E6/UL (ref 3.77–5.28)
RBC #/AREA URNS HPF: >30 /HPF (ref 0–2)
SODIUM SERPL-SCNC: 136 MMOL/L (ref 134–144)
SP GR UR STRIP: 1.02 (ref 1–1.03)
TRIGL SERPL-MCNC: 204 MG/DL (ref 0–149)
UNIDENT CRYS URNS QL MICRO: PRESENT
UROBILINOGEN UR STRIP-MCNC: 0.2 MG/DL (ref 0.2–1)
VLDLC SERPL CALC-MCNC: 34 MG/DL (ref 5–40)
WBC # BLD AUTO: 9 X10E3/UL (ref 3.4–10.8)
WBC #/AREA URNS HPF: ABNORMAL /HPF (ref 0–5)

## 2022-06-13 NOTE — PROGRESS NOTES
Please call. Her urine shows blood and some protein. She having trouble with urinating. ? She is spilling some protein in her urine so we will have to make sure her blood pressure stays in good control and use specific medication for it  Her hemoglobin A1c is 12.64 months ago it was 11.6. Our target is 7. Find out what she is taking specifically for her diabetes now and if she is taking it regularly. We can then adjust her meds. Cholesterol is 129 which is good slightly high triglycerides but changing from fried foods and fatty meals we will drop it down to normal range. Complete blood count is normal.

## 2022-06-17 DIAGNOSIS — I82.4Y1 ACUTE DEEP VEIN THROMBOSIS (DVT) OF PROXIMAL VEIN OF RIGHT LOWER EXTREMITY (HCC): ICD-10-CM

## 2022-06-17 DIAGNOSIS — K21.9 GASTROESOPHAGEAL REFLUX DISEASE WITHOUT ESOPHAGITIS: ICD-10-CM

## 2022-06-17 RX ORDER — OMEPRAZOLE 20 MG/1
20 CAPSULE, DELAYED RELEASE ORAL DAILY
Qty: 30 CAPSULE | Refills: 3 | Status: SHIPPED | OUTPATIENT
Start: 2022-06-17 | End: 2022-11-04

## 2022-06-17 NOTE — TELEPHONE ENCOUNTER
Requested Prescriptions     Pending Prescriptions Disp Refills    omeprazole (PRILOSEC) 20 mg capsule 30 Capsule 3     Sig: Take 1 Capsule by mouth daily.     apixaban (Eliquis) 5 mg tablet 30 Tablet 2

## 2022-07-26 RX ORDER — GLUCOSAM/CHON-MSM1/C/MANG/BOSW 500-416.6
TABLET ORAL
Qty: 100 LANCET | Refills: 10 | Status: SHIPPED | OUTPATIENT
Start: 2022-07-26

## 2022-08-02 ENCOUNTER — HOSPITAL ENCOUNTER (EMERGENCY)
Age: 62
Discharge: HOME | End: 2022-08-03
Attending: STUDENT IN AN ORGANIZED HEALTH CARE EDUCATION/TRAINING PROGRAM
Payer: MEDICAID

## 2022-08-02 ENCOUNTER — APPOINTMENT (OUTPATIENT)
Dept: GENERAL RADIOLOGY | Age: 62
End: 2022-08-02
Attending: STUDENT IN AN ORGANIZED HEALTH CARE EDUCATION/TRAINING PROGRAM
Payer: MEDICAID

## 2022-08-02 DIAGNOSIS — N30.00 ACUTE CYSTITIS WITHOUT HEMATURIA: Primary | ICD-10-CM

## 2022-08-02 DIAGNOSIS — R73.9 HYPERGLYCEMIA: ICD-10-CM

## 2022-08-02 LAB
ANION GAP SERPL CALC-SCNC: 14 MMOL/L (ref 3–18)
APPEARANCE UR: ABNORMAL
BACTERIA URNS QL MICRO: ABNORMAL /HPF
BASE DEFICIT BLDV-SCNC: 3.4 MMOL/L
BASOPHILS # BLD: 0 K/UL (ref 0–0.1)
BASOPHILS NFR BLD: 0 % (ref 0–2)
BILIRUB UR QL: NEGATIVE
BUN SERPL-MCNC: 41 MG/DL (ref 7–18)
BUN/CREAT SERPL: 32 (ref 12–20)
CALCIUM SERPL-MCNC: 9.3 MG/DL (ref 8.5–10.1)
CHLORIDE SERPL-SCNC: 98 MMOL/L (ref 100–111)
CO2 SERPL-SCNC: 23 MMOL/L (ref 21–32)
COLOR UR: ABNORMAL
CREAT SERPL-MCNC: 1.3 MG/DL (ref 0.6–1.3)
DIFFERENTIAL METHOD BLD: ABNORMAL
EOSINOPHIL # BLD: 0 K/UL (ref 0–0.4)
EOSINOPHIL NFR BLD: 0 % (ref 0–5)
EPITH CASTS URNS QL MICRO: ABNORMAL /LPF (ref 0–5)
ERYTHROCYTE [DISTWIDTH] IN BLOOD BY AUTOMATED COUNT: 13.7 % (ref 11.6–14.5)
GLUCOSE BLD STRIP.AUTO-MCNC: 319 MG/DL (ref 70–110)
GLUCOSE BLD STRIP.AUTO-MCNC: 483 MG/DL (ref 70–110)
GLUCOSE SERPL-MCNC: 445 MG/DL (ref 74–99)
GLUCOSE UR STRIP.AUTO-MCNC: >1000 MG/DL
HCO3 BLDV-SCNC: 21.9 MMOL/L (ref 23–28)
HCT VFR BLD AUTO: 33.8 % (ref 35–45)
HGB BLD-MCNC: 11.3 G/DL (ref 12–16)
HGB UR QL STRIP: ABNORMAL
IMM GRANULOCYTES # BLD AUTO: 0.1 K/UL (ref 0–0.04)
IMM GRANULOCYTES NFR BLD AUTO: 1 % (ref 0–0.5)
KETONES UR QL STRIP.AUTO: 40 MG/DL
LEUKOCYTE ESTERASE UR QL STRIP.AUTO: ABNORMAL
LYMPHOCYTES # BLD: 0.7 K/UL (ref 0.9–3.6)
LYMPHOCYTES NFR BLD: 10 % (ref 21–52)
MCH RBC QN AUTO: 26.2 PG (ref 24–34)
MCHC RBC AUTO-ENTMCNC: 33.4 G/DL (ref 31–37)
MCV RBC AUTO: 78.4 FL (ref 78–100)
MONOCYTES # BLD: 0.7 K/UL (ref 0.05–1.2)
MONOCYTES NFR BLD: 10 % (ref 3–10)
MUCOUS THREADS URNS QL MICRO: NEGATIVE /LPF
NEUTS SEG # BLD: 5.6 K/UL (ref 1.8–8)
NEUTS SEG NFR BLD: 79 % (ref 40–73)
NITRITE UR QL STRIP.AUTO: NEGATIVE
NRBC # BLD: 0 K/UL (ref 0–0.01)
NRBC BLD-RTO: 0 PER 100 WBC
PCO2 BLDV: 39.6 MMHG (ref 41–51)
PH BLDV: 7.35 [PH] (ref 7.32–7.42)
PH UR STRIP: 5.5 [PH] (ref 5–8)
PLATELET # BLD AUTO: 250 K/UL (ref 135–420)
PMV BLD AUTO: 9.6 FL (ref 9.2–11.8)
PO2 BLDV: 30 MMHG (ref 25–40)
POTASSIUM SERPL-SCNC: 3.8 MMOL/L (ref 3.5–5.5)
PROT UR STRIP-MCNC: 100 MG/DL
RBC # BLD AUTO: 4.31 M/UL (ref 4.2–5.3)
RBC #/AREA URNS HPF: ABNORMAL /HPF (ref 0–5)
SAO2 % BLDV: 54.8 % (ref 65–88)
SERVICE CMNT-IMP: ABNORMAL
SODIUM SERPL-SCNC: 135 MMOL/L (ref 136–145)
SP GR UR REFRACTOMETRY: 1.02 (ref 1–1.03)
SPECIMEN TYPE: ABNORMAL
TROPONIN-HIGH SENSITIVITY: 7 NG/L (ref 0–54)
UROBILINOGEN UR QL STRIP.AUTO: 1 EU/DL (ref 0.2–1)
WBC # BLD AUTO: 7 K/UL (ref 4.6–13.2)
WBC URNS QL MICRO: ABNORMAL /HPF (ref 0–4)

## 2022-08-02 PROCEDURE — 71045 X-RAY EXAM CHEST 1 VIEW: CPT

## 2022-08-02 PROCEDURE — 74011636637 HC RX REV CODE- 636/637: Performed by: STUDENT IN AN ORGANIZED HEALTH CARE EDUCATION/TRAINING PROGRAM

## 2022-08-02 PROCEDURE — 96374 THER/PROPH/DIAG INJ IV PUSH: CPT

## 2022-08-02 PROCEDURE — 74011250636 HC RX REV CODE- 250/636: Performed by: STUDENT IN AN ORGANIZED HEALTH CARE EDUCATION/TRAINING PROGRAM

## 2022-08-02 PROCEDURE — 82962 GLUCOSE BLOOD TEST: CPT

## 2022-08-02 PROCEDURE — 80048 BASIC METABOLIC PNL TOTAL CA: CPT

## 2022-08-02 PROCEDURE — 82803 BLOOD GASES ANY COMBINATION: CPT

## 2022-08-02 PROCEDURE — 85025 COMPLETE CBC W/AUTO DIFF WBC: CPT

## 2022-08-02 PROCEDURE — 81001 URINALYSIS AUTO W/SCOPE: CPT

## 2022-08-02 PROCEDURE — 99284 EMERGENCY DEPT VISIT MOD MDM: CPT

## 2022-08-02 PROCEDURE — 84484 ASSAY OF TROPONIN QUANT: CPT

## 2022-08-02 PROCEDURE — 96361 HYDRATE IV INFUSION ADD-ON: CPT

## 2022-08-02 RX ORDER — METOCLOPRAMIDE HYDROCHLORIDE 5 MG/ML
5 INJECTION INTRAMUSCULAR; INTRAVENOUS EVERY 6 HOURS
Status: DISCONTINUED | OUTPATIENT
Start: 2022-08-02 | End: 2022-08-02

## 2022-08-02 RX ORDER — METOCLOPRAMIDE HYDROCHLORIDE 5 MG/ML
10 INJECTION INTRAMUSCULAR; INTRAVENOUS
Status: COMPLETED | OUTPATIENT
Start: 2022-08-02 | End: 2022-08-02

## 2022-08-02 RX ADMIN — SODIUM CHLORIDE 1000 ML: 900 INJECTION, SOLUTION INTRAVENOUS at 13:49

## 2022-08-02 RX ADMIN — Medication 7 UNITS: at 23:22

## 2022-08-02 RX ADMIN — SODIUM CHLORIDE 1000 ML: 9 INJECTION, SOLUTION INTRAVENOUS at 23:22

## 2022-08-02 RX ADMIN — SODIUM CHLORIDE 1000 ML: 900 INJECTION, SOLUTION INTRAVENOUS at 12:33

## 2022-08-02 RX ADMIN — METOCLOPRAMIDE 10 MG: 5 INJECTION, SOLUTION INTRAMUSCULAR; INTRAVENOUS at 15:36

## 2022-08-02 NOTE — ED TRIAGE NOTES
Patient went to [facility] and they sent her to the ER for a high blood glucose level.   Patient's blood glucose level is 483mg/dL in triage

## 2022-08-02 NOTE — ED PROVIDER NOTES
EMERGENCY DEPARTMENT HISTORY AND PHYSICAL EXAM      Date: 8/2/2022  Patient Name: Jarocho Thomas    History of Presenting Illness     Chief Complaint   Patient presents with    High Blood Sugar         History Provided By: Patient     Chief Complaint: High blood sugar, dehydration  Duration: Today  Timing:   Location:   Quality:     Severity: Severe  Modifying Factors: None  Associated Symptoms: Dry mouth, frequent urination, malaise      History (Context): Jarocho Thomas is a 59 y.o. female with a past medical history significant for diabetes comes into the ED today due to elevated blood sugar at urgent care today. Has been feeling unwell recently with reduced appetite so she has not been taking any of her diabetes medications. No other acute complaints. No recent fever. PCP: Dipti Davies MD    Current Facility-Administered Medications   Medication Dose Route Frequency Provider Last Rate Last Admin    metoclopramide HCl (REGLAN) injection 5 mg  5 mg IntraVENous Q6H Inocencia Lundy Space, DO         Current Outpatient Medications   Medication Sig Dispense Refill    TRUEplus Lancets 28 gauge misc USE TO CHECK THE BLOOD GLUCOSE ONCE A  Lancet 10    omeprazole (PRILOSEC) 20 mg capsule Take 1 Capsule by mouth daily. 30 Capsule 3    apixaban (Eliquis) 5 mg tablet TAKE 1 TABLET BY MOUTH TWO (2) TIMES A DAY.  180 Tablet 3    metFORMIN ER (GLUCOPHAGE XR) 750 mg tablet TAKE 1 TABLET BY MOUTH TWO TIMES A  Tablet 3    hydroCHLOROthiazide (HYDRODIURIL) 25 mg tablet TAKE 1/2 TABLET BY MOUTH ONCE DAILY 45 Tablet 3    losartan (COZAAR) 100 mg tablet TAKE 1 TABLET BY MOUTH ONCE DAILY 90 Tablet 3    glucose blood VI test strips (True Metrix Glucose Test Strip) strip Test Blood Glucose once daily; ICD 10 E11.9, Quantity 100 100 Strip 3    lancets misc Use to check the blood glucose once a day 100 Each 11    amLODIPine (NORVASC) 10 mg tablet TAKE 1 TABLET BY MOUTH ONCE DAILY 90 Tablet 3    melatonin 5 mg cap capsule Take 1 Capsule by mouth nightly. 90 Capsule 3    Blood-Glucose Meter (Contour Next EZ Meter) monitoring kit Test sugar daily 1 Kit 0    glimepiride (AMARYL) 2 mg tablet TAKE 1 TABLET BY MOUTH TWO TIMES A DAY 90 Tablet 3    atorvastatin (Lipitor) 40 mg tablet 1 tablet daily 90 Tablet 3    carbamide peroxide (DEBROX) 6.5 % otic solution Administer 5 Drops into each ear two (2) times a day. 15 mL 1    insulin glargine (Lantus Solostar U-100 Insulin) 100 unit/mL (3 mL) inpn INJECT 50 UNITS UNDER THE SKIN ONCE DAILY 15 Adjustable Dose Pre-filled Pen Syringe 11    empagliflozin (Jardiance) 25 mg tablet Take 1 Tab by mouth daily. 30 Tab 3    gabapentin (NEURONTIN) 100 mg capsule Take one at night for a week then 3 at night. 90 Cap 3    Insulin Needles, Disposable, (Pen Needle) 32 gauge x 5/32\" ndle For insulin shots 100 Pen Needle 11    insulin glargine (LANTUS SOLOSTAR U-100 INSULIN) 100 unit/mL (3 mL) inpn INJECT 40 UNITS UNDER THE SKIN ONCE DAILY ( 37 DAYS ) 15 Adjustable Dose Pre-filled Pen Syringe 1    Blood-Glucose Meter (TRUE METRIX AIR GLUCOSE METER) monitoring kit Test Blood Glucose once daily; ICD 10 E11.9 1 Kit 0       Past History     Past Medical History:   Past Medical History:   Diagnosis Date    Essential hypertension, benign     Mixed hyperlipidemia     Type II or unspecified type diabetes mellitus without mention of complication, not stated as uncontrolled        Past Surgical History:  Past Surgical History:   Procedure Laterality Date    HX HYSTERECTOMY  80s    X1       Family History:  Family History   Problem Relation Age of Onset    Diabetes Mother     Stroke Maternal Grandmother     Stroke Maternal Grandfather     Hypertension Sister     Diabetes Sister        Social History:   Social History     Tobacco Use    Smoking status: Never    Smokeless tobacco: Never   Substance Use Topics    Alcohol use: No     Alcohol/week: 0.0 standard drinks    Drug use:  No Allergies: Allergies   Allergen Reactions    Clindamycin Hives    Sulfa (Sulfonamide Antibiotics) Unable to Obtain    Lisinopril Cough       PMH, PSH, family history, social history, allergies reviewed with the patient with significant items noted above. Review of Systems   Review of Systems   Constitutional:  Positive for appetite change. Negative for chills and fever. HENT:  Negative for sore throat. Eyes:  Negative for visual disturbance. Respiratory:  Negative for shortness of breath. Cardiovascular:  Negative for chest pain. Gastrointestinal:  Positive for nausea. Negative for abdominal pain and vomiting. Genitourinary:  Positive for frequency. Negative for difficulty urinating. Musculoskeletal:  Negative for myalgias. Skin:  Negative for rash. Neurological:  Negative for headaches. Physical Exam     Vitals:    08/02/22 1213   BP: (!) 142/79   Pulse: (!) 101   Resp: 18   Temp: 98.1 °F (36.7 °C)   SpO2: 99%   Weight: 77.6 kg (171 lb)   Height: 5' 6\" (1.676 m)       Physical Exam  Vitals and nursing note reviewed. Constitutional:       General: She is not in acute distress. Appearance: Normal appearance. HENT:      Head: Normocephalic and atraumatic. Mouth/Throat:      Mouth: Mucous membranes are dry. Eyes:      General: No scleral icterus. Conjunctiva/sclera: Conjunctivae normal.   Cardiovascular:      Rate and Rhythm: Normal rate and regular rhythm. Comments: Normal peripheral perfusion  Pulmonary:      Effort: Pulmonary effort is normal.      Breath sounds: Normal breath sounds. Abdominal:      General: There is no distension. Palpations: Abdomen is soft. Tenderness: There is no abdominal tenderness. Musculoskeletal:         General: No deformity. Normal range of motion. Cervical back: Normal range of motion and neck supple. Skin:     General: Skin is warm and dry. Findings: No rash.    Neurological:      General: No focal deficit present. Mental Status: She is alert and oriented to person, place, and time. Mental status is at baseline. Psychiatric:         Mood and Affect: Mood normal.         Thought Content: Thought content normal.       Diagnostic Study Results     Labs -     Recent Results (from the past 12 hour(s))   GLUCOSE, POC    Collection Time: 08/02/22 12:21 PM   Result Value Ref Range    Glucose (POC) 483 (HH) 70 - 110 mg/dL   CBC WITH AUTOMATED DIFF    Collection Time: 08/02/22 12:27 PM   Result Value Ref Range    WBC 7.0 4.6 - 13.2 K/uL    RBC 4.31 4.20 - 5.30 M/uL    HGB 11.3 (L) 12.0 - 16.0 g/dL    HCT 33.8 (L) 35.0 - 45.0 %    MCV 78.4 78.0 - 100.0 FL    MCH 26.2 24.0 - 34.0 PG    MCHC 33.4 31.0 - 37.0 g/dL    RDW 13.7 11.6 - 14.5 %    PLATELET 809 010 - 693 K/uL    MPV 9.6 9.2 - 11.8 FL    NRBC 0.0 0  WBC    ABSOLUTE NRBC 0.00 0.00 - 0.01 K/uL    NEUTROPHILS 79 (H) 40 - 73 %    LYMPHOCYTES 10 (L) 21 - 52 %    MONOCYTES 10 3 - 10 %    EOSINOPHILS 0 0 - 5 %    BASOPHILS 0 0 - 2 %    IMMATURE GRANULOCYTES 1 (H) 0.0 - 0.5 %    ABS. NEUTROPHILS 5.6 1.8 - 8.0 K/UL    ABS. LYMPHOCYTES 0.7 (L) 0.9 - 3.6 K/UL    ABS. MONOCYTES 0.7 0.05 - 1.2 K/UL    ABS. EOSINOPHILS 0.0 0.0 - 0.4 K/UL    ABS. BASOPHILS 0.0 0.0 - 0.1 K/UL    ABS. IMM.  GRANS. 0.1 (H) 0.00 - 0.04 K/UL    DF AUTOMATED     METABOLIC PANEL, BASIC    Collection Time: 08/02/22 12:27 PM   Result Value Ref Range    Sodium 135 (L) 136 - 145 mmol/L    Potassium 3.8 3.5 - 5.5 mmol/L    Chloride 98 (L) 100 - 111 mmol/L    CO2 23 21 - 32 mmol/L    Anion gap 14 3.0 - 18 mmol/L    Glucose 445 (HH) 74 - 99 mg/dL    BUN 41 (H) 7.0 - 18 MG/DL    Creatinine 1.30 0.6 - 1.3 MG/DL    BUN/Creatinine ratio 32 (H) 12 - 20      GFR est AA 50 (L) >60 ml/min/1.73m2    GFR est non-AA 41 (L) >60 ml/min/1.73m2    Calcium 9.3 8.5 - 10.1 MG/DL      Labs Reviewed   CBC WITH AUTOMATED DIFF - Abnormal; Notable for the following components:       Result Value    HGB 11.3 (*)     HCT 33.8 (*)     NEUTROPHILS 79 (*)     LYMPHOCYTES 10 (*)     IMMATURE GRANULOCYTES 1 (*)     ABS. LYMPHOCYTES 0.7 (*)     ABS. IMM. GRANS. 0.1 (*)     All other components within normal limits   METABOLIC PANEL, BASIC - Abnormal; Notable for the following components:    Sodium 135 (*)     Chloride 98 (*)     Glucose 445 (*)     BUN 41 (*)     BUN/Creatinine ratio 32 (*)     GFR est AA 50 (*)     GFR est non-AA 41 (*)     All other components within normal limits   GLUCOSE, POC - Abnormal; Notable for the following components:    Glucose (POC) 483 (*)     All other components within normal limits   URINALYSIS W/ RFLX MICROSCOPIC   TROPONIN-HIGH SENSITIVITY   POC VENOUS BLOOD GAS       Radiologic Studies -   XR CHEST PORT   Final Result   Negative CXR        CT Results  (Last 48 hours)      None          CXR Results  (Last 48 hours)                 08/02/22 1308  XR CHEST PORT Final result    Impression:  Negative CXR       Narrative:  Portable CXR:       HISTORY: Shortness of breath. Comparison January 24, 2022       Cardiac silhouette and vascularity within normal limits. Lungs and costophrenic   angles are clear. The laboratory results, imaging results, and other diagnostic exams were reviewed in the EMR. Medical Decision Making   I am the first provider for this patient. I reviewed the vital signs, available nursing notes, past medical history, past surgical history, family history and social history. Vital Signs-Reviewed the patient's vital signs. Records Reviewed: Personally, on initial evaluation    MDM:   Fay Oneill presents with complaint of high blood sugar. DDX includes but is not limited to: DKA, symptomatic hyperglycemia, sepsis, infection, electrolyte abnormalities. Will obtain lab work and imaging with chest x-ray for further evaluation of patients complaint. Will continue to monitor and evaluate patient while in the ED.       Orders as below:  Orders Placed This Encounter    XR CHEST PORT    CBC WITH AUTOMATED DIFF    BASIC METABOLIC PANEL    URINALYSIS W/ RFLX MICROSCOPIC    TROPONIN-HIGH SENSITIVITY    GLUCOSE, POC    POC VENOUS BLOOD GAS    EKG, 12 LEAD, INITIAL    SALINE LOCK IV ONE TIME STAT    sodium chloride 0.9 % bolus infusion 1,000 mL    sodium chloride 0.9 % bolus infusion 1,000 mL    metoclopramide HCl (REGLAN) injection 5 mg        ED Course:   ED Course as of 08/03/22 0659   Tue Aug 02, 2022   1450 Patient is having symptomatic hypoglycemia after several days of medication noncompliance due to poor appetite and nausea. Suspect component of delayed gastric emptying, possibly gastroparesis. Feeling somewhat improved as fluids have gotten started. Still pending a ECG, 2 L of normal saline and a troponin. Giving a dose of Reglan and letting her sip on ice water now. Labs showing hyperglycemia with elevated BUN but no DAYANNA, normal anion gap no evidence of DKA. Chest x-ray normal.  Turned over to oncoming physician team pending remainder of work-up, fluid hydration and reevaluation with point-of-care glucose. [VL]   Wed Aug 03, 2022   0124 Patient's glucose downtrending from over 400-2 73, elevation could be secondary to patient having a urinary tract infection. Patient without any signs of DKA. Will have patient take her nighttime Lantus and discharge with antibiotics. Patient to follow-up with her PCP [TB]      ED Course User Index  [TB] Janessa David MD  [VL] Mckenzie Median, DO       6:59 PM : Pt care transferred to Dr. Son Howard  ,ED provider. History of patient complaint(s), available diagnostic reports and current treatment plan has been discussed thoroughly. Bedside rounding on patient occured : no .   Intended disposition of patient : TBD  Pending diagnostics reports and/or labs (please list): Rpt BG    Dr. Manuela Hidalgo assistance in completion of this plan is greatly appreciated but it should be noted that I will be the provider of record for this patient. Procedures:  Procedures        Diagnosis and Disposition     CLINICAL IMPRESSION:  No diagnosis found. Current Discharge Medication List          Disposition: TBD              Dragon Disclaimer     Please note that this dictation was completed with [employer], the computer voice recognition software. Quite often unanticipated grammatical, syntax, homophones, and other interpretive errors are inadvertently transcribed by the computer software. Please disregard these errors. Please excuse any errors that have escaped final proofreading. Janelle MOHRO.      ==================================================================================================================================================    I personally saw and examined the patient. I have reviewed and agree with the residents findings, including all diagnostic interpretations, and plans as written. I have edited the note as needed. I was present during the key portions of separately billed procedures.   Alana Bhatt, DO

## 2022-08-03 VITALS
DIASTOLIC BLOOD PRESSURE: 75 MMHG | RESPIRATION RATE: 16 BRPM | HEART RATE: 77 BPM | HEIGHT: 66 IN | SYSTOLIC BLOOD PRESSURE: 135 MMHG | OXYGEN SATURATION: 98 % | BODY MASS INDEX: 27.48 KG/M2 | WEIGHT: 171 LBS | TEMPERATURE: 97.9 F

## 2022-08-03 LAB
GLUCOSE BLD STRIP.AUTO-MCNC: 273 MG/DL (ref 70–110)
SARS-COV-2, NAA: NOT DETECTED

## 2022-08-03 PROCEDURE — 82962 GLUCOSE BLOOD TEST: CPT

## 2022-08-03 RX ORDER — CEPHALEXIN 250 MG/1
500 CAPSULE ORAL ONCE
Status: DISCONTINUED | OUTPATIENT
Start: 2022-08-03 | End: 2022-08-03 | Stop reason: HOSPADM

## 2022-08-03 RX ORDER — CEPHALEXIN 500 MG/1
500 CAPSULE ORAL 4 TIMES DAILY
Qty: 28 CAPSULE | Refills: 0 | Status: SHIPPED | OUTPATIENT
Start: 2022-08-03 | End: 2022-08-10

## 2022-08-03 NOTE — ED NOTES
2:15 AM  08/03/22     Discharge instructions given to [patient (name) with verbalization of understanding. Patient accompanied by self. Patient discharged with the following prescriptions Keflex. Patient discharged to home (destination).       Luis Holly RN

## 2022-08-10 DIAGNOSIS — E11.9 TYPE 2 DIABETES MELLITUS WITHOUT COMPLICATION, WITHOUT LONG-TERM CURRENT USE OF INSULIN (HCC): ICD-10-CM

## 2022-08-10 DIAGNOSIS — E11.42 DIABETIC POLYNEUROPATHY ASSOCIATED WITH TYPE 2 DIABETES MELLITUS (HCC): ICD-10-CM

## 2022-08-10 RX ORDER — PEN NEEDLE, DIABETIC 31 GX3/16"
NEEDLE, DISPOSABLE MISCELLANEOUS
Qty: 100 PEN NEEDLE | Refills: 11 | Status: SHIPPED | OUTPATIENT
Start: 2022-08-10

## 2022-08-10 RX ORDER — INSULIN GLARGINE 100 [IU]/ML
INJECTION, SOLUTION SUBCUTANEOUS
Qty: 15 ADJUSTABLE DOSE PRE-FILLED PEN SYRINGE | Refills: 11 | Status: SHIPPED | OUTPATIENT
Start: 2022-08-10

## 2022-08-10 NOTE — TELEPHONE ENCOUNTER
Requested Prescriptions     Pending Prescriptions Disp Refills    insulin glargine (Lantus Solostar U-100 Insulin) 100 unit/mL (3 mL) inpn 15 Adjustable Dose Pre-filled Pen Syringe 11     Sig: INJECT 50 UNITS UNDER THE SKIN ONCE DAILY    Insulin Needles, Disposable, (Pen Needle) 32 gauge x 5/32\" ndle 100 Pen Needle 11     Sig: For insulin shots

## 2022-08-27 NOTE — PROGRESS NOTES
ICD-10-CM ICD-9-CM    1. Essential hypertension, benign  I10 401.1 hydroCHLOROthiazide (HYDRODIURIL) 12.5 mg tablet      2. Type 2 diabetes mellitus without complication, without long-term current use of insulin (HCC)  E11.9 250.00 dulaglutide (TRULICITY) 4.73 LT/7.9 mL sub-q pen      AMB POC HEMOGLOBIN A1C      HM DIABETES FOOT EXAM      3. Acute deep vein thrombosis (DVT) of proximal vein of right lower extremity (HCC)  I82.4Y1 453.41       4. Mixed hyperlipidemia  E78.2 272.2       5. Normocytic anemia  D64.9 285.9 FE+CBC/D/PLT+TIBC+TAD+RETIC      TRANSFERRIN      METHYLMALONIC ACID      6. Encounter for Papanicolaou smear for cervical cancer screening  Z12.4 V76.2 PAP IG, APTIMA HPV AND RFX 16/18,45 ([id number])        Follow-up and Dispositions    Return in about 6 weeks (around 10/11/2022) for Follow up on illness, Labs/diagnostics/referrals ordered this visit. Assessment and plan    Essential hypertension. On losartan we will add back the hydrochlorothiazide 12.5 mg daily  Mild normochromic normocytic anemia. Hemoglobin 11.3 with MCV 78.4 on August 2, 2022. Declines colonoscopy. Anemia panel ordered  Type 2 diabetes mellitus with long-term current use of insulin. On Lantus insulin 50 units daily and metformin 1000 mg twice a day we will add Trulicity 0.5 mg weekly. Significant diabetic neuropathy with absent vibration and position sense as well as absent filament test.  Hyperlipidemia. Total cholesterol 204 with LDL of 59 on 6/1/2022. Recheck next visit. DVT. Recurrent. Eliquis lifelong. Complicated by hematuria. Referred to urology. Lab results and schedule of future lab studies reviewed with patient  Diagnostic and radiologic results and the schedule of future studies were reviewed with the patient  Reviewed diet, exercise and weight control  All questions answered and understood.   Health Maintenance Due   Topic Date Due    Eye Exam Retinal or Dilated  Never done    DTaP/Tdap/Td series (1 - Tdap) Never done    Colorectal Cancer Screening Combo  Never done    Shingrix Vaccine Age 50> (1 of 2) Never done    Breast Cancer Screen Mammogram  Never done    COVID-19 Vaccine (4 - Booster for Moderna series) 04/16/2022    Flu Vaccine (1) 09/01/2022     Previously declined health maintenance. 09/08/22 declines today also. Declines eye follow up  Covid vaccines done , two doses 01/14/22  Declines mammogram or colonoscopy 01/14/22        Subjective:   Sheela Roy is a 59 y.o. female has Mixed hyperlipidemia, Essential hypertension, benign, Arthralgia, Dermatitis, Abscess of skin of abdomen, Uncontrolled type 2 diabetes mellitus with hyperglycemia ([address] Acute deep vein thrombosis (DVT) of proximal vein of right lower extremity ([address] Hyperglycemia due to diabetes mellitus ([address] Hyperosmolar (nonketotic) coma ([address] and UTI (urinary tract infection) on their problem list..         Seen previously by our practice on 6/1/2022  The primary encounter diagnosis was Essential hypertension, benign. Diagnoses of Type 2 diabetes mellitus without complication, without long-term current use of insulin (HCC), Acute deep vein thrombosis (DVT) of proximal vein of right lower extremity ([address] Mixed hyperlipidemia, Normocytic anemia, and Encounter for Papanicolaou smear for cervical cancer screening were also pertinent to this visit. ED visit 2/16/2022 acute DVT of calf muscle of the right lower extremity. Hyperglycemia due to diabetes and varicose veins. Placed on apixaban. There is a prior history of blood clot. There was no provoking symptoms such as injury or travel. Office visit 2/24/2022. Follow-up on DVT right leg. Prior DVT 40 years, right leg. Screenings refused, exposed to secondhand smoke. Advised long-term anticoagulation as this was a second occurrence.    Seen previously by our practice on 2/2/2022  Most recent labs 4/29/2022 CBC within normal limits CMP shows a glucose of 368 otherwise unremarkable. Microalbumin to creatinine ratio 644. Most recent hemoglobin A1c was 12 on 10/26/2021. Total cholesterol 130 LDL cholesterol 60. S/p hospital discharge from the ED with an extremely elevated blood sugar 700. Is now down to the 300 range. We have her switch to Lantus to 50 units every morning. If not successful would consider adding either semaglutide or one of the SGLT2 inhibitors. The patient has GERD and we added Prilosec. The patient states that she has difficulty with sleeping, melatonin added. The blood pressure was 149/73, not controlled, Norvasc increased to 10 mg daily. Hyperlipidemia on last visit. Encouraged to  go ahead and get labs done before the next visit. Will recheck in 1 month. Lab results and schedule of future lab studies reviewed with patient  Diagnostic and radiologic results and the schedule of future studies were reviewed with the patient  All questions answered and understood. Seen in the ED 1/24/2022. Diagnosis hyperglycemia acute cystitis with hematuria. Her blood pressure was 140/75 and pulse was 115. She had epigastric abdominal tenderness without rebound on examination. Blood sugar was to be initially greater than 700 potassium 3.4 creatinine 1.18 select 37 chest x-ray was unremarkable  Office visit was 10/26/2021. Hemoglobin A1c was 11.6. Seen for essential hypertension diabetes mixed hyperlipidemia and hearing loss on the right side. Essential hypertension, chronic problem. Diabetes mellitus type 2 non-insulin-requiring, not controlled in October determination. Follow-up labs ordered. Discussed diet and exercise. Mixed hyperlipidemia. Chronic problem. Controlled on last visit. Follow-up labs ordered. Right earwax. Debrox ordered. Continues to decline mammogram all her immunizations and colonoscopy. Risk benefits alternatives explained and understood to her. Patient had claudication symptoms. Will refer again for duplex study.   The duplex study is still pending. Previous visit was 10/26/2021. Fit test ordered for colon cancer screening  Hypertension  The patient has no headaches, visual changes, chest pain or pressure,dyspnea, orthopnea, or PND. There is no problem with the medication. Diabetes mellitus  The patient admits to polyuria, polydipsia, denies polyphagia. The patient denies any other aggravating or relieving factors at this time there has been no problem with the medications. She has symptoms of neuropathy with numbness and tingling of the lower extremities. Hemoglobin A1c  Blood sugar was 700 in the ED now in the 200-300 range. Lantus on 50 units daily. Given Trumetrix meter  Metformin 1000 mg bid  Key Antihyperglycemic Medications               dulaglutide (TRULICITY) 9.32 UB/3.5 mL sub-q pen (Taking) 0.5 mL by SubCUTAneous route every seven (7) days. insulin glargine (Lantus Solostar U-100 Insulin) 100 unit/mL (3 mL) inpn (Taking) INJECT 50 UNITS UNDER THE SKIN ONCE DAILY    metFORMIN ER (GLUCOPHAGE XR) 750 mg tablet (Taking/Discontinued) TAKE 1 TABLET BY MOUTH TWO TIMES A DAY            Hyperlipidemia   The patient denies any myalgias or weakness. No abdominal discomfort admitted to. The patient denies any difficulty with or side effects from the medication. BP Readings from Last 3 Encounters:   08/30/22 (!) 150/87   08/03/22 135/75   06/01/22 117/65         Lab Results   Component Value Date/Time    Hemoglobin A1c 12.6 (H) 06/01/2022 09:36 AM    Hemoglobin A1c (POC) 10.0 08/30/2022 03:31 PM     Lab Results   Component Value Date/Time    Cholesterol, total 129 06/01/2022 09:36 AM    HDL Cholesterol 36 (L) 06/01/2022 09:36 AM    LDL, calculated 59 06/01/2022 09:36 AM    LDL, calculated 56 11/27/2017 09:15 AM    VLDL, calculated 34 06/01/2022 09:36 AM    VLDL, calculated 41 (H) 11/27/2017 09:15 AM    Triglyceride 204 (H) 06/01/2022 09:36 AM     Lower extremity pain  The patient admits to lower extremity pain.   The onset was 6 months prior. The pain is precipitated by walking. It is located in the calves. It is relieved by rest.  There is no position change. No fevers chills or sweats admitted to. No redness or paleness of the lower extremities are admitted to. No coolness is admitted to. This is a new symptom. It has not been evaluated in the past.  Duplex study pending still  GERD  This is a chronic problem. It is unchanged. The problem is in good control. There are no new aggravating or relieving factors. There is no history of any new associated signs, symptoms, or complications. The treatment is unchanged and there are no side effects from it. DVT  The patient has  Hematuria. Red. Present for 2 days. clinical course has improved   The patient voices no complaints  Presently on apixaban. Review of Systems   Constitutional:  Negative for chills and fever. HENT:  Positive for hearing loss (Right side). Eyes:  Negative for blurred vision, discharge and redness. Respiratory:  Negative for cough and shortness of breath. Cardiovascular:  Positive for claudication. Negative for chest pain, palpitations, orthopnea, leg swelling and PND. Gastrointestinal:  Negative for abdominal pain, blood in stool, constipation, diarrhea and melena. Genitourinary:  Positive for hematuria. Negative for dysuria, frequency and urgency. Musculoskeletal:  Positive for myalgias (calf and thigh, improving.). Negative for joint pain. Skin:  Negative for rash. Neurological:  Negative for dizziness, focal weakness and headaches. She states her IQ is 72   Psychiatric/Behavioral:  Negative for depression. The patient is not nervous/anxious. Current Outpatient Medications   Medication Sig    dulaglutide (TRULICITY) 9.49 PW/3.9 mL sub-q pen 0.5 mL by SubCUTAneous route every seven (7) days. hydroCHLOROthiazide (HYDRODIURIL) 12.5 mg tablet Take 1 Tablet by mouth daily.     BD Veo Insulin Syringe UF 1/2 mL 31 gauge x 15/64\" syrg     insulin glargine (Lantus Solostar U-100 Insulin) 100 unit/mL (3 mL) inpn INJECT 50 UNITS UNDER THE SKIN ONCE DAILY    Insulin Needles, Disposable, (Pen Needle) 32 gauge x 5/32\" ndle For insulin shots    TRUEplus Lancets 28 gauge misc USE TO CHECK THE BLOOD GLUCOSE ONCE A DAY    omeprazole (PRILOSEC) 20 mg capsule Take 1 Capsule by mouth daily. apixaban (Eliquis) 5 mg tablet TAKE 1 TABLET BY MOUTH TWO (2) TIMES A DAY. glucose blood VI test strips (True Metrix Glucose Test Strip) strip Test Blood Glucose once daily; ICD 10 E11.9, Quantity 100    lancets misc Use to check the blood glucose once a day    melatonin 5 mg cap capsule Take 1 Capsule by mouth nightly. Blood-Glucose Meter (Contour Next EZ Meter) monitoring kit Test sugar daily    atorvastatin (Lipitor) 40 mg tablet 1 tablet daily    Blood-Glucose Meter (TRUE METRIX AIR GLUCOSE METER) monitoring kit Test Blood Glucose once daily; ICD 10 E11.9    losartan (COZAAR) 100 mg tablet TAKE 1 TABLET BY MOUTH ONCE DAILY    metFORMIN ER (GLUCOPHAGE XR) 750 mg tablet TAKE 1 TABLET BY MOUTH TWO TIMES A DAY     No current facility-administered medications for this visit. Allergies   Allergen Reactions    Clindamycin Hives    Sulfa (Sulfonamide Antibiotics) Unable to Obtain    Lisinopril Cough     has Mixed hyperlipidemia, Essential hypertension, benign, Arthralgia, Dermatitis, Abscess of skin of abdomen, Uncontrolled type 2 diabetes mellitus with hyperglycemia ([address] Acute deep vein thrombosis (DVT) of proximal vein of right lower extremity ([address] Hyperglycemia due to diabetes mellitus ([address] Hyperosmolar (nonketotic) coma ([address] and UTI (urinary tract infection) on their problem list.    Past Surgical History:   Procedure Laterality Date    HX HYSTERECTOMY  1990s    X2      reports that she has never smoked. She has never used smokeless tobacco. She reports that she does not drink alcohol and does not use drugs.   family history includes Diabetes in her mother and sister; Hypertension in her sister; Stroke in her maternal grandfather and maternal grandmother. Visit Vitals  BP (!) 150/87   Pulse 88   Resp 16   Ht 5' 6\" (1.676 m)   Wt 193 lb (87.5 kg)   SpO2 95%   BMI 31.15 kg/m²       Physical Exam  Constitutional:       General: She is not in acute distress. Appearance: She is well-developed. She is obese. HENT:      Head: Normocephalic and atraumatic. Right Ear: Tympanic membrane and external ear normal.      Left Ear: Tympanic membrane and external ear normal.      Nose: Nose normal.      Mouth/Throat:      Mouth: Mucous membranes are moist.      Comments: Edentulous. Eyes:      General: No scleral icterus. Conjunctiva/sclera: Conjunctivae normal.      Pupils: Pupils are equal, round, and reactive to light. Neck:      Thyroid: No thyromegaly. Vascular: No JVD. Cardiovascular:      Rate and Rhythm: Normal rate and regular rhythm. Heart sounds: Normal heart sounds. Pulmonary:      Effort: Pulmonary effort is normal.      Breath sounds: Normal breath sounds. Abdominal:      General: Bowel sounds are normal. There is no distension. Tenderness: no abdominal tenderness   Genitourinary:     General: Normal vulva. Rectum: Normal.      Comments: Slightly friable, atrophic cervical area  Musculoskeletal:         General: No swelling, tenderness, deformity or signs of injury. Normal range of motion. Right lower leg: No edema. Left lower leg: No edema. Comments: Small amount of blood underneath her left middle toenail. Lymphadenopathy:      Cervical: No cervical adenopathy. Skin:     General: Skin is dry. Capillary Refill: Capillary refill takes less than 2 seconds. Coloration: Skin is not jaundiced or pale. Findings: No erythema, lesion or rash. Comments: Hyperpigmentation of the lower extremity from about mid lower tibia distally.   Similar in appearance to stasis changes Neurological:      Mental Status: She is alert and oriented to person, place, and time. Cranial Nerves: No cranial nerve deficit. Sensory: No sensory deficit (Both feet). Motor: No weakness or abnormal muscle tone. Coordination: Coordination normal.   Psychiatric:         Behavior: Behavior normal.         Thought Content: Thought content normal.         Judgment: Judgment normal.       Lab Results   Component Value Date/Time    WBC 7.0 08/02/2022 12:27 PM    HGB 11.3 (L) 08/02/2022 12:27 PM    HCT 33.8 (L) 08/02/2022 12:27 PM    PLATELET 759 36/12/1896 12:27 PM    MCV 78.4 08/02/2022 12:27 PM     Lab Results   Component Value Date/Time    Hemoglobin A1c 12.6 (H) 06/01/2022 09:36 AM    Hemoglobin A1c 11.6 (H) 01/24/2022 01:46 AM    Hemoglobin A1c 12.5 (H) 07/27/2021 11:26 AM    Glucose 445 (HH) 08/02/2022 12:27 PM    Glucose (POC) 273 (H) 08/03/2022 01:20 AM    Microalb/Creat ratio (ug/mg creat.) 698 (H) 06/01/2022 09:36 AM    LDL, calculated 59 06/01/2022 09:36 AM    LDL, calculated 56 11/27/2017 09:15 AM    Creatinine 1.30 08/02/2022 12:27 PM      Lab Results   Component Value Date/Time    Cholesterol, total 129 06/01/2022 09:36 AM    HDL Cholesterol 36 (L) 06/01/2022 09:36 AM    LDL, calculated 59 06/01/2022 09:36 AM    LDL, calculated 56 11/27/2017 09:15 AM    Triglyceride 204 (H) 06/01/2022 09:36 AM     Lab Results   Component Value Date/Time    ALT (SGPT) 14 06/01/2022 09:36 AM    Alk.  phosphatase 197 (H) 06/01/2022 09:36 AM    Bilirubin, total 0.5 06/01/2022 09:36 AM    Albumin 4.4 06/01/2022 09:36 AM    Protein, total 6.8 06/01/2022 09:36 AM    PLATELET 911 60/66/4754 12:27 PM     No results found for: INR, INREXT, PTMR, PTP, PT1, PT2, INREXT, INREXT   Lab Results   Component Value Date/Time    GFR est non-AA 41 (L) 08/02/2022 12:27 PM    GFR est AA 50 (L) 08/02/2022 12:27 PM    Creatinine 1.30 08/02/2022 12:27 PM    BUN 41 (H) 08/02/2022 12:27 PM    Sodium 135 (L) 08/02/2022 12:27 PM Potassium 3.8 08/02/2022 12:27 PM    Chloride 98 (L) 08/02/2022 12:27 PM    CO2 23 08/02/2022 12:27 PM    Magnesium 2.3 01/24/2022 11:40 AM    Phosphorus 2.9 01/24/2022 11:40 AM     No results found for: TSH, TSH2, TSH3, TSHP, TSHELE, TSHEXT, TT3, T3U, T3UP, FRT3, FT3, FT4, FT4P, T4, T4P, FT4T, TT7, TSHEXT, TSHEXT   Lab Results   Component Value Date/Time    Sodium 135 (L) 08/02/2022 12:27 PM    Potassium 3.8 08/02/2022 12:27 PM    Chloride 98 (L) 08/02/2022 12:27 PM    CO2 23 08/02/2022 12:27 PM    Anion gap 14 08/02/2022 12:27 PM    Glucose 445 (HH) 08/02/2022 12:27 PM    BUN 41 (H) 08/02/2022 12:27 PM    Creatinine 1.30 08/02/2022 12:27 PM    BUN/Creatinine ratio 32 (H) 08/02/2022 12:27 PM    GFR est AA 50 (L) 08/02/2022 12:27 PM    GFR est non-AA 41 (L) 08/02/2022 12:27 PM    Calcium 9.3 08/02/2022 12:27 PM    Bilirubin, total 0.5 06/01/2022 09:36 AM    ALT (SGPT) 14 06/01/2022 09:36 AM    Alk. phosphatase 197 (H) 06/01/2022 09:36 AM    Protein, total 6.8 06/01/2022 09:36 AM    Albumin 4.4 06/01/2022 09:36 AM    Globulin 4.0 01/24/2022 01:46 AM    A-G Ratio 1.8 06/01/2022 09:36 AM      Lab Results   Component Value Date/Time    Hemoglobin A1c 12.6 (H) 06/01/2022 09:36 AM    Hemoglobin A1c (POC) 10.0 08/30/2022 03:31 PM        Diabetic foot exam:     Left Foot:   Visual Exam: normal    Pulse DP: 1+ (weak)   Filament test: absent sensation    Vibratory sensation: absent      Right Foot:   Visual Exam: normal    Pulse DP: 1+ (weak)   Filament test: absent sensation    Vibratory sensation: absent      We discussed the expected course, resolution and complications of the diagnosis(es) in detail. Medication risks, benefits, costs, interactions, and alternatives were discussed as indicated. I advised her to contact the office if her condition worsens, changes or fails to improve as anticipated. She expressed understanding with the diagnosis(es) and plan.       This note was done with the assistance of dragon speech software.   Some inadvertent errors or omissions may be present

## 2022-08-30 ENCOUNTER — OFFICE VISIT (OUTPATIENT)
Dept: FAMILY MEDICINE CLINIC | Age: 62
End: 2022-08-30
Payer: MEDICAID

## 2022-08-30 VITALS
OXYGEN SATURATION: 95 % | HEART RATE: 88 BPM | RESPIRATION RATE: 16 BRPM | DIASTOLIC BLOOD PRESSURE: 87 MMHG | HEIGHT: 66 IN | BODY MASS INDEX: 31.02 KG/M2 | WEIGHT: 193 LBS | SYSTOLIC BLOOD PRESSURE: 150 MMHG

## 2022-08-30 DIAGNOSIS — E78.2 MIXED HYPERLIPIDEMIA: ICD-10-CM

## 2022-08-30 DIAGNOSIS — Z12.4 ENCOUNTER FOR PAPANICOLAOU SMEAR FOR CERVICAL CANCER SCREENING: ICD-10-CM

## 2022-08-30 DIAGNOSIS — D64.9 NORMOCYTIC ANEMIA: ICD-10-CM

## 2022-08-30 DIAGNOSIS — I10 ESSENTIAL HYPERTENSION, BENIGN: ICD-10-CM

## 2022-08-30 DIAGNOSIS — I10 ESSENTIAL HYPERTENSION, BENIGN: Primary | ICD-10-CM

## 2022-08-30 DIAGNOSIS — I82.4Y1 ACUTE DEEP VEIN THROMBOSIS (DVT) OF PROXIMAL VEIN OF RIGHT LOWER EXTREMITY (HCC): ICD-10-CM

## 2022-08-30 DIAGNOSIS — E11.9 TYPE 2 DIABETES MELLITUS WITHOUT COMPLICATION, WITHOUT LONG-TERM CURRENT USE OF INSULIN (HCC): ICD-10-CM

## 2022-08-30 PROBLEM — N39.0 UTI (URINARY TRACT INFECTION): Status: ACTIVE | Noted: 2022-08-05

## 2022-08-30 PROBLEM — E11.01 HYPEROSMOLAR (NONKETOTIC) COMA (HCC): Status: ACTIVE | Noted: 2022-08-05

## 2022-08-30 PROBLEM — E11.65 HYPERGLYCEMIA DUE TO DIABETES MELLITUS (HCC): Status: ACTIVE | Noted: 2022-08-05

## 2022-08-30 LAB — HBA1C MFR BLD HPLC: 10 %

## 2022-08-30 PROCEDURE — 83036 HEMOGLOBIN GLYCOSYLATED A1C: CPT | Performed by: EMERGENCY MEDICINE

## 2022-08-30 PROCEDURE — 99214 OFFICE O/P EST MOD 30 MIN: CPT | Performed by: EMERGENCY MEDICINE

## 2022-08-30 PROCEDURE — 3046F HEMOGLOBIN A1C LEVEL >9.0%: CPT | Performed by: EMERGENCY MEDICINE

## 2022-08-30 RX ORDER — HYDROCHLOROTHIAZIDE 12.5 MG/1
12.5 TABLET ORAL DAILY
Qty: 90 TABLET | Refills: 3 | Status: SHIPPED | OUTPATIENT
Start: 2022-08-30

## 2022-08-30 NOTE — TELEPHONE ENCOUNTER
Requested Prescriptions     Pending Prescriptions Disp Refills    losartan (COZAAR) 100 mg tablet 90 Tablet 3     Sig: TAKE 1 TABLET BY MOUTH ONCE DAILY    metFORMIN ER (GLUCOPHAGE XR) 750 mg tablet 180 Tablet 3     Sig: TAKE 1 TABLET BY MOUTH TWO TIMES A DAY

## 2022-08-30 NOTE — PATIENT INSTRUCTIONS
Hyperlipidemia: After Your Visit  Your Care Instructions  Hyperlipidemia is too much fat in your blood. The body has several kinds of fat, including cholesterol and triglycerides. Your body needs fat for many things, such as making new cells. But too much fat in your blood increases your chances of having a heart attack or stroke. You may be able to lower your cholesterol and triglycerides with a heart-healthy diet, exercise, and if needed, medicine. Your doctor may want you to try lifestyle changes first to see whether they lower the fat in your blood. You may need to take medicine if lifestyle changes do not lower the fat in your blood enough. Follow-up care is a key part of your treatment and safety. Be sure to make and go to all appointments, and call your doctor if you are having problems. Its also a good idea to know your test results and keep a list of the medicines you take. How can you care for yourself at home? Take your medicines  Take your medicines exactly as prescribed. Call your doctor if you think you are having a problem with your medicine. If you take medicine to lower your cholesterol, go to follow-up visits. You will need to have blood tests. Do not take large doses of niacin, which is a B vitamin, while taking medicine called statins. It may increase the chance of muscle pain and liver problems. Talk to your doctor about avoiding grapefruit juice if you are taking statins. Grapefruit juice can raise the level of this medicine in your blood. This could increase side effects. Eat more fruits, vegetables, and fiber  Fruits and vegetables have lots of nutrients that help protect against heart disease, and they have little--if any--fat. Try to eat at least five servings a day. Dark green, deep orange, or yellow fruits and vegetables are healthy choices. Keep carrots, celery, and other veggies handy for snacks.  Buy fruit that is in season and store it where you can see it so that you will be tempted to eat it. Cook dishes that have a lot of veggies in them, such as stir-fries and soups. Foods high in fiber may reduce your cholesterol and provide important vitamins and minerals. High-fiber foods include whole-grain cereals and breads, oatmeal, beans, brown rice, citrus fruits, and apples. Buy whole-grain breads and cereals instead of white bread and pastries. Limit saturated fat  Read food labels and try to avoid saturated fat and trans fat. They increase your risk of heart disease. Use olive or canola oil when you cook. Try cholesterol-lowering spreads, such as Benecol or Take Control. Bake, broil, grill, or steam foods instead of frying them. Limit the amount of high-fat meats you eat, including hot dogs and sausages. Cut out all visible fat when you prepare meat. Eat fish, skinless poultry, and soy products such as tofu instead of high-fat meats. Soybeans may be especially good for your heart. Eat at least two servings of fish a week. Certain fish, such as salmon, contain omega-3 fatty acids, which may help reduce your risk of heart attack. Choose low-fat or fat-free milk and dairy products. Get exercise, limit alcohol, and quit smoking  Get more exercise. Work with your doctor to set up an exercise program. Even if you can do only a small amount, exercise will help you get stronger, have more energy, and manage your weight and your stress. Walking is an easy way to get exercise. Gradually increase the amount you walk every day. Aim for at least 30 minutes on most days of the week. You also may want to swim, bike, or do other activities. Limit alcohol to no more than 2 drinks a day for men and 1 drink a day for women. Do not smoke. If you need help quitting, talk to your doctor about stop-smoking programs and medicines. These can increase your chances of quitting for good. When should you call for help? Call 911 anytime you think you may need emergency care.  For example, call if:  You have symptoms of a heart attack. These may include:  Chest pain or pressure, or a strange feeling in the chest.  Sweating. Shortness of breath. Nausea or vomiting. Pain, pressure, or a strange feeling in the back, neck, jaw, or upper belly or in one or both shoulders or arms. Lightheadedness or sudden weakness. A fast or irregular heartbeat. After you call 911, the  may tell you to chew 1 adult-strength or 2 to 4 low-dose aspirin. Wait for an ambulance. Do not try to drive yourself. You have signs of a stroke. These may include:  Sudden numbness, paralysis, or weakness in your face, arm, or leg, especially on only one side of your body. New problems with walking or balance. Sudden vision changes. Drooling or slurred speech. New problems speaking or understanding simple statements, or feeling confused. A sudden, severe headache that is different from past headaches. You passed out (lost consciousness). Call your doctor now or seek immediate medical care if:  You have muscle pain or weakness. Watch closely for changes in your health, and be sure to contact your doctor if:  You are very tired. You have an upset stomach, gas, constipation, or belly pain or cramps. Where can you learn more? Go to Mobile[employer].be  Enter C406 in the search box to learn more about \"Hyperlipidemia: After Your Visit. \"   © 9404-2800 [employer]. Care instructions adapted under license by [facility] [employer] (which disclaims liability or warranty for this information). This care instruction is for use with your licensed healthcare professional. If you have questions about a medical condition or this instruction, always ask your healthcare professional. [place] [address] any warranty or liability for your use of this information.   Content Version: 0.0.455897; Last Revised: October 13, 2011                 Type 2 Diabetes: Care Instructions  Your Care Instructions     Type 2 diabetes is a disease that develops when the body's tissues cannot use insulin properly. Over time, the pancreas cannot make enough insulin. Insulin is a hormone that helps the body's cells use sugar (glucose) for energy. It also helps the body store extra sugar in muscle, fat, and liver cells. Without insulin, the sugar cannot get into the cells to do its work. It stays in the blood instead. This can cause high blood sugar levels. A person has diabetes when the blood sugar stays too high too much of the time. Over time, diabetes can lead to diseases of the heart, blood vessels, nerves, kidneys, and eyes. You may be able to control your blood sugar by losing weight, eating a healthy diet, and getting daily exercise. You may also have to take insulin or other diabetes medicine. Follow-up care is a key part of your treatment and safety. Be sure to make and go to all appointments. Call your doctor if you are having problems. It's also a good idea to know your test results and keep a list of the medicines you take. How can you care for yourself at home? Keep your blood sugar at a target level (which you set with your doctor). Carbohydrate--the body's main source of fuel--affects blood sugar more than any other nutrient. Carbohydrate is in fruits, vegetables, milk, and yogurt. It also is in breads, cereals, vegetables such as potatoes and corn, and sugary foods such as candy and cakes. Follow your meal plan to know how much carbohydrate to eat at each meal and snack. Aim for 30 minutes of exercise on most, preferably all, days of the week. Walking is a good choice. You also may want to do other activities, such as running, swimming, cycling, or playing tennis or team sports. Try to do muscle-strengthening exercises at least 2 times a week. Take your medicines exactly as prescribed. Call your doctor if you think you are having a problem with your medicine.  You will get more details on the specific medicines your doctor prescribes. Check your blood sugar as often as your doctor recommends. It is important to keep track of any symptoms you have, such as low blood sugar. Also tell your doctor if you have any changes in your activities, diet, or insulin use. Talk to your doctor before you start taking aspirin every day. Aspirin can help certain people lower their risk of a heart attack or stroke. But taking aspirin isn't right for everyone, because it can cause serious bleeding. Do not smoke. If you need help quitting, talk to your doctor about stop-smoking programs and medicines. These can increase your chances of quitting for good. Keep your cholesterol and blood pressure at normal levels. You may need to take one or more medicines to reach your goals. Take them exactly as directed. Do not stop or change a medicine without talking to your doctor first.  When should you call for help? Call 911 anytime you think you may need emergency care. For example, call if:    You passed out (lost consciousness), or you suddenly become very sleepy or confused. (You may have very low blood sugar.)   Call your doctor now or seek immediate medical care if:    Your blood sugar is 300 mg/dL or is higher than the level your doctor has set for you. You have symptoms of low blood sugar, such as:  Sweating. Feeling nervous, shaky, and weak. Extreme hunger and slight nausea. Dizziness and headache. Blurred vision. Confusion. Watch closely for changes in your health, and be sure to contact your doctor if:    You often have problems controlling your blood sugar. You have symptoms of long-term diabetes problems, such as:  New vision changes. New pain, numbness, or tingling in your hands or feet. Skin problems. Where can you learn more? Go to http://www.gray.com/  Enter C553 in the search box to learn more about \"Type 2 Diabetes: Care Instructions. \"  Current as of: August 31, 2020               Content Version: 12.8  © 7248-4530 [employer]. Care instructions adapted under license by [employer] (which disclaims liability or warranty for this information). If you have questions about a medical condition or this instruction, always ask your healthcare professional. [address] any warranty or liability for your use of this information. High Blood Pressure: Care Instructions  Overview     It's normal for blood pressure to go up and down throughout the day. But if it stays up, you have high blood pressure. Another name for high blood pressure is hypertension. Despite what a lot of people think, high blood pressure usually doesn't cause headaches or make you feel dizzy or lightheaded. It usually has no symptoms. But it does increase your risk of stroke, heart attack, and other problems. You and your doctor will talk about your risks of these problems based on your blood pressure. Your doctor will give you a goal for your blood pressure. Your goal will be based on your health and your age. Lifestyle changes, such as eating healthy and being active, are always important to help lower blood pressure. You might also take medicine to reach your blood pressure goal.  Follow-up care is a key part of your treatment and safety. Be sure to make and go to all appointments, and call your doctor if you are having problems. It's also a good idea to know your test results and keep a list of the medicines you take. How can you care for yourself at home? Medical treatment  If you stop taking your medicine, your blood pressure will go back up. You may take one or more types of medicine to lower your blood pressure. Be safe with medicines. Take your medicine exactly as prescribed. Call your doctor if you think you are having a problem with your medicine. Talk to your doctor before you start taking aspirin every day.  Aspirin can help certain people lower their risk of a heart attack or stroke. But taking aspirin isn't right for everyone, because it can cause serious bleeding. See your doctor regularly. You may need to see the doctor more often at first or until your blood pressure comes down. If you are taking blood pressure medicine, talk to your doctor before you take decongestants or anti-inflammatory medicine, such as ibuprofen. Some of these medicines can raise blood pressure. Learn how to check your blood pressure at home. Lifestyle changes  Stay at a healthy weight. This is especially important if you put on weight around the waist. Losing even 10 pounds can help you lower your blood pressure. If your doctor recommends it, get more exercise. Walking is a good choice. Bit by bit, increase the amount you walk every day. Try for at least 30 minutes on most days of the week. You also may want to swim, bike, or do other activities. Avoid or limit alcohol. Talk to your doctor about whether you can drink any alcohol. Try to limit how much sodium you eat to less than 2,300 milligrams (mg) a day. Your doctor may ask you to try to eat less than 1,500 mg a day. Eat plenty of fruits (such as bananas and oranges), vegetables, legumes, whole grains, and low-fat dairy products. Lower the amount of saturated fat in your diet. Saturated fat is found in animal products such as milk, cheese, and meat. Limiting these foods may help you lose weight and also lower your risk for heart disease. Do not smoke. Smoking increases your risk for heart attack and stroke. If you need help quitting, talk to your doctor about stop-smoking programs and medicines. These can increase your chances of quitting for good. When should you call for help? Call  911 anytime you think you may need emergency care. This may mean having symptoms that suggest that your blood pressure is causing a serious heart or blood vessel problem. Your blood pressure may be over 180/120.   For example, call 911 if:    You have symptoms of a heart attack. These may include:  Chest pain or pressure, or a strange feeling in the chest.  Sweating. Shortness of breath. Nausea or vomiting. Pain, pressure, or a strange feeling in the back, neck, jaw, or upper belly or in one or both shoulders or arms. Lightheadedness or sudden weakness. A fast or irregular heartbeat. You have symptoms of a stroke. These may include:  Sudden numbness, tingling, weakness, or loss of movement in your face, arm, or leg, especially on only one side of your body. Sudden vision changes. Sudden trouble speaking. Sudden confusion or trouble understanding simple statements. Sudden problems with walking or balance. A sudden, severe headache that is different from past headaches. You have severe back or belly pain. Do not wait until your blood pressure comes down on its own. Get help right away. Call your doctor now or seek immediate care if:    Your blood pressure is much higher than normal (such as 180/120 or higher), but you don't have symptoms. You think high blood pressure is causing symptoms, such as:  Severe headache. Blurry vision. Watch closely for changes in your health, and be sure to contact your doctor if:    Your blood pressure measures higher than your doctor recommends at least 2 times. That means the top number is higher or the bottom number is higher, or both. You think you may be having side effects from your blood pressure medicine. Where can you learn more? Go to http://www.gray.com/  Enter [id number] in the search box to learn more about \"High Blood Pressure: Care Instructions. \"  Current as of: August 31, 2020               Content Version: 12.8  © 7182-8357 [employer]. Care instructions adapted under license by [employer] (which disclaims liability or warranty for this information).  If you have questions about a medical condition or this instruction, always ask your healthcare professional. [address] any warranty or liability for your use of this information.

## 2022-08-30 NOTE — PROGRESS NOTES
1. \"Have you been to the ER, urgent care clinic since your last visit? Hospitalized since your last visit? \" Yes Where: [facility]    2. \"Have you seen or consulted any other health care providers outside of the [address] [place] [address] since your last visit? \" Yes Where: [facility]      3. For patients aged 39-70: Has the patient had a colonoscopy / FIT/ Cologuard? No      If the patient is female:    4. For patients aged 41-77: Has the patient had a mammogram within the past 2 years? No      5. For patients aged 21-65: Has the patient had a pap smear?  No

## 2022-08-31 RX ORDER — METFORMIN HYDROCHLORIDE 750 MG/1
TABLET, EXTENDED RELEASE ORAL
Qty: 180 TABLET | Refills: 3 | Status: SHIPPED | OUTPATIENT
Start: 2022-08-31 | End: 2022-09-07 | Stop reason: SDUPTHER

## 2022-08-31 RX ORDER — LOSARTAN POTASSIUM 100 MG/1
TABLET ORAL
Qty: 90 TABLET | Refills: 3 | Status: SHIPPED | OUTPATIENT
Start: 2022-08-31 | End: 2022-09-12 | Stop reason: SDUPTHER

## 2022-09-02 LAB
CYTOLOGIST CVX/VAG CYTO: NORMAL
CYTOLOGY CVX/VAG DOC CYTO: NORMAL
CYTOLOGY CVX/VAG DOC THIN PREP: NORMAL
DX ICD CODE: NORMAL
HPV I/H RISK 4 DNA CVX QL PROBE+SIG AMP: NEGATIVE
Lab: NORMAL
OTHER STN SPEC: NORMAL
SPECIMEN STATUS REPORT, ROLRST: NORMAL
STAT OF ADQ CVX/VAG CYTO-IMP: NORMAL

## 2022-09-07 DIAGNOSIS — I10 ESSENTIAL HYPERTENSION, BENIGN: ICD-10-CM

## 2022-09-07 DIAGNOSIS — E11.9 TYPE 2 DIABETES MELLITUS WITHOUT COMPLICATION, WITHOUT LONG-TERM CURRENT USE OF INSULIN (HCC): ICD-10-CM

## 2022-09-12 RX ORDER — LOSARTAN POTASSIUM 100 MG/1
TABLET ORAL
Qty: 90 TABLET | Refills: 3 | Status: SHIPPED | OUTPATIENT
Start: 2022-09-12

## 2022-09-12 RX ORDER — METFORMIN HYDROCHLORIDE 750 MG/1
TABLET, EXTENDED RELEASE ORAL
Qty: 180 TABLET | Refills: 3 | Status: SHIPPED | OUTPATIENT
Start: 2022-09-12 | End: 2022-11-03

## 2022-09-12 NOTE — TELEPHONE ENCOUNTER
Patient requesting on the following medications.       Requested Prescriptions     Pending Prescriptions Disp Refills    metFORMIN ER (GLUCOPHAGE XR) 750 mg tablet 180 Tablet 3     Sig: TAKE 1 TABLET BY MOUTH TWO TIMES A DAY    losartan (COZAAR) 100 mg tablet 90 Tablet 3     Sig: TAKE 1 TABLET BY MOUTH ONCE DAILY

## 2022-09-19 DIAGNOSIS — E11.9 TYPE 2 DIABETES MELLITUS WITHOUT COMPLICATION, WITHOUT LONG-TERM CURRENT USE OF INSULIN (HCC): ICD-10-CM

## 2022-09-29 PROBLEM — N39.0 UTI (URINARY TRACT INFECTION): Status: RESOLVED | Noted: 2022-08-05 | Resolved: 2022-09-29

## 2022-10-11 ENCOUNTER — OFFICE VISIT (OUTPATIENT)
Dept: FAMILY MEDICINE CLINIC | Age: 62
End: 2022-10-11
Payer: MEDICAID

## 2022-10-11 VITALS
OXYGEN SATURATION: 99 % | HEART RATE: 71 BPM | SYSTOLIC BLOOD PRESSURE: 139 MMHG | WEIGHT: 179 LBS | HEIGHT: 66 IN | DIASTOLIC BLOOD PRESSURE: 71 MMHG | BODY MASS INDEX: 28.77 KG/M2 | RESPIRATION RATE: 16 BRPM | TEMPERATURE: 97.8 F

## 2022-10-11 DIAGNOSIS — E11.9 TYPE 2 DIABETES MELLITUS WITHOUT COMPLICATION, WITHOUT LONG-TERM CURRENT USE OF INSULIN (HCC): ICD-10-CM

## 2022-10-11 DIAGNOSIS — I49.9 IRREGULAR HEARTBEAT: ICD-10-CM

## 2022-10-11 DIAGNOSIS — I82.5Y9 CHRONIC DEEP VEIN THROMBOSIS (DVT) OF PROXIMAL VEIN OF LOWER EXTREMITY, UNSPECIFIED LATERALITY (HCC): ICD-10-CM

## 2022-10-11 DIAGNOSIS — E78.2 MIXED HYPERLIPIDEMIA: ICD-10-CM

## 2022-10-11 DIAGNOSIS — D64.9 NORMOCYTIC ANEMIA: ICD-10-CM

## 2022-10-11 DIAGNOSIS — I10 ESSENTIAL HYPERTENSION, BENIGN: Primary | ICD-10-CM

## 2022-10-11 DIAGNOSIS — I10 ESSENTIAL HYPERTENSION, BENIGN: ICD-10-CM

## 2022-10-11 PROCEDURE — 3046F HEMOGLOBIN A1C LEVEL >9.0%: CPT | Performed by: EMERGENCY MEDICINE

## 2022-10-11 PROCEDURE — 93000 ELECTROCARDIOGRAM COMPLETE: CPT | Performed by: EMERGENCY MEDICINE

## 2022-10-11 PROCEDURE — 99214 OFFICE O/P EST MOD 30 MIN: CPT | Performed by: EMERGENCY MEDICINE

## 2022-10-11 RX ORDER — ATORVASTATIN CALCIUM 40 MG/1
TABLET, FILM COATED ORAL
Qty: 90 TABLET | Refills: 3 | Status: SHIPPED | OUTPATIENT
Start: 2022-10-11

## 2022-10-11 NOTE — PROGRESS NOTES
Nicole Fajardo presents today for   Chief Complaint   Patient presents with    Follow-up    Medication Evaluation       Vitals:    10/11/22 1422   BP: 139/71   Pulse: 71   Resp: 16   Temp: 97.8 °F (36.6 °C)   TempSrc: Oral   SpO2: 99%   Weight: 179 lb (81.2 kg)   Height: 5' 6\" (1.676 m)        Nicole Fajardo preferred language for health care discussion is english/other. Is someone accompanying this pt? no    Is the patient using any DME equipment during [address]? no    Depression Screening:  3 most recent PHQ Screens 10/11/2022   Little interest or pleasure in doing things Not at all   Feeling down, depressed, irritable, or hopeless Not at all   Total Score PHQ 2 0       Learning Assessment:  Learning Assessment 10/2/2018   PRIMARY LEARNER Patient   HIGHEST LEVEL OF EDUCATION - PRIMARY LEARNER  DID NOT GRADUATE HIGH SCHOOL   BARRIERS PRIMARY LEARNER NONE   CO-LEARNER CAREGIVER No   PRIMARY LANGUAGE ENGLISH   LEARNER PREFERENCE PRIMARY DEMONSTRATION   ANSWERED BY patient   RELATIONSHIP SELF       Abuse Screening:  Abuse Screening Questionnaire 10/2/2018   Do you ever feel afraid of your partner? N   Are you in a relationship with someone who physically or mentally threatens you? N   Is it safe for you to go home? Y       Generalized Anxiety  No flowsheet data found. Health Maintenance Due   Topic Date Due    Eye Exam Retinal or Dilated  Never done    DTaP/Tdap/Td series (1 - Tdap) Never done    Colorectal Cancer Screening Combo  Never done    Shingrix Vaccine Age 50> (1 of 2) Never done    Breast Cancer Screen Mammogram  Never done    COVID-19 Vaccine (4 - Booster for Moderna series) 04/16/2022    Flu Vaccine (1) 08/01/2022   . Health Maintenance reviewed and discussed and ordered per Provider. VACCINES DUE   SCREENINGS DUE       Nicole Fajardo is updated on all HM    1. \"Have you been to the ER, urgent care clinic since your last visit? Hospitalized since your last visit? \" No    2.  \"Have you seen or consulted any other health care providers outside of the [address] [place] [address] since your last visit? \" No     3. For patients aged 39-70: Has the patient had a colonoscopy / FIT/ Cologuard? No     If the patient is female:    4. For patients aged 41-77: Has the patient had a mammogram within the past 2 years? No    5. For patients aged 21-65: Has the patient had a pap smear?  Yes - no Care Gap present

## 2022-10-11 NOTE — PROGRESS NOTES
ICD-10-CM ICD-9-CM    1. Essential hypertension, benign  I10 401.1 CBC WITH AUTOMATED DIFF      2. Type 2 diabetes mellitus without complication, without long-term current use of insulin ([facility])  X29.4 495.54 METABOLIC PANEL, COMPREHENSIVE      HEMOGLOBIN A1C WITH EAG      MICROALBUMIN, UR, RAND W/ MICROALB/CREAT RATIO      3. Mixed hyperlipidemia  E78.2 272.2 LIPID PANEL      4. Normocytic anemia  D64.9 285.9 FE+CBC/D/PLT+TIBC+TAD+RETIC      TRANSFERRIN      METHYLMALONIC ACID      5. Chronic deep vein thrombosis (DVT) of proximal vein of lower extremity, unspecified laterality ([facility])  I82.5Y9 453.51       6. Irregular heartbeat  I49.9 427.9 AMB POC EKG ROUTINE W/ 12 LEADS, INTER & REP        Follow-up and Dispositions    Return in about 3 months (around 1/11/2023). Assessment and plan  Noted to have an irregular heartbeat today. Essential hypertension. Good control today no change in treatment. Mild normochromic normocytic anemia. Hemoglobin 11.3 with MCV 78.4 on August 2, 2022. Declines colonoscopy. Anemia panel ordered to be done before next visit. Type 2 diabetes mellitus with long-term current use of insulin. On Lantus insulin 50 units daily and metformin 1000 mg twice a day we will add Trulicity 0.5 mg weekly. I reviewed the side effects with her which were troubling to her. She is now agrees to continue to try to take the medication and we will follow-up with labs on next visit. Significant diabetic neuropathy with absent vibration and position sense as well as absent filament test.  No change. Hyperlipidemia. Total cholesterol 204 with LDL of 59 on 6/1/2022. Recheck next visit. DVT. Recurrent. Eliquis lifelong. Complicated by hematuria. Referred to urology. Lab results and schedule of future lab studies reviewed with patient.   Labs next visit  Diagnostic and radiologic results and the schedule of future studies were reviewed with the patient  Reviewed diet, exercise and weight control  All questions answered and understood. Health Maintenance Due   Topic Date Due    Eye Exam Retinal or Dilated  Never done    DTaP/Tdap/Td series (1 - Tdap) Never done    Colorectal Cancer Screening Combo  Never done    Shingrix Vaccine Age 50> (1 of 2) Never done    Breast Cancer Screen Mammogram  Never done    COVID-19 Vaccine (4 - Booster for Moderna series) 04/16/2022    Flu Vaccine (1) 08/01/2022     Previously declined health maintenance. 10/11/22 declines today also. Declines eye follow up  Covid vaccines done , two doses 01/14/22  Declines mammogram or colonoscopy 01/14/22        Subjective:   Deette Osler is a 59 y.o. female has Mixed hyperlipidemia, Essential hypertension, benign, Arthralgia, Dermatitis, Abscess of skin of abdomen, Uncontrolled type 2 diabetes mellitus with hyperglycemia ([address] Acute deep vein thrombosis (DVT) of proximal vein of right lower extremity ([address] Hyperglycemia due to diabetes mellitus ([address] Hyperosmolar (nonketotic) coma ([address] and UTI (urinary tract infection) on their problem list..          Seen previously by our practice on 8/30/2022. The primary encounter diagnosis was Essential hypertension, benign. Diagnoses of Type 2 diabetes mellitus without complication, without long-term current use of insulin (HCC), Mixed hyperlipidemia, Normocytic anemia, and Chronic deep vein thrombosis (DVT) of proximal vein of lower extremity, unspecified laterality ([address] were also pertinent to this visit. Seen previously by our practice on 6/1/2022  The primary encounter diagnosis was Essential hypertension, benign. Diagnoses of Type 2 diabetes mellitus without complication, without long-term current use of insulin (HCC), Mixed hyperlipidemia, Normocytic anemia, and Chronic deep vein thrombosis (DVT) of proximal vein of lower extremity, unspecified laterality ([address] were also pertinent to this visit. ED visit 2/16/2022 acute DVT of calf muscle of the right lower extremity. Hyperglycemia due to diabetes and varicose veins. Placed on apixaban. There is a prior history of blood clot. There was no provoking symptoms such as injury or travel. Office visit 2/24/2022. Follow-up on DVT right leg. Prior DVT 40 years, right leg. Screenings refused, exposed to secondhand smoke. Advised long-term anticoagulation as this was a second occurrence. Seen previously by our practice on 2/2/2022  Most recent labs 4/29/2022 CBC within normal limits CMP shows a glucose of 368 otherwise unremarkable. Microalbumin to creatinine ratio 644. Most recent hemoglobin A1c was 12 on 10/26/2021. Total cholesterol 130 LDL cholesterol 60. S/p hospital discharge from the ED with an extremely elevated blood sugar 700. Is now down to the 300 range. We have her switch to Lantus to 50 units every morning. If not successful would consider adding either semaglutide or one of the SGLT2 inhibitors. The patient has GERD and we added Prilosec. The patient states that she has difficulty with sleeping, melatonin added. The blood pressure was 149/73, not controlled, Norvasc increased to 10 mg daily. Hyperlipidemia on last visit. Encouraged to  go ahead and get labs done before the next visit. Will recheck in 1 month. Lab results and schedule of future lab studies reviewed with patient  Diagnostic and radiologic results and the schedule of future studies were reviewed with the patient  All questions answered and understood. Seen in the ED 1/24/2022. Diagnosis hyperglycemia acute cystitis with hematuria. Her blood pressure was 140/75 and pulse was 115. She had epigastric abdominal tenderness without rebound on examination. Blood sugar was to be initially greater than 700 potassium 3.4 creatinine 1.18 select 37 chest x-ray was unremarkable  Office visit was 10/26/2021. Hemoglobin A1c was 11.6. Seen for essential hypertension diabetes mixed hyperlipidemia and hearing loss on the right side.   Essential hypertension, chronic problem. Diabetes mellitus type 2 non-insulin-requiring, not controlled in October determination. Follow-up labs ordered. Discussed diet and exercise. Mixed hyperlipidemia. Chronic problem. Controlled on last visit. Follow-up labs ordered. Right earwax. Debrox ordered. Continues to decline mammogram all her immunizations and colonoscopy. Risk benefits alternatives explained and understood to her. Patient had claudication symptoms. Will refer again for duplex study. The duplex study is still pending. Previous visit was 10/26/2021. Fit test ordered for colon cancer screening    Hypertension  The patient has no headaches, visual changes, chest pain or pressure,dyspnea, orthopnea, or PND. There is no problem with the medication. Diabetes mellitus  The patient admits to polyuria, polydipsia, denies polyphagia. The patient denies any other aggravating or relieving factors at this time there has been no problem with the medications. She has symptoms of neuropathy with numbness and tingling of the lower extremities. Hemoglobin A1c  Blood sugar was 700 in the ED now in the 200-300 range. Lantus on 50 units daily. Given Trumetrix meter  Metformin 1000 mg bid  Key Antihyperglycemic Medications               dulaglutide (TRULICITY) 1.5 AJ/0.8 mL sub-q pen 0.5 mL by SubCUTAneous route every seven (7) days. metFORMIN ER (GLUCOPHAGE XR) 750 mg tablet TAKE 1 TABLET BY MOUTH TWO TIMES A DAY    insulin glargine (Lantus Solostar U-100 Insulin) 100 unit/mL (3 mL) inpn INJECT 50 UNITS UNDER THE SKIN ONCE DAILY          Wt Readings from Last 3 Encounters:   10/11/22 179 lb (81.2 kg)   08/30/22 193 lb (87.5 kg)   08/23/22 171 lb (77.6 kg)       Hyperlipidemia   The patient denies any myalgias or weakness. No abdominal discomfort admitted to. The patient denies any difficulty with or side effects from the medication.     BP Readings from Last 3 Encounters:   10/11/22 139/71   08/30/22 (!) 150/87   08/03/22 135/75         Lab Results   Component Value Date/Time    Hemoglobin A1c 12.6 (H) 06/01/2022 09:36 AM    Hemoglobin A1c (POC) 10.0 08/30/2022 03:31 PM     Lab Results   Component Value Date/Time    Cholesterol, total 129 06/01/2022 09:36 AM    HDL Cholesterol 36 (L) 06/01/2022 09:36 AM    LDL, calculated 59 06/01/2022 09:36 AM    LDL, calculated 56 11/27/2017 09:15 AM    VLDL, calculated 34 06/01/2022 09:36 AM    VLDL, calculated 41 (H) 11/27/2017 09:15 AM    Triglyceride 204 (H) 06/01/2022 09:36 AM     Lower extremity pain  The patient admits to lower extremity pain. The onset was 6 months prior. The pain is precipitated by walking. It is located in the calves. It is relieved by rest.  There is no position change. No fevers chills or sweats admitted to. No redness or paleness of the lower extremities are admitted to. No coolness is admitted to. This is a new symptom. It has not been evaluated in the past.  Duplex study pending still  GERD  This is a chronic problem. It is unchanged. The problem is in good control. There are no new aggravating or relieving factors. There is no history of any new associated signs, symptoms, or complications. The treatment is unchanged and there are no side effects from it. DVT  The patient has  Hematuria. Red. Present for 2 days. clinical course has improved   The patient voices no complaints  Presently on apixaban. Review of Systems   Constitutional:  Negative for chills and fever. HENT:  Positive for hearing loss (Right side). Eyes:  Negative for blurred vision, discharge and redness. Respiratory:  Negative for cough and shortness of breath. Cardiovascular:  Positive for claudication. Negative for chest pain, palpitations, orthopnea, leg swelling and PND. Gastrointestinal:  Negative for abdominal pain, blood in stool, constipation, diarrhea and melena.           Near dizziness  Decreased appetite     Genitourinary:  Positive for hematuria. Negative for dysuria, frequency and urgency. Musculoskeletal:  Positive for myalgias (calf and thigh, improving.). Negative for joint pain. Skin:  Negative for rash. Neurological:  Negative for dizziness, focal weakness and headaches. She states her IQ is 72   Psychiatric/Behavioral:  Negative for depression. The patient is not nervous/anxious. Current Outpatient Medications   Medication Sig    dulaglutide (TRULICITY) 1.5 UX/0.9 mL sub-q pen 0.5 mL by SubCUTAneous route every seven (7) days. metFORMIN ER (GLUCOPHAGE XR) 750 mg tablet TAKE 1 TABLET BY MOUTH TWO TIMES A DAY    losartan (COZAAR) 100 mg tablet TAKE 1 TABLET BY MOUTH ONCE DAILY    hydroCHLOROthiazide (HYDRODIURIL) 12.5 mg tablet Take 1 Tablet by mouth daily. BD Veo Insulin Syringe UF 1/2 mL 31 gauge x 15/64\" syrg     insulin glargine (Lantus Solostar U-100 Insulin) 100 unit/mL (3 mL) inpn INJECT 50 UNITS UNDER THE SKIN ONCE DAILY    Insulin Needles, Disposable, (Pen Needle) 32 gauge x 5/32\" ndle For insulin shots    TRUEplus Lancets 28 gauge misc USE TO CHECK THE BLOOD GLUCOSE ONCE A DAY    omeprazole (PRILOSEC) 20 mg capsule Take 1 Capsule by mouth daily. apixaban (Eliquis) 5 mg tablet TAKE 1 TABLET BY MOUTH TWO (2) TIMES A DAY. glucose blood VI test strips (True Metrix Glucose Test Strip) strip Test Blood Glucose once daily; ICD 10 E11.9, Quantity 100    lancets misc Use to check the blood glucose once a day    melatonin 5 mg cap capsule Take 1 Capsule by mouth nightly. Blood-Glucose Meter (Contour Next EZ Meter) monitoring kit Test sugar daily    atorvastatin (Lipitor) 40 mg tablet 1 tablet daily    Blood-Glucose Meter (TRUE METRIX AIR GLUCOSE METER) monitoring kit Test Blood Glucose once daily; ICD 10 E11.9     No current facility-administered medications for this visit.      Allergies   Allergen Reactions    Clindamycin Hives    Sulfa (Sulfonamide Antibiotics) Unable to Obtain    Lisinopril Cough     has Mixed hyperlipidemia, Essential hypertension, benign, Arthralgia, Dermatitis, Abscess of skin of abdomen, Uncontrolled type 2 diabetes mellitus with hyperglycemia ([address] Acute deep vein thrombosis (DVT) of proximal vein of right lower extremity ([address] Hyperglycemia due to diabetes mellitus ([address] Hyperosmolar (nonketotic) coma ([address] and UTI (urinary tract infection) on their problem list.    Past Surgical History:   Procedure Laterality Date    HX HYSTERECTOMY  1990s    X2      reports that she has never smoked. She has never used smokeless tobacco. She reports that she does not drink alcohol and does not use drugs. family history includes Diabetes in her mother and sister; Hypertension in her sister; Stroke in her maternal grandfather and maternal grandmother. Visit Vitals  /71 (BP 1 Location: Left upper arm, BP Patient Position: Sitting)   Pulse 71   Temp 97.8 °F (36.6 °C) (Oral)   Resp 16   Ht 5' 6\" (1.676 m)   Wt 179 lb (81.2 kg)   SpO2 99%   BMI 28.89 kg/m²       Physical Exam  Constitutional:       General: She is not in acute distress. Appearance: She is well-developed. She is obese. HENT:      Head: Normocephalic and atraumatic. Right Ear: Tympanic membrane and external ear normal.      Left Ear: Tympanic membrane and external ear normal.      Nose: Nose normal.      Mouth/Throat:      Mouth: Mucous membranes are moist.      Comments: Edentulous. Eyes:      General: No scleral icterus. Conjunctiva/sclera: Conjunctivae normal.      Pupils: Pupils are equal, round, and reactive to light. Neck:      Thyroid: No thyromegaly. Vascular: No JVD. Cardiovascular:      Rate and Rhythm: Normal rate and regular rhythm. Heart sounds: Normal heart sounds. Pulmonary:      Effort: Pulmonary effort is normal.      Breath sounds: Normal breath sounds. Abdominal:      General: Bowel sounds are normal. There is no distension. Tenderness: There is no abdominal tenderness. Genitourinary:     General: Normal vulva. Rectum: Normal.      Comments: Slightly friable, atrophic cervical area  Musculoskeletal:         General: No swelling, tenderness, deformity or signs of injury. Normal range of motion. Right lower leg: No edema. Left lower leg: No edema. Comments: Small amount of blood underneath her left middle toenail. Lymphadenopathy:      Cervical: No cervical adenopathy. Skin:     General: Skin is dry. Capillary Refill: Capillary refill takes less than 2 seconds. Coloration: Skin is not jaundiced or pale. Findings: No erythema, lesion or rash. Comments: Hyperpigmentation of the lower extremity from about mid lower tibia distally. Similar in appearance to stasis changes   Neurological:      Mental Status: She is alert and oriented to person, place, and time. Cranial Nerves: No cranial nerve deficit. Sensory: No sensory deficit (Both feet). Motor: No weakness or abnormal muscle tone. Coordination: Coordination normal.   Psychiatric:         Behavior: Behavior normal.         Thought Content:  Thought content normal.         Judgment: Judgment normal.       Lab Results   Component Value Date/Time    WBC 7.0 08/02/2022 12:27 PM    HGB 11.3 (L) 08/02/2022 12:27 PM    HCT 33.8 (L) 08/02/2022 12:27 PM    PLATELET 576 90/73/6623 12:27 PM    MCV 78.4 08/02/2022 12:27 PM     Lab Results   Component Value Date/Time    Hemoglobin A1c 12.6 (H) 06/01/2022 09:36 AM    Hemoglobin A1c 11.6 (H) 01/24/2022 01:46 AM    Hemoglobin A1c 12.5 (H) 07/27/2021 11:26 AM    Glucose 445 (HH) 08/02/2022 12:27 PM    Glucose (POC) 273 (H) 08/03/2022 01:20 AM    Microalb/Creat ratio (ug/mg creat.) 698 (H) 06/01/2022 09:36 AM    LDL, calculated 59 06/01/2022 09:36 AM    LDL, calculated 56 11/27/2017 09:15 AM    Creatinine 1.30 08/02/2022 12:27 PM      Lab Results   Component Value Date/Time    Cholesterol, total 129 06/01/2022 09:36 AM    HDL Cholesterol 36 (L) 06/01/2022 09:36 AM    LDL, calculated 59 06/01/2022 09:36 AM    LDL, calculated 56 11/27/2017 09:15 AM    Triglyceride 204 (H) 06/01/2022 09:36 AM     Lab Results   Component Value Date/Time    ALT (SGPT) 14 06/01/2022 09:36 AM    Alk. phosphatase 197 (H) 06/01/2022 09:36 AM    Bilirubin, total 0.5 06/01/2022 09:36 AM    Albumin 4.4 06/01/2022 09:36 AM    Protein, total 6.8 06/01/2022 09:36 AM    PLATELET 234 56/43/1787 12:27 PM     No results found for: INR, INREXT, PTMR, PTP, PT1, PT2, INREXT, INREXT   Lab Results   Component Value Date/Time    GFR est non-AA 41 (L) 08/02/2022 12:27 PM    GFR est AA 50 (L) 08/02/2022 12:27 PM    Creatinine 1.30 08/02/2022 12:27 PM    BUN 41 (H) 08/02/2022 12:27 PM    Sodium 135 (L) 08/02/2022 12:27 PM    Potassium 3.8 08/02/2022 12:27 PM    Chloride 98 (L) 08/02/2022 12:27 PM    CO2 23 08/02/2022 12:27 PM    Magnesium 2.3 01/24/2022 11:40 AM    Phosphorus 2.9 01/24/2022 11:40 AM     No results found for: TSH, TSH2, TSH3, TSHP, TSHELE, TSHEXT, TT3, T3U, T3UP, FRT3, FT3, FT4, FT4P, T4, T4P, FT4T, TT7, TSHEXT, TSHEXT   Lab Results   Component Value Date/Time    Sodium 135 (L) 08/02/2022 12:27 PM    Potassium 3.8 08/02/2022 12:27 PM    Chloride 98 (L) 08/02/2022 12:27 PM    CO2 23 08/02/2022 12:27 PM    Anion gap 14 08/02/2022 12:27 PM    Glucose 445 (HH) 08/02/2022 12:27 PM    BUN 41 (H) 08/02/2022 12:27 PM    Creatinine 1.30 08/02/2022 12:27 PM    BUN/Creatinine ratio 32 (H) 08/02/2022 12:27 PM    GFR est AA 50 (L) 08/02/2022 12:27 PM    GFR est non-AA 41 (L) 08/02/2022 12:27 PM    Calcium 9.3 08/02/2022 12:27 PM    Bilirubin, total 0.5 06/01/2022 09:36 AM    ALT (SGPT) 14 06/01/2022 09:36 AM    Alk.  phosphatase 197 (H) 06/01/2022 09:36 AM    Protein, total 6.8 06/01/2022 09:36 AM    Albumin 4.4 06/01/2022 09:36 AM    Globulin 4.0 01/24/2022 01:46 AM    A-G Ratio 1.8 06/01/2022 09:36 AM      Lab Results   Component Value Date/Time    Hemoglobin A1c 12.6 (H) 06/01/2022 09:36 AM    Hemoglobin A1c (POC) 10.0 08/30/2022 03:31 PM        Diabetic foot exam:     Left Foot:   Visual Exam: normal    Pulse DP: 1+ (weak)   Filament test: absent sensation    Vibratory sensation: absent      Right Foot:   Visual Exam: normal    Pulse DP: 1+ (weak)   Filament test: absent sensation    Vibratory sensation: absent      We discussed the expected course, resolution and complications of the diagnosis(es) in detail. Medication risks, benefits, costs, interactions, and alternatives were discussed as indicated. I advised her to contact the office if her condition worsens, changes or fails to improve as anticipated. She expressed understanding with the diagnosis(es) and plan. This note was done with the assistance of dragon speech software.   Some inadvertent errors or omissions may be present

## 2022-10-11 NOTE — DIABETES MGMT
GLYCEMIC CONTROL:    Follow-up review for patient placed on regular insulin drip via GlucoStabilizer for hyperglycemia. BG has only been stable for 1 hour. Patient reported list of home diabetes medications:  Metformin 750 mg BID with meals  Glimepiride 2 mg BID  Patient reported prior history of taking pen lantus insulin until it was discontinued \"a while back. \"    See separate notes for assessment of home diabetes management and education. Patient was given Contour next EZ BG meter with  20 lancets and 20 test strips (sample). Recommendations:  1.) cont on GlucoStabilizer until criteria is met for transition for SC insulin:  Stable BG/no increase x4 hours  Stable insulin drip requirment/no increase [id number]  2.) consider daily basal basal lantus insulin 15 units to be given 2 hours before d/c insulin drip (there should be an overlap of insulin drip and first dose of lantus  Insulin for at least 2 hours) then start correctional lispro insulin once patient is off [facility]. 3.) Consider adding the following for disch diabetes meds:  Lantus insulin 15 units daily (pen) and include prescription for pen needles (BD Rupali ultra fine) 4mm x 32 G  4.) prescriptions for additional testing supplies: Contour next EZ  5.) follow-up with her primary medical provider after disch for cont'd assessment and treatment of diabetes and dose adjustment of lantus insulin.         Makenzie Gilmore RN [facility]  Pager: [phone] Slit Excision Additional Text (Leave Blank If You Do Not Want): A linear line was drawn on the skin overlying the lesion. An incision was made slowly until the lesion was visualized.  Once visualized, the lesion was removed with blunt dissection.

## 2022-10-11 NOTE — PATIENT INSTRUCTIONS
Hyperlipidemia: After Your Visit  Your Care Instructions  Hyperlipidemia is too much fat in your blood. The body has several kinds of fat, including cholesterol and triglycerides. Your body needs fat for many things, such as making new cells. But too much fat in your blood increases your chances of having a heart attack or stroke. You may be able to lower your cholesterol and triglycerides with a heart-healthy diet, exercise, and if needed, medicine. Your doctor may want you to try lifestyle changes first to see whether they lower the fat in your blood. You may need to take medicine if lifestyle changes do not lower the fat in your blood enough. Follow-up care is a key part of your treatment and safety. Be sure to make and go to all appointments, and call your doctor if you are having problems. Its also a good idea to know your test results and keep a list of the medicines you take. How can you care for yourself at home? Take your medicines  Take your medicines exactly as prescribed. Call your doctor if you think you are having a problem with your medicine. If you take medicine to lower your cholesterol, go to follow-up visits. You will need to have blood tests. Do not take large doses of niacin, which is a B vitamin, while taking medicine called statins. It may increase the chance of muscle pain and liver problems. Talk to your doctor about avoiding grapefruit juice if you are taking statins. Grapefruit juice can raise the level of this medicine in your blood. This could increase side effects. Eat more fruits, vegetables, and fiber  Fruits and vegetables have lots of nutrients that help protect against heart disease, and they have little--if any--fat. Try to eat at least five servings a day. Dark green, deep orange, or yellow fruits and vegetables are healthy choices. Keep carrots, celery, and other veggies handy for snacks.  Buy fruit that is in season and store it where you can see it so that you will be tempted to eat it. Cook dishes that have a lot of veggies in them, such as stir-fries and soups. Foods high in fiber may reduce your cholesterol and provide important vitamins and minerals. High-fiber foods include whole-grain cereals and breads, oatmeal, beans, brown rice, citrus fruits, and apples. Buy whole-grain breads and cereals instead of white bread and pastries. Limit saturated fat  Read food labels and try to avoid saturated fat and trans fat. They increase your risk of heart disease. Use olive or canola oil when you cook. Try cholesterol-lowering spreads, such as Benecol or Take Control. Bake, broil, grill, or steam foods instead of frying them. Limit the amount of high-fat meats you eat, including hot dogs and sausages. Cut out all visible fat when you prepare meat. Eat fish, skinless poultry, and soy products such as tofu instead of high-fat meats. Soybeans may be especially good for your heart. Eat at least two servings of fish a week. Certain fish, such as salmon, contain omega-3 fatty acids, which may help reduce your risk of heart attack. Choose low-fat or fat-free milk and dairy products. Get exercise, limit alcohol, and quit smoking  Get more exercise. Work with your doctor to set up an exercise program. Even if you can do only a small amount, exercise will help you get stronger, have more energy, and manage your weight and your stress. Walking is an easy way to get exercise. Gradually increase the amount you walk every day. Aim for at least 30 minutes on most days of the week. You also may want to swim, bike, or do other activities. Limit alcohol to no more than 2 drinks a day for men and 1 drink a day for women. Do not smoke. If you need help quitting, talk to your doctor about stop-smoking programs and medicines. These can increase your chances of quitting for good. When should you call for help? Call 911 anytime you think you may need emergency care.  For example, call if:  You have symptoms of a heart attack. These may include:  Chest pain or pressure, or a strange feeling in the chest.  Sweating. Shortness of breath. Nausea or vomiting. Pain, pressure, or a strange feeling in the back, neck, jaw, or upper belly or in one or both shoulders or arms. Lightheadedness or sudden weakness. A fast or irregular heartbeat. After you call 911, the  may tell you to chew 1 adult-strength or 2 to 4 low-dose aspirin. Wait for an ambulance. Do not try to drive yourself. You have signs of a stroke. These may include:  Sudden numbness, paralysis, or weakness in your face, arm, or leg, especially on only one side of your body. New problems with walking or balance. Sudden vision changes. Drooling or slurred speech. New problems speaking or understanding simple statements, or feeling confused. A sudden, severe headache that is different from past headaches. You passed out (lost consciousness). Call your doctor now or seek immediate medical care if:  You have muscle pain or weakness. Watch closely for changes in your health, and be sure to contact your doctor if:  You are very tired. You have an upset stomach, gas, constipation, or belly pain or cramps. Where can you learn more? Go to [employer]  Enter C406 in the search box to learn more about \"Hyperlipidemia: After Your Visit. \"   © 9497-6302 [employer]. Care instructions adapted under license by [employer] (which disclaims liability or warranty for this information). This care instruction is for use with your licensed healthcare professional. If you have questions about a medical condition or this instruction, always ask your healthcare professional. [place] [address] any warranty or liability for your use of this information.   Content Version: 1.2.570338; Last Revised: October 13, 2011                 Type 2 Diabetes: Care Instructions  Your Care Instructions     Type 2 diabetes is a disease that develops when the body's tissues cannot use insulin properly. Over time, the pancreas cannot make enough insulin. Insulin is a hormone that helps the body's cells use sugar (glucose) for energy. It also helps the body store extra sugar in muscle, fat, and liver cells. Without insulin, the sugar cannot get into the cells to do its work. It stays in the blood instead. This can cause high blood sugar levels. A person has diabetes when the blood sugar stays too high too much of the time. Over time, diabetes can lead to diseases of the heart, blood vessels, nerves, kidneys, and eyes. You may be able to control your blood sugar by losing weight, eating a healthy diet, and getting daily exercise. You may also have to take insulin or other diabetes medicine. Follow-up care is a key part of your treatment and safety. Be sure to make and go to all appointments. Call your doctor if you are having problems. It's also a good idea to know your test results and keep a list of the medicines you take. How can you care for yourself at home? Keep your blood sugar at a target level (which you set with your doctor). Carbohydrate--the body's main source of fuel--affects blood sugar more than any other nutrient. Carbohydrate is in fruits, vegetables, milk, and yogurt. It also is in breads, cereals, vegetables such as potatoes and corn, and sugary foods such as candy and cakes. Follow your meal plan to know how much carbohydrate to eat at each meal and snack. Aim for 30 minutes of exercise on most, preferably all, days of the week. Walking is a good choice. You also may want to do other activities, such as running, swimming, cycling, or playing tennis or team sports. Try to do muscle-strengthening exercises at least 2 times a week. Take your medicines exactly as prescribed. Call your doctor if you think you are having a problem with your medicine.  You will get more details on the specific medicines your doctor prescribes. Check your blood sugar as often as your doctor recommends. It is important to keep track of any symptoms you have, such as low blood sugar. Also tell your doctor if you have any changes in your activities, diet, or insulin use. Talk to your doctor before you start taking aspirin every day. Aspirin can help certain people lower their risk of a heart attack or stroke. But taking aspirin isn't right for everyone, because it can cause serious bleeding. Do not smoke. If you need help quitting, talk to your doctor about stop-smoking programs and medicines. These can increase your chances of quitting for good. Keep your cholesterol and blood pressure at normal levels. You may need to take one or more medicines to reach your goals. Take them exactly as directed. Do not stop or change a medicine without talking to your doctor first.  When should you call for help? Call 911 anytime you think you may need emergency care. For example, call if:    You passed out (lost consciousness), or you suddenly become very sleepy or confused. (You may have very low blood sugar.)   Call your doctor now or seek immediate medical care if:    Your blood sugar is 300 mg/dL or is higher than the level your doctor has set for you. You have symptoms of low blood sugar, such as:  Sweating. Feeling nervous, shaky, and weak. Extreme hunger and slight nausea. Dizziness and headache. Blurred vision. Confusion. Watch closely for changes in your health, and be sure to contact your doctor if:    You often have problems controlling your blood sugar. You have symptoms of long-term diabetes problems, such as:  New vision changes. New pain, numbness, or tingling in your hands or feet. Skin problems. Where can you learn more? Go to http://lyn-misty.info/  Enter C553 in the search box to learn more about \"Type 2 Diabetes: Care Instructions. \"  Current as of: August 31, 2020               Content Version: 12.8  © 5539-9470 [employer]. Care instructions adapted under license by [employer] (which disclaims liability or warranty for this information). If you have questions about a medical condition or this instruction, always ask your healthcare professional. [address] any warranty or liability for your use of this information. High Blood Pressure: Care Instructions  Overview     It's normal for blood pressure to go up and down throughout the day. But if it stays up, you have high blood pressure. Another name for high blood pressure is hypertension. Despite what a lot of people think, high blood pressure usually doesn't cause headaches or make you feel dizzy or lightheaded. It usually has no symptoms. But it does increase your risk of stroke, heart attack, and other problems. You and your doctor will talk about your risks of these problems based on your blood pressure. Your doctor will give you a goal for your blood pressure. Your goal will be based on your health and your age. Lifestyle changes, such as eating healthy and being active, are always important to help lower blood pressure. You might also take medicine to reach your blood pressure goal.  Follow-up care is a key part of your treatment and safety. Be sure to make and go to all appointments, and call your doctor if you are having problems. It's also a good idea to know your test results and keep a list of the medicines you take. How can you care for yourself at home? Medical treatment  If you stop taking your medicine, your blood pressure will go back up. You may take one or more types of medicine to lower your blood pressure. Be safe with medicines. Take your medicine exactly as prescribed. Call your doctor if you think you are having a problem with your medicine. Talk to your doctor before you start taking aspirin every day.  Aspirin can help certain people lower their risk of a heart attack or stroke. But taking aspirin isn't right for everyone, because it can cause serious bleeding. See your doctor regularly. You may need to see the doctor more often at first or until your blood pressure comes down. If you are taking blood pressure medicine, talk to your doctor before you take decongestants or anti-inflammatory medicine, such as ibuprofen. Some of these medicines can raise blood pressure. Learn how to check your blood pressure at home. Lifestyle changes  Stay at a healthy weight. This is especially important if you put on weight around the waist. Losing even 10 pounds can help you lower your blood pressure. If your doctor recommends it, get more exercise. Walking is a good choice. Bit by bit, increase the amount you walk every day. Try for at least 30 minutes on most days of the week. You also may want to swim, bike, or do other activities. Avoid or limit alcohol. Talk to your doctor about whether you can drink any alcohol. Try to limit how much sodium you eat to less than 2,300 milligrams (mg) a day. Your doctor may ask you to try to eat less than 1,500 mg a day. Eat plenty of fruits (such as bananas and oranges), vegetables, legumes, whole grains, and low-fat dairy products. Lower the amount of saturated fat in your diet. Saturated fat is found in animal products such as milk, cheese, and meat. Limiting these foods may help you lose weight and also lower your risk for heart disease. Do not smoke. Smoking increases your risk for heart attack and stroke. If you need help quitting, talk to your doctor about stop-smoking programs and medicines. These can increase your chances of quitting for good. When should you call for help? Call  911 anytime you think you may need emergency care. This may mean having symptoms that suggest that your blood pressure is causing a serious heart or blood vessel problem. Your blood pressure may be over 180/120.   For example, call 911 if:    You have symptoms of a heart attack. These may include:  Chest pain or pressure, or a strange feeling in the chest.  Sweating. Shortness of breath. Nausea or vomiting. Pain, pressure, or a strange feeling in the back, neck, jaw, or upper belly or in one or both shoulders or arms. Lightheadedness or sudden weakness. A fast or irregular heartbeat. You have symptoms of a stroke. These may include:  Sudden numbness, tingling, weakness, or loss of movement in your face, arm, or leg, especially on only one side of your body. Sudden vision changes. Sudden trouble speaking. Sudden confusion or trouble understanding simple statements. Sudden problems with walking or balance. A sudden, severe headache that is different from past headaches. You have severe back or belly pain. Do not wait until your blood pressure comes down on its own. Get help right away. Call your doctor now or seek immediate care if:    Your blood pressure is much higher than normal (such as 180/120 or higher), but you don't have symptoms. You think high blood pressure is causing symptoms, such as:  Severe headache. Blurry vision. Watch closely for changes in your health, and be sure to contact your doctor if:    Your blood pressure measures higher than your doctor recommends at least 2 times. That means the top number is higher or the bottom number is higher, or both. You think you may be having side effects from your blood pressure medicine. Where can you learn more? Go to http://lyn-misty.info/  Enter [id number] in the search box to learn more about \"High Blood Pressure: Care Instructions. \"  Current as of: August 31, 2020               Content Version: 12.8  © 9768-7518 [employer]. Care instructions adapted under license by [employer] (which disclaims liability or warranty for this information).  If you have questions about a medical condition or this instruction, always ask your healthcare professional. [address] any warranty or liability for your use of this information.

## 2022-11-15 ENCOUNTER — HOSPITAL ENCOUNTER (EMERGENCY)
Age: 62
Discharge: HOME | End: 2022-11-15
Attending: STUDENT IN AN ORGANIZED HEALTH CARE EDUCATION/TRAINING PROGRAM
Payer: MEDICAID

## 2022-11-15 ENCOUNTER — APPOINTMENT (OUTPATIENT)
Dept: GENERAL RADIOLOGY | Age: 62
End: 2022-11-15
Attending: STUDENT IN AN ORGANIZED HEALTH CARE EDUCATION/TRAINING PROGRAM
Payer: MEDICAID

## 2022-11-15 VITALS
DIASTOLIC BLOOD PRESSURE: 86 MMHG | SYSTOLIC BLOOD PRESSURE: 149 MMHG | OXYGEN SATURATION: 98 % | BODY MASS INDEX: 28.93 KG/M2 | HEART RATE: 96 BPM | RESPIRATION RATE: 18 BRPM | TEMPERATURE: 98.8 F | HEIGHT: 66 IN | WEIGHT: 180 LBS

## 2022-11-15 DIAGNOSIS — K21.9 GASTROESOPHAGEAL REFLUX DISEASE, UNSPECIFIED WHETHER ESOPHAGITIS PRESENT: Primary | ICD-10-CM

## 2022-11-15 LAB
ALBUMIN SERPL-MCNC: 4.3 G/DL (ref 3.4–5)
ALBUMIN/GLOB SERPL: 1.2 {RATIO} (ref 0.8–1.7)
ALP SERPL-CCNC: 148 U/L (ref 45–117)
ALT SERPL-CCNC: 23 U/L (ref 13–56)
ANION GAP SERPL CALC-SCNC: 9 MMOL/L (ref 3–18)
AST SERPL-CCNC: 25 U/L (ref 10–38)
BASOPHILS # BLD: 0 K/UL (ref 0–0.1)
BASOPHILS NFR BLD: 0 % (ref 0–2)
BILIRUB SERPL-MCNC: 0.7 MG/DL (ref 0.2–1)
BNP SERPL-MCNC: 273 PG/ML (ref 0–900)
BUN SERPL-MCNC: 36 MG/DL (ref 7–18)
BUN/CREAT SERPL: 33 (ref 12–20)
CALCIUM SERPL-MCNC: 9 MG/DL (ref 8.5–10.1)
CHLORIDE SERPL-SCNC: 105 MMOL/L (ref 100–111)
CO2 SERPL-SCNC: 27 MMOL/L (ref 21–32)
CREAT SERPL-MCNC: 1.08 MG/DL (ref 0.6–1.3)
DIFFERENTIAL METHOD BLD: ABNORMAL
EOSINOPHIL # BLD: 0 K/UL (ref 0–0.4)
EOSINOPHIL NFR BLD: 0 % (ref 0–5)
ERYTHROCYTE [DISTWIDTH] IN BLOOD BY AUTOMATED COUNT: 14.9 % (ref 11.6–14.5)
GLOBULIN SER CALC-MCNC: 3.5 G/DL (ref 2–4)
GLUCOSE SERPL-MCNC: 118 MG/DL (ref 74–99)
HCT VFR BLD AUTO: 35.9 % (ref 35–45)
HGB BLD-MCNC: 11.6 G/DL (ref 12–16)
IMM GRANULOCYTES # BLD AUTO: 0 K/UL (ref 0–0.04)
IMM GRANULOCYTES NFR BLD AUTO: 0 % (ref 0–0.5)
LYMPHOCYTES # BLD: 1.3 K/UL (ref 0.9–3.6)
LYMPHOCYTES NFR BLD: 12 % (ref 21–52)
MCH RBC QN AUTO: 25.3 PG (ref 24–34)
MCHC RBC AUTO-ENTMCNC: 32.3 G/DL (ref 31–37)
MCV RBC AUTO: 78.4 FL (ref 78–100)
MONOCYTES # BLD: 0.4 K/UL (ref 0.05–1.2)
MONOCYTES NFR BLD: 3 % (ref 3–10)
NEUTS SEG # BLD: 9.7 K/UL (ref 1.8–8)
NEUTS SEG NFR BLD: 85 % (ref 40–73)
NRBC # BLD: 0 K/UL (ref 0–0.01)
NRBC BLD-RTO: 0 PER 100 WBC
PLATELET # BLD AUTO: 281 K/UL (ref 135–420)
PMV BLD AUTO: 9.3 FL (ref 9.2–11.8)
POTASSIUM SERPL-SCNC: 3.9 MMOL/L (ref 3.5–5.5)
PROT SERPL-MCNC: 7.8 G/DL (ref 6.4–8.2)
RBC # BLD AUTO: 4.58 M/UL (ref 4.2–5.3)
SODIUM SERPL-SCNC: 141 MMOL/L (ref 136–145)
TROPONIN-HIGH SENSITIVITY: 8 NG/L (ref 0–54)
WBC # BLD AUTO: 11.4 K/UL (ref 4.6–13.2)

## 2022-11-15 PROCEDURE — 80053 COMPREHEN METABOLIC PANEL: CPT

## 2022-11-15 PROCEDURE — 85025 COMPLETE CBC W/AUTO DIFF WBC: CPT

## 2022-11-15 PROCEDURE — 84484 ASSAY OF TROPONIN QUANT: CPT

## 2022-11-15 PROCEDURE — 99285 EMERGENCY DEPT VISIT HI MDM: CPT

## 2022-11-15 PROCEDURE — 93005 ELECTROCARDIOGRAM TRACING: CPT

## 2022-11-15 PROCEDURE — 83880 ASSAY OF NATRIURETIC PEPTIDE: CPT

## 2022-11-15 PROCEDURE — 71045 X-RAY EXAM CHEST 1 VIEW: CPT

## 2022-11-15 RX ORDER — ONDANSETRON 2 MG/ML
4 INJECTION INTRAMUSCULAR; INTRAVENOUS
Status: DISCONTINUED | OUTPATIENT
Start: 2022-11-15 | End: 2022-11-15 | Stop reason: HOSPADM

## 2022-11-15 RX ORDER — SUCRALFATE 1 G/1
1 TABLET ORAL 4 TIMES DAILY
Qty: 20 TABLET | Refills: 0 | Status: SHIPPED | OUTPATIENT
Start: 2022-11-15 | End: 2022-11-20

## 2022-11-15 NOTE — ED NOTES
Pt presents from triage and stated, \"I think I'm having a gallstone attack. \" Pt c/o chest pain x 2 days and states that it feels like a burning pain.

## 2022-11-15 NOTE — DISCHARGE INSTRUCTIONS
1. Follow up with your Primary care physician in 2-3 days. If you do not have one, you may use the attached resources on your discharge paperwork. We evaluated you today for a medical emergency, you are being discharged and does not appear as if you are having a medical emergency. An emergency room visit is not intended to be comprehensive and  complete care, you need to follow-up with an outpatient resource for additional testing if needed, at the time of that visit, discuss your emergency room visit and the symptoms you were/are having. 2. Return if you are experiencing any worsening in your condition including development of chest pain, shortness of breath, passing out, intractable vomiting, or any worsening in your condition in general.    3. If you were prescribed medications, take them all as prescribed.

## 2022-11-16 LAB
ATRIAL RATE: 93 BPM
CALCULATED P AXIS, ECG09: 56 DEGREES
CALCULATED R AXIS, ECG10: 49 DEGREES
CALCULATED T AXIS, ECG11: 66 DEGREES
DIAGNOSIS, 93000: NORMAL
P-R INTERVAL, ECG05: 136 MS
Q-T INTERVAL, ECG07: 394 MS
QRS DURATION, ECG06: 90 MS
QTC CALCULATION (BEZET), ECG08: 489 MS
VENTRICULAR RATE, ECG03: 93 BPM

## 2023-01-23 ENCOUNTER — OFFICE VISIT (OUTPATIENT)
Dept: FAMILY MEDICINE CLINIC | Age: 63
End: 2023-01-23
Payer: MEDICAID

## 2023-01-23 VITALS
SYSTOLIC BLOOD PRESSURE: 148 MMHG | HEIGHT: 66 IN | BODY MASS INDEX: 22.5 KG/M2 | HEART RATE: 93 BPM | WEIGHT: 140 LBS | RESPIRATION RATE: 16 BRPM | OXYGEN SATURATION: 100 % | DIASTOLIC BLOOD PRESSURE: 82 MMHG

## 2023-01-23 DIAGNOSIS — Z79.4 TYPE 2 DIABETES MELLITUS WITH OTHER SPECIFIED COMPLICATION, WITH LONG-TERM CURRENT USE OF INSULIN (HCC): ICD-10-CM

## 2023-01-23 DIAGNOSIS — E11.69 TYPE 2 DIABETES MELLITUS WITH OTHER SPECIFIED COMPLICATION, WITH LONG-TERM CURRENT USE OF INSULIN (HCC): ICD-10-CM

## 2023-01-23 DIAGNOSIS — E78.2 MIXED HYPERLIPIDEMIA: ICD-10-CM

## 2023-01-23 DIAGNOSIS — N32.89 BLADDER MASS: ICD-10-CM

## 2023-01-23 DIAGNOSIS — I49.9 IRREGULAR HEARTBEAT: ICD-10-CM

## 2023-01-23 DIAGNOSIS — E11.43 DIABETIC GASTROPARESIS (HCC): ICD-10-CM

## 2023-01-23 DIAGNOSIS — K31.84 DIABETIC GASTROPARESIS (HCC): ICD-10-CM

## 2023-01-23 DIAGNOSIS — D64.9 NORMOCYTIC ANEMIA: ICD-10-CM

## 2023-01-23 DIAGNOSIS — I10 ESSENTIAL HYPERTENSION: Primary | ICD-10-CM

## 2023-01-23 DIAGNOSIS — E11.42 DIABETIC POLYNEUROPATHY ASSOCIATED WITH TYPE 2 DIABETES MELLITUS (HCC): ICD-10-CM

## 2023-01-23 DIAGNOSIS — I10 ESSENTIAL HYPERTENSION: ICD-10-CM

## 2023-01-23 PROCEDURE — 3074F SYST BP LT 130 MM HG: CPT | Performed by: EMERGENCY MEDICINE

## 2023-01-23 PROCEDURE — 99214 OFFICE O/P EST MOD 30 MIN: CPT | Performed by: EMERGENCY MEDICINE

## 2023-01-23 PROCEDURE — 3078F DIAST BP <80 MM HG: CPT | Performed by: EMERGENCY MEDICINE

## 2023-01-23 RX ORDER — METOCLOPRAMIDE 5 MG/1
5 TABLET ORAL
Qty: 30 TABLET | Refills: 3 | Status: SHIPPED | OUTPATIENT
Start: 2023-01-23 | End: 2023-02-02

## 2023-01-23 NOTE — PATIENT INSTRUCTIONS
Learning About High Blood Pressure  It's normal for blood pressure to go up and down throughout the day. But if it stays up, you have high blood pressure (hypertension). High blood pressure means that your blood is pressing on your arteries with too much force. Usually it doesn't cause symptoms. But over time, it can cause damage. High blood pressure increases the risk of stroke, heart attack, vision loss, and dementia. Your doctor will give you a goal for your blood pressure. Your goal will be based on your health and age. Eating healthy foods, not smoking, and being active can help lower blood pressure. You might also take medicine to reach your blood pressure goal.  Tips for preventing high blood pressure    Stay at a healthy weight. Talk to your doctor about what a healthy weight is for you. Limit salt (sodium). Use no or less salt in recipes. And buy foods labeled \"unsalted,\" \"sodium-free,\" or \"low-sodium. \" Foods labeled \"reduced-sodium\" and \"light sodium\" may still have too much sodium. Find new ways to flavor food. Try garlic, lemon juice, onion, vinegar, herbs, and spices instead of salt. Avoid things like soy sauce, steak sauce, mustard, and ketchup. Get at least 30 minutes of exercise on most days of the week. Walking is a good choice. Swimming, running, or team sports may also work for you. Limit alcohol. Men should have no more than 2 drinks a day. Women should have no more than 1. Eat plenty of fruits, vegetables, and low-fat dairy products. Eat less saturated fat (like red meat and full fat dairy), and limit sweets and sugary beverages. Understanding the numbers  Two numbers tell you your blood pressure. The first (top) number shows how hard the blood pushes on your artery walls when your heart pumps. The second (bottom) number shows how hard the blood pushes on your artery walls between heartbeats, when your heart relaxes. Where can you learn more?   Go to http://www.gray.com/  Enter P501 in the search box to learn more about \"Learning About High Blood Pressure. \"  Current as of: October 6, 2021               Content Version: 13.4  © 2006-2022 Healthwise, [facility]. Care instructions adapted under license by [employer] (which disclaims liability or warranty for this information). If you have questions about a medical condition or this instruction, always ask your healthcare professional. [address] any warranty or liability for your use of this information. Learning About Carbohydrate (Carb) Counting and Eating Out When You Have Diabetes  Why plan your meals? Meal planning can be a key part of managing diabetes. Planning meals and snacks with the right balance of carbohydrate, protein, and fat can help you keep your blood sugar at the target level you set with your doctor. You don't have to eat special foods. You can eat what your family eats, including sweets once in a while. But you do have to pay attention to how often you eat and how much you eat of certain foods. You may want to work with a dietitian or a diabetes educator. They can give you tips and meal ideas and can answer your questions about meal planning. This health professional can also help you reach a healthy weight if that is one of your goals. What should you know about eating carbs? Managing the amount of carbohydrate (carbs) you eat is an important part of healthy meals when you have diabetes. Carbohydrate is found in many foods. Learn which foods have carbs. And learn the amounts of carbs in different foods. Bread, cereal, pasta, and rice have about 15 grams of carbs in a serving. A serving is 1 slice of bread (1 ounce), ½ cup of cooked cereal, or 1/3 cup of cooked pasta or rice. Fruits have 15 grams of carbs in a serving.  A serving is 1 small fresh fruit, such as an apple or orange; ½ of a banana; ½ cup of cooked or canned fruit; ½ cup of fruit juice; 1 cup of melon or raspberries; or 2 tablespoons of dried fruit. Milk and no-sugar-added yogurt have 15 grams of carbs in a serving. A serving is 1 cup of milk or 3/4 cup (6 oz) of no-sugar-added yogurt. Starchy vegetables have 15 grams of carbs in a serving. A serving is ½ cup of mashed potatoes or sweet potato; 1 cup winter squash; ½ of a small baked potato; ½ cup of cooked beans; or ½ cup cooked corn or green peas. Learn how much carbs to eat each day and at each meal. A dietitian or certified diabetes educator can teach you how to keep track of the amount of carbs you eat. This is called carbohydrate counting. If you are not sure how to count carbohydrate grams, use the plate method to plan meals. It is a quick way to make sure that you have a balanced meal. It also can help you manage the amount of carbohydrate you eat at meals. Divide your plate by types of foods. Put non-starchy vegetables on half the plate, meat or other protein food on one-quarter of the plate, and a grain or starchy vegetable in the final quarter of the plate. To this you can add a small piece of fruit and 1 cup of milk or yogurt, depending on how many carbs you are supposed to eat at a meal.  Try to eat about the same amount of carbs at each meal. Do not \"save up\" your daily allowance of carbs to eat at one meal.  Proteins have very little or no carbs. Examples of proteins are beef, chicken, turkey, fish, eggs, tofu, cheese, cottage cheese, and peanut butter. How can you eat out and still eat healthy? Learn to estimate the serving sizes of foods that have carbohydrate. If you measure food at home, it will be easier to estimate the amount in a serving of restaurant food. If the meal you order has too much carbohydrate (such as potatoes, corn, or baked beans), ask to have a low-carbohydrate food instead.  Ask for a salad or non-starchy vegetables like broccoli, cauliflower, green beans, or peppers. If you eat more carbohydrate at a meal than you had planned, take a walk or do other exercise. This will help lower your blood sugar. What are some tips for eating healthy? Limit saturated fat, such as the fat from meat and dairy products. This is a healthy choice because people who have diabetes are at higher risk of heart disease. So choose lean cuts of meat and nonfat or low-fat dairy products. Use olive or canola oil instead of butter or shortening when cooking. Don't skip meals. Your blood sugar may drop too low if you skip meals and take insulin or certain medicines for diabetes. Check with your doctor before you drink alcohol. Alcohol can cause your blood sugar to drop too low. Alcohol can also cause a bad reaction if you take certain diabetes medicines. Follow-up care is a key part of your treatment and safety. Be sure to make and go to all appointments, and call your doctor if you are having problems. It's also a good idea to know your test results and keep a list of the medicines you take. Where can you learn more? Go to http://lyn-misty.info/  Enter I147 in the search box to learn more about \"Learning About Carbohydrate (Carb) Counting and Eating Out When You Have Diabetes. \"  Current as of: May 9, 2022               Content Version: 13.4  © 2006-2022 Healthwise, [employer]. Care instructions adapted under license by [employer] (which disclaims liability or warranty for this information). If you have questions about a medical condition or this instruction, always ask your healthcare professional. [place] [address] any warranty or liability for your use of this information. Type 2 Diabetes: Care Instructions  Your Care Instructions     Type 2 diabetes is a disease that develops when the body's tissues cannot use insulin properly. Over time, the pancreas cannot make enough insulin.  Insulin is a hormone that helps the body's cells use sugar (glucose) for energy. It also helps the body store extra sugar in muscle, fat, and liver cells. Without insulin, the sugar cannot get into the cells to do its work. It stays in the blood instead. This can cause high blood sugar levels. A person has diabetes when the blood sugar stays too high too much of the time. Over time, diabetes can lead to diseases of the heart, blood vessels, nerves, kidneys, and eyes. You may be able to control your blood sugar by losing weight, eating a healthy diet, and getting daily exercise. You may also have to take insulin or other diabetes medicine. Follow-up care is a key part of your treatment and safety. Be sure to make and go to all appointments. Call your doctor if you are having problems. It's also a good idea to know your test results and keep a list of the medicines you take. How can you care for yourself at home? Keep your blood sugar at a target level (which you set with your doctor). Carbohydrate--the body's main source of fuel--affects blood sugar more than any other nutrient. Carbohydrate is in fruits, vegetables, milk, and yogurt. It also is in breads, cereals, vegetables such as potatoes and corn, and sugary foods such as candy and cakes. Follow your meal plan to know how much carbohydrate to eat at each meal and snack. Aim for 30 minutes of exercise on most, preferably all, days of the week. Walking is a good choice. You also may want to do other activities, such as running, swimming, cycling, or playing tennis or team sports. Try to do muscle-strengthening exercises at least 2 times a week. Take your medicines exactly as prescribed. Call your doctor if you think you are having a problem with your medicine. You will get more details on the specific medicines your doctor prescribes. Check your blood sugar as often as your doctor recommends. It is important to keep track of any symptoms you have, such as low blood sugar.  Also tell your doctor if you have any changes in your activities, diet, or insulin use. Talk to your doctor before you start taking aspirin every day. Aspirin can help certain people lower their risk of a heart attack or stroke. But taking aspirin isn't right for everyone, because it can cause serious bleeding. Do not smoke. If you need help quitting, talk to your doctor about stop-smoking programs and medicines. These can increase your chances of quitting for good. Keep your cholesterol and blood pressure at normal levels. You may need to take one or more medicines to reach your goals. Take them exactly as directed. Do not stop or change a medicine without talking to your doctor first.  When should you call for help? Call 911 anytime you think you may need emergency care. For example, call if:    You passed out (lost consciousness), or you suddenly become very sleepy or confused. (You may have very low blood sugar.)   Call your doctor now or seek immediate medical care if:    Your blood sugar is 300 mg/dL or is higher than the level your doctor has set for you. You have symptoms of low blood sugar, such as:  Sweating. Feeling nervous, shaky, and weak. Extreme hunger and slight nausea. Dizziness and headache. Blurred vision. Confusion. Watch closely for changes in your health, and be sure to contact your doctor if:    You often have problems controlling your blood sugar. You have symptoms of long-term diabetes problems, such as:  New vision changes. New pain, numbness, or tingling in your hands or feet. Skin problems. Where can you learn more? Go to http://www.gray.com/  Enter C553 in the search box to learn more about \"Type 2 Diabetes: Care Instructions. \"  Current as of: August 31, 2020               Content Version: 13.0  © 2042-8130 [employer]. Care instructions adapted under license by [employer] (which disclaims liability or warranty for this information).  If you have questions about a medical condition or this instruction, always ask your healthcare professional. [address] any warranty or liability for your use of this information. Learning About Carbohydrate (Carb) Counting and Eating Out When You Have Diabetes  Why plan your meals? Meal planning can be a key part of managing diabetes. Planning meals and snacks with the right balance of carbohydrate, protein, and fat can help you keep your blood sugar at the target level you set with your doctor. You don't have to eat special foods. You can eat what your family eats, including sweets once in a while. But you do have to pay attention to how often you eat and how much you eat of certain foods. You may want to work with a dietitian or a certified diabetes educator. He or she can give you tips and meal ideas and can answer your questions about meal planning. This health professional can also help you reach a healthy weight if that is one of your goals. What should you know about eating carbs? Managing the amount of carbohydrate (carbs) you eat is an important part of healthy meals when you have diabetes. Carbohydrate is found in many foods. Learn which foods have carbs. And learn the amounts of carbs in different foods. Bread, cereal, pasta, and rice have about 15 grams of carbs in a serving. A serving is 1 slice of bread (1 ounce), ½ cup of cooked cereal, or 1/3 cup of cooked pasta or rice. Fruits have 15 grams of carbs in a serving. A serving is 1 small fresh fruit, such as an apple or orange; ½ of a banana; ½ cup of cooked or canned fruit; ½ cup of fruit juice; 1 cup of melon or raspberries; or 2 tablespoons of dried fruit. Milk and no-sugar-added yogurt have 15 grams of carbs in a serving. A serving is 1 cup of milk or 2/3 cup of no-sugar-added yogurt. Starchy vegetables have 15 grams of carbs in a serving.  A serving is ½ cup of mashed potatoes or sweet potato; 1 cup winter squash; ½ of a small baked potato; ½ cup of cooked beans; or ½ cup cooked corn or green peas. Learn how much carbs to eat each day and at each meal. A dietitian or CDE can teach you how to keep track of the amount of carbs you eat. This is called carbohydrate counting. If you are not sure how to count carbohydrate grams, use the Plate Method to plan meals. It is a good, quick way to make sure that you have a balanced meal. It also helps you spread carbs throughout the day. Divide your plate by types of foods. Put non-starchy vegetables on half the plate, meat or other protein food on one-quarter of the plate, and a grain or starchy vegetable in the final quarter of the plate. To this you can add a small piece of fruit and 1 cup of milk or yogurt, depending on how many carbs you are supposed to eat at a meal.  Try to eat about the same amount of carbs at each meal. Do not \"save up\" your daily allowance of carbs to eat at one meal.  Proteins have very little or no carbs per serving. Examples of proteins are beef, chicken, turkey, fish, eggs, tofu, cheese, cottage cheese, and peanut butter. A serving size of meat is 3 ounces, which is about the size of a deck of cards. Examples of meat substitute serving sizes (equal to 1 ounce of meat) are 1/4 cup of cottage cheese, 1 egg, 1 tablespoon of peanut butter, and ½ cup of tofu. How can you eat out and still eat healthy? Learn to estimate the serving sizes of foods that have carbohydrate. If you measure food at home, it will be easier to estimate the amount in a serving of restaurant food. If the meal you order has too much carbohydrate (such as potatoes, corn, or baked beans), ask to have a low-carbohydrate food instead. Ask for a salad or green vegetables. If you use insulin, check your blood sugar before and after eating out to help you plan how much to eat in the future.   If you eat more carbohydrate at a meal than you had planned, take a walk or do other exercise. This will help lower your blood sugar. What are some tips for eating healthy? Limit saturated fat, such as the fat from meat and dairy products. This is a healthy choice because people who have diabetes are at higher risk of heart disease. So choose lean cuts of meat and nonfat or low-fat dairy products. Use olive or canola oil instead of butter or shortening when cooking. Don't skip meals. Your blood sugar may drop too low if you skip meals and take insulin or certain medicines for diabetes. Check with your doctor before you drink alcohol. Alcohol can cause your blood sugar to drop too low. Alcohol can also cause a bad reaction if you take certain diabetes medicines. Follow-up care is a key part of your treatment and safety. Be sure to make and go to all appointments, and call your doctor if you are having problems. It's also a good idea to know your test results and keep a list of the medicines you take. Where can you learn more? Go to http://www.calloway.com/  Enter I147 in the search box to learn more about \"Learning About Carbohydrate (Carb) Counting and Eating Out When You Have Diabetes. \"  Current as of: December 17, 2020               Content Version: 13.0  © 1149-8113 Healthwise, [employer] Care instructions adapted under license by [employer] (which disclaims liability or warranty for this information). If you have questions about a medical condition or this instruction, always ask your healthcare professional. [place] [address] any warranty or liability for your use of this information. Diabetes Blood Sugar Emergencies: Your Action Plan  How can you prevent a blood sugar emergency? An important part of living with diabetes is keeping your blood sugar in your target range. You'll need to know what to do if it's too high or too low. Managing your blood sugar levels helps you avoid emergencies.  This care sheet will teach you about the signs of high and low blood sugar. It will help you make an action plan with your doctor for when these signs occur. Low blood sugar is more likely to happen if you take certain medicines for diabetes. It can also happen if you skip a meal, drink alcohol, or exercise more than usual.  You may get high blood sugar if you eat differently than you normally do. One example is eating more carbohydrate than usual. Having a cold, the flu, or other sudden illness can also cause high blood sugar levels. Levels can also rise if you miss a dose of medicine. Any change in how you take your medicine may affect your blood sugar level. So it's important to work with your doctor before you make any changes. Track your blood sugar  Work with your doctor to fill in the blank spaces below that apply to you. Track your levels, know your target range, and write down ways you can get your blood sugar back in your target range. A log book can help you track your levels. Take the book to all of your medical appointments. Check your blood sugar _____ times a day, at these times:________________________________________________. (For example: Before meals, at bedtime, before exercise, during exercise, other.)  Your blood sugar target range before a meal is ___________________. Your blood sugar target range after a meal is _______________________. Do this--___________________________________________________--to get your blood sugar back within your safe range if your blood sugar results are _________________________________________. (For example: Less than 70 or above 250 mg/dL.)  Call your doctor when your blood sugar results are ___________________________________. (For example: Less than 70 or above 250 mg/dL.)  What are the symptoms of low and high blood sugar? Common symptoms of low blood sugar are sweating and feeling shaky, weak, hungry, or confused. Symptoms can start quickly.   Common symptoms of high blood sugar are feeling very thirsty or very hungry. You may also pass urine more often than usual. You may have blurry vision and may lose weight without trying. But some people may have high or low blood sugar without having any symptoms. That's a good reason to check your blood sugar on a regular schedule. What should you do if you have symptoms? Work with your doctor to fill in the blank spaces below that apply to you. Low blood sugar and \"the rule of 15\"  If you have symptoms of low blood sugar, check your blood sugar. If it's below _____ ( for example, below 70), eat or drink a quick-sugar food that has about 15 grams of carbohydrate. Your goal is to get your level back to your safe range. Check your blood sugar again 15 minutes later. If it's still not in your target range, take another 15 grams of carbohydrate and check your blood sugar again in 15 minutes. Repeat this until you reach your target. Then go back to your regular testing schedule. Children usually need less than 15 grams of carbohydrate. Check with your doctor or diabetes educator for the amount that is right for your child. When you have low blood sugar, it's best to stop or reduce any physical activity until your blood sugar is back in your target range and is stable. If you must stay active, eat or drink 30 grams of carbohydrate. Then check your blood sugar again in 15 minutes. If it's not in your target range, take another 30 grams of carbohydrates. Check your blood sugar again in 15 minutes. Keep doing this until you reach your target. You can then go back to your regular testing schedule. If your symptoms or blood sugar levels are getting worse or have not improved after 15 minutes, seek medical care right away. If you take insulin, always carry a glucagon emergency kit. Be sure your family, friends, and coworkers know how to give glucagon.    Here are some examples of quick-sugar foods with 15 grams of carbohydrate:  3 or 4 glucose tablets  1 tablespoon (3 teaspoons) table sugar  ½ cup to ¾ cup (4 to 6 ounces) of fruit juice or regular (not diet) soda  Hard candy (such as 6 Life Savers)  High blood sugar  If you have symptoms of high blood sugar, check your blood sugar. Your goal is to get your level back to your target range. If it's above ______ ( for example, above 250), follow these steps: If you missed a dose of your diabetes medicine, take it now. Take only the amount of medicine that you have been prescribed. Do not take more or less medicine. Give yourself insulin if your doctor has prescribed it for high blood sugar. Test for ketones, if the doctor told you to do so. If the results of the ketone test show a moderate-to-large amount of ketones, call the doctor for advice. Wait 30 minutes after you take the extra insulin or the missed medicine. Check your blood sugar again. If your symptoms or blood sugar levels are getting worse or have not improved after taking these steps, seek medical care right away. Follow-up care is a key part of your treatment and safety. Be sure to make and go to all appointments, and call your doctor if you are having problems. It's also a good idea to know your test results and keep a list of the medicines you take. Where can you learn more? Go to http://www.gray.com/  Enter O544 in the search box to learn more about \"Diabetes Blood Sugar Emergencies: Your Action Plan. \"  Current as of: August 31, 2020               Content Version: 13.0  © 2006-2021 Healthwise, Incorporated. Care instructions adapted under license by [employer] (which disclaims liability or warranty for this information). If you have questions about a medical condition or this instruction, always ask your healthcare professional. [address] any warranty or liability for your use of this information.

## 2023-01-23 NOTE — PROGRESS NOTES
ICD-10-CM ICD-9-CM    1. Essential hypertension  I10 401.9 CBC WITH AUTOMATED DIFF      METABOLIC PANEL, COMPREHENSIVE      2. Type 2 diabetes mellitus with other specified complication, with long-term current use of insulin ([facility])  E11.69 250.80     Z79.4 V58.67       3. Diabetic polyneuropathy associated with type 2 diabetes mellitus ([facility])  E11.42 250.60 HEMOGLOBIN A1C WITH EAG     357.2 MICROALBUMIN, UR, RAND W/ MICROALB/CREAT RATIO      4. Diabetic gastroparesis ([facility])  E11.43 250.60 metoclopramide HCl (REGLAN) 5 mg tablet    K31.84 536.3       5. Mixed hyperlipidemia  E78.2 272.2 LIPID PANEL      6. Irregular heartbeat  I49.9 427.9       7. Normocytic anemia  D64.9 285.9       8. Bladder mass  N32.89 596.89 REFERRAL TO UROLOGY            Assessment and plan  History of irregular heartbeat history of syncope. Cardiology referral recommended. Declined. We will reasked each visit. Daughter present. Essential hypertension. Mild elevation but will hold on changing medication because of starting Reglan. Diabetic gastroparesis will trial Reglan. Mild normochromic normocytic anemia. Hemoglobin 11.3 with MCV 78.4 on August 2, 2022. Declines colonoscopy. Anemia panel   S/p lap cholecystectomy. No symptoms. Incidental bladder mass. Referred to urology. Type 2 diabetes mellitus with long-term current use of insulin. Key Antihyperglycemic Medications               metFORMIN ER (GLUCOPHAGE XR) 750 mg tablet (Taking) TAKE 1 TABLET BY MOUTH TWO TIMES A DAY    dulaglutide (TRULICITY) 1.5 LA/6.0 mL sub-q pen (Taking) 0.5 mL by SubCUTAneous route every seven (7) days. insulin glargine (Lantus Solostar U-100 Insulin) 100 unit/mL (3 mL) inpn (Taking) INJECT 50 UNITS UNDER THE SKIN ONCE DAILY        Follow closely for hypoglycemic episodes in this case we will decrease the Lantus. Significant diabetic neuropathy with absent vibration and position sense as well as absent filament test.  No change. Address risk factors for progression. Hyperlipidemia. DVT. Recurrent. Eliquis lifelong. Complicated by hematuria. Referred to urology. Lab results and schedule of future lab studies reviewed with patient. Labs next visit  Diagnostic and radiologic results and the schedule of future studies were reviewed with the patient  Reviewed diet, exercise and weight control  All questions answered and understood. Diagnostics this visit   Follow-up 3 months. Health Maintenance Due   Topic Date Due    Eye Exam Retinal or Dilated  Never done    DTaP/Tdap/Td series (1 - Tdap) Never done    Colorectal Cancer Screening Combo  Never done    Shingles Vaccine (1 of 2) Never done    Breast Cancer Screen Mammogram  Never done    COVID-19 Vaccine (4 - Booster for Moderna series) 02/10/2022    Flu Vaccine (1) 08/01/2022    A1C test (Diabetic or Prediabetic)  11/30/2022     Previously declined health maintenance. 01/23/23 declines today also. Declines eye follow up  Covid vaccines done , two doses 01/14/22  Declines mammogram or colonoscopy 01/14/22        Subjective:   Marifer Jordan is a 57 y.o. female has Mixed hyperlipidemia, Essential hypertension, benign, Arthralgia, Dermatitis, Abscess of skin of abdomen, Uncontrolled type 2 diabetes mellitus with hyperglycemia ([address] Acute deep vein thrombosis (DVT) of proximal vein of right lower extremity ([address] Hyperglycemia due to diabetes mellitus ([address] Hyperosmolar (nonketotic) coma ([address] and UTI (urinary tract infection) on their problem list..         Previous visit 10/11/2022   The primary encounter diagnosis was Irregular heartbeat. Diagnoses of Essential hypertension, Normocytic anemia, Type 2 diabetes mellitus with other specified complication, with long-term current use of insulin ([address] and Diabetic polyneuropathy associated with type 2 diabetes mellitus ([address] were also pertinent to this visit. ED visit 11/13/2023. Diagnosis gastroesophageal reflux disease. Burning-like chest pain with nonbilious emesis. Treated with GI cocktail and Zofran. Prescribed Carafate. Diagnostics showed normal troponin, hemoglobin 11.6 with normal platelets, BNP normal at 273 chemistry showing a glucose of 118 BUN 36 alk phos 148 chest x-ray unremarkable EKG reassuring. ED visit 11/20/2022 urology update 11/22/2022 incidental finding of hyperechoic bladder mass on ultrasound not well visualized on CT 8/20/2022. She is s/p lap eduardo. Bladder mass noted. ED visit urinary tract infection treated and released. ED visit 12/22/2022 discharge 12/30/2010 diagnoses urinary tract infection, acute kidney injury. She also had a bladder mass, type 2 diabetes, hypoglycemic episode treated successfully, hypertension, history of DVT, recent cholecystectomy, intractable nausea and vomiting, newly diagnosed gastroparesis, hyperlipidemia, GERD, hypokalemia. MRI recommended to further evaluate the bladder area. .  Her prior A1c was 12% and on this visit it was 6%. The losartan was held and HCTZ was held she was to continue the amlodipine. She was to continue her Eliquis for DVT  Her catheter esophageal scintigraphy was positive for gastric slowing. She was started on Reglan and this was ordered at discharge. She should have small frequent meals. Her statins were continued for hyperlipidemia and PPI continued for GERD. Seen previously by our practice on 8/30/2022. The primary encounter diagnosis was Irregular heartbeat. Diagnoses of Essential hypertension, Normocytic anemia, Type 2 diabetes mellitus with other specified complication, with long-term current use of insulin ([address] and Diabetic polyneuropathy associated with type 2 diabetes mellitus ([facility] were also pertinent to this visit. Seen previously by our practice on 6/1/2022  The primary encounter diagnosis was Irregular heartbeat.  Diagnoses of Essential hypertension, Normocytic anemia, Type 2 diabetes mellitus with other specified complication, with long-term current use of insulin ([address] and Diabetic polyneuropathy associated with type 2 diabetes mellitus ([facility] were also pertinent to this visit. ED visit 2/16/2022 acute DVT of calf muscle of the right lower extremity. Hyperglycemia due to diabetes and varicose veins. Placed on apixaban. There is a prior history of blood clot. There was no provoking symptoms such as injury or travel. Office visit 2/24/2022. Follow-up on DVT right leg. Prior DVT 40 years, right leg. Screenings refused, exposed to secondhand smoke. Advised long-term anticoagulation as this was a second occurrence. Seen previously by our practice on 2/2/2022  Most recent labs 4/29/2022 CBC within normal limits CMP shows a glucose of 368 otherwise unremarkable. Microalbumin to creatinine ratio 644. Most recent hemoglobin A1c was 12 on 10/26/2021. Total cholesterol 130 LDL cholesterol 60. S/p hospital discharge from the ED with an extremely elevated blood sugar 700. Is now down to the 300 range. We have her switch to Lantus to 50 units every morning. If not successful would consider adding either semaglutide or one of the SGLT2 inhibitors. The patient has GERD and we added Prilosec. The patient states that she has difficulty with sleeping, melatonin added. The blood pressure was 149/73, not controlled, Norvasc increased to 10 mg daily. Hyperlipidemia on last visit. Encouraged to  go ahead and get labs done before the next visit. Will recheck in 1 month. Lab results and schedule of future lab studies reviewed with patient  Diagnostic and radiologic results and the schedule of future studies were reviewed with the patient  All questions answered and understood. Seen in the ED 1/24/2022. Diagnosis hyperglycemia acute cystitis with hematuria. Her blood pressure was 140/75 and pulse was 115. She had epigastric abdominal tenderness without rebound on examination.   Blood sugar was to be initially greater than 700 potassium 3.4 creatinine 1.18 select 37 chest x-ray was unremarkable  Office visit was 10/26/2021. Hemoglobin A1c was 11.6. Seen for essential hypertension diabetes mixed hyperlipidemia and hearing loss on the right side. Essential hypertension, chronic problem. Diabetes mellitus type 2 non-insulin-requiring, not controlled in October determination. Follow-up labs ordered. Discussed diet and exercise. Mixed hyperlipidemia. Chronic problem. Controlled on last visit. Follow-up labs ordered. Right earwax. Debrox ordered. Continues to decline mammogram all her immunizations and colonoscopy. Risk benefits alternatives explained and understood to her. Patient had claudication symptoms. Will refer again for duplex study. The duplex study is still pending. Previous visit was 10/26/2021. Fit test ordered for colon cancer screening    Hypertension  The patient has no headaches, visual changes, chest pain or pressure,dyspnea, orthopnea, or PND. There is no problem with the medication. Diabetes mellitus  The patient admits to polyuria, polydipsia, denies polyphagia. The patient denies any other aggravating or relieving factors at this time there has been no problem with the medications. She has symptoms of neuropathy with numbness and tingling of the lower extremities. Hemoglobin A1c  Blood sugar was 700 in the ED now in the 200-300 range. Lantus on 50 units daily. Given Trumetrix meter  Metformin 1000 mg bid  Key Antihyperglycemic Medications               metFORMIN ER (GLUCOPHAGE XR) 750 mg tablet TAKE 1 TABLET BY MOUTH TWO TIMES A DAY    dulaglutide (TRULICITY) 1.5 DY/0.2 mL sub-q pen 0.5 mL by SubCUTAneous route every seven (7) days.     insulin glargine (Lantus Solostar U-100 Insulin) 100 unit/mL (3 mL) inpn INJECT 50 UNITS UNDER THE SKIN ONCE DAILY          Wt Readings from Last 3 Encounters:   11/15/22 180 lb (81.6 kg)   10/11/22 179 lb (81.2 kg)   08/30/22 193 lb (87.5 kg)       Hyperlipidemia The patient denies any myalgias or weakness. No abdominal discomfort admitted to. The patient denies any difficulty with or side effects from the medication. BP Readings from Last 3 Encounters:   01/23/23 (!) 148/82   11/15/22 (!) 149/86   10/11/22 139/71         Lab Results   Component Value Date/Time    Hemoglobin A1c 12.6 (H) 06/01/2022 09:36 AM    Hemoglobin A1c (POC) 10.0 08/30/2022 03:31 PM     Lab Results   Component Value Date/Time    Cholesterol, total 129 06/01/2022 09:36 AM    HDL Cholesterol 36 (L) 06/01/2022 09:36 AM    LDL, calculated 59 06/01/2022 09:36 AM    LDL, calculated 56 11/27/2017 09:15 AM    VLDL, calculated 34 06/01/2022 09:36 AM    VLDL, calculated 41 (H) 11/27/2017 09:15 AM    Triglyceride 204 (H) 06/01/2022 09:36 AM     Lower extremity pain  The patient admits to lower extremity pain. The onset was 6 months prior. The pain is precipitated by walking. It is located in the calves. It is relieved by rest.  There is no position change. No fevers chills or sweats admitted to. No redness or paleness of the lower extremities are admitted to. No coolness is admitted to. This is a new symptom. It has not been evaluated in the past.  Duplex study pending still  GERD  This is a chronic problem. It is unchanged. The problem is in good control. There are no new aggravating or relieving factors. There is no history of any new associated signs, symptoms, or complications. The treatment is unchanged and there are no side effects from it. DVT  The patient has  Hematuria. Red. Present for 2 days. clinical course has improved   The patient voices no complaints  Presently on apixaban. Review of Systems   Constitutional:  Negative for chills and fever. HENT:  Positive for hearing loss (Right side). Eyes:  Negative for blurred vision, discharge and redness. Respiratory:  Negative for cough and shortness of breath. Cardiovascular:  Positive for claudication.  Negative for chest pain, palpitations, orthopnea, leg swelling and PND. Gastrointestinal:  Positive for nausea and vomiting. Negative for abdominal pain, blood in stool, constipation, diarrhea and melena. Near dizziness  Decreased appetite     Genitourinary:  Negative for dysuria, frequency and urgency. Musculoskeletal:  Positive for myalgias (calf and thigh, improving.). Negative for joint pain. Skin:  Negative for rash. Neurological:  Negative for dizziness, focal weakness and headaches. She states her IQ is 72   Psychiatric/Behavioral:  Negative for depression. The patient is not nervous/anxious. Current Outpatient Medications   Medication Sig    amLODIPine (NORVASC) 10 mg tablet TAKE 1 TABLET BY MOUTH ONCE DAILY    atorvastatin (LIPITOR) 40 mg tablet 1 TABLET DAILY    omeprazole (PRILOSEC) 20 mg capsule TAKE 1 CAPSULE BY MOUTH DAILY. metFORMIN ER (GLUCOPHAGE XR) 750 mg tablet TAKE 1 TABLET BY MOUTH TWO TIMES A DAY    dulaglutide (TRULICITY) 1.5 KD/4.6 mL sub-q pen 0.5 mL by SubCUTAneous route every seven (7) days. losartan (COZAAR) 100 mg tablet TAKE 1 TABLET BY MOUTH ONCE DAILY    hydroCHLOROthiazide (HYDRODIURIL) 12.5 mg tablet Take 1 Tablet by mouth daily. BD Veo Insulin Syringe UF 1/2 mL 31 gauge x 15/64\" syrg     insulin glargine (Lantus Solostar U-100 Insulin) 100 unit/mL (3 mL) inpn INJECT 50 UNITS UNDER THE SKIN ONCE DAILY    Insulin Needles, Disposable, (Pen Needle) 32 gauge x 5/32\" ndle For insulin shots    TRUEplus Lancets 28 gauge misc USE TO CHECK THE BLOOD GLUCOSE ONCE A DAY    apixaban (Eliquis) 5 mg tablet TAKE 1 TABLET BY MOUTH TWO (2) TIMES A DAY. glucose blood VI test strips (True Metrix Glucose Test Strip) strip Test Blood Glucose once daily; ICD 10 E11.9, Quantity 100    lancets misc Use to check the blood glucose once a day    melatonin 5 mg cap capsule Take 1 Capsule by mouth nightly.     Blood-Glucose Meter (Contour Next EZ Meter) monitoring kit Test sugar daily    Blood-Glucose Meter (TRUE METRIX AIR GLUCOSE METER) monitoring kit Test Blood Glucose once daily; ICD 10 E11.9     No current facility-administered medications for this visit. Allergies   Allergen Reactions    Clindamycin Hives    Sulfa (Sulfonamide Antibiotics) Unable to Obtain    Lisinopril Cough     has Mixed hyperlipidemia, Essential hypertension, benign, Arthralgia, Dermatitis, Abscess of skin of abdomen, Uncontrolled type 2 diabetes mellitus with hyperglycemia ([address] Acute deep vein thrombosis (DVT) of proximal vein of right lower extremity ([address] Hyperglycemia due to diabetes mellitus ([address] Hyperosmolar (nonketotic) coma ([address] and UTI (urinary tract infection) on their problem list.    Past Surgical History:   Procedure Laterality Date    HX HYSTERECTOMY  1990s    X2      reports that she has never smoked. She has never used smokeless tobacco. She reports that she does not drink alcohol and does not use drugs. family history includes Diabetes in her mother and sister; Hypertension in her sister; Stroke in her maternal grandfather and maternal grandmother. There were no vitals taken for this visit. Physical Exam  Constitutional:       General: She is not in acute distress. Appearance: She is well-developed. She is obese. HENT:      Head: Normocephalic and atraumatic. Right Ear: Tympanic membrane and external ear normal.      Left Ear: Tympanic membrane and external ear normal.      Nose: Nose normal.      Mouth/Throat:      Mouth: Mucous membranes are moist.      Comments: Edentulous. Eyes:      General: No scleral icterus. Conjunctiva/sclera: Conjunctivae normal.      Pupils: Pupils are equal, round, and reactive to light. Neck:      Thyroid: No thyromegaly. Vascular: No JVD. Cardiovascular:      Rate and Rhythm: Normal rate and regular rhythm. Heart sounds: Normal heart sounds.    Pulmonary:      Effort: Pulmonary effort is normal.      Breath sounds: Normal breath sounds. Abdominal:      General: Bowel sounds are normal. There is no distension. Tenderness: There is no abdominal tenderness. Genitourinary:     General: Normal vulva. Rectum: Normal.      Comments: Slightly friable, atrophic cervical area  Musculoskeletal:         General: No swelling, tenderness, deformity or signs of injury. Normal range of motion. Right lower leg: No edema. Left lower leg: No edema. Comments: Small amount of blood underneath her left middle toenail. Lymphadenopathy:      Cervical: No cervical adenopathy. Skin:     General: Skin is dry. Capillary Refill: Capillary refill takes less than 2 seconds. Coloration: Skin is not jaundiced or pale. Findings: No erythema, lesion or rash. Comments: Hyperpigmentation of the lower extremity from about mid lower tibia distally. Similar in appearance to stasis changes   Neurological:      Mental Status: She is alert and oriented to person, place, and time. Cranial Nerves: No cranial nerve deficit. Sensory: No sensory deficit (Both feet). Motor: No weakness or abnormal muscle tone. Coordination: Coordination normal.   Psychiatric:         Behavior: Behavior normal.         Thought Content:  Thought content normal.         Judgment: Judgment normal.       Lab Results   Component Value Date/Time    WBC 11.4 11/15/2022 04:00 PM    HGB 11.6 (L) 11/15/2022 04:00 PM    HCT 35.9 11/15/2022 04:00 PM    PLATELET 226 88/31/4978 04:00 PM    MCV 78.4 11/15/2022 04:00 PM     Lab Results   Component Value Date/Time    Hemoglobin A1c 12.6 (H) 06/01/2022 09:36 AM    Hemoglobin A1c 11.6 (H) 01/24/2022 01:46 AM    Hemoglobin A1c 12.5 (H) 07/27/2021 11:26 AM    Glucose 118 (H) 11/15/2022 04:00 PM    Glucose (POC) 273 (H) 08/03/2022 01:20 AM    Microalb/Creat ratio (ug/mg creat.) 698 (H) 06/01/2022 09:36 AM    LDL, calculated 59 06/01/2022 09:36 AM    LDL, calculated 56 11/27/2017 09:15 AM Creatinine 1.08 11/15/2022 04:00 PM      Lab Results   Component Value Date/Time    Cholesterol, total 129 06/01/2022 09:36 AM    HDL Cholesterol 36 (L) 06/01/2022 09:36 AM    LDL, calculated 59 06/01/2022 09:36 AM    LDL, calculated 56 11/27/2017 09:15 AM    Triglyceride 204 (H) 06/01/2022 09:36 AM     Lab Results   Component Value Date/Time    ALT (SGPT) 23 11/15/2022 04:00 PM    Alk. phosphatase 148 (H) 11/15/2022 04:00 PM    Bilirubin, total 0.7 11/15/2022 04:00 PM    Albumin 4.3 11/15/2022 04:00 PM    Protein, total 7.8 11/15/2022 04:00 PM    PLATELET 328 55/52/6183 04:00 PM     No results found for: INR, INREXT, PTMR, PTP, PT1, PT2, INREXT, INREXT   Lab Results   Component Value Date/Time    GFR est non-AA 41 (L) 08/02/2022 12:27 PM    GFR est AA 50 (L) 08/02/2022 12:27 PM    Creatinine 1.08 11/15/2022 04:00 PM    BUN 36 (H) 11/15/2022 04:00 PM    Sodium 141 11/15/2022 04:00 PM    Potassium 3.9 11/15/2022 04:00 PM    Chloride 105 11/15/2022 04:00 PM    CO2 27 11/15/2022 04:00 PM    Magnesium 2.3 01/24/2022 11:40 AM    Phosphorus 2.9 01/24/2022 11:40 AM     No results found for: TSH, TSH2, TSH3, TSHP, TSHELE, TSHEXT, TT3, T3U, T3UP, FRT3, FT3, FT4, FT4P, T4, T4P, FT4T, TT7, TSHEXT, TSHEXT   Lab Results   Component Value Date/Time    Sodium 141 11/15/2022 04:00 PM    Potassium 3.9 11/15/2022 04:00 PM    Chloride 105 11/15/2022 04:00 PM    CO2 27 11/15/2022 04:00 PM    Anion gap 9 11/15/2022 04:00 PM    Glucose 118 (H) 11/15/2022 04:00 PM    BUN 36 (H) 11/15/2022 04:00 PM    Creatinine 1.08 11/15/2022 04:00 PM    BUN/Creatinine ratio 33 (H) 11/15/2022 04:00 PM    GFR est AA 50 (L) 08/02/2022 12:27 PM    GFR est non-AA 41 (L) 08/02/2022 12:27 PM    Calcium 9.0 11/15/2022 04:00 PM    Bilirubin, total 0.7 11/15/2022 04:00 PM    ALT (SGPT) 23 11/15/2022 04:00 PM    Alk.  phosphatase 148 (H) 11/15/2022 04:00 PM    Protein, total 7.8 11/15/2022 04:00 PM    Albumin 4.3 11/15/2022 04:00 PM    Globulin 3.5 11/15/2022 04:00 PM    A-G Ratio 1.2 11/15/2022 04:00 PM      Lab Results   Component Value Date/Time    Hemoglobin A1c 12.6 (H) 06/01/2022 09:36 AM    Hemoglobin A1c (POC) 10.0 08/30/2022 03:31 PM        Diabetic foot exam:     Left Foot:   Visual Exam: normal    Pulse DP: 1+ (weak)   Filament test: absent sensation    Vibratory sensation: absent      Right Foot:   Visual Exam: normal    Pulse DP: 1+ (weak)   Filament test: absent sensation    Vibratory sensation: absent      We discussed the expected course, resolution and complications of the diagnosis(es) in detail. Medication risks, benefits, costs, interactions, and alternatives were discussed as indicated. I advised her to contact the office if her condition worsens, changes or fails to improve as anticipated. She expressed understanding with the diagnosis(es) and plan. This note was done with the assistance of dragon speech software.   Some inadvertent errors or omissions may be present

## 2023-01-25 ENCOUNTER — HOSPITAL ENCOUNTER (EMERGENCY)
Age: 63
Discharge: HOME | End: 2023-01-26
Attending: EMERGENCY MEDICINE
Payer: MEDICAID

## 2023-01-25 DIAGNOSIS — E16.2 HYPOGLYCEMIA: ICD-10-CM

## 2023-01-25 DIAGNOSIS — Z79.4 INSULIN DEPENDENT TYPE 2 DIABETES MELLITUS (HCC): Primary | ICD-10-CM

## 2023-01-25 DIAGNOSIS — E11.9 INSULIN DEPENDENT TYPE 2 DIABETES MELLITUS (HCC): Primary | ICD-10-CM

## 2023-01-25 LAB
BASOPHILS # BLD: 0 K/UL (ref 0–0.1)
BASOPHILS NFR BLD: 1 % (ref 0–2)
DIFFERENTIAL METHOD BLD: ABNORMAL
EOSINOPHIL # BLD: 0 K/UL (ref 0–0.4)
EOSINOPHIL NFR BLD: 1 % (ref 0–5)
ERYTHROCYTE [DISTWIDTH] IN BLOOD BY AUTOMATED COUNT: 15.6 % (ref 11.6–14.5)
GLUCOSE BLD STRIP.AUTO-MCNC: 54 MG/DL (ref 70–110)
HCT VFR BLD AUTO: 35.9 % (ref 35–45)
HGB BLD-MCNC: 12 G/DL (ref 12–16)
IMM GRANULOCYTES # BLD AUTO: 0 K/UL (ref 0–0.04)
IMM GRANULOCYTES NFR BLD AUTO: 0 % (ref 0–0.5)
LYMPHOCYTES # BLD: 1.9 K/UL (ref 0.9–3.6)
LYMPHOCYTES NFR BLD: 26 % (ref 21–52)
MCH RBC QN AUTO: 27.1 PG (ref 24–34)
MCHC RBC AUTO-ENTMCNC: 33.4 G/DL (ref 31–37)
MCV RBC AUTO: 81 FL (ref 78–100)
MONOCYTES # BLD: 0.8 K/UL (ref 0.05–1.2)
MONOCYTES NFR BLD: 11 % (ref 3–10)
NEUTS SEG # BLD: 4.6 K/UL (ref 1.8–8)
NEUTS SEG NFR BLD: 62 % (ref 40–73)
NRBC # BLD: 0 K/UL (ref 0–0.01)
NRBC BLD-RTO: 0 PER 100 WBC
PLATELET # BLD AUTO: 334 K/UL (ref 135–420)
PMV BLD AUTO: 9 FL (ref 9.2–11.8)
RBC # BLD AUTO: 4.43 M/UL (ref 4.2–5.3)
WBC # BLD AUTO: 7.4 K/UL (ref 4.6–13.2)

## 2023-01-25 PROCEDURE — 82962 GLUCOSE BLOOD TEST: CPT

## 2023-01-25 PROCEDURE — 96375 TX/PRO/DX INJ NEW DRUG ADDON: CPT

## 2023-01-25 PROCEDURE — 85025 COMPLETE CBC W/AUTO DIFF WBC: CPT

## 2023-01-25 PROCEDURE — 80048 BASIC METABOLIC PNL TOTAL CA: CPT

## 2023-01-25 PROCEDURE — 99284 EMERGENCY DEPT VISIT MOD MDM: CPT

## 2023-01-25 PROCEDURE — 96365 THER/PROPH/DIAG IV INF INIT: CPT

## 2023-01-26 VITALS
HEIGHT: 66 IN | BODY MASS INDEX: 22.5 KG/M2 | DIASTOLIC BLOOD PRESSURE: 69 MMHG | HEART RATE: 82 BPM | RESPIRATION RATE: 21 BRPM | OXYGEN SATURATION: 95 % | TEMPERATURE: 98.1 F | SYSTOLIC BLOOD PRESSURE: 139 MMHG | WEIGHT: 140 LBS

## 2023-01-26 LAB
ANION GAP SERPL CALC-SCNC: 4 MMOL/L (ref 3–18)
BUN SERPL-MCNC: 32 MG/DL (ref 7–18)
BUN/CREAT SERPL: 27 (ref 12–20)
CALCIUM SERPL-MCNC: 9.4 MG/DL (ref 8.5–10.1)
CHLORIDE SERPL-SCNC: 98 MMOL/L (ref 100–111)
CO2 SERPL-SCNC: 35 MMOL/L (ref 21–32)
CREAT SERPL-MCNC: 1.18 MG/DL (ref 0.6–1.3)
GLUCOSE BLD STRIP.AUTO-MCNC: 239 MG/DL (ref 70–110)
GLUCOSE SERPL-MCNC: 73 MG/DL (ref 74–99)
POTASSIUM SERPL-SCNC: 3.4 MMOL/L (ref 3.5–5.5)
SODIUM SERPL-SCNC: 137 MMOL/L (ref 136–145)

## 2023-01-26 PROCEDURE — 74011250636 HC RX REV CODE- 250/636: Performed by: EMERGENCY MEDICINE

## 2023-01-26 PROCEDURE — 74011000250 HC RX REV CODE- 250

## 2023-01-26 PROCEDURE — 82962 GLUCOSE BLOOD TEST: CPT

## 2023-01-26 RX ORDER — ONDANSETRON 2 MG/ML
4 INJECTION INTRAMUSCULAR; INTRAVENOUS
Status: COMPLETED | OUTPATIENT
Start: 2023-01-26 | End: 2023-01-26

## 2023-01-26 RX ORDER — DEXTROSE MONOHYDRATE 100 MG/ML
INJECTION, SOLUTION INTRAVENOUS
Status: COMPLETED
Start: 2023-01-26 | End: 2023-01-26

## 2023-01-26 RX ADMIN — ONDANSETRON 4 MG: 2 INJECTION INTRAMUSCULAR; INTRAVENOUS at 00:39

## 2023-01-26 RX ADMIN — DEXTROSE MONOHYDRATE 250 ML: 10 INJECTION, SOLUTION INTRAVENOUS at 00:07

## 2023-01-26 NOTE — ED NOTES
Pt given written and verbal discharge instructions. Pt verbalized understanding. Pt taken to ED lobby via W/C. Pt reports family member arranged ride home. Pt awaiting ride in ED lobby.

## 2023-01-26 NOTE — ED NOTES
Pt ambulated 5 steps to chair, sat, stood and returned to bed. MD present for observation. Pt tolerated ambulation well.

## 2023-01-26 NOTE — ED NOTES
Patient in bed. Rectal temp up to 96.0. Mary hugger in place. Purewick in place. Pt daughter at bedside, stated she gives her mom 50 units of Lantus daily and 50 units of Trulicity once a week. Reports pt saw Endocrinologist this Monday.

## 2023-01-26 NOTE — ED PROVIDER NOTES
EMERGENCY DEPARTMENT HISTORY AND PHYSICAL EXAM    11:45 PM patient seen at this time in room 2      Date: 1/25/2023  Patient Name: Evelyn Morillo    History of Presenting Illness     Chief Complaint   Patient presents with    Low Blood Sugar         History Provided By: Nurses from EMS report    Additional History (Context): Evelyn Morillo is a 57 y.o. female presents with insulin-dependent diabetic, has felt nauseous recently so she has been taking her insulin but not eating. Patient of Dr. Saranya Franco. No vomiting or diarrhea no abdominal pain or chest pain. No fevers or known infectious exposures. No discomfort at the time of my evaluation. She is found to be altered and blood sugar was 44, EMS administered glucagon which brought it up to 60. Here in the emergency department she is alert. Talking normally. Mraely Genao PCP: Radha Pablo MD    Chief Complaint:   Duration:    Timing:    Location:   Quality:   Severity:   Modifying Factors:   Associated Symptoms:       Current Outpatient Medications   Medication Sig Dispense Refill    metoclopramide HCl (REGLAN) 5 mg tablet Take 1 Tablet by mouth Before breakfast, lunch, and dinner for 10 days. 30 Tablet 3    amLODIPine (NORVASC) 10 mg tablet TAKE 1 TABLET BY MOUTH ONCE DAILY 90 Tablet 3    atorvastatin (LIPITOR) 40 mg tablet 1 TABLET DAILY 90 Tablet 3    omeprazole (PRILOSEC) 20 mg capsule TAKE 1 CAPSULE BY MOUTH DAILY. 30 Capsule 2    metFORMIN ER (GLUCOPHAGE XR) 750 mg tablet TAKE 1 TABLET BY MOUTH TWO TIMES A  Tablet 3    dulaglutide (TRULICITY) 1.5 MQ/5.7 mL sub-q pen 0.5 mL by SubCUTAneous route every seven (7) days. 12 Each 3    losartan (COZAAR) 100 mg tablet TAKE 1 TABLET BY MOUTH ONCE DAILY 90 Tablet 3    hydroCHLOROthiazide (HYDRODIURIL) 12.5 mg tablet Take 1 Tablet by mouth daily.  90 Tablet 3    BD Veo Insulin Syringe UF 1/2 mL 31 gauge x 15/64\" syrg       insulin glargine (Lantus Solostar U-100 Insulin) 100 unit/mL (3 mL) inpn INJECT 50 UNITS UNDER THE SKIN ONCE DAILY 15 Adjustable Dose Pre-filled Pen Syringe 11    Insulin Needles, Disposable, (Pen Needle) 32 gauge x 5/32\" ndle For insulin shots 100 Pen Needle 11    TRUEplus Lancets 28 gauge misc USE TO CHECK THE BLOOD GLUCOSE ONCE A  Lancet 10    apixaban (Eliquis) 5 mg tablet TAKE 1 TABLET BY MOUTH TWO (2) TIMES A DAY. 180 Tablet 3    glucose blood VI test strips (True Metrix Glucose Test Strip) strip Test Blood Glucose once daily; ICD 10 E11.9, Quantity 100 100 Strip 3    lancets misc Use to check the blood glucose once a day 100 Each 11    melatonin 5 mg cap capsule Take 1 Capsule by mouth nightly. 90 Capsule 3    Blood-Glucose Meter (Contour Next EZ Meter) monitoring kit Test sugar daily 1 Kit 0    Blood-Glucose Meter (TRUE METRIX AIR GLUCOSE METER) monitoring kit Test Blood Glucose once daily; ICD 10 E11.9 1 Kit 0       Past History     Past Medical History:  Past Medical History:   Diagnosis Date    Essential hypertension, benign     Mixed hyperlipidemia     Type II or unspecified type diabetes mellitus without mention of complication, not stated as uncontrolled        Past Surgical History:  Past Surgical History:   Procedure Laterality Date    HX HYSTERECTOMY  36s    X2       Family History:  Family History   Problem Relation Age of Onset    Diabetes Mother     Stroke Maternal Grandmother     Stroke Maternal Grandfather     Hypertension Sister     Diabetes Sister        Social History:  Social History     Tobacco Use    Smoking status: Never    Smokeless tobacco: Never   Substance Use Topics    Alcohol use: No     Alcohol/week: 0.0 standard drinks    Drug use: No       Allergies:   Allergies   Allergen Reactions    Clindamycin Hives    Sulfa (Sulfonamide Antibiotics) Unable to Obtain    Lisinopril Cough         Review of Systems     Review of Systems      Physical Exam       Patient Vitals for the past 12 hrs:   Temp Pulse Resp BP SpO2   01/26/23 0210 98.1 °F (36.7 °C) 82 21 139/69 95 % 01/26/23 0055 (!) 96 °F (35.6 °C) -- -- -- --   01/26/23 0054 -- -- -- -- 99 %   01/26/23 0035 (!) 95.1 °F (35.1 °C) -- -- -- --   01/25/23 2351 (!) 92.6 °F (33.7 °C) 67 18 (!) 146/85 99 %       IPVITALS  Patient Vitals for the past 24 hrs:   BP Temp Pulse Resp SpO2 Height Weight   01/26/23 0210 139/69 98.1 °F (36.7 °C) 82 21 95 % -- --   01/26/23 0055 -- (!) 96 °F (35.6 °C) -- -- -- -- --   01/26/23 0054 -- -- -- -- 99 % -- --   01/26/23 0035 -- (!) 95.1 °F (35.1 °C) -- -- -- -- --   01/25/23 2351 (!) 146/85 (!) 92.6 °F (33.7 °C) 67 18 99 % 5' 6\" (1.676 m) 63.5 kg (140 lb)       Physical Exam  Vitals and nursing note reviewed. Constitutional:       General: She is not in acute distress. Appearance: She is well-developed. She is not toxic-appearing. HENT:      Head: Normocephalic and atraumatic. Eyes:      General: No scleral icterus. Conjunctiva/sclera: Conjunctivae normal.   Neck:      Vascular: No JVD. Cardiovascular:      Rate and Rhythm: Normal rate and regular rhythm. Heart sounds: Normal heart sounds. Comments: 4 intact extremity pulses  Pulmonary:      Effort: Pulmonary effort is normal.      Breath sounds: Normal breath sounds. Abdominal:      Palpations: Abdomen is soft. There is no mass. Tenderness: There is no abdominal tenderness. Musculoskeletal:         General: Normal range of motion. Cervical back: Normal range of motion and neck supple. Lymphadenopathy:      Cervical: No cervical adenopathy. Skin:     General: Skin is warm and dry. Neurological:      General: No focal deficit present. Mental Status: She is alert.       Comments: Normal speech oriented appropriate sentences and word selection         Diagnostic Study Results   Labs -  Recent Results (from the past 24 hour(s))   GLUCOSE, POC    Collection Time: 01/25/23 11:47 PM   Result Value Ref Range    Glucose (POC) 54 (LL) 70 - 110 mg/dL   GLUCOSE, POC    Collection Time: 01/26/23  1:41 AM Result Value Ref Range    Glucose (POC) 239 (H) 70 - 110 mg/dL       Radiologic Studies -   No orders to display     No results found. Medications ordered:   Medications   dextrose 10 % infusion 250 mL (250 mL IntraVENous New Bag 1/26/23 0007)   ondansetron (ZOFRAN) injection 4 mg (4 mg IntraVENous Given 1/26/23 0039)         Medical Decision Making   Initial Medical Decision Making and DDx:  Will get Accu-Chek here to see where she is at. Basic labs for concomitant metabolic abnormality anemia renal failure uremia. Anticipate these will be negative and uneventful discharge    ED Course: Progress Notes, Reevaluation, and Consults:  ED Course as of 01/26/23 0331   Thu Jan 26, 2023   0013 Temperature 92 Fahrenheit, patient placed on [employer] [facility]. Do not suspect sepsis. Continues to be hypoglycemic, D10 being administered. [CB]   3644 Updated patient on reassuring results of diagnostics, she felt lightheaded when she stood up to go use the bathroom so she was being fed. And anticipate uneventful discharge after [CB]      ED Course User Index  [CB] Alcira Flanagan MD     3:32 AM Pt reevaluated at this time. Discussed results and findings, as well as, diagnosis and plan for discharge. Follow up with doctors/services listed. Return to the emergency department for alarming symptoms. Pt verbalizes understanding and agreement with plan. All questions addressed. Patient is up and ambulatory, minimal assistance, steady gait, suitable for discharge    I am the first provider for this patient. I reviewed the vital signs, available nursing notes, past medical history, past surgical history, family history and social history.     Patient Vitals for the past 12 hrs:   Temp Pulse Resp BP SpO2   01/26/23 0210 98.1 °F (36.7 °C) 82 21 139/69 95 %   01/26/23 0055 (!) 96 °F (35.6 °C) -- -- -- --   01/26/23 0054 -- -- -- -- 99 %   01/26/23 0035 (!) 95.1 °F (35.1 °C) -- -- -- --   01/25/23 2351 (!) 92.6 °F (33.7 °C) 67 18 (!) 146/85 99 %       Vital Signs-Reviewed the patient's vital signs. Pulse Oximetry Analysis, Cardiac Monitor, 12 lead ekg:      Interpreted by the EP. Records Reviewed: Nursing notes reviewed (Time of Review: 11:45 PM)    Procedures:   Critical Care Time: 0  If critical care time is note it is exclusive of any separately billable procedures. Aspirin: (was aspirin given for stroke?)    Diagnosis     Clinical Impression:   1. Insulin dependent type 2 diabetes mellitus ([address]    2. Hypoglycemia        Disposition: Discharged      Follow-up Information       Follow up With Specialties Details Why Contact Info    Kaleb Johnson MD Internal Medicine Physician In 2 days  [address] [place] [address]  [phone]               Patient's Medications   Start Taking    No medications on file   Continue Taking    AMLODIPINE (NORVASC) 10 MG TABLET    TAKE 1 TABLET BY MOUTH ONCE DAILY    APIXABAN (ELIQUIS) 5 MG TABLET    TAKE 1 TABLET BY MOUTH TWO (2) TIMES A DAY. ATORVASTATIN (LIPITOR) 40 MG TABLET    1 TABLET DAILY    BD VEO INSULIN SYRINGE UF 1/2 ML 31 GAUGE X 15/64\" SYRG        BLOOD-GLUCOSE METER (CONTOUR NEXT EZ METER) MONITORING KIT    Test sugar daily    BLOOD-GLUCOSE METER (TRUE METRIX AIR GLUCOSE METER) MONITORING KIT    Test Blood Glucose once daily; ICD 10 E11.9    DULAGLUTIDE (TRULICITY) 1.5 DQ/6.4 ML SUB-Q PEN    0.5 mL by SubCUTAneous route every seven (7) days. GLUCOSE BLOOD VI TEST STRIPS (TRUE METRIX GLUCOSE TEST STRIP) STRIP    Test Blood Glucose once daily; ICD 10 E11.9, Quantity 100    HYDROCHLOROTHIAZIDE (HYDRODIURIL) 12.5 MG TABLET    Take 1 Tablet by mouth daily.     INSULIN GLARGINE (LANTUS SOLOSTAR U-100 INSULIN) 100 UNIT/ML (3 ML) INPN    INJECT 50 UNITS UNDER THE SKIN ONCE DAILY    INSULIN NEEDLES, DISPOSABLE, (PEN NEEDLE) 32 GAUGE X 5/32\" NDLE    For insulin shots    LANCETS MISC    Use to check the blood glucose once a day    LOSARTAN (COZAAR) 100 MG TABLET    TAKE 1 TABLET BY MOUTH ONCE DAILY    MELATONIN 5 MG CAP CAPSULE    Take 1 Capsule by mouth nightly. METFORMIN ER (GLUCOPHAGE XR) 750 MG TABLET    TAKE 1 TABLET BY MOUTH TWO TIMES A DAY    METOCLOPRAMIDE HCL (REGLAN) 5 MG TABLET    Take 1 Tablet by mouth Before breakfast, lunch, and dinner for 10 days. OMEPRAZOLE (PRILOSEC) 20 MG CAPSULE    TAKE 1 CAPSULE BY MOUTH DAILY.     TRUEPLUS LANCETS 28 GAUGE MISC    USE TO CHECK THE BLOOD GLUCOSE ONCE A DAY   These Medications have changed    No medications on file   Stop Taking    No medications on file     _______________________________    Notes:    Ángela Aguilar MD using Dragon dictation      _______________________________

## 2023-01-26 NOTE — ED NOTES
BS recheck 239. Mary solano removed per MD verbal order. Pt ambulated 4 steps and reported lightheadedness. Pt placed back in bed. Rectal probe in place. Gave pt 240 ml of ice water and crackers for consumption. Verbal order given to re-attempt ambulation following consumption.

## 2023-01-26 NOTE — ED TRIAGE NOTES
Pt found at home by daughter, decreased response from baseline. Per daughter, pt has not been eating. Med Hx of insulin dependent diabetes. Pt arrived via EMS. Per medic, BS at home 44. Pt received 1GM of Glucagon in field. Repeat BS 66. Pt alert and oriented.

## 2023-02-03 DIAGNOSIS — E78.2 MIXED HYPERLIPIDEMIA: Primary | ICD-10-CM

## 2023-02-03 DIAGNOSIS — E11.69 TYPE 2 DIABETES MELLITUS WITH OTHER SPECIFIED COMPLICATION, WITH LONG-TERM CURRENT USE OF INSULIN (HCC): ICD-10-CM

## 2023-02-03 DIAGNOSIS — I10 ESSENTIAL HYPERTENSION: ICD-10-CM

## 2023-02-03 DIAGNOSIS — E78.2 MIXED HYPERLIPIDEMIA: ICD-10-CM

## 2023-02-03 DIAGNOSIS — Z79.4 TYPE 2 DIABETES MELLITUS WITH OTHER SPECIFIED COMPLICATION, WITH LONG-TERM CURRENT USE OF INSULIN (HCC): ICD-10-CM

## 2023-02-03 RX ORDER — ATORVASTATIN CALCIUM 80 MG/1
80 TABLET, FILM COATED ORAL DAILY
Qty: 90 TABLET | Refills: 3 | Status: SHIPPED | OUTPATIENT
Start: 2023-02-03

## 2023-03-23 RX ORDER — OMEPRAZOLE 20 MG/1
CAPSULE, DELAYED RELEASE ORAL
Qty: 30 CAPSULE | Refills: 1 | Status: SHIPPED | OUTPATIENT
Start: 2023-03-23

## 2023-04-04 ENCOUNTER — HOSPITAL ENCOUNTER (OUTPATIENT)
Facility: HOSPITAL | Age: 63
Setting detail: SPECIMEN
Discharge: HOME | End: 2023-04-07

## 2023-04-04 ENCOUNTER — OFFICE VISIT (OUTPATIENT)
Facility: CLINIC | Age: 63
End: 2023-04-04
Payer: MEDICAID

## 2023-04-04 VITALS
SYSTOLIC BLOOD PRESSURE: 156 MMHG | WEIGHT: 132 LBS | DIASTOLIC BLOOD PRESSURE: 103 MMHG | BODY MASS INDEX: 21.21 KG/M2 | HEIGHT: 66 IN | RESPIRATION RATE: 16 BRPM | HEART RATE: 70 BPM | OXYGEN SATURATION: 95 %

## 2023-04-04 DIAGNOSIS — Z79.4 TYPE 2 DIABETES MELLITUS WITH OTHER SPECIFIED COMPLICATION, WITH LONG-TERM CURRENT USE OF INSULIN (HCC): ICD-10-CM

## 2023-04-04 DIAGNOSIS — I10 ESSENTIAL (PRIMARY) HYPERTENSION: Primary | ICD-10-CM

## 2023-04-04 DIAGNOSIS — E11.69 TYPE 2 DIABETES MELLITUS WITH OTHER SPECIFIED COMPLICATION, WITH LONG-TERM CURRENT USE OF INSULIN (HCC): ICD-10-CM

## 2023-04-04 DIAGNOSIS — E11.42 DIABETIC POLYNEUROPATHY ASSOCIATED WITH TYPE 2 DIABETES MELLITUS (HCC): ICD-10-CM

## 2023-04-04 DIAGNOSIS — N32.89 BLADDER MASS: ICD-10-CM

## 2023-04-04 DIAGNOSIS — E78.2 MIXED HYPERLIPIDEMIA: ICD-10-CM

## 2023-04-04 DIAGNOSIS — Z86.79 HISTORY OF IRREGULAR HEARTBEAT: ICD-10-CM

## 2023-04-04 DIAGNOSIS — R11.0 NAUSEA: ICD-10-CM

## 2023-04-04 DIAGNOSIS — Z11.4 SCREENING FOR HIV (HUMAN IMMUNODEFICIENCY VIRUS): ICD-10-CM

## 2023-04-04 DIAGNOSIS — D64.9 NORMOCYTIC ANEMIA: ICD-10-CM

## 2023-04-04 DIAGNOSIS — K31.84 GASTROPARESIS: ICD-10-CM

## 2023-04-04 LAB — LABCORP SPECIMEN COLLECTION: NORMAL

## 2023-04-04 PROCEDURE — 3077F SYST BP >= 140 MM HG: CPT | Performed by: EMERGENCY MEDICINE

## 2023-04-04 PROCEDURE — 3080F DIAST BP >= 90 MM HG: CPT | Performed by: EMERGENCY MEDICINE

## 2023-04-04 PROCEDURE — 99214 OFFICE O/P EST MOD 30 MIN: CPT | Performed by: EMERGENCY MEDICINE

## 2023-04-04 PROCEDURE — 99001 SPECIMEN HANDLING PT-LAB: CPT

## 2023-04-04 PROCEDURE — 3044F HG A1C LEVEL LT 7.0%: CPT | Performed by: EMERGENCY MEDICINE

## 2023-04-04 RX ORDER — ONDANSETRON 4 MG/1
4 TABLET, ORALLY DISINTEGRATING ORAL 3 TIMES DAILY PRN
Qty: 21 TABLET | Refills: 0 | Status: SHIPPED | OUTPATIENT
Start: 2023-04-04

## 2023-04-04 RX ORDER — HYDROCHLOROTHIAZIDE 25 MG/1
12.5 TABLET ORAL DAILY
Qty: 90 TABLET | Refills: 3 | Status: SHIPPED | OUTPATIENT
Start: 2023-04-04

## 2023-04-04 RX ORDER — ATORVASTATIN CALCIUM 80 MG/1
80 TABLET, FILM COATED ORAL DAILY
Qty: 30 TABLET | Refills: 3 | Status: SHIPPED | OUTPATIENT
Start: 2023-04-04

## 2023-04-04 RX ORDER — METOCLOPRAMIDE 5 MG/1
5 TABLET ORAL 3 TIMES DAILY
Qty: 120 TABLET | Refills: 3 | Status: CANCELLED | OUTPATIENT
Start: 2023-04-04

## 2023-04-04 SDOH — ECONOMIC STABILITY: INCOME INSECURITY: HOW HARD IS IT FOR YOU TO PAY FOR THE VERY BASICS LIKE FOOD, HOUSING, MEDICAL CARE, AND HEATING?: SOMEWHAT HARD

## 2023-04-04 SDOH — ECONOMIC STABILITY: HOUSING INSECURITY
IN THE LAST 12 MONTHS, WAS THERE A TIME WHEN YOU DID NOT HAVE A STEADY PLACE TO SLEEP OR SLEPT IN A SHELTER (INCLUDING NOW)?: NO

## 2023-04-04 SDOH — ECONOMIC STABILITY: FOOD INSECURITY: WITHIN THE PAST 12 MONTHS, THE FOOD YOU BOUGHT JUST DIDN'T LAST AND YOU DIDN'T HAVE MONEY TO GET MORE.: OFTEN TRUE

## 2023-04-04 SDOH — ECONOMIC STABILITY: FOOD INSECURITY: WITHIN THE PAST 12 MONTHS, YOU WORRIED THAT YOUR FOOD WOULD RUN OUT BEFORE YOU GOT MONEY TO BUY MORE.: OFTEN TRUE

## 2023-04-04 ASSESSMENT — PATIENT HEALTH QUESTIONNAIRE - PHQ9
SUM OF ALL RESPONSES TO PHQ QUESTIONS 1-9: 0
SUM OF ALL RESPONSES TO PHQ QUESTIONS 1-9: 0
2. FEELING DOWN, DEPRESSED OR HOPELESS: 0
SUM OF ALL RESPONSES TO PHQ9 QUESTIONS 1 & 2: 0
SUM OF ALL RESPONSES TO PHQ QUESTIONS 1-9: 0
1. LITTLE INTEREST OR PLEASURE IN DOING THINGS: 0
SUM OF ALL RESPONSES TO PHQ QUESTIONS 1-9: 0

## 2023-04-04 ASSESSMENT — ANXIETY QUESTIONNAIRES
7. FEELING AFRAID AS IF SOMETHING AWFUL MIGHT HAPPEN: 0
6. BECOMING EASILY ANNOYED OR IRRITABLE: 0
5. BEING SO RESTLESS THAT IT IS HARD TO SIT STILL: 0
IF YOU CHECKED OFF ANY PROBLEMS ON THIS QUESTIONNAIRE, HOW DIFFICULT HAVE THESE PROBLEMS MADE IT FOR YOU TO DO YOUR WORK, TAKE CARE OF THINGS AT HOME, OR GET ALONG WITH OTHER PEOPLE: NOT DIFFICULT AT ALL
2. NOT BEING ABLE TO STOP OR CONTROL WORRYING: 0
1. FEELING NERVOUS, ANXIOUS, OR ON EDGE: 0
GAD7 TOTAL SCORE: 0
3. WORRYING TOO MUCH ABOUT DIFFERENT THINGS: 0
4. TROUBLE RELAXING: 0

## 2023-04-04 NOTE — PATIENT INSTRUCTIONS
be tempted to eat it. Cook dishes that have a lot of veggies in them, such as stir-fries and soups. Foods high in fiber may reduce your cholesterol and provide important vitamins and minerals. High-fiber foods include whole-grain cereals and breads, oatmeal, beans, brown rice, citrus fruits, and apples. Buy whole-grain breads and cereals instead of white bread and pastries. Limit saturated fat  Read food labels and try to avoid saturated fat and trans fat. They increase your risk of heart disease. Use olive or canola oil when you cook. Try cholesterol-lowering spreads, such as Benecol or Take Control. Bake, broil, grill, or steam foods instead of frying them. Limit the amount of high-fat meats you eat, including hot dogs and sausages. Cut out all visible fat when you prepare meat. Eat fish, skinless poultry, and soy products such as tofu instead of high-fat meats. Soybeans may be especially good for your heart. Eat at least two servings of fish a week. Certain fish, such as salmon, contain omega-3 fatty acids, which may help reduce your risk of heart attack. Choose low-fat or fat-free milk and dairy products. Get exercise, limit alcohol, and quit smoking  Get more exercise. Work with your doctor to set up an exercise program. Even if you can do only a small amount, exercise will help you get stronger, have more energy, and manage your weight and your stress. Walking is an easy way to get exercise. Gradually increase the amount you walk every day. Aim for at least 30 minutes on most days of the week. You also may want to swim, bike, or do other activities. Limit alcohol to no more than 2 drinks a day for men and 1 drink a day for women. Do not smoke. If you need help quitting, talk to your doctor about stop-smoking programs and medicines. These can increase your chances of quitting for good. When should you call for help? Call 911 anytime you think you may need emergency care.  For example, call if:  You

## 2023-04-04 NOTE — PROGRESS NOTES
this or any previous visit. CT Results (most recent):  @BSHSILASTIMGCAT([id number])@  XR CHEST PORTABLE  Narrative: EXAM: XR CHEST PORT    INDICATION: chest pain    COMPARISON: 8/2/2022. FINDINGS: A single view of the chest demonstrates clear lungs. The cardiac and  mediastinal contours and pulmonary vascularity are normal. The bones and soft  tissues are within normal limits. Impression: No acute findings. Key Anti-Hypertensive Meds            amLODIPine (NORVASC) 10 MG tablet (Taking)    Class: Historical Med    hydroCHLOROthiazide (HYDRODIURIL) 12.5 MG tablet (Taking)    Class: Historical Med    losartan (COZAAR) 100 MG tablet (Taking)    Class: Historical Med           Diabetes mellitus  The patient denies polyuria, polydipsia, or polyphagia. There are no new end organ effects. The patient denies any other aggravating or relieving factors There has been no problem with the medications. Hemoglobin A1C   Date Value Ref Range Status   01/24/2023 5.7 (H) 4.8 - 5.6 % Final     Comment:              Prediabetes: 5.7 - 6.4           Diabetes: >6.4           Glycemic control for adults with diabetes: <7.0       Hemoglobin A1C, POC   Date Value Ref Range Status   08/30/2022 10.0 % Final     Lab Results   Component Value Date    LABMICR See additional order 06/01/2022     Key Antihyperglycemic Medications            dulaglutide (TRULICITY) 1.5 KH/1.3PZ SC injection (Taking)    Class: Historical Med    insulin glargine (LANTUS SOLOSTAR) 100 UNIT/ML injection pen (Taking)    Class: Historical Med    metFORMIN (GLUCOPHAGE-XR) 750 MG extended release tablet (Taking)    Class: Historical Med           Hyperlipidemia   The patient denies any myalgias or weakness. No abdominal discomfort admitted to. The patient denies any difficulty with or side effects from the medication.   Lab Results   Component Value Date    CHOL 256 (H) 01/24/2023    CHOL 129 06/01/2022    CHOL 130 04/29/2022     Lab Results   Component Value

## 2023-04-05 ENCOUNTER — TELEPHONE (OUTPATIENT)
Facility: CLINIC | Age: 63
End: 2023-04-05

## 2023-04-05 LAB
BASOPHILS # BLD AUTO: 0.1 X10E3/UL (ref 0–0.2)
BASOPHILS NFR BLD AUTO: 1 %
EOSINOPHIL # BLD AUTO: 0 X10E3/UL (ref 0–0.4)
EOSINOPHIL NFR BLD AUTO: 0 %
ERYTHROCYTE [DISTWIDTH] IN BLOOD BY AUTOMATED COUNT: 12.8 % (ref 11.7–15.4)
HBA1C MFR BLD: 5.9 % (ref 4.8–5.6)
HCT VFR BLD AUTO: 37.8 % (ref 34–46.6)
HGB BLD-MCNC: 12.5 G/DL (ref 11.1–15.9)
IMM GRANULOCYTES # BLD AUTO: 0.1 X10E3/UL (ref 0–0.1)
IMM GRANULOCYTES NFR BLD AUTO: 1 %
LYMPHOCYTES # BLD AUTO: 1.5 X10E3/UL (ref 0.7–3.1)
LYMPHOCYTES NFR BLD AUTO: 17 %
MCH RBC QN AUTO: 28 PG (ref 26.6–33)
MCHC RBC AUTO-ENTMCNC: 33.1 G/DL (ref 31.5–35.7)
MCV RBC AUTO: 85 FL (ref 79–97)
MONOCYTES # BLD AUTO: 0.7 X10E3/UL (ref 0.1–0.9)
MONOCYTES NFR BLD AUTO: 8 %
NEUTROPHILS # BLD AUTO: 6.8 X10E3/UL (ref 1.4–7)
NEUTROPHILS NFR BLD AUTO: 73 %
PLATELET # BLD AUTO: 339 X10E3/UL (ref 150–450)
RBC # BLD AUTO: 4.46 X10E6/UL (ref 3.77–5.28)
SPECIMEN STATUS REPORT: NORMAL
WBC # BLD AUTO: 9.2 X10E3/UL (ref 3.4–10.8)

## 2023-04-05 RX ORDER — OMEPRAZOLE 20 MG/1
CAPSULE, DELAYED RELEASE ORAL
Qty: 30 CAPSULE | Refills: 1 | OUTPATIENT
Start: 2023-04-05

## 2023-04-05 NOTE — TELEPHONE ENCOUNTER
I returned a call to pt to make pt aware that I spoke with the pharmacy and the reason she only received 1 prescription on yesterday which was the Zofran because it was too early to refill the other two that was sent in which was Atorvastatin which pt received on 03/29/2023 and Hydrochlorothiazide which was received on 03/23/2023. Pt verbalized understanding.

## 2023-04-06 LAB
ALBUMIN SERPL-MCNC: 4.8 G/DL (ref 3.8–4.8)
ALBUMIN/CREAT UR: 592 MG/G CREAT (ref 0–29)
ALBUMIN/GLOB SERPL: 1.8 {RATIO} (ref 1.2–2.2)
ALP SERPL-CCNC: 185 IU/L (ref 44–121)
ALT SERPL-CCNC: 24 IU/L (ref 0–32)
AST SERPL-CCNC: 16 IU/L (ref 0–40)
BILIRUB SERPL-MCNC: 0.3 MG/DL (ref 0–1.2)
BUN SERPL-MCNC: 28 MG/DL (ref 8–27)
BUN/CREAT SERPL: 23 (ref 12–28)
CALCIUM SERPL-MCNC: 9.7 MG/DL (ref 8.7–10.3)
CHLORIDE SERPL-SCNC: 97 MMOL/L (ref 96–106)
CHOLEST SERPL-MCNC: 252 MG/DL (ref 100–199)
CO2 SERPL-SCNC: 18 MMOL/L (ref 20–29)
CREAT SERPL-MCNC: 1.2 MG/DL (ref 0.57–1)
CREAT UR-MCNC: 111.3 MG/DL
EGFRCR SERPLBLD CKD-EPI 2021: 51 ML/MIN/1.73
GLOBULIN SER CALC-MCNC: 2.6 G/DL (ref 1.5–4.5)
GLUCOSE SERPL-MCNC: 165 MG/DL (ref 70–99)
HDLC SERPL-MCNC: 48 MG/DL
LDLC SERPL CALC-MCNC: 183 MG/DL (ref 0–99)
MICROALBUMIN UR-MCNC: 658.7 UG/ML
POTASSIUM SERPL-SCNC: 3.9 MMOL/L (ref 3.5–5.2)
PROT SERPL-MCNC: 7.4 G/DL (ref 6–8.5)
SODIUM SERPL-SCNC: 140 MMOL/L (ref 134–144)
TRIGL SERPL-MCNC: 117 MG/DL (ref 0–149)
VLDLC SERPL CALC-MCNC: 21 MG/DL (ref 5–40)

## 2023-04-12 RX ORDER — OMEPRAZOLE 20 MG/1
CAPSULE, DELAYED RELEASE ORAL
Qty: 90 CAPSULE | Refills: 3 | Status: SHIPPED | OUTPATIENT
Start: 2023-04-12

## 2023-05-01 ENCOUNTER — TELEPHONE (OUTPATIENT)
Facility: CLINIC | Age: 63
End: 2023-05-01

## 2023-05-02 ENCOUNTER — TELEPHONE (OUTPATIENT)
Facility: CLINIC | Age: 63
End: 2023-05-02

## 2023-05-02 DIAGNOSIS — R11.0 NAUSEA: ICD-10-CM

## 2023-05-02 DIAGNOSIS — K31.84 GASTROPARESIS: Primary | ICD-10-CM

## 2023-05-02 RX ORDER — SUCRALFATE 1 G/1
1 TABLET ORAL 4 TIMES DAILY
Qty: 120 TABLET | Refills: 3 | Status: SHIPPED | OUTPATIENT
Start: 2023-05-02

## 2023-05-02 RX ORDER — ONDANSETRON 4 MG/1
4 TABLET, FILM COATED ORAL 3 TIMES DAILY PRN
Qty: 30 TABLET | Refills: 0 | Status: SHIPPED | OUTPATIENT
Start: 2023-05-02

## 2023-05-02 NOTE — TELEPHONE ENCOUNTER
Patient called stated that she has not received the medication  Sucralfate   for her stomach  .  She also  is been nausea  and needs something for that as well please advise

## 2023-09-19 NOTE — TELEPHONE ENCOUNTER
Requested Prescriptions     Pending Prescriptions Disp Refills    [facility] 3.54 GZ/1.0EO SOPN [Pharmacy Med Name: [facility] 7.81XU/6.6TN INJ] 12 mL 12     Sig: INJECT 0.5 ML BY SUBCUTANEOUS ROUTE EVERY SEVEN (7) DAYS.

## 2023-09-20 DIAGNOSIS — E11.69 TYPE 2 DIABETES MELLITUS WITH OTHER SPECIFIED COMPLICATION, WITH LONG-TERM CURRENT USE OF INSULIN (HCC): Primary | ICD-10-CM

## 2023-09-20 DIAGNOSIS — Z79.4 TYPE 2 DIABETES MELLITUS WITH OTHER SPECIFIED COMPLICATION, WITH LONG-TERM CURRENT USE OF INSULIN (HCC): Primary | ICD-10-CM

## 2023-09-20 RX ORDER — DULAGLUTIDE 0.75 MG/.5ML
INJECTION, SOLUTION SUBCUTANEOUS
Qty: 12 ML | Refills: 12 | OUTPATIENT
Start: 2023-09-20

## 2023-11-08 DIAGNOSIS — Z79.4 TYPE 2 DIABETES MELLITUS WITH OTHER SPECIFIED COMPLICATION, WITH LONG-TERM CURRENT USE OF INSULIN (HCC): ICD-10-CM

## 2023-11-08 DIAGNOSIS — E11.69 TYPE 2 DIABETES MELLITUS WITH OTHER SPECIFIED COMPLICATION, WITH LONG-TERM CURRENT USE OF INSULIN (HCC): ICD-10-CM

## 2023-11-08 DIAGNOSIS — I10 ESSENTIAL (PRIMARY) HYPERTENSION: ICD-10-CM

## 2023-11-16 RX ORDER — DULAGLUTIDE 1.5 MG/.5ML
INJECTION, SOLUTION SUBCUTANEOUS
Qty: 12 ML | Refills: 3 | Status: SHIPPED | OUTPATIENT
Start: 2023-11-16 | End: 2023-12-12 | Stop reason: SDUPTHER

## 2023-11-16 RX ORDER — HYDROCHLOROTHIAZIDE 25 MG/1
12.5 TABLET ORAL DAILY
Qty: 90 TABLET | Refills: 3 | Status: SHIPPED | OUTPATIENT
Start: 2023-11-16

## 2023-11-16 RX ORDER — METFORMIN HYDROCHLORIDE 750 MG/1
TABLET, EXTENDED RELEASE ORAL
Qty: 180 TABLET | Refills: 3 | Status: SHIPPED | OUTPATIENT
Start: 2023-11-16

## 2023-12-11 NOTE — PROGRESS NOTES
effort is normal.      Breath sounds: Normal breath sounds. Abdominal:      General: Bowel sounds are normal. There is no distension. Tenderness: There is no abdominal tenderness. Genitourinary:     General: Normal vulva. Rectum: Normal.      Comments: Slightly friable, atrophic cervical area  Musculoskeletal:         General: No swelling, tenderness, deformity or signs of injury. Normal range of motion. Right lower leg: No edema. Left lower leg: No edema. Comments: Small amount of blood underneath her left middle toenail. Lymphadenopathy:      Cervical: No cervical adenopathy. Skin:     General: Skin is dry. Capillary Refill: Capillary refill takes less than 2 seconds. Coloration: Skin is not jaundiced or pale. Findings: No erythema, lesion or rash. Comments: Hyperpigmentation of the lower extremity from about mid lower tibia distally. Similar in appearance to stasis changes   Neurological:      Mental Status: She is alert and oriented to person, place, and time. Cranial Nerves: No cranial nerve deficit. Sensory: No sensory deficit (Both feet). Motor: No weakness or abnormal muscle tone. Coordination: Coordination normal.   Psychiatric:         Behavior: Behavior normal.         Thought Content: Thought content normal.         Judgment: Judgment normal.         We discussed the expected course, resolution and complications of the diagnosis(es) in detail. Medication risks, benefits, costs, interactions, and alternatives were discussed as indicated. I advised her to contact the office if her condition worsens, changes or fails to improve as anticipated. She expressed understanding with the diagnosis(es) and plan. This note was done with the assistance of dragon speech software.   Some inadvertent errors or omissions may be present

## 2023-12-12 ENCOUNTER — OFFICE VISIT (OUTPATIENT)
Facility: CLINIC | Age: 63
End: 2023-12-12
Payer: MEDICAID

## 2023-12-12 VITALS
HEART RATE: 94 BPM | DIASTOLIC BLOOD PRESSURE: 75 MMHG | RESPIRATION RATE: 16 BRPM | BODY MASS INDEX: 19.61 KG/M2 | OXYGEN SATURATION: 95 % | SYSTOLIC BLOOD PRESSURE: 138 MMHG | TEMPERATURE: 97.7 F | HEIGHT: 66 IN | WEIGHT: 122 LBS

## 2023-12-12 DIAGNOSIS — E78.2 MIXED HYPERLIPIDEMIA: ICD-10-CM

## 2023-12-12 DIAGNOSIS — Z79.4 TYPE 2 DIABETES MELLITUS WITH OTHER SPECIFIED COMPLICATION, WITH LONG-TERM CURRENT USE OF INSULIN (HCC): ICD-10-CM

## 2023-12-12 DIAGNOSIS — R05.1 ACUTE COUGH: ICD-10-CM

## 2023-12-12 DIAGNOSIS — E11.42 DIABETIC POLYNEUROPATHY ASSOCIATED WITH TYPE 2 DIABETES MELLITUS (HCC): ICD-10-CM

## 2023-12-12 DIAGNOSIS — N32.89 BLADDER MASS: ICD-10-CM

## 2023-12-12 DIAGNOSIS — Z12.11 SCREENING FOR COLON CANCER: ICD-10-CM

## 2023-12-12 DIAGNOSIS — F51.01 PRIMARY INSOMNIA: ICD-10-CM

## 2023-12-12 DIAGNOSIS — I10 ESSENTIAL (PRIMARY) HYPERTENSION: Primary | ICD-10-CM

## 2023-12-12 DIAGNOSIS — Z86.79 HISTORY OF IRREGULAR HEARTBEAT: ICD-10-CM

## 2023-12-12 DIAGNOSIS — K31.84 GASTROPARESIS: ICD-10-CM

## 2023-12-12 DIAGNOSIS — D64.9 NORMOCYTIC ANEMIA: ICD-10-CM

## 2023-12-12 DIAGNOSIS — E11.69 TYPE 2 DIABETES MELLITUS WITH OTHER SPECIFIED COMPLICATION, WITH LONG-TERM CURRENT USE OF INSULIN (HCC): ICD-10-CM

## 2023-12-12 DIAGNOSIS — R82.90 CLOUDY URINE: Primary | ICD-10-CM

## 2023-12-12 PROCEDURE — 3075F SYST BP GE 130 - 139MM HG: CPT | Performed by: EMERGENCY MEDICINE

## 2023-12-12 PROCEDURE — 3044F HG A1C LEVEL LT 7.0%: CPT | Performed by: EMERGENCY MEDICINE

## 2023-12-12 PROCEDURE — 99214 OFFICE O/P EST MOD 30 MIN: CPT | Performed by: EMERGENCY MEDICINE

## 2023-12-12 PROCEDURE — 3078F DIAST BP <80 MM HG: CPT | Performed by: EMERGENCY MEDICINE

## 2023-12-12 RX ORDER — BENZONATATE 100 MG/1
100 CAPSULE ORAL 3 TIMES DAILY PRN
Qty: 30 CAPSULE | Refills: 0 | Status: SHIPPED | OUTPATIENT
Start: 2023-12-12 | End: 2023-12-22

## 2023-12-12 RX ORDER — DULAGLUTIDE 1.5 MG/.5ML
INJECTION, SOLUTION SUBCUTANEOUS
Qty: 12 ML | Refills: 3 | Status: SHIPPED | OUTPATIENT
Start: 2023-12-12

## 2023-12-12 RX ORDER — METOCLOPRAMIDE 10 MG/1
10 TABLET ORAL
Qty: 120 TABLET | Refills: 3 | Status: SHIPPED | OUTPATIENT
Start: 2023-12-12

## 2023-12-13 LAB
ALBUMIN SERPL-MCNC: 3.5 G/DL (ref 3.9–4.9)
ALBUMIN/CREAT UR: 1601 MG/G CREAT (ref 0–29)
ALBUMIN/GLOB SERPL: 0.9 {RATIO} (ref 1.2–2.2)
ALP SERPL-CCNC: 236 IU/L (ref 44–121)
ALT SERPL-CCNC: 11 IU/L (ref 0–32)
APPEARANCE UR: ABNORMAL
AST SERPL-CCNC: 11 IU/L (ref 0–40)
BACTERIA #/AREA URNS HPF: ABNORMAL /[HPF]
BASOPHILS # BLD AUTO: 0 X10E3/UL (ref 0–0.2)
BASOPHILS NFR BLD AUTO: 1 %
BILIRUB SERPL-MCNC: 0.2 MG/DL (ref 0–1.2)
BILIRUB UR QL STRIP: NEGATIVE
BUN SERPL-MCNC: 48 MG/DL (ref 8–27)
BUN/CREAT SERPL: 31 (ref 12–28)
CALCIUM SERPL-MCNC: 8.6 MG/DL (ref 8.7–10.3)
CASTS URNS QL MICRO: ABNORMAL /LPF
CHLORIDE SERPL-SCNC: 97 MMOL/L (ref 96–106)
CHOLEST SERPL-MCNC: 168 MG/DL (ref 100–199)
CO2 SERPL-SCNC: 23 MMOL/L (ref 20–29)
COLOR UR: YELLOW
CREAT SERPL-MCNC: 1.57 MG/DL (ref 0.57–1)
CREAT UR-MCNC: 31 MG/DL
EGFRCR SERPLBLD CKD-EPI 2021: 37 ML/MIN/1.73
EOSINOPHIL # BLD AUTO: 0.1 X10E3/UL (ref 0–0.4)
EOSINOPHIL NFR BLD AUTO: 2 %
EPI CELLS #/AREA URNS HPF: ABNORMAL /HPF (ref 0–10)
ERYTHROCYTE [DISTWIDTH] IN BLOOD BY AUTOMATED COUNT: 13.4 % (ref 11.7–15.4)
GLOBULIN SER CALC-MCNC: 3.8 G/DL (ref 1.5–4.5)
GLUCOSE SERPL-MCNC: 142 MG/DL (ref 70–99)
GLUCOSE UR QL STRIP: NEGATIVE
HBA1C MFR BLD: 7.4 % (ref 4.8–5.6)
HCT VFR BLD AUTO: 29.7 % (ref 34–46.6)
HDLC SERPL-MCNC: 54 MG/DL
HGB BLD-MCNC: 9.5 G/DL (ref 11.1–15.9)
HGB UR QL STRIP: ABNORMAL
IMM GRANULOCYTES # BLD AUTO: 0.1 X10E3/UL (ref 0–0.1)
IMM GRANULOCYTES NFR BLD AUTO: 1 %
KETONES UR QL STRIP: NEGATIVE
LDLC SERPL CALC-MCNC: 95 MG/DL (ref 0–99)
LEUKOCYTE ESTERASE UR QL STRIP: ABNORMAL
LYMPHOCYTES # BLD AUTO: 1.7 X10E3/UL (ref 0.7–3.1)
LYMPHOCYTES NFR BLD AUTO: 28 %
MCH RBC QN AUTO: 27.9 PG (ref 26.6–33)
MCHC RBC AUTO-ENTMCNC: 32 G/DL (ref 31.5–35.7)
MCV RBC AUTO: 87 FL (ref 79–97)
MICRO URNS: ABNORMAL
MICROALBUMIN UR-MCNC: 496.3 UG/ML
MONOCYTES # BLD AUTO: 0.6 X10E3/UL (ref 0.1–0.9)
MONOCYTES NFR BLD AUTO: 9 %
NEUTROPHILS # BLD AUTO: 3.6 X10E3/UL (ref 1.4–7)
NEUTROPHILS NFR BLD AUTO: 59 %
NITRITE UR QL STRIP: NEGATIVE
PH UR STRIP: 6 [PH] (ref 5–7.5)
PLATELET # BLD AUTO: 442 X10E3/UL (ref 150–450)
POTASSIUM SERPL-SCNC: 4.7 MMOL/L (ref 3.5–5.2)
PROT SERPL-MCNC: 7.3 G/DL (ref 6–8.5)
PROT UR QL STRIP: ABNORMAL
RBC # BLD AUTO: 3.41 X10E6/UL (ref 3.77–5.28)
RBC #/AREA URNS HPF: ABNORMAL /HPF (ref 0–2)
SODIUM SERPL-SCNC: 135 MMOL/L (ref 134–144)
SP GR UR STRIP: 1.01 (ref 1–1.03)
TRIGL SERPL-MCNC: 103 MG/DL (ref 0–149)
UROBILINOGEN UR STRIP-MCNC: 0.2 MG/DL (ref 0.2–1)
VLDLC SERPL CALC-MCNC: 19 MG/DL (ref 5–40)
WBC # BLD AUTO: 6.1 X10E3/UL (ref 3.4–10.8)
WBC #/AREA URNS HPF: >30 /HPF (ref 0–5)

## 2023-12-15 LAB — BACTERIA UR CULT: NO GROWTH

## 2023-12-20 DIAGNOSIS — D64.9 NORMOCYTIC ANEMIA: ICD-10-CM

## 2023-12-22 ENCOUNTER — TELEPHONE (OUTPATIENT)
Facility: CLINIC | Age: 63
End: 2023-12-22

## 2023-12-28 LAB — NONINV COLON CA DNA+OCC BLD SCRN STL QL: NORMAL

## 2024-01-09 ENCOUNTER — CLINICAL DOCUMENTATION (OUTPATIENT)
Age: 64
End: 2024-01-09

## 2024-01-09 DIAGNOSIS — F51.01 PRIMARY INSOMNIA: ICD-10-CM

## 2024-01-12 RX ORDER — MECOBALAMIN 5000 MCG
TABLET,DISINTEGRATING ORAL
Refills: 2 | OUTPATIENT
Start: 2024-01-12

## 2024-02-12 ENCOUNTER — HOSPITAL ENCOUNTER (OUTPATIENT)
Facility: HOSPITAL | Age: 64
Discharge: HOME | End: 2024-02-15
Attending: UROLOGY
Payer: MEDICAID

## 2024-02-12 DIAGNOSIS — R31.0 GROSS HEMATURIA: ICD-10-CM

## 2024-02-12 DIAGNOSIS — N32.89 BLADDER MASS: ICD-10-CM

## 2024-02-12 DIAGNOSIS — N39.0 RECURRENT UTI: ICD-10-CM

## 2024-02-12 DIAGNOSIS — R33.9 RETENTION OF URINE, UNSPECIFIED: ICD-10-CM

## 2024-02-12 LAB — CREAT UR-MCNC: 1.8 MG/DL (ref 0.6–1.3)

## 2024-02-12 PROCEDURE — 82565 ASSAY OF CREATININE: CPT

## 2024-02-12 PROCEDURE — 6360000004 HC RX CONTRAST MEDICATION: Performed by: UROLOGY

## 2024-02-12 PROCEDURE — 74178 CT ABD&PLV WO CNTR FLWD CNTR: CPT | Performed by: UROLOGY

## 2024-02-12 RX ADMIN — IOPAMIDOL 100 ML: 755 INJECTION, SOLUTION INTRAVENOUS at 15:55

## 2024-02-23 ENCOUNTER — HOSPITAL ENCOUNTER (EMERGENCY)
Facility: HOSPITAL | Age: 64
Discharge: HOME | End: 2024-02-23
Payer: MEDICAID

## 2024-02-23 VITALS
SYSTOLIC BLOOD PRESSURE: 130 MMHG | BODY MASS INDEX: 20.89 KG/M2 | DIASTOLIC BLOOD PRESSURE: 78 MMHG | RESPIRATION RATE: 14 BRPM | WEIGHT: 130 LBS | HEART RATE: 86 BPM | HEIGHT: 66 IN | OXYGEN SATURATION: 99 % | TEMPERATURE: 98.1 F

## 2024-02-23 DIAGNOSIS — N30.01 ACUTE CYSTITIS WITH HEMATURIA: ICD-10-CM

## 2024-02-23 DIAGNOSIS — R33.9 RETENTION OF URINE: Primary | ICD-10-CM

## 2024-02-23 LAB
ALBUMIN SERPL-MCNC: 3.2 G/DL (ref 3.4–5)
ALBUMIN/GLOB SERPL: 0.8 (ref 0.8–1.7)
ALP SERPL-CCNC: 125 U/L (ref 45–117)
ALT SERPL-CCNC: 12 U/L (ref 13–56)
AMPHET UR QL SCN: NEGATIVE
ANION GAP SERPL CALC-SCNC: 2 MMOL/L (ref 3–18)
APPEARANCE UR: ABNORMAL
AST SERPL-CCNC: 10 U/L (ref 10–38)
BACTERIA URNS QL MICRO: ABNORMAL /HPF
BARBITURATES UR QL SCN: NEGATIVE
BASOPHILS # BLD: 0 K/UL (ref 0–0.1)
BASOPHILS NFR BLD: 0 % (ref 0–2)
BENZODIAZ UR QL: NEGATIVE
BILIRUB SERPL-MCNC: 0.3 MG/DL (ref 0.2–1)
BILIRUB UR QL: NEGATIVE
BUN SERPL-MCNC: 59 MG/DL (ref 7–18)
BUN/CREAT SERPL: 38 (ref 12–20)
CALCIUM SERPL-MCNC: 8.9 MG/DL (ref 8.5–10.1)
CANNABINOIDS UR QL SCN: NEGATIVE
CHLORIDE SERPL-SCNC: 110 MMOL/L (ref 100–111)
CO2 SERPL-SCNC: 25 MMOL/L (ref 21–32)
COCAINE UR QL SCN: NEGATIVE
COLOR UR: YELLOW
CREAT SERPL-MCNC: 1.55 MG/DL (ref 0.6–1.3)
DIFFERENTIAL METHOD BLD: ABNORMAL
EOSINOPHIL # BLD: 0.1 K/UL (ref 0–0.4)
EOSINOPHIL NFR BLD: 2 % (ref 0–5)
EPITH CASTS URNS QL MICRO: ABNORMAL /LPF (ref 0–5)
ERYTHROCYTE [DISTWIDTH] IN BLOOD BY AUTOMATED COUNT: 15.5 % (ref 11.6–14.5)
GLOBULIN SER CALC-MCNC: 4.2 G/DL (ref 2–4)
GLUCOSE SERPL-MCNC: 97 MG/DL (ref 74–99)
GLUCOSE UR STRIP.AUTO-MCNC: 100 MG/DL
HCT VFR BLD AUTO: 28.5 % (ref 35–45)
HGB BLD-MCNC: 8.9 G/DL (ref 12–16)
HGB UR QL STRIP: ABNORMAL
IMM GRANULOCYTES # BLD AUTO: 0 K/UL (ref 0–0.04)
IMM GRANULOCYTES NFR BLD AUTO: 0 % (ref 0–0.5)
KETONES UR QL STRIP.AUTO: NEGATIVE MG/DL
LEUKOCYTE ESTERASE UR QL STRIP.AUTO: ABNORMAL
LYMPHOCYTES # BLD: 1.5 K/UL (ref 0.9–3.6)
LYMPHOCYTES NFR BLD: 26 % (ref 21–52)
Lab: NORMAL
MCH RBC QN AUTO: 27.4 PG (ref 24–34)
MCHC RBC AUTO-ENTMCNC: 31.2 G/DL (ref 31–37)
MCV RBC AUTO: 87.7 FL (ref 78–100)
METHADONE UR QL: NEGATIVE
MONOCYTES # BLD: 0.4 K/UL (ref 0.05–1.2)
MONOCYTES NFR BLD: 7 % (ref 3–10)
NEUTS SEG # BLD: 3.8 K/UL (ref 1.8–8)
NEUTS SEG NFR BLD: 65 % (ref 40–73)
NITRITE UR QL STRIP.AUTO: NEGATIVE
NRBC # BLD: 0 K/UL (ref 0–0.01)
NRBC BLD-RTO: 0 PER 100 WBC
OPIATES UR QL: NEGATIVE
PCP UR QL: NEGATIVE
PH UR STRIP: 7.5 (ref 5–8)
PLATELET # BLD AUTO: 227 K/UL (ref 135–420)
PMV BLD AUTO: 9 FL (ref 9.2–11.8)
POTASSIUM SERPL-SCNC: 4.4 MMOL/L (ref 3.5–5.5)
PROT SERPL-MCNC: 7.4 G/DL (ref 6.4–8.2)
PROT UR STRIP-MCNC: 30 MG/DL
RBC # BLD AUTO: 3.25 M/UL (ref 4.2–5.3)
RBC #/AREA URNS HPF: ABNORMAL /HPF (ref 0–5)
SODIUM SERPL-SCNC: 137 MMOL/L (ref 136–145)
SP GR UR REFRACTOMETRY: 1.01 (ref 1–1.03)
UROBILINOGEN UR QL STRIP.AUTO: 0.2 EU/DL (ref 0.2–1)
WBC # BLD AUTO: 5.8 K/UL (ref 4.6–13.2)
WBC URNS QL MICRO: ABNORMAL /HPF (ref 0–4)

## 2024-02-23 PROCEDURE — 85025 COMPLETE CBC W/AUTO DIFF WBC: CPT

## 2024-02-23 PROCEDURE — 6360000002 HC RX W HCPCS: Performed by: PHYSICIAN ASSISTANT

## 2024-02-23 PROCEDURE — 99284 EMERGENCY DEPT VISIT MOD MDM: CPT

## 2024-02-23 PROCEDURE — 80307 DRUG TEST PRSMV CHEM ANLYZR: CPT

## 2024-02-23 PROCEDURE — 80053 COMPREHEN METABOLIC PANEL: CPT

## 2024-02-23 PROCEDURE — 94761 N-INVAS EAR/PLS OXIMETRY MLT: CPT

## 2024-02-23 PROCEDURE — 96372 THER/PROPH/DIAG INJ SC/IM: CPT

## 2024-02-23 PROCEDURE — 87086 URINE CULTURE/COLONY COUNT: CPT

## 2024-02-23 PROCEDURE — 2500000003 HC RX 250 WO HCPCS: Performed by: PHYSICIAN ASSISTANT

## 2024-02-23 PROCEDURE — 51702 INSERT TEMP BLADDER CATH: CPT

## 2024-02-23 PROCEDURE — 81001 URINALYSIS AUTO W/SCOPE: CPT

## 2024-02-23 PROCEDURE — 51798 US URINE CAPACITY MEASURE: CPT

## 2024-02-23 RX ORDER — CEPHALEXIN 500 MG/1
500 CAPSULE ORAL 2 TIMES DAILY
Qty: 14 CAPSULE | Refills: 0 | Status: SHIPPED | OUTPATIENT
Start: 2024-02-23 | End: 2024-03-01

## 2024-02-23 RX ADMIN — LIDOCAINE HYDROCHLORIDE 1000 MG: 10 INJECTION, SOLUTION EPIDURAL; INFILTRATION; INTRACAUDAL; PERINEURAL at 14:36

## 2024-02-23 ASSESSMENT — ENCOUNTER SYMPTOMS
ABDOMINAL PAIN: 0
SORE THROAT: 0
VOMITING: 0
DIARRHEA: 0
SHORTNESS OF BREATH: 0

## 2024-02-23 ASSESSMENT — PAIN DESCRIPTION - LOCATION: LOCATION: VAGINA

## 2024-02-23 ASSESSMENT — PAIN DESCRIPTION - PAIN TYPE: TYPE: ACUTE PAIN

## 2024-02-23 ASSESSMENT — PAIN - FUNCTIONAL ASSESSMENT: PAIN_FUNCTIONAL_ASSESSMENT: 0-10

## 2024-02-23 ASSESSMENT — PAIN DESCRIPTION - DESCRIPTORS: DESCRIPTORS: BURNING

## 2024-02-23 ASSESSMENT — PAIN DESCRIPTION - FREQUENCY: FREQUENCY: INTERMITTENT

## 2024-02-23 ASSESSMENT — PAIN SCALES - GENERAL: PAINLEVEL_OUTOF10: 5

## 2024-02-23 NOTE — ED NOTES
20 ga PIV started to L AC, labs obtained and sent, 16 fr castro also placed. Will do castro teaching shortly.

## 2024-02-23 NOTE — ED TRIAGE NOTES
Pt came ambulatory to triage c/o urinary retention and pain when urinating. Pt states she ahs decreased urine output.    Pt states she has Urology appointment on March 04 for the same problem.   Metronidazole Pregnancy And Lactation Text: This medication is Pregnancy Category B and considered safe during pregnancy.  It is also excreted in breast milk. Griseofulvin Pregnancy And Lactation Text: This medication is Pregnancy Category X and is known to cause serious birth defects. It is unknown if this medication is excreted in breast milk but breast feeding should be avoided. Bexarotene Counseling:  I discussed with the patient the risks of bexarotene including but not limited to hair loss, dry lips/skin/eyes, liver abnormalities, hyperlipidemia, pancreatitis, depression/suicidal ideation, photosensitivity, drug rash/allergic reactions, hypothyroidism, anemia, leukopenia, infection, cataracts, and teratogenicity.  Patient understands that they will need regular blood tests to check lipid profile, liver function tests, white blood cell count, thyroid function tests and pregnancy test if applicable. Albendazole Counseling:  I discussed with the patient the risks of albendazole including but not limited to cytopenia, kidney damage, nausea/vomiting and severe allergy.  The patient understands that this medication is being used in an off-label manner. Dupixent Counseling: I discussed with the patient the risks of dupilumab including but not limited to eye infection and irritation, cold sores, injection site reactions, worsening of asthma, allergic reactions and increased risk of parasitic infection.  Live vaccines should be avoided while taking dupilumab. Dupilumab will also interact with certain medications such as warfarin and cyclosporine. The patient understands that monitoring is required and they must alert us or the primary physician if symptoms of infection or other concerning signs are noted. Stelara Counseling:  I discussed with the patient the risks of ustekinumab including but not limited to immunosuppression, malignancy, posterior leukoencephalopathy syndrome, and serious infections.  The patient understands that monitoring is required including a PPD at baseline and must alert us or the primary physician if symptoms of infection or other concerning signs are noted. Elidel Counseling: Patient may experience a mild burning sensation during topical application. Elidel is not approved in children less than 2 years of age. There have been case reports of hematologic and skin malignancies in patients using topical calcineurin inhibitors although causality is questionable. Azathioprine Pregnancy And Lactation Text: This medication is Pregnancy Category D and isn't considered safe during pregnancy. It is unknown if this medication is excreted in breast milk. Thalidomide Pregnancy And Lactation Text: This medication is Pregnancy Category X and is absolutely contraindicated during pregnancy. It is unknown if it is excreted in breast milk. Nsaids Counseling: NSAID Counseling: I discussed with the patient that NSAIDs should be taken with food. Prolonged use of NSAIDs can result in the development of stomach ulcers.  Patient advised to stop taking NSAIDs if abdominal pain occurs.  The patient verbalized understanding of the proper use and possible adverse effects of NSAIDs.  All of the patient's questions and concerns were addressed. Solaraze Counseling:  I discussed with the patient the risks of Solaraze including but not limited to erythema, scaling, itching, weeping, crusting, and pain. Itraconazole Counseling:  I discussed with the patient the risks of itraconazole including but not limited to liver damage, nausea/vomiting, neuropathy, and severe allergy.  The patient understands that this medication is best absorbed when taken with acidic beverages such as non-diet cola or ginger ale.  The patient understands that monitoring is required including baseline LFTs and repeat LFTs at intervals.  The patient understands that they are to contact us or the primary physician if concerning signs are noted. Humira Counseling:  I discussed with the patient the risks of adalimumab including but not limited to myelosuppression, immunosuppression, autoimmune hepatitis, demyelinating diseases, lymphoma, and serious infections.  The patient understands that monitoring is required including a PPD at baseline and must alert us or the primary physician if symptoms of infection or other concerning signs are noted. Bexarotene Pregnancy And Lactation Text: This medication is Pregnancy Category X and should not be given to women who are pregnant or may become pregnant. This medication should not be used if you are breast feeding. Azithromycin Counseling:  I discussed with the patient the risks of azithromycin including but not limited to GI upset, allergic reaction, drug rash, diarrhea, and yeast infections. Minocycline Counseling: Patient advised regarding possible photosensitivity and discoloration of the teeth, skin, lips, tongue and gums.  Patient instructed to avoid sunlight, if possible.  When exposed to sunlight, patients should wear protective clothing, sunglasses, and sunscreen.  The patient was instructed to call the office immediately if the following severe adverse effects occur:  hearing changes, easy bruising/bleeding, severe headache, or vision changes.  The patient verbalized understanding of the proper use and possible adverse effects of minocycline.  All of the patient's questions and concerns were addressed. Nsaids Pregnancy And Lactation Text: These medications are considered safe up to 30 weeks gestation. It is excreted in breast milk. Clofazimine Counseling:  I discussed with the patient the risks of clofazimine including but not limited to skin and eye pigmentation, liver damage, nausea/vomiting, gastrointestinal bleeding and allergy. Albendazole Pregnancy And Lactation Text: This medication is Pregnancy Category C and it isn't known if it is safe during pregnancy. It is also excreted in breast milk. Solaraze Pregnancy And Lactation Text: This medication is Pregnancy Category B and is considered safe. There is some data to suggest avoiding during the third trimester. It is unknown if this medication is excreted in breast milk. Detail Level: Detailed Dupixent Pregnancy And Lactation Text: This medication likely crosses the placenta but the risk for the fetus is uncertain. This medication is excreted in breast milk. Stelara Pregnancy And Lactation Text: This medication is Pregnancy Category B and is considered safe during pregnancy. It is unknown if this medication is excreted in breast milk. Valtrex Counseling: I discussed with the patient the risks of valacyclovir including but not limited to kidney damage, nausea, vomiting and severe allergy.  The patient understands that if the infection seems to be worsening or is not improving, they are to call. Cellcept Counseling:  I discussed with the patient the risks of mycophenolate mofetil including but not limited to infection/immunosuppression, GI upset, hypokalemia, hypercholesterolemia, bone marrow suppression, lymphoproliferative disorders, malignancy, GI ulceration/bleed/perforation, colitis, interstitial lung disease, kidney failure, progressive multifocal leukoencephalopathy, and birth defects.  The patient understands that monitoring is required including a baseline creatinine and regular CBC testing. In addition, patient must alert us immediately if symptoms of infection or other concerning signs are noted. Itraconazole Pregnancy And Lactation Text: This medication is Pregnancy Category C and it isn't know if it is safe during pregnancy. It is also excreted in breast milk. Elidel Pregnancy And Lactation Text: This medication is Pregnancy Category C. It is unknown if this medication is excreted in breast milk. Ivermectin Counseling:  Patient instructed to take medication on an empty stomach with a full glass of water.  Patient informed of potential adverse effects including but not limited to nausea, diarrhea, dizziness, itching, and swelling of the extremities or lymph nodes.  The patient verbalized understanding of the proper use and possible adverse effects of ivermectin.  All of the patient's questions and concerns were addressed. Azithromycin Pregnancy And Lactation Text: This medication is considered safe during pregnancy and is also secreted in breast milk. Minocycline Pregnancy And Lactation Text: This medication is Pregnancy Category D and not consider safe during pregnancy. It is also excreted in breast milk. Isotretinoin Counseling: Patient should get monthly blood tests, not donate blood, not drive at night if vision affected, not share medication, and not undergo elective surgery for 6 months after tx completed. Side effects reviewed, pt to contact office should one occur. Valtrex Pregnancy And Lactation Text: this medication is Pregnancy Category B and is considered safe during pregnancy. This medication is not directly found in breast milk but it's metabolite acyclovir is present. Odomzo Counseling- I discussed with the patient the risks of Odomzo including but not limited to nausea, vomiting, diarrhea, constipation, weight loss, changes in the sense of taste, decreased appetite, muscle spasms, and hair loss.  The patient verbalized understanding of the proper use and possible adverse effects of Odomzo.  All of the patient's questions and concerns were addressed. Eucrisa Counseling: Patient may experience a mild burning sensation during topical application. Eucrisa is not approved in children less than 2 years of age. Taltz Counseling: I discussed with the patient the risks of ixekizumab including but not limited to immunosuppression, serious infections, worsening of inflammatory bowel disease and drug reactions.  The patient understands that monitoring is required including a PPD at baseline and must alert us or the primary physician if symptoms of infection or other concerning signs are noted. Isotretinoin Pregnancy And Lactation Text: This medication is Pregnancy Category X and is considered extremely dangerous during pregnancy. It is unknown if it is excreted in breast milk. Clofazimine Pregnancy And Lactation Text: This medication is Pregnancy Category C and isn't considered safe during pregnancy. It is excreted in breast milk. Quinolones Counseling:  I discussed with the patient the risks of fluoroquinolones including but not limited to GI upset, allergic reaction, drug rash, diarrhea, dizziness, photosensitivity, yeast infections, liver function test abnormalities, tendonitis/tendon rupture. Ketoconazole Counseling:   Patient counseled regarding improving absorption with orange juice.  Adverse effects include but are not limited to breast enlargement, headache, diarrhea, nausea, upset stomach, liver function test abnormalities, taste disturbance, and stomach pain.  There is a rare possibility of liver failure that can occur when taking ketoconazole. The patient understands that monitoring of LFTs may be required, especially at baseline. The patient verbalized understanding of the proper use and possible adverse effects of ketoconazole.  All of the patient's questions and concerns were addressed. Ilumya Counseling: I discussed with the patient the risks of tildrakizumab including but not limited to immunosuppression, malignancy, posterior leukoencephalopathy syndrome, and serious infections.  The patient understands that monitoring is required including a PPD at baseline and must alert us or the primary physician if symptoms of infection or other concerning signs are noted. Taltz Pregnancy And Lactation Text: The risk during pregnancy and breastfeeding is uncertain with this medication. Topical Retinoid counseling:  Patient advised to apply a pea-sized amount only at bedtime and wait 30 minutes after washing their face before applying.  If too drying, patient may add a non-comedogenic moisturizer. The patient verbalized understanding of the proper use and possible adverse effects of retinoids.  All of the patient's questions and concerns were addressed. Bactrim Counseling:  I discussed with the patient the risks of sulfa antibiotics including but not limited to GI upset, allergic reaction, drug rash, diarrhea, dizziness, photosensitivity, and yeast infections.  Rarely, more serious reactions can occur including but not limited to aplastic anemia, agranulocytosis, methemoglobinemia, blood dyscrasias, liver or kidney failure, lung infiltrates or desquamative/blistering drug rashes. Colchicine Counseling:  Patient counseled regarding adverse effects including but not limited to stomach upset (nausea, vomiting, stomach pain, or diarrhea).  Patient instructed to limit alcohol consumption while taking this medication.  Colchicine may reduce blood counts especially with prolonged use.  The patient understands that monitoring of kidney function and blood counts may be required, especially at baseline. The patient verbalized understanding of the proper use and possible adverse effects of colchicine.  All of the patient's questions and concerns were addressed. Eucrisa Pregnancy And Lactation Text: This medication has not been assigned a Pregnancy Risk Category but animal studies failed to show danger with the topical medication. It is unknown if the medication is excreted in breast milk. Cyclophosphamide Counseling:  I discussed with the patient the risks of cyclophosphamide including but not limited to hair loss, hormonal abnormalities, decreased fertility, abdominal pain, diarrhea, nausea and vomiting, bone marrow suppression and infection. The patient understands that monitoring is required while taking this medication. Ketoconazole Pregnancy And Lactation Text: This medication is Pregnancy Category C and it isn't know if it is safe during pregnancy. It is also excreted in breast milk and breast feeding isn't recommended. Use Enhanced Medication Counseling?: No High Dose Vitamin A Counseling: Side effects reviewed, pt to contact office should one occur. Tremfya Counseling: I discussed with the patient the risks of guselkumab including but not limited to immunosuppression, serious infections, worsening of inflammatory bowel disease and drug reactions.  The patient understands that monitoring is required including a PPD at baseline and must alert us or the primary physician if symptoms of infection or other concerning signs are noted. Bactrim Pregnancy And Lactation Text: This medication is Pregnancy Category D and is known to cause fetal risk.  It is also excreted in breast milk. Cyclophosphamide Pregnancy And Lactation Text: This medication is Pregnancy Category D and it isn't considered safe during pregnancy. This medication is excreted in breast milk. Otezla Counseling: The side effects of Otezla were discussed with the patient, including but not limited to worsening or new depression, weight loss, diarrhea, nausea, upper respiratory tract infection, and headache. Patient instructed to call the office should any adverse effect occur.  The patient verbalized understanding of the proper use and possible adverse effects of Otezla.  All the patient's questions and concerns were addressed. Terbinafine Counseling: Patient counseling regarding adverse effects of terbinafine including but not limited to headache, diarrhea, rash, upset stomach, liver function test abnormalities, itching, taste/smell disturbance, nausea, abdominal pain, and flatulence.  There is a rare possibility of liver failure that can occur when taking terbinafine.  The patient understands that a baseline LFT and kidney function test may be required. The patient verbalized understanding of the proper use and possible adverse effects of terbinafine.  All of the patient's questions and concerns were addressed. Infliximab Counseling:  I discussed with the patient the risks of infliximab including but not limited to myelosuppression, immunosuppression, autoimmune hepatitis, demyelinating diseases, lymphoma, and serious infections.  The patient understands that monitoring is required including a PPD at baseline and must alert us or the primary physician if symptoms of infection or other concerning signs are noted. High Dose Vitamin A Pregnancy And Lactation Text: High dose vitamin A therapy is contraindicated during pregnancy and breast feeding. Otezla Pregnancy And Lactation Text: This medication is Pregnancy Category C and it isn't known if it is safe during pregnancy. It is unknown if it is excreted in breast milk. Hydroquinone Counseling:  Patient advised that medication may result in skin irritation, lightening (hypopigmentation), dryness, and burning.  In the event of skin irritation, the patient was advised to reduce the amount of the drug applied or use it less frequently.  Rarely, spots that are treated with hydroquinone can become darker (pseudoochronosis).  Should this occur, patient instructed to stop medication and call the office. The patient verbalized understanding of the proper use and possible adverse effects of hydroquinone.  All of the patient's questions and concerns were addressed. Rifampin Counseling: I discussed with the patient the risks of rifampin including but not limited to liver damage, kidney damage, red-orange body fluids, nausea/vomiting and severe allergy. Dapsone Counseling: I discussed with the patient the risks of dapsone including but not limited to hemolytic anemia, agranulocytosis, rashes, methemoglobinemia, kidney failure, peripheral neuropathy, headaches, GI upset, and liver toxicity.  Patients who start dapsone require monitoring including baseline LFTs and weekly CBCs for the first month, then every month thereafter.  The patient verbalized understanding of the proper use and possible adverse effects of dapsone.  All of the patient's questions and concerns were addressed. Tazorac Counseling:  Patient advised that medication is irritating and drying.  Patient may need to apply sparingly and wash off after an hour before eventually leaving it on overnight.  The patient verbalized understanding of the proper use and possible adverse effects of tazorac.  All of the patient's questions and concerns were addressed. Cephalexin Counseling: I counseled the patient regarding use of cephalexin as an antibiotic for prophylactic and/or therapeutic purposes. Cephalexin (commonly prescribed under brand name Keflex) is a cephalosporin antibiotic which is active against numerous classes of bacteria, including most skin bacteria. Side effects may include nausea, diarrhea, gastrointestinal upset, rash, hives, yeast infections, and in rare cases, hepatitis, kidney disease, seizures, fever, confusion, neurologic symptoms, and others. Patients with severe allergies to penicillin medications are cautioned that there is about a 10% incidence of cross-reactivity with cephalosporins. When possible, patients with penicillin allergies should use alternatives to cephalosporins for antibiotic therapy. Cyclosporine Counseling:  I discussed with the patient the risks of cyclosporine including but not limited to hypertension, gingival hyperplasia,myelosuppression, immunosuppression, liver damage, kidney damage, neurotoxicity, lymphoma, and serious infections. The patient understands that monitoring is required including baseline blood pressure, CBC, CMP, lipid panel and uric acid, and then 1-2 times monthly CMP and blood pressure. Tazorac Pregnancy And Lactation Text: This medication is not safe during pregnancy. It is unknown if this medication is excreted in breast milk. Rifampin Pregnancy And Lactation Text: This medication is Pregnancy Category C and it isn't know if it is safe during pregnancy. It is also excreted in breast milk and should not be used if you are breast feeding. Terbinafine Pregnancy And Lactation Text: This medication is Pregnancy Category B and is considered safe during pregnancy. It is also excreted in breast milk and breast feeding isn't recommended. Oxybutynin Counseling:  I discussed with the patient the risks of oxybutynin including but not limited to skin rash, drowsiness, dry mouth, difficulty urinating, and blurred vision. Dapsone Pregnancy And Lactation Text: This medication is Pregnancy Category C and is not considered safe during pregnancy or breast feeding. Cimzia Counseling:  I discussed with the patient the risks of Cimzia including but not limited to immunosuppression, allergic reactions and infections.  The patient understands that monitoring is required including a PPD at baseline and must alert us or the primary physician if symptoms of infection or other concerning signs are noted. Benzoyl Peroxide Counseling: Patient counseled that medicine may cause skin irritation and bleach clothing.  In the event of skin irritation, the patient was advised to reduce the amount of the drug applied or use it less frequently.   The patient verbalized understanding of the proper use and possible adverse effects of benzoyl peroxide.  All of the patient's questions and concerns were addressed. Xeljanz Counseling: I discussed with the patient the risks of Xeljanz therapy including increased risk of infection, liver issues, headache, diarrhea, or cold symptoms. Live vaccines should be avoided. They were instructed to call if they have any problems. Cephalexin Pregnancy And Lactation Text: This medication is Pregnancy Category B and considered safe during pregnancy.  It is also excreted in breast milk but can be used safely for shorter doses. Cyclosporine Pregnancy And Lactation Text: This medication is Pregnancy Category C and it isn't know if it is safe during pregnancy. This medication is excreted in breast milk. Sarecycline Counseling: Patient advised regarding possible photosensitivity and discoloration of the teeth, skin, lips, tongue and gums.  Patient instructed to avoid sunlight, if possible.  When exposed to sunlight, patients should wear protective clothing, sunglasses, and sunscreen.  The patient was instructed to call the office immediately if the following severe adverse effects occur:  hearing changes, easy bruising/bleeding, severe headache, or vision changes.  The patient verbalized understanding of the proper use and possible adverse effects of sarecycline.  All of the patient's questions and concerns were addressed. Imiquimod Counseling:  I discussed with the patient the risks of imiquimod including but not limited to erythema, scaling, itching, weeping, crusting, and pain.  Patient understands that the inflammatory response to imiquimod is variable from person to person and was educated regarded proper titration schedule.  If flu-like symptoms develop, patient knows to discontinue the medication and contact us. Rituxan Counseling:  I discussed with the patient the risks of Rituxan infusions. Side effects can include infusion reactions, severe drug rashes including mucocutaneous reactions, reactivation of latent hepatitis and other infections and rarely progressive multifocal leukoencephalopathy.  All of the patient's questions and concerns were addressed. Oxybutynin Pregnancy And Lactation Text: This medication is Pregnancy Category B and is considered safe during pregnancy. It is unknown if it is excreted in breast milk. Erivedge Counseling- I discussed with the patient the risks of Erivedge including but not limited to nausea, vomiting, diarrhea, constipation, weight loss, changes in the sense of taste, decreased appetite, muscle spasms, and hair loss.  The patient verbalized understanding of the proper use and possible adverse effects of Erivedge.  All of the patient's questions and concerns were addressed. Xelpatriciaz Pregnancy And Lactation Text: This medication is Pregnancy Category D and is not considered safe during pregnancy.  The risk during breast feeding is also uncertain. Topical Clindamycin Counseling: Patient counseled that this medication may cause skin irritation or allergic reactions.  In the event of skin irritation, the patient was advised to reduce the amount of the drug applied or use it less frequently.   The patient verbalized understanding of the proper use and possible adverse effects of clindamycin.  All of the patient's questions and concerns were addressed. Methotrexate Counseling:  Patient counseled regarding adverse effects of methotrexate including but not limited to nausea, vomiting, abnormalities in liver function tests. Patients may develop mouth sores, rash, diarrhea, and abnormalities in blood counts. The patient understands that monitoring is required including LFT's and blood counts.  There is a rare possibility of scarring of the liver and lung problems that can occur when taking methotrexate. Persistent nausea, loss of appetite, pale stools, dark urine, cough, and shortness of breath should be reported immediately. Patient advised to discontinue methotrexate treatment at least three months before attempting to become pregnant.  I discussed the need for folate supplements while taking methotrexate.  These supplements can decrease side effects during methotrexate treatment. The patient verbalized understanding of the proper use and possible adverse effects of methotrexate.  All of the patient's questions and concerns were addressed. Clindamycin Counseling: I counseled the patient regarding use of clindamycin as an antibiotic for prophylactic and/or therapeutic purposes. Clindamycin is active against numerous classes of bacteria, including skin bacteria. Side effects may include nausea, diarrhea, gastrointestinal upset, rash, hives, yeast infections, and in rare cases, colitis. Cimetidine Counseling:  I discussed with the patient the risks of Cimetidine including but not limited to gynecomastia, headache, diarrhea, nausea, drowsiness, arrhythmias, pancreatitis, skin rashes, psychosis, bone marrow suppression and kidney toxicity. Benzoyl Peroxide Pregnancy And Lactation Text: This medication is Pregnancy Category C. It is unknown if benzoyl peroxide is excreted in breast milk. Rituxan Pregnancy And Lactation Text: This medication is Pregnancy Category C and it isn't know if it is safe during pregnancy. It is unknown if this medication is excreted in breast milk but similar antibodies are known to be excreted. Birth Control Pills Counseling: Birth Control Pill Counseling: I discussed with the patient the potential side effects of OCPs including but not limited to increased risk of stroke, heart attack, thrombophlebitis, deep venous thrombosis, hepatic adenomas, breast changes, GI upset, headaches, and depression.  The patient verbalized understanding of the proper use and possible adverse effects of OCPs. All of the patient's questions and concerns were addressed. Xolair Counseling:  Patient informed of potential adverse effects including but not limited to fever, muscle aches, rash and allergic reactions.  The patient verbalized understanding of the proper use and possible adverse effects of Xolair.  All of the patient's questions and concerns were addressed. Methotrexate Pregnancy And Lactation Text: This medication is Pregnancy Category X and is known to cause fetal harm. This medication is excreted in breast milk. Tetracycline Counseling: Patient counseled regarding possible photosensitivity and increased risk for sunburn.  Patient instructed to avoid sunlight, if possible.  When exposed to sunlight, patients should wear protective clothing, sunglasses, and sunscreen.  The patient was instructed to call the office immediately if the following severe adverse effects occur:  hearing changes, easy bruising/bleeding, severe headache, or vision changes.  The patient verbalized understanding of the proper use and possible adverse effects of tetracycline.  All of the patient's questions and concerns were addressed. Patient understands to avoid pregnancy while on therapy due to potential birth defects. Clindamycin Pregnancy And Lactation Text: This medication can be used in pregnancy if certain situations. Clindamycin is also present in breast milk. Carac Counseling:  I discussed with the patient the risks of Carac including but not limited to erythema, scaling, itching, weeping, crusting, and pain. Siliq Counseling:  I discussed with the patient the risks of Siliq including but not limited to new or worsening depression, suicidal thoughts and behavior, immunosuppression, malignancy, posterior leukoencephalopathy syndrome, and serious infections.  The patient understands that monitoring is required including a PPD at baseline and must alert us or the primary physician if symptoms of infection or other concerning signs are noted. There is also a special program designed to monitor depression which is required with Siliq. Birth Control Pills Pregnancy And Lactation Text: This medication should be avoided if pregnant and for the first 30 days post-partum. Gabapentin Counseling: I discussed with the patient the risks of gabapentin including but not limited to dizziness, somnolence, fatigue and ataxia. Minoxidil Counseling: Minoxidil is a topical medication which can increase blood flow where it is applied. It is uncertain how this medication increases hair growth. Side effects are uncommon and include stinging and allergic reactions. Topical Sulfur Applications Counseling: Topical Sulfur Counseling: Patient counseled that this medication may cause skin irritation or allergic reactions.  In the event of skin irritation, the patient was advised to reduce the amount of the drug applied or use it less frequently.   The patient verbalized understanding of the proper use and possible adverse effects of topical sulfur application.  All of the patient's questions and concerns were addressed. Xolair Pregnancy And Lactation Text: This medication is Pregnancy Category B and is considered safe during pregnancy. This medication is excreted in breast milk. Doxycycline Counseling:  Patient counseled regarding possible photosensitivity and increased risk for sunburn.  Patient instructed to avoid sunlight, if possible.  When exposed to sunlight, patients should wear protective clothing, sunglasses, and sunscreen.  The patient was instructed to call the office immediately if the following severe adverse effects occur:  hearing changes, easy bruising/bleeding, severe headache, or vision changes.  The patient verbalized understanding of the proper use and possible adverse effects of doxycycline.  All of the patient's questions and concerns were addressed. Prednisone Counseling:  I discussed with the patient the risks of prolonged use of prednisone including but not limited to weight gain, insomnia, osteoporosis, mood changes, diabetes, susceptibility to infection, glaucoma and high blood pressure.  In cases where prednisone use is prolonged, patients should be monitored with blood pressure checks, serum glucose levels and an eye exam.  Additionally, the patient may need to be placed on GI prophylaxis, PCP prophylaxis, and calcium and vitamin D supplementation and/or a bisphosphonate.  The patient verbalized understanding of the proper use and the possible adverse effects of prednisone.  All of the patient's questions and concerns were addressed. Topical Sulfur Applications Pregnancy And Lactation Text: This medication is Pregnancy Category C and has an unknown safety profile during pregnancy. It is unknown if this topical medication is excreted in breast milk. Doxepin Counseling:  Patient advised that the medication is sedating and not to drive a car after taking this medication. Patient informed of potential adverse effects including but not limited to dry mouth, urinary retention, and blurry vision.  The patient verbalized understanding of the proper use and possible adverse effects of doxepin.  All of the patient's questions and concerns were addressed. Spironolactone Counseling: Patient advised regarding risks of diarrhea, abdominal pain, hyperkalemia, birth defects (for female patients), liver toxicity and renal toxicity. The patient may need blood work to monitor liver and kidney function and potassium levels while on therapy. The patient verbalized understanding of the proper use and possible adverse effects of spironolactone.  All of the patient's questions and concerns were addressed. Carac Pregnancy And Lactation Text: This medication is Pregnancy Category X and contraindicated in pregnancy and in women who may become pregnant. It is unknown if this medication is excreted in breast milk. Doxycycline Pregnancy And Lactation Text: This medication is Pregnancy Category D and not consider safe during pregnancy. It is also excreted in breast milk but is considered safe for shorter treatment courses. Doxepin Pregnancy And Lactation Text: This medication is Pregnancy Category C and it isn't known if it is safe during pregnancy. It is also excreted in breast milk and breast feeding isn't recommended. 5-Fu Counseling: 5-Fluorouracil Counseling:  I discussed with the patient the risks of 5-fluorouracil including but not limited to erythema, scaling, itching, weeping, crusting, and pain. Spironolactone Pregnancy And Lactation Text: This medication can cause feminization of the male fetus and should be avoided during pregnancy. The active metabolite is also found in breast milk. Glycopyrrolate Counseling:  I discussed with the patient the risks of glycopyrrolate including but not limited to skin rash, drowsiness, dry mouth, difficulty urinating, and blurred vision. Picato Counseling:  I discussed with the patient the risks of Picato including but not limited to erythema, scaling, itching, weeping, crusting, and pain. Simponi Counseling:  I discussed with the patient the risks of golimumab including but not limited to myelosuppression, immunosuppression, autoimmune hepatitis, demyelinating diseases, lymphoma, and serious infections.  The patient understands that monitoring is required including a PPD at baseline and must alert us or the primary physician if symptoms of infection or other concerning signs are noted. Zyclara Counseling:  I discussed with the patient the risks of imiquimod including but not limited to erythema, scaling, itching, weeping, crusting, and pain.  Patient understands that the inflammatory response to imiquimod is variable from person to person and was educated regarded proper titration schedule.  If flu-like symptoms develop, patient knows to discontinue the medication and contact us. SSKI Counseling:  I discussed with the patient the risks of SSKI including but not limited to thyroid abnormalities, metallic taste, GI upset, fever, headache, acne, arthralgias, paraesthesias, lymphadenopathy, easy bleeding, arrhythmias, and allergic reaction. Fluconazole Counseling:  Patient counseled regarding adverse effects of fluconazole including but not limited to headache, diarrhea, nausea, upset stomach, liver function test abnormalities, taste disturbance, and stomach pain.  There is a rare possibility of liver failure that can occur when taking fluconazole.  The patient understands that monitoring of LFTs and kidney function test may be required, especially at baseline. The patient verbalized understanding of the proper use and possible adverse effects of fluconazole.  All of the patient's questions and concerns were addressed. Erythromycin Counseling:  I discussed with the patient the risks of erythromycin including but not limited to GI upset, allergic reaction, drug rash, diarrhea, increase in liver enzymes, and yeast infections. Hydroxyzine Counseling: Patient advised that the medication is sedating and not to drive a car after taking this medication.  Patient informed of potential adverse effects including but not limited to dry mouth, urinary retention, and blurry vision.  The patient verbalized understanding of the proper use and possible adverse effects of hydroxyzine.  All of the patient's questions and concerns were addressed. Cimzia Pregnancy And Lactation Text: This medication crosses the placenta but can be considered safe in certain situations. Cimzia may be excreted in breast milk. Glycopyrrolate Pregnancy And Lactation Text: This medication is Pregnancy Category B and is considered safe during pregnancy. It is unknown if it is excreted breast milk. Erythromycin Pregnancy And Lactation Text: This medication is Pregnancy Category B and is considered safe during pregnancy. It is also excreted in breast milk. Sski Pregnancy And Lactation Text: This medication is Pregnancy Category D and isn't considered safe during pregnancy. It is excreted in breast milk. Acitretin Counseling:  I discussed with the patient the risks of acitretin including but not limited to hair loss, dry lips/skin/eyes, liver damage, hyperlipidemia, depression/suicidal ideation, photosensitivity.  Serious rare side effects can include but are not limited to pancreatitis, pseudotumor cerebri, bony changes, clot formation/stroke/heart attack.  Patient understands that alcohol is contraindicated since it can result in liver toxicity and significantly prolong the elimination of the drug by many years. Cosentyx Counseling:  I discussed with the patient the risks of Cosentyx including but not limited to worsening of Crohn's disease, immunosuppression, allergic reactions and infections.  The patient understands that monitoring is required including a PPD at baseline and must alert us or the primary physician if symptoms of infection or other concerning signs are noted. Hydroxyzine Pregnancy And Lactation Text: This medication is not safe during pregnancy and should not be taken. It is also excreted in breast milk and breast feeding isn't recommended. Drysol Counseling:  I discussed with the patient the risks of drysol/aluminum chloride including but not limited to skin rash, itching, irritation, burning. Skyrizi Counseling: I discussed with the patient the risks of risankizumab-rzaa including but not limited to immunosuppression, and serious infections.  The patient understands that monitoring is required including a PPD at baseline and must alert us or the primary physician if symptoms of infection or other concerning signs are noted. Acitretin Pregnancy And Lactation Text: This medication is Pregnancy Category X and should not be given to women who are pregnant or may become pregnant in the future. This medication is excreted in breast milk. Hydroxychloroquine Counseling:  I discussed with the patient that a baseline ophthalmologic exam is needed at the start of therapy and every year thereafter while on therapy. A CBC may also be warranted for monitoring.  The side effects of this medication were discussed with the patient, including but not limited to agranulocytosis, aplastic anemia, seizures, rashes, retinopathy, and liver toxicity. Patient instructed to call the office should any adverse effect occur.  The patient verbalized understanding of the proper use and possible adverse effects of Plaquenil.  All the patient's questions and concerns were addressed. Protopic Counseling: Patient may experience a mild burning sensation during topical application. Protopic is not approved in children less than 2 years of age. There have been case reports of hematologic and skin malignancies in patients using topical calcineurin inhibitors although causality is questionable. Enbrel Counseling:  I discussed with the patient the risks of etanercept including but not limited to myelosuppression, immunosuppression, autoimmune hepatitis, demyelinating diseases, lymphoma, and infections.  The patient understands that monitoring is required including a PPD at baseline and must alert us or the primary physician if symptoms of infection or other concerning signs are noted. Griseofulvin Counseling:  I discussed with the patient the risks of griseofulvin including but not limited to photosensitivity, cytopenia, liver damage, nausea/vomiting and severe allergy.  The patient understands that this medication is best absorbed when taken with a fatty meal (e.g., ice cream or french fries). Protopic Pregnancy And Lactation Text: This medication is Pregnancy Category C. It is unknown if this medication is excreted in breast milk when applied topically. Metronidazole Counseling:  I discussed with the patient the risks of metronidazole including but not limited to seizures, nausea/vomiting, a metallic taste in the mouth, nausea/vomiting and severe allergy. Hydroxychloroquine Pregnancy And Lactation Text: This medication has been shown to cause fetal harm but it isn't assigned a Pregnancy Risk Category. There are small amounts excreted in breast milk. Arava Counseling:  Patient counseled regarding adverse effects of Arava including but not limited to nausea, vomiting, abnormalities in liver function tests. Patients may develop mouth sores, rash, diarrhea, and abnormalities in blood counts. The patient understands that monitoring is required including LFTs and blood counts.  There is a rare possibility of scarring of the liver and lung problems that can occur when taking methotrexate. Persistent nausea, loss of appetite, pale stools, dark urine, cough, and shortness of breath should be reported immediately. Patient advised to discontinue Arava treatment and consult with a physician prior to attempting conception. The patient will have to undergo a treatment to eliminate Arava from the body prior to conception. Drysol Pregnancy And Lactation Text: This medication is considered safe during pregnancy and breast feeding. Azathioprine Counseling:  I discussed with the patient the risks of azathioprine including but not limited to myelosuppression, immunosuppression, hepatotoxicity, lymphoma, and infections.  The patient understands that monitoring is required including baseline LFTs, Creatinine, possible TPMP genotyping and weekly CBCs for the first month and then every 2 weeks thereafter.  The patient verbalized understanding of the proper use and possible adverse effects of azathioprine.  All of the patient's questions and concerns were addressed. Thalidomide Counseling: I discussed with the patient the risks of thalidomide including but not limited to birth defects, anxiety, weakness, chest pain, dizziness, cough and severe allergy.

## 2024-02-23 NOTE — ED PROVIDER NOTES
EMERGENCY DEPARTMENT HISTORY AND PHYSICAL EXAM      Date: 2/23/2024  Patient Name: Regla Floyd    History of Presenting Illness     Chief Complaint   Patient presents with    Urinary Retention    pain when urinating       History (Context): Regla Floyd is a 63 y.o. female with significant past medical history of hyperlipidemia, hypertension, DM presents ambulatory the ED today.  Patient reports history of urinary retention.  Patient reports decreased urine output beginning last night.  Denies abdominal pain, vomiting, diarrhea, fever, chills.  Patient states she has follow-up appoint with urology on 3/4.  Patient states she has been seen for the similar complaint urology but she is unsure of the outcome.  Patient has not taken anything for symptoms      PCP: Obi Thornton MD    No current facility-administered medications for this encounter.     Current Outpatient Medications   Medication Sig Dispense Refill    cephALEXin (KEFLEX) 500 MG capsule Take 1 capsule by mouth 2 times daily for 7 days 14 capsule 0    famotidine (PEPCID) 20 MG tablet Take 1 tablet by mouth in the morning and 1 tablet in the evening.      melatonin 5 MG TBDP disintegrating tablet       sucralfate (CARAFATE) 1 GM tablet TAKE 1 TABLET BY MOUTH 4 TIMES DAILY 120 tablet 3    dulaglutide (TRULICITY) 1.5 MG/0.5ML SC injection INJECT 0.5 MLS INTO THE SKIN EVERY SEVEN DAYS 12 mL 3    Melatonin 5 MG CAPS Take 5 mg by mouth nightly as needed (sleep) 90 capsule 3    metoclopramide (REGLAN) 10 MG tablet Take 1 tablet by mouth 3 times daily (with meals) 120 tablet 3    hydroCHLOROthiazide (HYDRODIURIL) 25 MG tablet TAKE 0.5 TABLETS BY MOUTH DAILY 90 tablet 3    metFORMIN (GLUCOPHAGE-XR) 750 MG extended release tablet TAKE 1 TABLET BY MOUTH TWO TIMES A  tablet 3    ondansetron (ZOFRAN) 4 MG tablet Take 1 tablet by mouth 3 times daily as needed for Nausea or Vomiting 30 tablet 0    omeprazole (PRILOSEC) 20 MG delayed release

## 2024-02-23 NOTE — ED NOTES
Assumed care of pt in rm 7. Pt here for urinary retention, has a urology appointment March 4. Pt is A Ox 4, lung sounds clear, abd soft non-tender, skin intact.     Pt ambulated to restroom UA collected bladder scan done 350 c retention noted.

## 2024-02-25 LAB
BACTERIA SPEC CULT: NORMAL
CC UR VC: NORMAL
SERVICE CMNT-IMP: NORMAL

## 2024-03-04 ENCOUNTER — HOSPITAL ENCOUNTER (OUTPATIENT)
Facility: HOSPITAL | Age: 64
Discharge: HOME | End: 2024-03-07
Attending: UROLOGY

## 2024-03-04 DIAGNOSIS — N36.1 URETHRAL DIVERTICULUM: ICD-10-CM

## 2024-03-04 DIAGNOSIS — I10 ESSENTIAL (PRIMARY) HYPERTENSION: ICD-10-CM

## 2024-03-04 DIAGNOSIS — Z79.4 TYPE 2 DIABETES MELLITUS WITH OTHER SPECIFIED COMPLICATION, WITH LONG-TERM CURRENT USE OF INSULIN (HCC): ICD-10-CM

## 2024-03-04 DIAGNOSIS — N13.39 OTHER HYDRONEPHROSIS: ICD-10-CM

## 2024-03-04 DIAGNOSIS — E11.69 TYPE 2 DIABETES MELLITUS WITH OTHER SPECIFIED COMPLICATION, WITH LONG-TERM CURRENT USE OF INSULIN (HCC): ICD-10-CM

## 2024-03-06 RX ORDER — HYDROCHLOROTHIAZIDE 25 MG/1
12.5 TABLET ORAL DAILY
Qty: 90 TABLET | Refills: 4 | Status: SHIPPED | OUTPATIENT
Start: 2024-03-06

## 2024-03-06 RX ORDER — DULAGLUTIDE 1.5 MG/.5ML
INJECTION, SOLUTION SUBCUTANEOUS
Qty: 12 ML | Refills: 4 | Status: SHIPPED | OUTPATIENT
Start: 2024-03-06

## 2024-03-07 ENCOUNTER — TELEPHONE (OUTPATIENT)
Facility: CLINIC | Age: 64
End: 2024-03-07

## 2024-03-07 NOTE — TELEPHONE ENCOUNTER
Patient request medication for Trulicity, stated there is a back order.    She is suppose to take it once week      Request sent medication to [facility] on [address]

## 2024-03-09 NOTE — TELEPHONE ENCOUNTER
Can we find out if she was able to obtain the Trulicity?  If not can we have her call her insurance company and find out what we can substitute for it that is on their formulary.  Thanks

## 2024-03-12 ENCOUNTER — TELEPHONE (OUTPATIENT)
Facility: CLINIC | Age: 64
End: 2024-03-12

## 2024-03-12 DIAGNOSIS — E11.69 TYPE 2 DIABETES MELLITUS WITH OTHER SPECIFIED COMPLICATION, WITH LONG-TERM CURRENT USE OF INSULIN (HCC): ICD-10-CM

## 2024-03-12 DIAGNOSIS — Z79.4 TYPE 2 DIABETES MELLITUS WITH OTHER SPECIFIED COMPLICATION, WITH LONG-TERM CURRENT USE OF INSULIN (HCC): ICD-10-CM

## 2024-03-12 RX ORDER — DULAGLUTIDE 1.5 MG/.5ML
INJECTION, SOLUTION SUBCUTANEOUS
Qty: 12 ML | Refills: 4 | Status: SHIPPED | OUTPATIENT
Start: 2024-03-12 | End: 2024-03-14 | Stop reason: SDUPTHER

## 2024-03-12 NOTE — TELEPHONE ENCOUNTER
Patient request medication for Trulicity be sent to another location due to not at [facility]      Location \      [facility]      Request water pill due to gain weight believes it's water she is holding

## 2024-03-14 DIAGNOSIS — Z79.4 TYPE 2 DIABETES MELLITUS WITH OTHER SPECIFIED COMPLICATION, WITH LONG-TERM CURRENT USE OF INSULIN (HCC): ICD-10-CM

## 2024-03-14 DIAGNOSIS — E11.69 TYPE 2 DIABETES MELLITUS WITH OTHER SPECIFIED COMPLICATION, WITH LONG-TERM CURRENT USE OF INSULIN (HCC): ICD-10-CM

## 2024-03-14 NOTE — TELEPHONE ENCOUNTER
Patient stated legs are filling up with Fluid       Request Water PILL didn't know name of medication         Location     Drug center     -------------------------------------------------------------------------  Patient stated drug [place] doesn't have Trulicity please send to [facility]        Location     [facility]

## 2024-03-15 RX ORDER — DULAGLUTIDE 1.5 MG/.5ML
INJECTION, SOLUTION SUBCUTANEOUS
Qty: 12 ML | Refills: 4 | Status: SHIPPED | OUTPATIENT
Start: 2024-03-15

## 2024-03-18 DIAGNOSIS — E11.69 TYPE 2 DIABETES MELLITUS WITH OTHER SPECIFIED COMPLICATION, WITH LONG-TERM CURRENT USE OF INSULIN (HCC): ICD-10-CM

## 2024-03-18 DIAGNOSIS — Z79.4 TYPE 2 DIABETES MELLITUS WITH OTHER SPECIFIED COMPLICATION, WITH LONG-TERM CURRENT USE OF INSULIN (HCC): ICD-10-CM

## 2024-03-18 NOTE — TELEPHONE ENCOUNTER
PATIENT STOPPED BY TO GET A REFILL OF TRULICITY PLEASE CALL IT IN  [facility]     PH# [phone]    ALSO CALL IN FOR THE FLUID PILL

## 2024-03-19 RX ORDER — DULAGLUTIDE 1.5 MG/.5ML
INJECTION, SOLUTION SUBCUTANEOUS
Qty: 12 ML | Refills: 4 | Status: SHIPPED | OUTPATIENT
Start: 2024-03-19

## 2024-03-20 ENCOUNTER — TELEPHONE (OUTPATIENT)
Facility: CLINIC | Age: 64
End: 2024-03-20

## 2024-03-20 NOTE — TELEPHONE ENCOUNTER
PT CALLED SHE NEEDS HER PRESCRIPTION FOR FLUID PILL.,     CALL PRESCRIPTION INTO THE   [facility]  [address] [facility]  PH# [phone]

## 2024-03-20 NOTE — TELEPHONE ENCOUNTER
TC was made to pt and pt did receive RX for HCTZ but directions is for to take half a tablet pt states \" that she is still having swelling in legs please advise if pt can take a whole pill or if anything else can be given for fluid.

## 2024-04-02 ENCOUNTER — TELEPHONE (OUTPATIENT)
Facility: CLINIC | Age: 64
End: 2024-04-02

## 2024-04-02 NOTE — TELEPHONE ENCOUNTER
Pt called she is asking if her medicine had been increased.    hydroCHLOROthiazide (HYDRODIURIL) 25 MG tablet  TAKE 0.5 TABLETS BY MOUTH DAILY, Disp-90 tablet, R-4  Normal, Last Dose: Not Recorded  Refills: 4 ordered          Pharmacy: [facility] - [place] [address] - P [phone] - F [phone]

## 2024-04-12 NOTE — PROGRESS NOTES
Assessment/Plan  1. Essential (primary) hypertension  Comments:  Switch to Chlorthalidone 25 mg, DC HCTZ.   Consider Aldactone  Orders:  -     chlorthalidone (HYGROTON) 25 MG tablet; Take 1 tablet by mouth daily, Disp-30 tablet, R-3Normal  2. Mixed hyperlipidemia  3. Type 2 diabetes mellitus with other specified complication, with long-term current use of insulin (HCC)  4. Primary insomnia  5. Gastroparesis  Comments:  Doing well today. No change in treatment  6. History of irregular heartbeat  Comments:  Steady presently. Follow  7. Normocytic anemia  8. Bladder mass  Comments:  No cancer seen, just infected  9. Weight gain  Comments:  BMI still 24.37.  Will adjust medication diet  10. Insomnia, unspecified type  Comments:  Increase melatonin to 10  Orders:  -     Melatonin 10 MG TABS; Take 10 mg by mouth nightly as needed (sleep), Disp-90 tablet, R-3Normal  11. Encounter for screening mammogram for malignant neoplasm of breast  -     MOLLY DIGITAL SCREEN W OR WO CAD BILATERAL; Future     RESULTS REVIEW:   Office Visit on 03/04/2024   Component Date Value Ref Range Status    Urine Culture, Routine 03/04/2024 No growth in 36 - 48 hours.   Final   Admission on 02/23/2024, Discharged on 02/23/2024   Component Date Value Ref Range Status    Color, UA 02/23/2024 YELLOW    Final    Appearance 02/23/2024 CLOUDY    Final    Specific Gravity, UA 02/23/2024 1.010  1.005 - 1.030   Final    pH, Urine 02/23/2024 7.5  5.0 - 8.0   Final    Protein, UA 02/23/2024 30 (A)  NEG mg/dL Final    Glucose, UA 02/23/2024 100 (A)  NEG mg/dL Final    Ketones, Urine 02/23/2024 Negative  NEG mg/dL Final    Bilirubin Urine 02/23/2024 Negative  NEG   Final    Blood, Urine 02/23/2024 MODERATE (A)  NEG   Final    Urobilinogen, Urine 02/23/2024 0.2  0.2 - 1.0 EU/dL Final    Nitrite, Urine 02/23/2024 Negative  NEG   Final    Leukocyte Esterase, Urine 02/23/2024 LARGE (A)  NEG   Final    WBC, UA 02/23/2024 TOO NUMEROUS TO COUNT  0 - 4 /hpf Final

## 2024-04-16 ENCOUNTER — OFFICE VISIT (OUTPATIENT)
Facility: CLINIC | Age: 64
End: 2024-04-16
Payer: MEDICAID

## 2024-04-16 VITALS
HEIGHT: 66 IN | HEART RATE: 82 BPM | WEIGHT: 151 LBS | OXYGEN SATURATION: 100 % | BODY MASS INDEX: 24.27 KG/M2 | SYSTOLIC BLOOD PRESSURE: 169 MMHG | RESPIRATION RATE: 16 BRPM | DIASTOLIC BLOOD PRESSURE: 82 MMHG

## 2024-04-16 DIAGNOSIS — I10 ESSENTIAL (PRIMARY) HYPERTENSION: Primary | ICD-10-CM

## 2024-04-16 DIAGNOSIS — K31.84 GASTROPARESIS: ICD-10-CM

## 2024-04-16 DIAGNOSIS — E11.69 TYPE 2 DIABETES MELLITUS WITH OTHER SPECIFIED COMPLICATION, WITH LONG-TERM CURRENT USE OF INSULIN (HCC): ICD-10-CM

## 2024-04-16 DIAGNOSIS — F51.01 PRIMARY INSOMNIA: ICD-10-CM

## 2024-04-16 DIAGNOSIS — E78.2 MIXED HYPERLIPIDEMIA: ICD-10-CM

## 2024-04-16 DIAGNOSIS — Z79.4 TYPE 2 DIABETES MELLITUS WITH OTHER SPECIFIED COMPLICATION, WITH LONG-TERM CURRENT USE OF INSULIN (HCC): ICD-10-CM

## 2024-04-16 DIAGNOSIS — Z12.31 ENCOUNTER FOR SCREENING MAMMOGRAM FOR MALIGNANT NEOPLASM OF BREAST: ICD-10-CM

## 2024-04-16 DIAGNOSIS — D64.9 NORMOCYTIC ANEMIA: ICD-10-CM

## 2024-04-16 DIAGNOSIS — Z86.79 HISTORY OF IRREGULAR HEARTBEAT: ICD-10-CM

## 2024-04-16 DIAGNOSIS — R63.5 WEIGHT GAIN: ICD-10-CM

## 2024-04-16 DIAGNOSIS — N32.89 BLADDER MASS: ICD-10-CM

## 2024-04-16 PROCEDURE — 1123F ACP DISCUSS/DSCN MKR DOCD: CPT | Performed by: EMERGENCY MEDICINE

## 2024-04-16 PROCEDURE — 99214 OFFICE O/P EST MOD 30 MIN: CPT | Performed by: EMERGENCY MEDICINE

## 2024-04-16 PROCEDURE — 3077F SYST BP >= 140 MM HG: CPT | Performed by: EMERGENCY MEDICINE

## 2024-04-16 PROCEDURE — 3079F DIAST BP 80-89 MM HG: CPT | Performed by: EMERGENCY MEDICINE

## 2024-04-16 RX ORDER — PHENOL 1.4 %
10 AEROSOL, SPRAY (ML) MUCOUS MEMBRANE
Qty: 90 TABLET | Refills: 3 | Status: SHIPPED | OUTPATIENT
Start: 2024-04-16

## 2024-04-16 RX ORDER — CHLORTHALIDONE 25 MG/1
25 TABLET ORAL DAILY
Qty: 30 TABLET | Refills: 3 | Status: SHIPPED | OUTPATIENT
Start: 2024-04-16

## 2024-04-16 SDOH — ECONOMIC STABILITY: INCOME INSECURITY: HOW HARD IS IT FOR YOU TO PAY FOR THE VERY BASICS LIKE FOOD, HOUSING, MEDICAL CARE, AND HEATING?: SOMEWHAT HARD

## 2024-04-16 SDOH — ECONOMIC STABILITY: FOOD INSECURITY: WITHIN THE PAST 12 MONTHS, THE FOOD YOU BOUGHT JUST DIDN'T LAST AND YOU DIDN'T HAVE MONEY TO GET MORE.: OFTEN TRUE

## 2024-04-16 SDOH — ECONOMIC STABILITY: FOOD INSECURITY: WITHIN THE PAST 12 MONTHS, YOU WORRIED THAT YOUR FOOD WOULD RUN OUT BEFORE YOU GOT MONEY TO BUY MORE.: OFTEN TRUE

## 2024-04-16 ASSESSMENT — PATIENT HEALTH QUESTIONNAIRE - PHQ9
2. FEELING DOWN, DEPRESSED OR HOPELESS: NOT AT ALL
SUM OF ALL RESPONSES TO PHQ QUESTIONS 1-9: 0
1. LITTLE INTEREST OR PLEASURE IN DOING THINGS: NOT AT ALL
SUM OF ALL RESPONSES TO PHQ QUESTIONS 1-9: 0
SUM OF ALL RESPONSES TO PHQ9 QUESTIONS 1 & 2: 0

## 2024-04-16 ASSESSMENT — ANXIETY QUESTIONNAIRES
3. WORRYING TOO MUCH ABOUT DIFFERENT THINGS: NOT AT ALL
1. FEELING NERVOUS, ANXIOUS, OR ON EDGE: NOT AT ALL
GAD7 TOTAL SCORE: 0
6. BECOMING EASILY ANNOYED OR IRRITABLE: NOT AT ALL
IF YOU CHECKED OFF ANY PROBLEMS ON THIS QUESTIONNAIRE, HOW DIFFICULT HAVE THESE PROBLEMS MADE IT FOR YOU TO DO YOUR WORK, TAKE CARE OF THINGS AT HOME, OR GET ALONG WITH OTHER PEOPLE: NOT DIFFICULT AT ALL
5. BEING SO RESTLESS THAT IT IS HARD TO SIT STILL: NOT AT ALL
7. FEELING AFRAID AS IF SOMETHING AWFUL MIGHT HAPPEN: NOT AT ALL
4. TROUBLE RELAXING: NOT AT ALL
2. NOT BEING ABLE TO STOP OR CONTROL WORRYING: NOT AT ALL

## 2024-04-17 DIAGNOSIS — R74.8 ELEVATED ALKALINE PHOSPHATASE LEVEL: ICD-10-CM

## 2024-04-17 DIAGNOSIS — Z79.4 TYPE 2 DIABETES MELLITUS WITH OTHER SPECIFIED COMPLICATION, WITH LONG-TERM CURRENT USE OF INSULIN (HCC): ICD-10-CM

## 2024-04-17 DIAGNOSIS — E78.2 MIXED HYPERLIPIDEMIA: ICD-10-CM

## 2024-04-17 DIAGNOSIS — E11.69 TYPE 2 DIABETES MELLITUS WITH OTHER SPECIFIED COMPLICATION, WITH LONG-TERM CURRENT USE OF INSULIN (HCC): ICD-10-CM

## 2024-04-17 DIAGNOSIS — I10 ESSENTIAL (PRIMARY) HYPERTENSION: Primary | ICD-10-CM

## 2024-04-17 LAB
ALBUMIN SERPL-MCNC: 4.4 G/DL (ref 3.9–4.9)
ALBUMIN/GLOB SERPL: 1.9 {RATIO} (ref 1.2–2.2)
ALP SERPL-CCNC: 164 IU/L (ref 44–121)
ALT SERPL-CCNC: 14 IU/L (ref 0–32)
AST SERPL-CCNC: 13 IU/L (ref 0–40)
BASOPHILS # BLD AUTO: 0 X10E3/UL (ref 0–0.2)
BASOPHILS NFR BLD AUTO: 1 %
BILIRUB SERPL-MCNC: 0.3 MG/DL (ref 0–1.2)
BUN SERPL-MCNC: 37 MG/DL (ref 8–27)
BUN/CREAT SERPL: 33 (ref 12–28)
CALCIUM SERPL-MCNC: 8.8 MG/DL (ref 8.7–10.3)
CHLORIDE SERPL-SCNC: 103 MMOL/L (ref 96–106)
CHOLEST SERPL-MCNC: 189 MG/DL (ref 100–199)
CO2 SERPL-SCNC: 21 MMOL/L (ref 20–29)
CREAT SERPL-MCNC: 1.13 MG/DL (ref 0.57–1)
EGFRCR SERPLBLD CKD-EPI 2021: 54 ML/MIN/1.73
EOSINOPHIL # BLD AUTO: 0.1 X10E3/UL (ref 0–0.4)
EOSINOPHIL NFR BLD AUTO: 2 %
ERYTHROCYTE [DISTWIDTH] IN BLOOD BY AUTOMATED COUNT: 14.3 % (ref 11.7–15.4)
FERRITIN SERPL-MCNC: 18 NG/ML (ref 15–150)
FOLATE SERPL-MCNC: 4.9 NG/ML
GLOBULIN SER CALC-MCNC: 2.3 G/DL (ref 1.5–4.5)
GLUCOSE SERPL-MCNC: 108 MG/DL (ref 70–99)
HBA1C MFR BLD: 6 % (ref 4.8–5.6)
HCT VFR BLD AUTO: 31.1 % (ref 34–46.6)
HDLC SERPL-MCNC: 54 MG/DL
HGB BLD-MCNC: 9.8 G/DL (ref 11.1–15.9)
IMM GRANULOCYTES # BLD AUTO: 0 X10E3/UL (ref 0–0.1)
IMM GRANULOCYTES NFR BLD AUTO: 0 %
IRON SATN MFR SERPL: 10 % (ref 15–55)
IRON SERPL-MCNC: 41 UG/DL (ref 27–139)
LDLC SERPL CALC-MCNC: 121 MG/DL (ref 0–99)
LYMPHOCYTES # BLD AUTO: 1.5 X10E3/UL (ref 0.7–3.1)
LYMPHOCYTES NFR BLD AUTO: 31 %
MCH RBC QN AUTO: 27.3 PG (ref 26.6–33)
MCHC RBC AUTO-ENTMCNC: 31.5 G/DL (ref 31.5–35.7)
MCV RBC AUTO: 87 FL (ref 79–97)
MONOCYTES # BLD AUTO: 0.4 X10E3/UL (ref 0.1–0.9)
MONOCYTES NFR BLD AUTO: 8 %
NEUTROPHILS # BLD AUTO: 2.9 X10E3/UL (ref 1.4–7)
NEUTROPHILS NFR BLD AUTO: 58 %
PLATELET # BLD AUTO: 235 X10E3/UL (ref 150–450)
POTASSIUM SERPL-SCNC: 4.1 MMOL/L (ref 3.5–5.2)
PROT SERPL-MCNC: 6.7 G/DL (ref 6–8.5)
RBC # BLD AUTO: 3.59 X10E6/UL (ref 3.77–5.28)
RETICS/RBC NFR AUTO: 1.3 % (ref 0.6–2.6)
SODIUM SERPL-SCNC: 141 MMOL/L (ref 134–144)
TIBC SERPL-MCNC: 400 UG/DL (ref 250–450)
TRIGL SERPL-MCNC: 78 MG/DL (ref 0–149)
UIBC SERPL-MCNC: 359 UG/DL (ref 118–369)
VIT B12 SERPL-MCNC: 426 PG/ML (ref 232–1245)
VLDLC SERPL CALC-MCNC: 14 MG/DL (ref 5–40)
WBC # BLD AUTO: 5 X10E3/UL (ref 3.4–10.8)

## 2024-04-18 ENCOUNTER — HOSPITAL ENCOUNTER (OUTPATIENT)
Facility: HOSPITAL | Age: 64
End: 2024-04-18
Attending: UROLOGY
Payer: MEDICAID

## 2024-04-18 ENCOUNTER — HOSPITAL ENCOUNTER (OUTPATIENT)
Facility: HOSPITAL | Age: 64
Discharge: HOME | End: 2024-04-18
Attending: UROLOGY
Payer: MEDICAID

## 2024-04-18 LAB — CREAT UR-MCNC: 1.4 MG/DL (ref 0.6–1.3)

## 2024-04-18 PROCEDURE — 72197 MRI PELVIS W/O & W/DYE: CPT

## 2024-04-18 PROCEDURE — A9577 INJ MULTIHANCE: HCPCS | Performed by: UROLOGY

## 2024-04-18 PROCEDURE — 6360000004 HC RX CONTRAST MEDICATION: Performed by: UROLOGY

## 2024-04-18 PROCEDURE — 82565 ASSAY OF CREATININE: CPT

## 2024-04-18 PROCEDURE — 76770 US EXAM ABDO BACK WALL COMP: CPT

## 2024-04-18 RX ADMIN — GADOBENATE DIMEGLUMINE 14 ML: 529 INJECTION, SOLUTION INTRAVENOUS at 16:03

## 2024-04-23 LAB — RETICS/RBC NFR AUTO: 1.5 % (ref 0.6–2.6)

## 2024-06-28 ENCOUNTER — APPOINTMENT (OUTPATIENT)
Facility: HOSPITAL | Age: 64
End: 2024-06-28
Payer: MEDICAID

## 2024-06-28 ENCOUNTER — HOSPITAL ENCOUNTER (OUTPATIENT)
Facility: HOSPITAL | Age: 64
End: 2024-06-28
Attending: UROLOGY
Payer: MEDICAID

## 2024-06-28 ENCOUNTER — HOSPITAL ENCOUNTER (EMERGENCY)
Facility: HOSPITAL | Age: 64
Discharge: HOME | End: 2024-06-28
Payer: MEDICAID

## 2024-06-28 VITALS
RESPIRATION RATE: 13 BRPM | DIASTOLIC BLOOD PRESSURE: 77 MMHG | WEIGHT: 134 LBS | BODY MASS INDEX: 21.53 KG/M2 | OXYGEN SATURATION: 100 % | TEMPERATURE: 97.8 F | HEIGHT: 66 IN | SYSTOLIC BLOOD PRESSURE: 123 MMHG | HEART RATE: 86 BPM

## 2024-06-28 DIAGNOSIS — M25.562 ACUTE PAIN OF LEFT KNEE: ICD-10-CM
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
## 2024-06-28 DIAGNOSIS — N13.39 OTHER HYDRONEPHROSIS: ICD-10-CM

## 2024-06-28 DIAGNOSIS — S50.02XA CONTUSION OF LEFT ELBOW, INITIAL ENCOUNTER: ICD-10-CM

## 2024-06-28 PROCEDURE — 73551 X-RAY EXAM OF FEMUR 1: CPT

## 2024-06-28 PROCEDURE — 6360000002 HC RX W HCPCS: Performed by: UROLOGY

## 2024-06-28 PROCEDURE — 99283 EMERGENCY DEPT VISIT LOW MDM: CPT

## 2024-06-28 PROCEDURE — 73080 X-RAY EXAM OF ELBOW: CPT

## 2024-06-28 PROCEDURE — 73502 X-RAY EXAM HIP UNI 2-3 VIEWS: CPT

## 2024-06-28 PROCEDURE — A9562 TC99M MERTIATIDE: HCPCS | Performed by: UROLOGY

## 2024-06-28 PROCEDURE — 73562 X-RAY EXAM OF KNEE 3: CPT

## 2024-06-28 PROCEDURE — 3430000000 HC RX DIAGNOSTIC RADIOPHARMACEUTICAL: Performed by: UROLOGY

## 2024-06-28 RX ORDER — FUROSEMIDE 10 MG/ML
40 INJECTION INTRAMUSCULAR; INTRAVENOUS ONCE
Status: COMPLETED | OUTPATIENT
Start: 2024-06-28 | End: 2024-06-28

## 2024-06-28 RX ADMIN — FUROSEMIDE 40 MG: 10 INJECTION, SOLUTION INTRAMUSCULAR; INTRAVENOUS at 13:47

## 2024-06-28 RX ADMIN — Medication 6.9 MILLICURIE: at 13:45

## 2024-06-28 ASSESSMENT — ENCOUNTER SYMPTOMS
COUGH: 0
VOMITING: 0
SHORTNESS OF BREATH: 0
CHEST TIGHTNESS: 0
NAUSEA: 0
DIARRHEA: 0
CONSTIPATION: 0
ABDOMINAL PAIN: 0

## 2024-06-28 ASSESSMENT — PAIN DESCRIPTION - LOCATION: LOCATION: ELBOW;LEG

## 2024-06-28 ASSESSMENT — PAIN SCALES - GENERAL: PAINLEVEL_OUTOF10: 7

## 2024-06-28 ASSESSMENT — PAIN DESCRIPTION - DESCRIPTORS: DESCRIPTORS: ACHING

## 2024-06-28 ASSESSMENT — PAIN - FUNCTIONAL ASSESSMENT: PAIN_FUNCTIONAL_ASSESSMENT: 0-10

## 2024-06-28 ASSESSMENT — PAIN DESCRIPTION - ORIENTATION: ORIENTATION: LEFT

## 2024-06-28 NOTE — ED TRIAGE NOTES
Pt brought from [facility] as an Rapid Response. As per [employer] technician , pt tripped and fell on her L knee and elbow,  no obvious deformity , (-) LOC ,(-) blood thinners. Pt is alert and orientedx4,

## 2024-06-28 NOTE — ED PROVIDER NOTES
(ZOFRAN-ODT) 4 MG disintegrating tablet Take 1 tablet by mouth 3 times daily as needed for Nausea or Vomiting 21 tablet 0    atorvastatin (LIPITOR) 80 MG tablet Take 1 tablet by mouth daily 30 tablet 3    Lancets MISC Use to check the blood glucose once a day      amLODIPine (NORVASC) 10 MG tablet TAKE 1 TABLET BY MOUTH ONCE DAILY      apixaban (ELIQUIS) 5 MG TABS tablet TAKE 1 TABLET BY MOUTH TWO (2) TIMES A DAY.      insulin glargine (LANTUS SOLOSTAR) 100 UNIT/ML injection pen INJECT 50 UNITS UNDER THE SKIN ONCE DAILY      losartan (COZAAR) 100 MG tablet TAKE 1 TABLET BY MOUTH ONCE DAILY         Past History     Past Medical History:  Past Medical History:   Diagnosis Date    Essential hypertension, benign     Mixed hyperlipidemia     Type II or unspecified type diabetes mellitus without mention of complication, not stated as uncontrolled        Past Surgical History:  Past Surgical History:   Procedure Laterality Date    HYSTERECTOMY (CERVIX STATUS UNKNOWN)  1990s    X2       Family History:  Family History   Problem Relation Age of Onset    Diabetes Sister     Hypertension Sister     Stroke Maternal Grandfather     Stroke Maternal Grandmother     Diabetes Mother        Social History:  Social History     Tobacco Use    Smoking status: Never    Smokeless tobacco: Never   Vaping Use    Vaping Use: Never used   Substance Use Topics    Alcohol use: No     Alcohol/week: 0.0 standard drinks of alcohol    Drug use: No       Allergies:  Allergies   Allergen Reactions    Clindamycin Hives    Sulfa Antibiotics      Other reaction(s): Unable to Obtain    Lisinopril Cough         Review of Systems       Review of Systems   Constitutional:  Negative for chills and fever.   Respiratory:  Negative for cough, chest tightness and shortness of breath.    Cardiovascular:  Negative for chest pain.   Gastrointestinal:  Negative for abdominal pain, constipation, diarrhea, nausea and vomiting.   Genitourinary:  Negative for dysuria    Neurological:      General: No focal deficit present.      Mental Status: She is alert and oriented to person, place, and time. Mental status is at baseline.   Psychiatric:         Behavior: Behavior is cooperative.           Diagnostic Study Results     Labs -  No results found for this or any previous visit (from the past 12 hour(s)).    Radiologic Studies -   XR KNEE LEFT (3 VIEWS)   Final Result      No bony abnormality seen.            Electronically signed by Ariel Reeder      XR ELBOW LEFT (MIN 3 VIEWS)   Final Result      No acute radiographic findings.      Electronically signed by Ariel Rivera      XR HIP 2-3 VW W PELVIS LEFT   Final Result      1. No acute injuries      Electronically signed by Conor Ruelas      XR FEMUR LEFT 1 VW   Final Result      1. Intact left femur but limited or incomplete with only one AP projection.      Electronically signed by Conor Ruelas            Medical Decision Making   I am the first provider for this patient.    I reviewed the vital signs, available nursing notes, past medical history, past surgical history, family history and social history.    Vital Signs-Reviewed the patient's vital signs.    Pulse Oximetry Analysis -  100 on room air (Interpretation)    Records Reviewed: Prior medical records(Time of Review: 5:50 PM)    ED Course: Progress Notes, Reevaluation, and Consults:  DDx: Left hip fracture, left talar fracture, tibia fracture, fibula fracture, ulnar fracture, contusion    Provider Notes (Medical Decision Making):   Patient is a 63-year-old female present to the emergency department following a ground-level fall.  On exam, patient is alert, oriented x 3, no acute distress.  Heart regular rate and rhythm.  No murmurs appreciated.  No lower extremity edema.  Lungs clear to auscultation bilaterally.  Abdomen soft and nontender. Left upper extremity is neurovascularly intact.  Presence of tenderness on palpation of the left elbow with associated ecchymosis.

## 2024-06-28 NOTE — DISCHARGE INSTRUCTIONS
Alternate Tylenol and ibuprofen as needed for pain.  Rest, ice, compress, and elevate the affected areas to decrease pain and swelling.  Follow-up with PCP within the next 2 days in order to be reevaluated.  Return to the ED with any new or worsening symptoms.

## 2024-06-28 NOTE — SIGNIFICANT EVENT
[facility] Medicine  Rapid/Medical Response Team Note      Patient:    eRgla Floyd 63 y.o. female, : 1960  Patient MRN: [id number]    Admission Date: 2024   Admission Diagnosis: No admission diagnoses are documented for this encounter.      RAPID RESPONSE  Rapid response called for: Ground-level fall  Rapid called for ground-level fall.  Patient denied head.  Patient endorsing elbow pain and knee pain.    OBJECTIVE    RRT/MRT start time:               RRT/MRT end time:  Vitals:    24 1421   BP: 123/77   Pulse: 86   Resp: 13   Temp: 97.8 °F (36.6 °C)   SpO2: 100%        Physical Exam:  Gen: Sitting in wheelchair, alert  HEENT: Atraumatic  MSK: Appears to be chronic ecchymosis to the left forearm, tenderness to the elbow, left, full range of motion to the left elbow and left knee, no swelling noted, no signs of fracture  Skin: warm, dry, intact,  Neuro: No focal deficits.  Psych: appropriate, alert, oriented x3      ASSESSMENT/PLAN AND DISPOSITION  Regla Floyd is a 63 y.o. year old female admitted for No admission diagnoses are documented for this encounter.  Rapid Response Team/ Medical Response Team Called For: Ground-level fall    In setting of ground-level fall completed the below:    Medications Administered During Rapid:  None    EKG: None    Labs: None    Imaging: None    Other interventions: None      Medical Problem(s)  Ground-level fall  - Witnessed fall from wheelchair to the ground.  Patient hit left knee and left arm.  No signs of fracture.  No open skin wounds.  Tenderness to the left elbow and left knee.  - Patient would like examination in the ED  Plan:  -ED for imaging to rule out fracture    Disposition: Emergency department (where going/ level of care)  Patient condition: Stable       ED to resume care    [facility] Emergency Department Senior resident Dr. Suraj Davenport present during Rapid Response.        Mynor Jimenez MD -PGY 1  [facility] Emergency [facility] Family Medicine  2024 5:04 PM

## 2024-07-01 NOTE — RESULT ENCOUNTER NOTE
I need to see her to review this. Will need cysto, cystogram, Diagnostic Left ureteroscopy, JJ stent - start holding a date for her jamesie.  She needs to get the UDS prior to that. Has that happened yet?

## 2024-07-24 ENCOUNTER — HOSPITAL ENCOUNTER (EMERGENCY)
Facility: HOSPITAL | Age: 64
Discharge: HOME | End: 2024-07-24
Payer: MEDICAID

## 2024-07-24 VITALS
WEIGHT: 140 LBS | OXYGEN SATURATION: 100 % | BODY MASS INDEX: 22.5 KG/M2 | SYSTOLIC BLOOD PRESSURE: 101 MMHG | HEIGHT: 66 IN | TEMPERATURE: 98.1 F | HEART RATE: 81 BPM | RESPIRATION RATE: 17 BRPM | DIASTOLIC BLOOD PRESSURE: 58 MMHG

## 2024-07-24 DIAGNOSIS — T83.9XXA FOLEY CATHETER PROBLEM, INITIAL ENCOUNTER (HCC): Primary | ICD-10-CM

## 2024-07-24 PROCEDURE — 51702 INSERT TEMP BLADDER CATH: CPT

## 2024-07-24 PROCEDURE — 99283 EMERGENCY DEPT VISIT LOW MDM: CPT

## 2024-07-24 ASSESSMENT — ENCOUNTER SYMPTOMS
EYES NEGATIVE: 1
ALLERGIC/IMMUNOLOGIC NEGATIVE: 1
RESPIRATORY NEGATIVE: 1
GASTROINTESTINAL NEGATIVE: 1

## 2024-07-24 NOTE — ED NOTES
Replaced PT's castro catheter with an 18 fr. PT tolerated well. Assisted by LYUBOV Patton. PT provided with leg bag and overnight castro bag.

## 2024-07-24 NOTE — ED PROVIDER NOTES
[facility] EMERGENCY DEPT  EMERGENCY DEPARTMENT ENCOUNTER      Pt Name: Regla Floyd  MRN: [id number]  Birthdate 1960  Date of evaluation: 7/24/2024  Provider: EDILMA Gusman  2:05 PM    CHIEF COMPLAINT       Chief Complaint   Patient presents with    Urinary Catheter         HISTORY OF PRESENT ILLNESS    Regla Floyd is a 63 y.o. female who presents to the emergency department with catheter problem.  Patient had a Negron placed here for urinary retention was seen at her urologist office today replacement Negron was put in the office and while she and her significant other were at the [employer]'s eating lunch, the catheter fell out.  She needs any replacement.    HPI    Nursing Notes were reviewed.    REVIEW OF SYSTEMS       Review of Systems   Constitutional: Negative.    HENT: Negative.     Eyes: Negative.    Respiratory: Negative.     Cardiovascular: Negative.    Gastrointestinal: Negative.    Endocrine: Negative.    Genitourinary:  Positive for difficulty urinating.   Musculoskeletal: Negative.    Skin: Negative.    Allergic/Immunologic: Negative.    Neurological: Negative.    Hematological: Negative.    Psychiatric/Behavioral: Negative.         Except as noted above the remainder of the review of systems was reviewed and negative.       PAST MEDICAL HISTORY     Past Medical History:   Diagnosis Date    Essential hypertension, benign     Mixed hyperlipidemia     Type II or unspecified type diabetes mellitus without mention of complication, not stated as uncontrolled          SURGICAL HISTORY       Past Surgical History:   Procedure Laterality Date    HYSTERECTOMY (CERVIX STATUS UNKNOWN)  1990s    X2         CURRENT MEDICATIONS       Previous Medications    AMLODIPINE (NORVASC) 10 MG TABLET    TAKE 1 TABLET BY MOUTH ONCE DAILY    APIXABAN (ELIQUIS) 5 MG TABS TABLET    TAKE 1 TABLET BY MOUTH TWO (2) TIMES A DAY.    ATORVASTATIN (LIPITOR) 80 MG TABLET    Take 1 tablet by mouth daily    CHLORTHALIDONE (HYGROTON)

## 2024-07-27 ENCOUNTER — HOSPITAL ENCOUNTER (EMERGENCY)
Facility: HOSPITAL | Age: 64
Discharge: HOME | End: 2024-07-27
Attending: EMERGENCY MEDICINE
Payer: MEDICAID

## 2024-07-27 VITALS
HEIGHT: 66 IN | WEIGHT: 140 LBS | SYSTOLIC BLOOD PRESSURE: 119 MMHG | BODY MASS INDEX: 22.5 KG/M2 | DIASTOLIC BLOOD PRESSURE: 62 MMHG | HEART RATE: 80 BPM | OXYGEN SATURATION: 97 % | RESPIRATION RATE: 18 BRPM | TEMPERATURE: 98.9 F

## 2024-07-27 DIAGNOSIS — R33.9 URINARY RETENTION: ICD-10-CM

## 2024-07-27 DIAGNOSIS — T83.9XXA FOLEY CATHETER PROBLEM, INITIAL ENCOUNTER (HCC): Primary | ICD-10-CM

## 2024-07-27 LAB
APPEARANCE UR: ABNORMAL
BACTERIA URNS QL MICRO: ABNORMAL /HPF
BILIRUB UR QL: NEGATIVE
COLOR UR: YELLOW
EPITH CASTS URNS QL MICRO: ABNORMAL /LPF (ref 0–5)
GLUCOSE UR STRIP.AUTO-MCNC: NEGATIVE MG/DL
HGB UR QL STRIP: ABNORMAL
KETONES UR QL STRIP.AUTO: NEGATIVE MG/DL
LEUKOCYTE ESTERASE UR QL STRIP.AUTO: ABNORMAL
NITRITE UR QL STRIP.AUTO: POSITIVE
PH UR STRIP: 7 (ref 5–8)
PROT UR STRIP-MCNC: 300 MG/DL
RBC #/AREA URNS HPF: ABNORMAL /HPF (ref 0–5)
SP GR UR REFRACTOMETRY: 1.02 (ref 1–1.03)
UROBILINOGEN UR QL STRIP.AUTO: 1 EU/DL (ref 0.2–1)
WBC URNS QL MICRO: ABNORMAL /HPF (ref 0–4)

## 2024-07-27 PROCEDURE — 87186 SC STD MICRODIL/AGAR DIL: CPT

## 2024-07-27 PROCEDURE — 51798 US URINE CAPACITY MEASURE: CPT

## 2024-07-27 PROCEDURE — 87086 URINE CULTURE/COLONY COUNT: CPT

## 2024-07-27 PROCEDURE — 99283 EMERGENCY DEPT VISIT LOW MDM: CPT

## 2024-07-27 PROCEDURE — 81001 URINALYSIS AUTO W/SCOPE: CPT

## 2024-07-27 PROCEDURE — 87088 URINE BACTERIA CULTURE: CPT

## 2024-07-27 PROCEDURE — 51702 INSERT TEMP BLADDER CATH: CPT

## 2024-07-27 ASSESSMENT — PAIN - FUNCTIONAL ASSESSMENT: PAIN_FUNCTIONAL_ASSESSMENT: NONE - DENIES PAIN

## 2024-07-27 NOTE — ED PROVIDER NOTES
EMERGENCY DEPARTMENT HISTORY AND PHYSICAL EXAM    2:28 PM EDT seen at this in room F6        Date: 7/27/2024  Patient Name: Regla Floyd    History of Presenting Illness     Chief Complaint   Patient presents with    Urinary Catheter         History Provided By: patient    Additional History (Context): Regla Floyd is a 63 y.o. female presents with indwelling Negron for 2 months due to urinary retention being worked up by urology complains that it has been leaking for 2 days.  No pain.  No other symptoms no abdominal pain fever nausea or vomiting or flank pain.    PCP: Obi Thornton MD    Chief Complaint:   Duration:    Timing:    Location:   Quality:   Severity:   Modifying Factors:   Associated Symptoms:       No current facility-administered medications for this encounter.     Current Outpatient Medications   Medication Sig Dispense Refill    hydroCHLOROthiazide (HYDRODIURIL) 25 MG tablet       chlorthalidone (HYGROTON) 25 MG tablet Take 1 tablet by mouth daily 30 tablet 3    Melatonin 10 MG TABS Take 10 mg by mouth nightly as needed (sleep) 90 tablet 3    dulaglutide (TRULICITY) 1.5 MG/0.5ML SC injection INJECT 0.5 MLS INTO THE SKIN EVERY SEVEN DAYS 12 mL 4    famotidine (PEPCID) 20 MG tablet Take 1 tablet by mouth in the morning and 1 tablet in the evening.      sucralfate (CARAFATE) 1 GM tablet TAKE 1 TABLET BY MOUTH 4 TIMES DAILY 120 tablet 3    metoclopramide (REGLAN) 10 MG tablet Take 1 tablet by mouth 3 times daily (with meals) 120 tablet 3    metFORMIN (GLUCOPHAGE-XR) 750 MG extended release tablet TAKE 1 TABLET BY MOUTH TWO TIMES A  tablet 3    ondansetron (ZOFRAN) 4 MG tablet Take 1 tablet by mouth 3 times daily as needed for Nausea or Vomiting 30 tablet 0    omeprazole (PRILOSEC) 20 MG delayed release capsule TAKE 1 CAPSULE BY MOUTH DAILY. 90 capsule 3    ondansetron (ZOFRAN-ODT) 4 MG disintegrating tablet Take 1 tablet by mouth 3 times daily as needed for Nausea or Vomiting 21  10 MG tablet TAKE 1 TABLET BY MOUTH ONCE DAILY      apixaban (ELIQUIS) 5 MG TABS tablet TAKE 1 TABLET BY MOUTH TWO (2) TIMES A DAY.      insulin glargine (LANTUS SOLOSTAR) 100 UNIT/ML injection pen INJECT 50 UNITS UNDER THE SKIN ONCE DAILY      losartan (COZAAR) 100 MG tablet TAKE 1 TABLET BY MOUTH ONCE DAILY             DISCONTINUED MEDICATIONS:  Current Discharge Medication List          PATIENT REFERRED TO:  Follow Up with:  Bubba Jenkins MD  [address] [facility] [address] [phone]    In 2 days      _______________________________    Clinical Impression:   1. Negron catheter problem, initial encounter ([facility])    2. Urinary retention         Elías George MD using Dragon dictation      _______________________________     Elías George MD  07/27/24 0228

## 2024-07-27 NOTE — ED TRIAGE NOTES
Patient presented to the Emergency Dept with a complaint of castro catheter leaking.   Patient states that it was change on thursday and started leaking today.     Patient alert and oriented x 4, patient breathes freely on room air in nil cardiopulmonary distress

## 2024-07-28 LAB
BACTERIA SPEC CULT: ABNORMAL
CC UR VC: ABNORMAL
SERVICE CMNT-IMP: ABNORMAL

## 2024-07-29 LAB
BACTERIA SPEC CULT: ABNORMAL
CC UR VC: ABNORMAL
SERVICE CMNT-IMP: ABNORMAL

## 2024-08-12 DIAGNOSIS — I10 ESSENTIAL (PRIMARY) HYPERTENSION: ICD-10-CM

## 2024-08-12 RX ORDER — CHLORTHALIDONE 25 MG/1
25 TABLET ORAL DAILY
Qty: 90 TABLET | Refills: 3 | Status: SHIPPED | OUTPATIENT
Start: 2024-08-12

## 2024-08-12 NOTE — TELEPHONE ENCOUNTER
Patient called in for a refill for:  chlorthalidone (HYGROTON) 25 MG tablet    Pharmacy info:  [facility] - [place], [place] - [address] - P [phone] - f [phone]

## 2024-09-17 ENCOUNTER — OFFICE VISIT (OUTPATIENT)
Facility: CLINIC | Age: 64
End: 2024-09-17
Payer: MEDICAID

## 2024-09-17 VITALS
SYSTOLIC BLOOD PRESSURE: 134 MMHG | BODY MASS INDEX: 23.46 KG/M2 | OXYGEN SATURATION: 99 % | TEMPERATURE: 97.4 F | HEART RATE: 88 BPM | RESPIRATION RATE: 16 BRPM | DIASTOLIC BLOOD PRESSURE: 78 MMHG | WEIGHT: 146 LBS | HEIGHT: 66 IN

## 2024-09-17 DIAGNOSIS — N31.9 NEUROGENIC BLADDER: ICD-10-CM

## 2024-09-17 DIAGNOSIS — Z12.31 ENCOUNTER FOR SCREENING MAMMOGRAM FOR MALIGNANT NEOPLASM OF BREAST: ICD-10-CM

## 2024-09-17 DIAGNOSIS — Z79.4 TYPE 2 DIABETES MELLITUS WITH OTHER SPECIFIED COMPLICATION, WITH LONG-TERM CURRENT USE OF INSULIN (HCC): ICD-10-CM

## 2024-09-17 DIAGNOSIS — Z12.11 SCREENING FOR COLON CANCER: ICD-10-CM

## 2024-09-17 DIAGNOSIS — Z13.820 ENCOUNTER FOR OSTEOPOROSIS SCREENING IN ASYMPTOMATIC POSTMENOPAUSAL PATIENT: ICD-10-CM

## 2024-09-17 DIAGNOSIS — Z78.0 ENCOUNTER FOR OSTEOPOROSIS SCREENING IN ASYMPTOMATIC POSTMENOPAUSAL PATIENT: ICD-10-CM

## 2024-09-17 DIAGNOSIS — F51.01 PRIMARY INSOMNIA: ICD-10-CM

## 2024-09-17 DIAGNOSIS — D64.9 NORMOCYTIC ANEMIA: ICD-10-CM

## 2024-09-17 DIAGNOSIS — I10 ESSENTIAL (PRIMARY) HYPERTENSION: Primary | ICD-10-CM

## 2024-09-17 DIAGNOSIS — E78.2 MIXED HYPERLIPIDEMIA: ICD-10-CM

## 2024-09-17 DIAGNOSIS — E11.69 TYPE 2 DIABETES MELLITUS WITH OTHER SPECIFIED COMPLICATION, WITH LONG-TERM CURRENT USE OF INSULIN (HCC): ICD-10-CM

## 2024-09-17 DIAGNOSIS — K31.84 GASTROPARESIS: ICD-10-CM

## 2024-09-17 LAB — HBA1C MFR BLD: 5.3 %

## 2024-09-17 PROCEDURE — 83036 HEMOGLOBIN GLYCOSYLATED A1C: CPT | Performed by: EMERGENCY MEDICINE

## 2024-09-17 PROCEDURE — 1123F ACP DISCUSS/DSCN MKR DOCD: CPT | Performed by: EMERGENCY MEDICINE

## 2024-09-17 PROCEDURE — 3078F DIAST BP <80 MM HG: CPT | Performed by: EMERGENCY MEDICINE

## 2024-09-17 PROCEDURE — 3044F HG A1C LEVEL LT 7.0%: CPT | Performed by: EMERGENCY MEDICINE

## 2024-09-17 PROCEDURE — 3075F SYST BP GE 130 - 139MM HG: CPT | Performed by: EMERGENCY MEDICINE

## 2024-09-17 PROCEDURE — 99214 OFFICE O/P EST MOD 30 MIN: CPT | Performed by: EMERGENCY MEDICINE

## 2024-09-17 RX ORDER — DULAGLUTIDE 1.5 MG/.5ML
INJECTION, SOLUTION SUBCUTANEOUS
Qty: 12 ML | Refills: 4 | Status: SHIPPED | OUTPATIENT
Start: 2024-09-17

## 2024-09-17 RX ORDER — METFORMIN HYDROCHLORIDE 750 MG/1
750 TABLET, EXTENDED RELEASE ORAL 2 TIMES DAILY
Qty: 180 TABLET | Refills: 3 | Status: SHIPPED | OUTPATIENT
Start: 2024-09-17

## 2024-09-17 SDOH — ECONOMIC STABILITY: FOOD INSECURITY: WITHIN THE PAST 12 MONTHS, YOU WORRIED THAT YOUR FOOD WOULD RUN OUT BEFORE YOU GOT MONEY TO BUY MORE.: NEVER TRUE

## 2024-09-17 SDOH — ECONOMIC STABILITY: INCOME INSECURITY: HOW HARD IS IT FOR YOU TO PAY FOR THE VERY BASICS LIKE FOOD, HOUSING, MEDICAL CARE, AND HEATING?: SOMEWHAT HARD

## 2024-09-17 SDOH — ECONOMIC STABILITY: FOOD INSECURITY: WITHIN THE PAST 12 MONTHS, THE FOOD YOU BOUGHT JUST DIDN'T LAST AND YOU DIDN'T HAVE MONEY TO GET MORE.: NEVER TRUE

## 2024-09-17 ASSESSMENT — PATIENT HEALTH QUESTIONNAIRE - PHQ9
1. LITTLE INTEREST OR PLEASURE IN DOING THINGS: NOT AT ALL
SUM OF ALL RESPONSES TO PHQ QUESTIONS 1-9: 0
SUM OF ALL RESPONSES TO PHQ QUESTIONS 1-9: 0
2. FEELING DOWN, DEPRESSED OR HOPELESS: NOT AT ALL
SUM OF ALL RESPONSES TO PHQ QUESTIONS 1-9: 0
SUM OF ALL RESPONSES TO PHQ QUESTIONS 1-9: 0
SUM OF ALL RESPONSES TO PHQ9 QUESTIONS 1 & 2: 0

## 2024-09-17 ASSESSMENT — ANXIETY QUESTIONNAIRES
5. BEING SO RESTLESS THAT IT IS HARD TO SIT STILL: NOT AT ALL
GAD7 TOTAL SCORE: 0
4. TROUBLE RELAXING: NOT AT ALL
2. NOT BEING ABLE TO STOP OR CONTROL WORRYING: NOT AT ALL
7. FEELING AFRAID AS IF SOMETHING AWFUL MIGHT HAPPEN: NOT AT ALL
3. WORRYING TOO MUCH ABOUT DIFFERENT THINGS: NOT AT ALL
1. FEELING NERVOUS, ANXIOUS, OR ON EDGE: NOT AT ALL
IF YOU CHECKED OFF ANY PROBLEMS ON THIS QUESTIONNAIRE, HOW DIFFICULT HAVE THESE PROBLEMS MADE IT FOR YOU TO DO YOUR WORK, TAKE CARE OF THINGS AT HOME, OR GET ALONG WITH OTHER PEOPLE: NOT DIFFICULT AT ALL
6. BECOMING EASILY ANNOYED OR IRRITABLE: NOT AT ALL

## 2024-09-19 ENCOUNTER — HOSPITAL ENCOUNTER (OUTPATIENT)
Facility: HOSPITAL | Age: 64
Discharge: HOME | End: 2024-09-22

## 2024-09-19 LAB
BASOPHILS # BLD AUTO: 0 X10E3/UL (ref 0–0.2)
BASOPHILS NFR BLD AUTO: 1 %
EOSINOPHIL # BLD AUTO: 0.1 X10E3/UL (ref 0–0.4)
EOSINOPHIL NFR BLD AUTO: 1 %
ERYTHROCYTE [DISTWIDTH] IN BLOOD BY AUTOMATED COUNT: 12.6 % (ref 11.7–15.4)
HBA1C MFR BLD: 5.2 % (ref 4.8–5.6)
HCT VFR BLD AUTO: 33.8 % (ref 34–46.6)
HGB BLD-MCNC: 11 G/DL (ref 11.1–15.9)
IMM GRANULOCYTES # BLD AUTO: 0 X10E3/UL (ref 0–0.1)
IMM GRANULOCYTES NFR BLD AUTO: 0 %
LABCORP SPECIMEN COLLECTION: NORMAL
LYMPHOCYTES # BLD AUTO: 1.9 X10E3/UL (ref 0.7–3.1)
LYMPHOCYTES NFR BLD AUTO: 39 %
MCH RBC QN AUTO: 29.9 PG (ref 26.6–33)
MCHC RBC AUTO-ENTMCNC: 32.5 G/DL (ref 31.5–35.7)
MCV RBC AUTO: 92 FL (ref 79–97)
MONOCYTES # BLD AUTO: 0.4 X10E3/UL (ref 0.1–0.9)
MONOCYTES NFR BLD AUTO: 7 %
NEUTROPHILS # BLD AUTO: 2.5 X10E3/UL (ref 1.4–7)
NEUTROPHILS NFR BLD AUTO: 52 %
PLATELET # BLD AUTO: 201 X10E3/UL (ref 150–450)
RBC # BLD AUTO: 3.68 X10E6/UL (ref 3.77–5.28)
WBC # BLD AUTO: 4.9 X10E3/UL (ref 3.4–10.8)

## 2024-09-19 PROCEDURE — 99001 SPECIMEN HANDLING PT-LAB: CPT

## 2024-09-20 DIAGNOSIS — D64.9 NORMOCYTIC ANEMIA: Primary | ICD-10-CM

## 2024-09-20 DIAGNOSIS — N06.9 ISOLATED PROTEINURIA WITH MORPHOLOGIC LESION: ICD-10-CM

## 2024-09-20 DIAGNOSIS — R80.9 MICROALBUMINURIA: ICD-10-CM

## 2024-09-20 DIAGNOSIS — E78.2 MIXED HYPERLIPIDEMIA: Primary | ICD-10-CM

## 2024-09-20 LAB
ALBUMIN SERPL-MCNC: 4.4 G/DL (ref 3.9–4.9)
ALBUMIN/CREAT UR: 662 MG/G CREAT (ref 0–29)
ALP SERPL-CCNC: 118 IU/L (ref 44–121)
ALT SERPL-CCNC: 13 IU/L (ref 0–32)
AST SERPL-CCNC: 16 IU/L (ref 0–40)
BILIRUB SERPL-MCNC: 0.3 MG/DL (ref 0–1.2)
BUN SERPL-MCNC: 40 MG/DL (ref 8–27)
BUN/CREAT SERPL: 32 (ref 12–28)
CALCIUM SERPL-MCNC: 9.5 MG/DL (ref 8.7–10.3)
CHLORIDE SERPL-SCNC: 106 MMOL/L (ref 96–106)
CHOLEST SERPL-MCNC: 195 MG/DL (ref 100–199)
CO2 SERPL-SCNC: 23 MMOL/L (ref 20–29)
CREAT SERPL-MCNC: 1.26 MG/DL (ref 0.57–1)
CREAT UR-MCNC: 65.2 MG/DL
EGFRCR SERPLBLD CKD-EPI 2021: 47 ML/MIN/1.73
GLOBULIN SER CALC-MCNC: 2.5 G/DL (ref 1.5–4.5)
GLUCOSE SERPL-MCNC: 91 MG/DL (ref 70–99)
HDLC SERPL-MCNC: 46 MG/DL
LDLC SERPL CALC-MCNC: 129 MG/DL (ref 0–99)
MICROALBUMIN UR-MCNC: 431.9 UG/ML
POTASSIUM SERPL-SCNC: 4.4 MMOL/L (ref 3.5–5.2)
PROT SERPL-MCNC: 6.9 G/DL (ref 6–8.5)
SODIUM SERPL-SCNC: 144 MMOL/L (ref 134–144)
TRIGL SERPL-MCNC: 113 MG/DL (ref 0–149)
VLDLC SERPL CALC-MCNC: 20 MG/DL (ref 5–40)

## 2024-09-20 RX ORDER — ROSUVASTATIN CALCIUM 10 MG/1
10 TABLET, COATED ORAL DAILY
Qty: 90 TABLET | Refills: 3 | Status: SHIPPED | OUTPATIENT
Start: 2024-09-20

## 2024-09-20 ASSESSMENT — ENCOUNTER SYMPTOMS
DIARRHEA: 0
COLOR CHANGE: 1
BACK PAIN: 0
CHOKING: 0
EYE DISCHARGE: 0
ABDOMINAL PAIN: 0
CHEST TIGHTNESS: 0
SHORTNESS OF BREATH: 1
ABDOMINAL DISTENTION: 0
EYE ITCHING: 0
CONSTIPATION: 0
COUGH: 0

## 2024-09-23 LAB
ALP BONE CFR SERPL: 32 % (ref 14–68)
ALP INTEST CFR SERPL: 2 % (ref 0–18)
ALP LIVER CFR SERPL: 66 % (ref 18–85)

## 2024-10-14 ENCOUNTER — TELEPHONE (OUTPATIENT)
Facility: CLINIC | Age: 64
End: 2024-10-14

## 2024-10-14 NOTE — TELEPHONE ENCOUNTER
Might need a little bit more information.  Perhaps we can take her on at the end on Monday is a phone visit.  They probably cannot do a video visit thanks

## 2024-10-22 ENCOUNTER — TELEPHONE (OUTPATIENT)
Facility: CLINIC | Age: 64
End: 2024-10-22

## 2024-10-22 NOTE — TELEPHONE ENCOUNTER
Pt stated the night sweats are getting worse    We tried to schedule an appt but system would not allow for a telephone visit and then we tried VV visit and there were not any available visits until 2025 but they were Flex    Pt requesting a call back please

## 2024-10-23 NOTE — TELEPHONE ENCOUNTER
She may need to go to the ED or an urgent care center to evaluate her for any hidden infection or other reasons for night sweats.

## 2024-11-01 ENCOUNTER — TELEPHONE (OUTPATIENT)
Facility: CLINIC | Age: 64
End: 2024-11-01

## 2024-11-26 NOTE — H&P (VIEW-ONLY)
Regla Floyd   1960     ASSESSMENT:  1. Bilateral hydro-ureteronephrosis, renal failure, , on 16F coude catheter              Cysto: Inflammation, no tumors              Cystogram 04/24/24:  Grade 1 reflux on RIGHT   MAG-3:  07/01/24:  T1/2 for left kidney 20 mins, T1/2 for right is 14 mins, Split renal function left = 41% and right = 59%.  Post castro Ultrasound: Mild left hydro   UDS 11/13/24:  Small capacity, compliant with under activity of the bladder     2. Gross hematuria, s/p negative workup 2024     3. Urethral diverticulum                 4. DM2, previous poorly controlled, but now with fair control A1c 5-6 range.     5.  On Eliquis.      New left flank pain. Will get culture now and treat prior to Cysto/cystoram, diagnostic URS bilaterally.      If all negative given that A1c is better now, may consider VT but has small capacity compliant bladder that is underactive.      If fails, will need consideration of Urethral diverticulectomy + SP tube.         HPI: Regla Floyd is a 63 y.o. female with hydroureteronephrosis, who presents today to review pre-op labs and UDS results.    Today, the patient is overall doing well. She has a catheter in and reports pain that started about 3 days ago and wants the catheter removed.      Past Medical History:   Diagnosis Date    Essential hypertension, benign     Mixed hyperlipidemia     Type II or unspecified type diabetes mellitus without mention of complication, not stated as uncontrolled        Past Surgical History:   Procedure Laterality Date    HYSTERECTOMY (CERVIX STATUS UNKNOWN)  1990s    X2       Social History     Tobacco Use    Smoking status: Never    Smokeless tobacco: Never   Vaping Use    Vaping status: Never Used   Substance Use Topics    Alcohol use: No     Alcohol/week: 0.0 standard drinks of alcohol    Drug use: No       Allergies   Allergen Reactions    Clindamycin Hives    Sulfa Antibiotics      Other reaction(s): Unable

## 2024-12-07 ENCOUNTER — ANESTHESIA EVENT (OUTPATIENT)
Facility: HOSPITAL | Age: 64
End: 2024-12-07
Payer: MEDICAID

## 2024-12-09 ENCOUNTER — ANESTHESIA (OUTPATIENT)
Facility: HOSPITAL | Age: 64
End: 2024-12-09
Payer: MEDICAID

## 2024-12-09 ENCOUNTER — HOSPITAL ENCOUNTER (OUTPATIENT)
Facility: HOSPITAL | Age: 64
Discharge: HOME | End: 2024-12-09
Attending: UROLOGY | Admitting: UROLOGY
Payer: MEDICAID

## 2024-12-09 ENCOUNTER — APPOINTMENT (OUTPATIENT)
Facility: HOSPITAL | Age: 64
End: 2024-12-09
Attending: UROLOGY
Payer: MEDICAID

## 2024-12-09 VITALS
BODY MASS INDEX: 25.17 KG/M2 | DIASTOLIC BLOOD PRESSURE: 83 MMHG | HEIGHT: 66 IN | SYSTOLIC BLOOD PRESSURE: 137 MMHG | OXYGEN SATURATION: 98 % | HEART RATE: 86 BPM | RESPIRATION RATE: 18 BRPM | TEMPERATURE: 97.9 F | WEIGHT: 156.6 LBS

## 2024-12-09 DIAGNOSIS — N20.0 KIDNEY STONE: Primary | ICD-10-CM

## 2024-12-09 LAB
GLUCOSE BLD STRIP.AUTO-MCNC: 105 MG/DL (ref 70–110)
GLUCOSE BLD STRIP.AUTO-MCNC: 105 MG/DL (ref 70–110)

## 2024-12-09 PROCEDURE — 2580000003 HC RX 258: Performed by: NURSE ANESTHETIST, CERTIFIED REGISTERED

## 2024-12-09 PROCEDURE — 88305 TISSUE EXAM BY PATHOLOGIST: CPT

## 2024-12-09 PROCEDURE — 7100000000 HC PACU RECOVERY - FIRST 15 MIN: Performed by: UROLOGY

## 2024-12-09 PROCEDURE — 82962 GLUCOSE BLOOD TEST: CPT

## 2024-12-09 PROCEDURE — 3700000000 HC ANESTHESIA ATTENDED CARE: Performed by: UROLOGY

## 2024-12-09 PROCEDURE — 2700000000 HC OXYGEN THERAPY PER DAY

## 2024-12-09 PROCEDURE — 74420 UROGRAPHY RTRGR +-KUB: CPT

## 2024-12-09 PROCEDURE — 6360000004 HC RX CONTRAST MEDICATION: Performed by: UROLOGY

## 2024-12-09 PROCEDURE — 3600000013 HC SURGERY LEVEL 3 ADDTL 15MIN: Performed by: UROLOGY

## 2024-12-09 PROCEDURE — 2500000003 HC RX 250 WO HCPCS: Performed by: NURSE ANESTHETIST, CERTIFIED REGISTERED

## 2024-12-09 PROCEDURE — 7100000010 HC PHASE II RECOVERY - FIRST 15 MIN: Performed by: UROLOGY

## 2024-12-09 PROCEDURE — 6360000002 HC RX W HCPCS: Performed by: NURSE ANESTHETIST, CERTIFIED REGISTERED

## 2024-12-09 PROCEDURE — 3700000001 HC ADD 15 MINUTES (ANESTHESIA): Performed by: UROLOGY

## 2024-12-09 PROCEDURE — 3600000003 HC SURGERY LEVEL 3 BASE: Performed by: UROLOGY

## 2024-12-09 PROCEDURE — 7100000001 HC PACU RECOVERY - ADDTL 15 MIN: Performed by: UROLOGY

## 2024-12-09 PROCEDURE — C2617 STENT, NON-COR, TEM W/O DEL: HCPCS | Performed by: UROLOGY

## 2024-12-09 PROCEDURE — 6370000000 HC RX 637 (ALT 250 FOR IP): Performed by: UROLOGY

## 2024-12-09 PROCEDURE — 6370000000 HC RX 637 (ALT 250 FOR IP): Performed by: NURSE ANESTHETIST, CERTIFIED REGISTERED

## 2024-12-09 PROCEDURE — 2580000003 HC RX 258: Performed by: UROLOGY

## 2024-12-09 PROCEDURE — 6360000002 HC RX W HCPCS: Performed by: UROLOGY

## 2024-12-09 PROCEDURE — C1769 GUIDE WIRE: HCPCS | Performed by: UROLOGY

## 2024-12-09 PROCEDURE — 2709999900 HC NON-CHARGEABLE SUPPLY: Performed by: UROLOGY

## 2024-12-09 PROCEDURE — 94761 N-INVAS EAR/PLS OXIMETRY MLT: CPT

## 2024-12-09 PROCEDURE — 7100000011 HC PHASE II RECOVERY - ADDTL 15 MIN: Performed by: UROLOGY

## 2024-12-09 DEVICE — URETERAL STENT WITH SIDE HOLES 8FX24CM
Type: IMPLANTABLE DEVICE | Site: URETER | Status: FUNCTIONAL
Brand: TRIA™ FIRM

## 2024-12-09 RX ORDER — SODIUM CHLORIDE 9 MG/ML
INJECTION, SOLUTION INTRAVENOUS PRN
Status: DISCONTINUED | OUTPATIENT
Start: 2024-12-09 | End: 2024-12-09 | Stop reason: HOSPADM

## 2024-12-09 RX ORDER — PHENYLEPHRINE HCL IN 0.9% NACL 1 MG/10 ML
SYRINGE (ML) INTRAVENOUS
Status: DISCONTINUED | OUTPATIENT
Start: 2024-12-09 | End: 2024-12-09 | Stop reason: SDUPTHER

## 2024-12-09 RX ORDER — ONDANSETRON 2 MG/ML
4 INJECTION INTRAMUSCULAR; INTRAVENOUS
Status: DISCONTINUED | OUTPATIENT
Start: 2024-12-09 | End: 2024-12-09 | Stop reason: HOSPADM

## 2024-12-09 RX ORDER — TAMSULOSIN HYDROCHLORIDE 0.4 MG/1
0.4 CAPSULE ORAL DAILY
Qty: 90 CAPSULE | Refills: 4 | Status: SHIPPED | OUTPATIENT
Start: 2024-12-09

## 2024-12-09 RX ORDER — KETAMINE HCL 50MG/ML(1)
SYRINGE (ML) INTRAVENOUS
Status: DISCONTINUED | OUTPATIENT
Start: 2024-12-09 | End: 2024-12-09 | Stop reason: SDUPTHER

## 2024-12-09 RX ORDER — NALOXONE HYDROCHLORIDE 0.4 MG/ML
INJECTION, SOLUTION INTRAMUSCULAR; INTRAVENOUS; SUBCUTANEOUS PRN
Status: DISCONTINUED | OUTPATIENT
Start: 2024-12-09 | End: 2024-12-09 | Stop reason: HOSPADM

## 2024-12-09 RX ORDER — SODIUM CHLORIDE, SODIUM LACTATE, POTASSIUM CHLORIDE, CALCIUM CHLORIDE 600; 310; 30; 20 MG/100ML; MG/100ML; MG/100ML; MG/100ML
INJECTION, SOLUTION INTRAVENOUS CONTINUOUS
Status: DISCONTINUED | OUTPATIENT
Start: 2024-12-09 | End: 2024-12-09 | Stop reason: HOSPADM

## 2024-12-09 RX ORDER — PROPOFOL 10 MG/ML
INJECTION, EMULSION INTRAVENOUS
Status: DISCONTINUED | OUTPATIENT
Start: 2024-12-09 | End: 2024-12-09 | Stop reason: SDUPTHER

## 2024-12-09 RX ORDER — PROCHLORPERAZINE EDISYLATE 5 MG/ML
5 INJECTION INTRAMUSCULAR; INTRAVENOUS
Status: DISCONTINUED | OUTPATIENT
Start: 2024-12-09 | End: 2024-12-09 | Stop reason: HOSPADM

## 2024-12-09 RX ORDER — FENTANYL CITRATE 50 UG/ML
INJECTION, SOLUTION INTRAMUSCULAR; INTRAVENOUS
Status: DISCONTINUED | OUTPATIENT
Start: 2024-12-09 | End: 2024-12-09 | Stop reason: SDUPTHER

## 2024-12-09 RX ORDER — OXYCODONE AND ACETAMINOPHEN 5; 325 MG/1; MG/1
1 TABLET ORAL EVERY 6 HOURS PRN
Status: DISCONTINUED | OUTPATIENT
Start: 2024-12-09 | End: 2024-12-09 | Stop reason: HOSPADM

## 2024-12-09 RX ORDER — LEVOFLOXACIN 250 MG/1
250 TABLET, FILM COATED ORAL DAILY
Qty: 7 TABLET | Refills: 0 | Status: SHIPPED | OUTPATIENT
Start: 2024-12-09 | End: 2024-12-16

## 2024-12-09 RX ORDER — SODIUM CHLORIDE 0.9 % (FLUSH) 0.9 %
5-40 SYRINGE (ML) INJECTION PRN
Status: DISCONTINUED | OUTPATIENT
Start: 2024-12-09 | End: 2024-12-09 | Stop reason: HOSPADM

## 2024-12-09 RX ORDER — DEXAMETHASONE SODIUM PHOSPHATE 4 MG/ML
INJECTION, SOLUTION INTRA-ARTICULAR; INTRALESIONAL; INTRAMUSCULAR; INTRAVENOUS; SOFT TISSUE
Status: DISCONTINUED | OUTPATIENT
Start: 2024-12-09 | End: 2024-12-09 | Stop reason: SDUPTHER

## 2024-12-09 RX ORDER — FAMOTIDINE 20 MG/1
20 TABLET, FILM COATED ORAL ONCE
Status: COMPLETED | OUTPATIENT
Start: 2024-12-09 | End: 2024-12-09

## 2024-12-09 RX ORDER — ONDANSETRON 2 MG/ML
INJECTION INTRAMUSCULAR; INTRAVENOUS
Status: DISCONTINUED | OUTPATIENT
Start: 2024-12-09 | End: 2024-12-09 | Stop reason: SDUPTHER

## 2024-12-09 RX ORDER — FENTANYL CITRATE 50 UG/ML
25 INJECTION, SOLUTION INTRAMUSCULAR; INTRAVENOUS EVERY 5 MIN PRN
Status: COMPLETED | OUTPATIENT
Start: 2024-12-09 | End: 2024-12-09

## 2024-12-09 RX ORDER — INSULIN LISPRO 100 [IU]/ML
0-4 INJECTION, SOLUTION INTRAVENOUS; SUBCUTANEOUS
Status: DISCONTINUED | OUTPATIENT
Start: 2024-12-09 | End: 2024-12-09 | Stop reason: SDUPTHER

## 2024-12-09 RX ORDER — MIDAZOLAM HYDROCHLORIDE 1 MG/ML
INJECTION, SOLUTION INTRAMUSCULAR; INTRAVENOUS
Status: DISCONTINUED | OUTPATIENT
Start: 2024-12-09 | End: 2024-12-09 | Stop reason: SDUPTHER

## 2024-12-09 RX ORDER — LIDOCAINE HYDROCHLORIDE 20 MG/ML
INJECTION, SOLUTION EPIDURAL; INFILTRATION; INTRACAUDAL; PERINEURAL
Status: DISCONTINUED | OUTPATIENT
Start: 2024-12-09 | End: 2024-12-09 | Stop reason: SDUPTHER

## 2024-12-09 RX ORDER — SODIUM CHLORIDE 0.9 % (FLUSH) 0.9 %
5-40 SYRINGE (ML) INJECTION EVERY 12 HOURS SCHEDULED
Status: DISCONTINUED | OUTPATIENT
Start: 2024-12-09 | End: 2024-12-09 | Stop reason: HOSPADM

## 2024-12-09 RX ORDER — OXYCODONE AND ACETAMINOPHEN 5; 325 MG/1; MG/1
1 TABLET ORAL EVERY 6 HOURS PRN
Qty: 12 TABLET | Refills: 0 | Status: SHIPPED | OUTPATIENT
Start: 2024-12-09 | End: 2024-12-12

## 2024-12-09 RX ORDER — ACETAMINOPHEN 325 MG/1
650 TABLET ORAL
Status: DISCONTINUED | OUTPATIENT
Start: 2024-12-09 | End: 2024-12-09 | Stop reason: HOSPADM

## 2024-12-09 RX ORDER — INSULIN LISPRO 100 [IU]/ML
0-4 INJECTION, SOLUTION INTRAVENOUS; SUBCUTANEOUS EVERY 6 HOURS SCHEDULED
Status: DISCONTINUED | OUTPATIENT
Start: 2024-12-09 | End: 2024-12-09 | Stop reason: HOSPADM

## 2024-12-09 RX ORDER — DEXTROSE MONOHYDRATE 100 MG/ML
INJECTION, SOLUTION INTRAVENOUS CONTINUOUS PRN
Status: DISCONTINUED | OUTPATIENT
Start: 2024-12-09 | End: 2024-12-09 | Stop reason: HOSPADM

## 2024-12-09 RX ORDER — IODIXANOL 320 MG/ML
INJECTION, SOLUTION INTRAVASCULAR PRN
Status: DISCONTINUED | OUTPATIENT
Start: 2024-12-09 | End: 2024-12-09 | Stop reason: ALTCHOICE

## 2024-12-09 RX ADMIN — FENTANYL CITRATE 25 MCG: 0.05 INJECTION, SOLUTION INTRAMUSCULAR; INTRAVENOUS at 11:33

## 2024-12-09 RX ADMIN — SODIUM CHLORIDE, POTASSIUM CHLORIDE, SODIUM LACTATE AND CALCIUM CHLORIDE: 600; 310; 30; 20 INJECTION, SOLUTION INTRAVENOUS at 08:24

## 2024-12-09 RX ADMIN — FENTANYL CITRATE 50 MCG: 50 INJECTION INTRAMUSCULAR; INTRAVENOUS at 10:13

## 2024-12-09 RX ADMIN — OXYCODONE HYDROCHLORIDE AND ACETAMINOPHEN 1 TABLET: 5; 325 TABLET ORAL at 12:08

## 2024-12-09 RX ADMIN — FENTANYL CITRATE 25 MCG: 0.05 INJECTION, SOLUTION INTRAMUSCULAR; INTRAVENOUS at 11:40

## 2024-12-09 RX ADMIN — DEXAMETHASONE SODIUM PHOSPHATE 4 MG: 4 INJECTION INTRA-ARTICULAR; INTRALESIONAL; INTRAMUSCULAR; INTRAVENOUS; SOFT TISSUE at 10:07

## 2024-12-09 RX ADMIN — Medication 100 MCG: at 10:21

## 2024-12-09 RX ADMIN — ONDANSETRON 4 MG: 2 INJECTION, SOLUTION INTRAMUSCULAR; INTRAVENOUS at 10:28

## 2024-12-09 RX ADMIN — FENTANYL CITRATE 50 MCG: 50 INJECTION INTRAMUSCULAR; INTRAVENOUS at 09:57

## 2024-12-09 RX ADMIN — Medication 100 MCG: at 10:10

## 2024-12-09 RX ADMIN — LIDOCAINE HYDROCHLORIDE 60 MG: 20 INJECTION, SOLUTION EPIDURAL; INFILTRATION; INTRACAUDAL; PERINEURAL at 09:54

## 2024-12-09 RX ADMIN — Medication 200 MCG: at 10:27

## 2024-12-09 RX ADMIN — FAMOTIDINE 20 MG: 20 TABLET ORAL at 08:25

## 2024-12-09 RX ADMIN — Medication 25 MG: at 10:01

## 2024-12-09 RX ADMIN — CEFTRIAXONE 1000 MG: 1 INJECTION, POWDER, FOR SOLUTION INTRAMUSCULAR; INTRAVENOUS at 09:59

## 2024-12-09 RX ADMIN — PROPOFOL 130 MG: 10 INJECTION, EMULSION INTRAVENOUS at 09:54

## 2024-12-09 RX ADMIN — PROPOFOL 50 MG: 10 INJECTION, EMULSION INTRAVENOUS at 10:29

## 2024-12-09 RX ADMIN — MIDAZOLAM 2 MG: 1 INJECTION, SOLUTION INTRAMUSCULAR; INTRAVENOUS at 09:49

## 2024-12-09 RX ADMIN — FENTANYL CITRATE 50 MCG: 50 INJECTION INTRAMUSCULAR; INTRAVENOUS at 09:59

## 2024-12-09 ASSESSMENT — PAIN - FUNCTIONAL ASSESSMENT
PAIN_FUNCTIONAL_ASSESSMENT: 0-10
PAIN_FUNCTIONAL_ASSESSMENT: ACTIVITIES ARE NOT PREVENTED
PAIN_FUNCTIONAL_ASSESSMENT: 0-10
PAIN_FUNCTIONAL_ASSESSMENT: ACTIVITIES ARE NOT PREVENTED

## 2024-12-09 ASSESSMENT — PAIN SCALES - GENERAL
PAINLEVEL_OUTOF10: 0
PAINLEVEL_OUTOF10: 4
PAINLEVEL_OUTOF10: 0
PAINLEVEL_OUTOF10: 0
PAINLEVEL_OUTOF10: 5
PAINLEVEL_OUTOF10: 0

## 2024-12-09 ASSESSMENT — PAIN DESCRIPTION - ORIENTATION
ORIENTATION: ANTERIOR;LEFT
ORIENTATION: LEFT

## 2024-12-09 ASSESSMENT — PAIN DESCRIPTION - LOCATION
LOCATION: FLANK
LOCATION: ABDOMEN

## 2024-12-09 ASSESSMENT — PAIN DESCRIPTION - DESCRIPTORS
DESCRIPTORS: CRAMPING
DESCRIPTORS: PRESSURE

## 2024-12-09 NOTE — OP NOTE
Operative Note  Patient: Regla Floyd               Sex: female             MRN: [id number]  YOB: 1960      Age:  63 y.o.              Preoperative Diagnosis: Hydronephrosis, unspecified hydronephrosis type [N13.30]  Postoperative Diagnosis:  * No post-op diagnosis entered *  Surgeon: Bubba Jenkins     Indication: This is a 62 y/o woman initilly presented with bilateral hydro and renal failure, castro placed, hydro on right improved but not on left.  Cystogram showed right reflux, no reflux on left.  Cysto negative. Mag 3 showed obstruction on left side.  She is here today for cysto, cystogram, diagnostic URS, proceed as indicated. UDS showes compliant bladder, low capacity and underactive.  She also has a urethral diverticulum found on MRI.      Procedure:    1) Cystoscopy and Cystogram   2) Left Retrograde pyelogram with interpretation  3) < 1 hour physician fluoroscopy imaging interpretation time  4) Left side Ureteroscopy  5) JJ ureteral stent placement   6) Transurethral resection of bladder lesion, ~1cm on left lateral wall   7) Castro catheter placement     Findings:    1).Cysto with likely inflammatory tissue on left side ~1cm in size.  Cystogram with right reflux, nothing on left.  Os to Urethral diverticulum visible at 5oclock on urethroscopy.    2). Retrograde pyelograms without evidence of hydronephrosis or filling defects but did have full 360 degree proximal left ureteral turn - straightened with wire and tigertail   3). TURBT left small - 1 cm, path sent   4). 8x24 JJ stent placed on left TIED to castro   5) 18fr castro     Narrative of events: The patient was brought to the operative suite. Anesthesia was induced and preoperative antibiotics were administered. They were then placed in the dorsal lithotomy position and their external genitalia was prepped and draped in the usual fashion. A surgical timeout was performed confirming the patient's name, date of birth, laterality, and

## 2024-12-09 NOTE — PERIOP NOTE
Patient /Family /Designee has been informed that [facility] is not responsible for patient belongings per policy and the signed [facility] Patient Agreement document.  Personal items should be sent home or checked in with security.  Patient /Family /Designee selected the following action:                            []  Send personal items home with a family member or friend                                                 []  Check in personal items with security, excluding clothing                            [x]  Maintain personal items at the bedside, against recommendation                                 by Milo Bond [facility]                                 Items sent to PACU   
  17. TWO VISITORS will be allowed in the waiting area during your surgery.  Exceptions may be made for surgical admissions, per nursing unit guidelines      Special Instructions:      Bring a list of CURRENT medications.  Follow instructions from the office regarding Blood Thinners and/or Insulin. Eliquis  Follow instructions from the office regarding medications to take the morning of surgery.         If you have a history of recreational drug use, you may be required to submit a urine sample for drug testing the day of your procedure, as some recreational drugs can interact with anesthetics and increase your surgical risk.    On day of surgery if you are running late, unable to make procedure time, or sick, please call the Pre-op department at [phone]    These surgical instructions were reviewed with Regla Floyd during the PAT phone call.

## 2024-12-09 NOTE — DISCHARGE INSTRUCTIONS
Discharge Instructions      Blood thinners: Ok restart Eliquis as needed     Catheter: The castro is tied to the stent. Plan will be remove castro + stent same time next Monday.      Antibiotics: 7 more days of antibiotics     ADDITIONAL INFORMATION:   You have indwelling ureteral stents which frequently causes slight discomfort in the flank region and bladder spasms.  If you are bothered by these symptoms, please contact our office and we can presribe you flomax as needed for flank discomfort or Ditropan for bladder spasms.  The indwelling stent will need to be removed/exchanged as directed below.  If the stent is left in place without appropriate follow-up, it may become encrusted with stone and/or infected.   If that occurs, you will require multiple additional surgeries to fix this.  It is very important you follow up for appropriate removal or exchange of ureteral stents.      If you experience any fevers > 100.4, significant nausea/vomiting, or significant worsening of pain, please contact us at the number below.  It is common to experience mild, recurrent blood in your urine and this is likely due to stent irritation.  If the bleeding continues to worsen with passage of clots, you are unable to urinate, or you experience significant light-headedness, please contact us at the number below.       FOLLOW UP CARE:  You have an appt in 1 week for stent/castro removal.  (Stent is tied to castro on a string)     You have a return appointment with Dr. Jenkins in 2 weeks for review of pathology and discussion of next steps.

## 2024-12-09 NOTE — ANESTHESIA PRE PROCEDURE
Department of Anesthesiology  Preprocedure Note       Name:  Regla Floyd   Age:  63 y.o.  :  1960                                          MRN:  [id number]         Date:  2024      Surgeon: Surgeon(s):  Bubba Jenkins MD    Procedure: Procedure(s):  CYSTOSCOPY, CYSTOGRAM, BILATERAL DIAGNOSTIC URETEROSCOPY, BILATERAL DOUBLE J STENTS; POSSIBLE LASER ABLATION; C-ARM; HOLMIUM    Medications prior to admission:   Prior to Admission medications    Medication Sig Start Date End Date Taking? Authorizing Provider   levoFLOXacin (LEVAQUIN) 500 MG tablet Take 1 tablet by mouth daily for 7 days 24  Bubba Jenkins MD   rosuvastatin (CRESTOR) 10 MG tablet Take 1 tablet by mouth daily 24   Obi Thornton MD   metFORMIN (GLUCOPHAGE-XR) 750 MG extended release tablet Take 1 tablet by mouth 2 times daily 24   Obi Thornton MD   dulaglutide (TRULICITY) 1.5 MG/0.5ML SC injection INJECT 0.5 MLS INTO THE SKIN EVERY SEVEN DAYS 24   Obi Thornton MD   chlorthalidone (HYGROTON) 25 MG tablet Take 1 tablet by mouth daily 24   Obi Thornton MD   hydroCHLOROthiazide (HYDRODIURIL) 25 MG tablet  24   Zeina Victoria MD   Melatonin 10 MG TABS Take 10 mg by mouth nightly as needed (sleep) 24   Obi Thornton MD   famotidine (PEPCID) 20 MG tablet Take 1 tablet by mouth in the morning and 1 tablet in the evening. 22   Zeina Victoria MD   sucralfate (CARAFATE) 1 GM tablet TAKE 1 TABLET BY MOUTH 4 TIMES DAILY 23   Obi Thornton MD   metoclopramide (REGLAN) 10 MG tablet Take 1 tablet by mouth 3 times daily (with meals) 23   Obi Thornton MD   ondansetron (ZOFRAN) 4 MG tablet Take 1 tablet by mouth 3 times daily as needed for Nausea or Vomiting 23   Obi Thornton MD   omeprazole (PRILOSEC) 20 MG delayed release capsule TAKE 1 CAPSULE BY MOUTH DAILY. 23   Hillary, Obi T, MD   ondansetron (ZOFRAN-ODT) 4 MG disintegrating

## 2024-12-09 NOTE — ANESTHESIA POSTPROCEDURE EVALUATION
Department of Anesthesiology  Postprocedure Note    Patient: Regla Floyd  MRN: [id number]  YOB: 1960  Date of evaluation: 12/9/2024    Procedure Summary       Date: 12/09/24 Room / Location: [facility] MAIN 08 / [facility] OR    Anesthesia Start: 0949 Anesthesia Stop: 1103    Procedure: CYSTOSCOPY, CYSTOGRAM, left  DIAGNOSTIC URETEROSCOPY, left  DOUBLE J STENT;bladder biopsy and fulguration C-ARM; HOLMIUM (Bilateral: Ureter) Diagnosis:       Hydronephrosis, unspecified hydronephrosis type      (Hydronephrosis, unspecified hydronephrosis type [N13.30])    Surgeons: Bubba Jenkins MD Responsible Provider: Abram Lira MD    Anesthesia Type: General ASA Status: 3            Anesthesia Type: General    Chaitanya Phase I: Chaitanya Score: 10    Chaitanya Phase II: Chaitanya Score: 10    Anesthesia Post Evaluation    Patient location during evaluation: bedside  Patient participation: complete - patient participated  Level of consciousness: responsive to verbal stimuli  Airway patency: patent  Nausea & Vomiting: no nausea  Respiratory status: acceptable  Hydration status: euvolemic    No notable events documented.

## 2024-12-09 NOTE — INTERVAL H&P NOTE
Update History & Physical    The patient's History and Physical of November 27, Progress was reviewed with the patient and I examined the patient. There was no change. The surgical site was confirmed by the patient and me.     Plan: The risks, benefits, expected outcome, and alternative to the recommended procedure have been discussed with the patient. Patient understands and wants to proceed with the procedure.     Electronically signed by Bubba Jenkins MD on 12/9/2024 at 9:15 AM

## 2024-12-26 ENCOUNTER — TELEPHONE (OUTPATIENT)
Facility: CLINIC | Age: 64
End: 2024-12-26

## 2024-12-27 DIAGNOSIS — Z86.79 HISTORY OF IRREGULAR HEARTBEAT: Primary | ICD-10-CM

## 2024-12-27 DIAGNOSIS — I82.4Y1 ACUTE DEEP VEIN THROMBOSIS (DVT) OF PROXIMAL VEIN OF RIGHT LOWER EXTREMITY (HCC): ICD-10-CM

## 2024-12-27 DIAGNOSIS — E11.69 TYPE 2 DIABETES MELLITUS WITH OTHER SPECIFIED COMPLICATION, WITH LONG-TERM CURRENT USE OF INSULIN (HCC): ICD-10-CM

## 2024-12-27 DIAGNOSIS — Z79.4 TYPE 2 DIABETES MELLITUS WITH OTHER SPECIFIED COMPLICATION, WITH LONG-TERM CURRENT USE OF INSULIN (HCC): ICD-10-CM

## 2024-12-27 RX ORDER — METFORMIN HYDROCHLORIDE 750 MG/1
750 TABLET, EXTENDED RELEASE ORAL 2 TIMES DAILY
Qty: 180 TABLET | Refills: 3 | Status: SHIPPED | OUTPATIENT
Start: 2024-12-27

## 2025-02-17 DIAGNOSIS — E11.69 TYPE 2 DIABETES MELLITUS WITH OTHER SPECIFIED COMPLICATION, WITH LONG-TERM CURRENT USE OF INSULIN: ICD-10-CM

## 2025-02-17 DIAGNOSIS — Z79.4 TYPE 2 DIABETES MELLITUS WITH OTHER SPECIFIED COMPLICATION, WITH LONG-TERM CURRENT USE OF INSULIN: ICD-10-CM

## 2025-03-06 RX ORDER — DULAGLUTIDE 1.5 MG/.5ML
INJECTION, SOLUTION SUBCUTANEOUS
Qty: 12 ML | Refills: 9 | OUTPATIENT
Start: 2025-03-06

## 2025-06-03 ENCOUNTER — COMMUNITY OUTREACH (OUTPATIENT)
Facility: CLINIC | Age: 65
End: 2025-06-03

## 2025-08-07 ENCOUNTER — TRANSCRIBE ORDERS (OUTPATIENT)
Facility: HOSPITAL | Age: 65
End: 2025-08-07

## 2025-08-07 DIAGNOSIS — R42 DIZZINESS AND GIDDINESS: ICD-10-CM

## 2025-08-07 DIAGNOSIS — H81.13 BENIGN PAROXYSMAL VERTIGO, BILATERAL: Primary | ICD-10-CM

## 2025-08-22 ENCOUNTER — HOSPITAL ENCOUNTER (OUTPATIENT)
Facility: HOSPITAL | Age: 65
Discharge: HOME | End: 2025-08-25
Payer: MEDICAID

## 2025-08-22 DIAGNOSIS — H81.13 BENIGN PAROXYSMAL VERTIGO, BILATERAL: ICD-10-CM

## 2025-08-22 DIAGNOSIS — R42 DIZZINESS AND GIDDINESS: ICD-10-CM

## 2025-08-22 LAB — CREAT UR-MCNC: 1.5 MG/DL (ref 0.6–1.3)

## 2025-08-22 PROCEDURE — 6360000004 HC RX CONTRAST MEDICATION: Performed by: INTERNAL MEDICINE

## 2025-08-22 PROCEDURE — 70470 CT HEAD/BRAIN W/O & W/DYE: CPT

## 2025-08-22 PROCEDURE — 82565 ASSAY OF CREATININE: CPT

## 2025-08-22 RX ORDER — IODIXANOL 320 MG/ML
100 INJECTION, SOLUTION INTRAVASCULAR
Status: COMPLETED | OUTPATIENT
Start: 2025-08-22 | End: 2025-08-22

## 2025-08-22 RX ADMIN — IODIXANOL 80 ML: 320 INJECTION, SOLUTION INTRAVASCULAR at 15:32

## (undated) DEVICE — GOWN,PREVENTION PLUS,XLN/XL,ST,24/CS: Brand: MEDLINE

## (undated) DEVICE — TUBING, SUCTION, 3/16" X 12', STRAIGHT: Brand: MEDLINE

## (undated) DEVICE — STER SINGLE BASIN SET W/BOWLS: Brand: CARDINAL HEALTH

## (undated) DEVICE — CYSTO PACK MARYVIEW: Brand: MEDLINE INDUSTRIES, INC.

## (undated) DEVICE — TOWEL,OR,DSP,ST,BLUE,STD,4/PK,20PK/CS: Brand: MEDLINE

## (undated) DEVICE — SOLUTION IRRIG 1000ML 0.9% SOD CHL USP POUR PLAS BTL

## (undated) DEVICE — GUIDEWIRE ENDOSCP L150CM DIA0.035IN TIP 3CM PTFE NIT

## (undated) DEVICE — GAUZE,SPONGE,4"X4",16PLY,STRL,LF,10/TRAY: Brand: MEDLINE

## (undated) DEVICE — CLEAN UP KIT: Brand: MEDLINE INDUSTRIES, INC.

## (undated) DEVICE — TRAY PREP DRY W/ PREM GLV 2 APPL 6 SPNG 2 UNDPD 1 OVERWRAP

## (undated) DEVICE — Device

## (undated) DEVICE — SOLUTION IRRIG 3000ML 0.9% SOD CHL FLX CONT 0797208] ICU MEDICAL INC]

## (undated) DEVICE — SPECIMEN COLLECTION 4-PORT MANIFOLD: Brand: NEPTUNE 2

## (undated) DEVICE — SYRINGE MED 10ML LUERLOCK TIP W/O SFTY DISP

## (undated) DEVICE — JELLY,LUBE,STERILE,FLIP TOP,TUBE,4-OZ: Brand: MEDLINE